# Patient Record
Sex: FEMALE | Race: WHITE | Employment: OTHER | URBAN - METROPOLITAN AREA
[De-identification: names, ages, dates, MRNs, and addresses within clinical notes are randomized per-mention and may not be internally consistent; named-entity substitution may affect disease eponyms.]

---

## 2017-02-07 ENCOUNTER — TELEPHONE (OUTPATIENT)
Dept: INTERNAL MEDICINE CLINIC | Facility: CLINIC | Age: 82
End: 2017-02-07

## 2017-02-10 NOTE — TELEPHONE ENCOUNTER
Future Appointments  Date Time Provider Tera Conrad   6/15/2017 10:30 AM Samir Lopez MD [facility]

## 2017-04-03 PROBLEM — J44.9 ASTHMA WITH COPD (CHRONIC OBSTRUCTIVE PULMONARY DISEASE): Chronic | Status: ACTIVE | Noted: 2017-04-03

## 2017-04-03 PROBLEM — J44.9 ASTHMA WITH COPD (CHRONIC OBSTRUCTIVE PULMONARY DISEASE) (HCC): Chronic | Status: ACTIVE | Noted: 2017-04-03

## 2017-04-03 PROBLEM — J44.89 ASTHMA WITH COPD (CHRONIC OBSTRUCTIVE PULMONARY DISEASE) (HCC): Chronic | Status: ACTIVE | Noted: 2017-04-03

## 2017-04-03 PROBLEM — J44.89 ASTHMA WITH COPD (CHRONIC OBSTRUCTIVE PULMONARY DISEASE): Chronic | Status: ACTIVE | Noted: 2017-04-03

## 2017-04-03 RX ORDER — ATORVASTATIN CALCIUM 10 MG/1
10 TABLET, FILM COATED ORAL DAILY
Qty: 90 TABLET | Refills: 1 | Status: SHIPPED | OUTPATIENT
Start: 2017-04-03 | End: 2017-09-21

## 2017-04-19 RX ORDER — LOSARTAN POTASSIUM AND HYDROCHLOROTHIAZIDE 12.5; 1 MG/1; MG/1
TABLET ORAL
Qty: 90 TABLET | Refills: 0 | Status: SHIPPED | OUTPATIENT
Start: 2017-04-19 | End: 2017-07-14

## 2017-05-23 ENCOUNTER — PATIENT MESSAGE (OUTPATIENT)
Dept: INTERNAL MEDICINE CLINIC | Facility: CLINIC | Age: 82
End: 2017-05-23

## 2017-05-23 DIAGNOSIS — I10 ESSENTIAL HYPERTENSION WITH GOAL BLOOD PRESSURE LESS THAN 140/90: ICD-10-CM

## 2017-05-23 DIAGNOSIS — Z00.00 ROUTINE GENERAL MEDICAL EXAMINATION AT A HEALTH CARE FACILITY: Primary | ICD-10-CM

## 2017-05-23 DIAGNOSIS — E78.00 PURE HYPERCHOLESTEROLEMIA: ICD-10-CM

## 2017-05-24 NOTE — TELEPHONE ENCOUNTER
MA supervisit on 6/15/17- await OV to discuss labs or order CPE labs per protocol? Please advise. Thank you.

## 2017-05-24 NOTE — TELEPHONE ENCOUNTER
FW: Other        From     Cathryn Crowley MD      To     [facility] Clinical Staff      Sent     5/24/2017  9:43 AM            Labs per protocol       CPE labs ordered per protocol.

## 2017-05-24 NOTE — TELEPHONE ENCOUNTER
From: Hortencia Hunter  To: Andres Barrow MD  Sent: 5/23/2017 9:28 AM CDT  Subject: Other    I am coming in for a yearly physical on Sita 15. Do I need to get blood work done before the visit.   If so please send instructions where to go and do I need an ap

## 2017-05-30 ENCOUNTER — MED REC SCAN ONLY (OUTPATIENT)
Dept: INTERNAL MEDICINE CLINIC | Facility: CLINIC | Age: 82
End: 2017-05-30

## 2017-05-31 ENCOUNTER — LAB ENCOUNTER (OUTPATIENT)
Dept: LAB | Age: 82
End: 2017-05-31
Attending: INTERNAL MEDICINE
Payer: MEDICARE

## 2017-05-31 DIAGNOSIS — I10 ESSENTIAL HYPERTENSION WITH GOAL BLOOD PRESSURE LESS THAN 140/90: ICD-10-CM

## 2017-05-31 DIAGNOSIS — E78.00 PURE HYPERCHOLESTEROLEMIA: ICD-10-CM

## 2017-05-31 DIAGNOSIS — Z00.00 ROUTINE GENERAL MEDICAL EXAMINATION AT A HEALTH CARE FACILITY: ICD-10-CM

## 2017-05-31 PROCEDURE — 84443 ASSAY THYROID STIM HORMONE: CPT | Performed by: INTERNAL MEDICINE

## 2017-05-31 PROCEDURE — 80053 COMPREHEN METABOLIC PANEL: CPT | Performed by: INTERNAL MEDICINE

## 2017-05-31 PROCEDURE — 85025 COMPLETE CBC W/AUTO DIFF WBC: CPT | Performed by: INTERNAL MEDICINE

## 2017-05-31 PROCEDURE — 80061 LIPID PANEL: CPT | Performed by: INTERNAL MEDICINE

## 2017-05-31 PROCEDURE — 36415 COLL VENOUS BLD VENIPUNCTURE: CPT | Performed by: INTERNAL MEDICINE

## 2017-06-15 ENCOUNTER — TELEPHONE (OUTPATIENT)
Dept: INTERNAL MEDICINE CLINIC | Facility: CLINIC | Age: 82
End: 2017-06-15

## 2017-06-15 ENCOUNTER — OFFICE VISIT (OUTPATIENT)
Dept: INTERNAL MEDICINE CLINIC | Facility: CLINIC | Age: 82
End: 2017-06-15

## 2017-06-15 ENCOUNTER — MA CHART PREP (OUTPATIENT)
Dept: FAMILY MEDICINE CLINIC | Facility: CLINIC | Age: 82
End: 2017-06-15

## 2017-06-15 VITALS
DIASTOLIC BLOOD PRESSURE: 60 MMHG | RESPIRATION RATE: 16 BRPM | WEIGHT: 173 LBS | BODY MASS INDEX: 29.53 KG/M2 | TEMPERATURE: 98 F | HEART RATE: 68 BPM | SYSTOLIC BLOOD PRESSURE: 118 MMHG | HEIGHT: 64 IN

## 2017-06-15 DIAGNOSIS — M48.061 SPINAL STENOSIS OF LUMBAR REGION: ICD-10-CM

## 2017-06-15 DIAGNOSIS — M43.16 SPONDYLOLISTHESIS OF LUMBAR REGION: ICD-10-CM

## 2017-06-15 DIAGNOSIS — J45.20 MILD INTERMITTENT ASTHMA WITHOUT COMPLICATION: ICD-10-CM

## 2017-06-15 DIAGNOSIS — M17.11 ARTHRITIS OF RIGHT KNEE: ICD-10-CM

## 2017-06-15 DIAGNOSIS — I10 ESSENTIAL HYPERTENSION: ICD-10-CM

## 2017-06-15 DIAGNOSIS — I77.810 AORTIC ROOT DILATION (HCC): ICD-10-CM

## 2017-06-15 DIAGNOSIS — M47.816 LUMBAR SPONDYLOSIS: ICD-10-CM

## 2017-06-15 DIAGNOSIS — Z96.651 S/P TKR (TOTAL KNEE REPLACEMENT) USING CEMENT, RIGHT: ICD-10-CM

## 2017-06-15 DIAGNOSIS — J44.9 ASTHMA WITH COPD (CHRONIC OBSTRUCTIVE PULMONARY DISEASE) (HCC): Chronic | ICD-10-CM

## 2017-06-15 DIAGNOSIS — Z00.00 MEDICARE ANNUAL WELLNESS VISIT, SUBSEQUENT: Primary | ICD-10-CM

## 2017-06-15 DIAGNOSIS — R73.02 GLUCOSE INTOLERANCE (IMPAIRED GLUCOSE TOLERANCE): ICD-10-CM

## 2017-06-15 DIAGNOSIS — R73.03 PRE-DIABETES: ICD-10-CM

## 2017-06-15 DIAGNOSIS — M47.816 LUMBAR ARTHROPATHY: ICD-10-CM

## 2017-06-15 DIAGNOSIS — E78.00 PURE HYPERCHOLESTEROLEMIA: ICD-10-CM

## 2017-06-15 DIAGNOSIS — M54.16 LUMBAR RADICULITIS: ICD-10-CM

## 2017-06-15 DIAGNOSIS — M51.36 DDD (DEGENERATIVE DISC DISEASE), LUMBAR: ICD-10-CM

## 2017-06-15 DIAGNOSIS — Z00.00 ENCOUNTER FOR ANNUAL HEALTH EXAMINATION: ICD-10-CM

## 2017-06-15 PROCEDURE — 96160 PT-FOCUSED HLTH RISK ASSMT: CPT | Performed by: INTERNAL MEDICINE

## 2017-06-15 PROCEDURE — G0439 PPPS, SUBSEQ VISIT: HCPCS | Performed by: INTERNAL MEDICINE

## 2017-06-15 PROCEDURE — 99397 PER PM REEVAL EST PAT 65+ YR: CPT | Performed by: INTERNAL MEDICINE

## 2017-06-15 RX ORDER — ZOLPIDEM TARTRATE 10 MG/1
TABLET ORAL
Qty: 30 TABLET | Refills: 0 | Status: SHIPPED | OUTPATIENT
Start: 2017-06-15 | End: 2017-06-15

## 2017-06-15 NOTE — PROGRESS NOTES
HPI:   Nicolas Goldman is a 80year old female who presents for a MA (Medicare Advantage) [facility] (Once per calendar year). Here for supervisit. Pt notes persistent right knee pain since replacement, would like second opinion.    Her last annual asses route daily. Ipratropium Bromide (ATROVENT) 0.03 % Nasal Solution 2 sprays by Nasal route every 12 (twelve) hours.    fluticasone-salmeterol (ADVAIR DISKUS) 250-50 MCG/DOSE Inhalation Aerosol Powder, Breath Activated Inhale 1 puff into the lungs every 12 illicit drugs.      REVIEW OF SYSTEMS:   GENERAL: feels well otherwise  SKIN: denies any unusual skin lesions  EYES: denies blurred vision or double vision  HEENT: denies nasal congestion, sinus pain or ST  LUNGS: denies shortness of breath with exertion  C Extremities normal, atraumatic, no cyanosis or edema   Pulses: 2+ and symmetric   Skin: Skin color, texture, turgor normal, no rashes or lesions   Lymph nodes: Cervical, supraclavicular, and axillary nodes normal   Neurologic: Normal       SUGGESTED VACCIN 10 MG Oral Tab; TAKE 1 TABLET BY MOUTH AT BEDTIME, AS NEEDED         Ms. Coy already takes aspirin and has it on her medication list.     Diet assessment: good     Advanced Directive:  Living Will on file in [facility]?   Zack Wilson does not have a Living [facility] have 3 or more medical conditions?: 0-No    Have you fallen in the last 12 months?: 1-Yes    Do you accidently lose urine?: 1-Yes    Do you have difficulty seeing?: 0-No    Do you have any difficulty walking or getting up?: 1-Yes    Do you have any trippin medically necessary Electrocardiogram date09/12/2016       Colorectal Cancer Screening      Colonoscopy Screen every 10 years Colonoscopy,10 Years due on 11/26/1987 Update Health Maintenance if applicable    Flex Sigmoidoscopy Screen every 10 years No resu history found This may be covered with your prescription benefits, but Medicare does not cover unless Medically needed    Zoster  Not covered by Medicare Part B No vaccine history found This may be covered with your pharmacy  prescription benefits        S

## 2017-06-15 NOTE — TELEPHONE ENCOUNTER
Paulie Tam Pharmacist at [facility] notified to reduce Zolpidem to 5mg tablet nightly as needed for sleep #30, no refills. New script phoned in.  AS, please approve.

## 2017-06-15 NOTE — TELEPHONE ENCOUNTER
Sheela Argueta from [facility]. called and they are questioning the dosage(high for patient's age) of the Zolpidem.   Please call

## 2017-06-15 NOTE — PATIENT INSTRUCTIONS
Helen Coy's SCREENING SCHEDULE   Tests on this list are recommended by your physician but may not be covered, or covered at this frequency, by your insurer. Please check with your insurance carrier before scheduling to verify coverage.    PREVENTATIVE Limited to patients who meet one of the following criteria:   • Men who are 73-68 years old and have smoked more than 100 cigarettes in their lifetime   • Anyone with a family history    Colorectal Cancer Screening  Covered up to Age 76     Colonoscopy Scr Immunizations      Influenza  Covered Annually   Orders placed or performed in visit on 10/15/15  -FLU VAC NO PRSV 4 ADAM 3 YRS+    Please get every year    Pneumococcal 13 (Prevnar)  Covered Once after 65   Orders placed or performed in visit on 07/06/15 [place])  [email or web address]  This link also has information from the [address] [place] [address] regarding Advance Directives.

## 2017-06-16 RX ORDER — ZOLPIDEM TARTRATE 5 MG/1
5 TABLET ORAL NIGHTLY PRN
Qty: 30 TABLET | Refills: 0 | OUTPATIENT
Start: 2017-06-16 | End: 2018-09-20

## 2017-07-14 RX ORDER — LOSARTAN POTASSIUM AND HYDROCHLOROTHIAZIDE 12.5; 1 MG/1; MG/1
TABLET ORAL
Qty: 90 TABLET | Refills: 1 | Status: SHIPPED | OUTPATIENT
Start: 2017-07-14 | End: 2018-01-10

## 2017-09-21 RX ORDER — ATORVASTATIN CALCIUM 10 MG/1
TABLET, FILM COATED ORAL
Qty: 90 TABLET | Refills: 0 | Status: SHIPPED | OUTPATIENT
Start: 2017-09-21 | End: 2017-12-19

## 2017-12-19 RX ORDER — ATORVASTATIN CALCIUM 10 MG/1
TABLET, FILM COATED ORAL
Qty: 90 TABLET | Refills: 0 | Status: SHIPPED | OUTPATIENT
Start: 2017-12-19 | End: 2018-03-17

## 2017-12-28 ENCOUNTER — OFFICE VISIT (OUTPATIENT)
Dept: INTERNAL MEDICINE CLINIC | Facility: CLINIC | Age: 82
End: 2017-12-28

## 2017-12-28 VITALS
RESPIRATION RATE: 17 BRPM | HEART RATE: 112 BPM | BODY MASS INDEX: 29 KG/M2 | DIASTOLIC BLOOD PRESSURE: 78 MMHG | SYSTOLIC BLOOD PRESSURE: 128 MMHG | WEIGHT: 170 LBS | OXYGEN SATURATION: 98 % | TEMPERATURE: 98 F

## 2017-12-28 DIAGNOSIS — J45.41 MODERATE PERSISTENT ASTHMA WITH ACUTE EXACERBATION: ICD-10-CM

## 2017-12-28 DIAGNOSIS — I10 ESSENTIAL HYPERTENSION: ICD-10-CM

## 2017-12-28 DIAGNOSIS — J06.9 ACUTE URI: Primary | ICD-10-CM

## 2017-12-28 DIAGNOSIS — R05.9 COUGH: ICD-10-CM

## 2017-12-28 DIAGNOSIS — J44.9 ASTHMA WITH COPD (CHRONIC OBSTRUCTIVE PULMONARY DISEASE) (HCC): Chronic | ICD-10-CM

## 2017-12-28 PROCEDURE — 99214 OFFICE O/P EST MOD 30 MIN: CPT | Performed by: NURSE PRACTITIONER

## 2017-12-28 RX ORDER — PREDNISONE 10 MG/1
TABLET ORAL
Qty: 30 TABLET | Refills: 0 | Status: SHIPPED | OUTPATIENT
Start: 2017-12-28 | End: 2018-04-24

## 2017-12-28 RX ORDER — SULFAMETHOXAZOLE AND TRIMETHOPRIM 800; 160 MG/1; MG/1
1 TABLET ORAL 2 TIMES DAILY
Qty: 20 TABLET | Refills: 0 | Status: SHIPPED | OUTPATIENT
Start: 2017-12-28 | End: 2018-05-18

## 2017-12-28 RX ORDER — CODEINE PHOSPHATE AND GUAIFENESIN 10; 100 MG/5ML; MG/5ML
5 SOLUTION ORAL 3 TIMES DAILY PRN
Qty: 120 ML | Refills: 0 | Status: SHIPPED | OUTPATIENT
Start: 2017-12-28 | End: 2018-04-24

## 2017-12-28 NOTE — PROGRESS NOTES
Arsen Bianchi is a [de-identified]year old female. Patient presents with:  Cough: started off as a cold; however now just has the persistant cough x 2-3 days ROOM 10       HPI:   Presents with 5 day hx of cough and cold. Cold symptoms started early in the week.   Carline Mckeon sprays by Nasal route every 12 (twelve) hours. Disp: 1 Bottle Rfl: 1   fluticasone-salmeterol (ADVAIR DISKUS) 250-50 MCG/DOSE Inhalation Aerosol Powder, Breath Activated Inhale 1 puff into the lungs every 12 (twelve) hours.  Disp: 60 each Rfl: 1   aspirin 8 abdominal pain and denies heartburn, no diarrhea or constipation  MUSCULOSKELETAL:  No arthralgias or myalgias  NEURO: denies headaches,     EXAM:   /78 (BP Location: Right arm, Patient Position: Sitting, Cuff Size: adult)   Pulse 112   Temp 98.4 °F

## 2018-01-10 RX ORDER — LOSARTAN POTASSIUM AND HYDROCHLOROTHIAZIDE 12.5; 1 MG/1; MG/1
TABLET ORAL
Qty: 90 TABLET | Refills: 0 | Status: SHIPPED | OUTPATIENT
Start: 2018-01-10 | End: 2018-04-06

## 2018-01-10 RX ORDER — ATORVASTATIN CALCIUM 10 MG/1
TABLET, FILM COATED ORAL
Qty: 90 TABLET | Refills: 0 | Status: SHIPPED | OUTPATIENT
Start: 2018-01-10 | End: 2018-03-19

## 2018-03-07 ENCOUNTER — TELEPHONE (OUTPATIENT)
Dept: INTERNAL MEDICINE CLINIC | Facility: CLINIC | Age: 83
End: 2018-03-07

## 2018-03-07 DIAGNOSIS — E78.00 HYPERCHOLESTEROLEMIA: ICD-10-CM

## 2018-03-07 DIAGNOSIS — I10 HYPERTENSION, UNSPECIFIED TYPE: Primary | ICD-10-CM

## 2018-03-07 NOTE — TELEPHONE ENCOUNTER
Patient coming in for wellness and would like to have labs done through [facility]  Please place orders thank you    Future Appointments  Date Time Provider Tera Conrad   4/24/2018 1:45 PM Clay Steele MD [facility]

## 2018-03-19 RX ORDER — ATORVASTATIN CALCIUM 10 MG/1
TABLET, FILM COATED ORAL
Qty: 90 TABLET | Refills: 0 | Status: SHIPPED | OUTPATIENT
Start: 2018-03-19 | End: 2018-07-14

## 2018-04-06 RX ORDER — LOSARTAN POTASSIUM AND HYDROCHLOROTHIAZIDE 12.5; 1 MG/1; MG/1
TABLET ORAL
Qty: 90 TABLET | Refills: 0 | Status: SHIPPED | OUTPATIENT
Start: 2018-04-06 | End: 2018-07-10

## 2018-04-17 ENCOUNTER — LAB ENCOUNTER (OUTPATIENT)
Dept: LAB | Age: 83
End: 2018-04-17
Attending: INTERNAL MEDICINE
Payer: MEDICARE

## 2018-04-17 DIAGNOSIS — I10 HYPERTENSION, UNSPECIFIED TYPE: ICD-10-CM

## 2018-04-17 DIAGNOSIS — E78.00 HYPERCHOLESTEROLEMIA: ICD-10-CM

## 2018-04-17 PROCEDURE — 85025 COMPLETE CBC W/AUTO DIFF WBC: CPT | Performed by: INTERNAL MEDICINE

## 2018-04-17 PROCEDURE — 80061 LIPID PANEL: CPT | Performed by: INTERNAL MEDICINE

## 2018-04-17 PROCEDURE — 84443 ASSAY THYROID STIM HORMONE: CPT | Performed by: INTERNAL MEDICINE

## 2018-04-17 PROCEDURE — 80053 COMPREHEN METABOLIC PANEL: CPT | Performed by: INTERNAL MEDICINE

## 2018-04-24 ENCOUNTER — OFFICE VISIT (OUTPATIENT)
Dept: INTERNAL MEDICINE CLINIC | Facility: CLINIC | Age: 83
End: 2018-04-24

## 2018-04-24 VITALS
BODY MASS INDEX: 29.02 KG/M2 | HEART RATE: 88 BPM | HEIGHT: 64 IN | WEIGHT: 170 LBS | RESPIRATION RATE: 17 BRPM | DIASTOLIC BLOOD PRESSURE: 62 MMHG | SYSTOLIC BLOOD PRESSURE: 114 MMHG | TEMPERATURE: 98 F

## 2018-04-24 DIAGNOSIS — M54.16 LUMBAR RADICULITIS: ICD-10-CM

## 2018-04-24 DIAGNOSIS — R73.03 PRE-DIABETES: ICD-10-CM

## 2018-04-24 DIAGNOSIS — M51.36 DDD (DEGENERATIVE DISC DISEASE), LUMBAR: ICD-10-CM

## 2018-04-24 DIAGNOSIS — M43.16 SPONDYLOLISTHESIS OF LUMBAR REGION: ICD-10-CM

## 2018-04-24 DIAGNOSIS — R42 DIZZINESS: ICD-10-CM

## 2018-04-24 DIAGNOSIS — J44.9 ASTHMA WITH COPD (CHRONIC OBSTRUCTIVE PULMONARY DISEASE) (HCC): Chronic | ICD-10-CM

## 2018-04-24 DIAGNOSIS — Z00.00 ENCOUNTER FOR ANNUAL HEALTH EXAMINATION: ICD-10-CM

## 2018-04-24 DIAGNOSIS — I77.810 AORTIC ROOT DILATION (HCC): ICD-10-CM

## 2018-04-24 DIAGNOSIS — R73.02 GLUCOSE INTOLERANCE (IMPAIRED GLUCOSE TOLERANCE): ICD-10-CM

## 2018-04-24 DIAGNOSIS — Z96.651 STATUS POST TOTAL RIGHT KNEE REPLACEMENT USING CEMENT: ICD-10-CM

## 2018-04-24 DIAGNOSIS — M47.816 LUMBAR SPONDYLOSIS: ICD-10-CM

## 2018-04-24 DIAGNOSIS — R42 VERTIGO: ICD-10-CM

## 2018-04-24 DIAGNOSIS — M17.11 ARTHRITIS OF RIGHT KNEE: ICD-10-CM

## 2018-04-24 DIAGNOSIS — J34.89 SINUS PRESSURE: ICD-10-CM

## 2018-04-24 DIAGNOSIS — E78.00 PURE HYPERCHOLESTEROLEMIA: ICD-10-CM

## 2018-04-24 DIAGNOSIS — M48.061 SPINAL STENOSIS OF LUMBAR REGION WITHOUT NEUROGENIC CLAUDICATION: ICD-10-CM

## 2018-04-24 DIAGNOSIS — Z00.00 MEDICARE ANNUAL WELLNESS VISIT, SUBSEQUENT: Primary | ICD-10-CM

## 2018-04-24 DIAGNOSIS — M47.816 LUMBAR ARTHROPATHY: ICD-10-CM

## 2018-04-24 DIAGNOSIS — I10 ESSENTIAL HYPERTENSION: ICD-10-CM

## 2018-04-24 DIAGNOSIS — J45.20 MILD INTERMITTENT ASTHMA WITHOUT COMPLICATION: ICD-10-CM

## 2018-04-24 PROCEDURE — 99397 PER PM REEVAL EST PAT 65+ YR: CPT | Performed by: INTERNAL MEDICINE

## 2018-04-24 PROCEDURE — 93000 ELECTROCARDIOGRAM COMPLETE: CPT | Performed by: INTERNAL MEDICINE

## 2018-04-24 PROCEDURE — 96160 PT-FOCUSED HLTH RISK ASSMT: CPT | Performed by: INTERNAL MEDICINE

## 2018-04-24 PROCEDURE — G0439 PPPS, SUBSEQ VISIT: HCPCS | Performed by: INTERNAL MEDICINE

## 2018-04-24 RX ORDER — AZITHROMYCIN 250 MG/1
TABLET, FILM COATED ORAL
Qty: 6 TABLET | Refills: 0 | Status: SHIPPED | OUTPATIENT
Start: 2018-04-24 | End: 2018-05-18

## 2018-04-24 NOTE — PATIENT INSTRUCTIONS
Maryann Coy's SCREENING SCHEDULE   Tests on this list are recommended by your physician but may not be covered, or covered at this frequency, by your insurer. Please check with your insurance carrier before scheduling to verify coverage.    PREVENTATIVE IPPE only No results found for this or any previous visit.  Limited to patients who meet one of the following criteria:   • Men who are 73-68 years old and have smoked more than 100 cigarettes in their lifetime   • Anyone with a family history    Colorectal needed after 75 There are no preventive care reminders to display for this patient.  Please get this Mammogram regularly   Immunizations      Influenza  Covered Annually   Orders placed or performed in visit on 10/15/15  -FLU VAC NO PRSV 4 ADAM 3 YRS+    Ple review and print using their home computer and printer. (the forms are also available in [facility])  [email or web address]  This link also has information from the [facility] regarding Advance Directives.

## 2018-04-24 NOTE — PROGRESS NOTES
HPI:   Laura Cooper is a [de-identified]year old female who presents for a MA (Medicare Advantage) [facility] (Once per calendar year). Here for supervisit. Notes intermittent dizziness for 6 mos. No falls.  Worse in am, room spinning sensation a few times per mo screening   Arsen Bianchi was screened for Alcohol abuse and had a score of 0 so is at low risk.     Patient Care Team: Patient Care Team:  Ti Irving MD as PCP - General    Patient Active Problem List:     Pure hypercholesterolemia     Essential hype Aerosol Powder, Breath Activated Inhale 1 puff into the lungs every 12 (twelve) hours. aspirin 81 MG Oral Tab Take 81 mg by mouth daily. CRANBERRY EXTRACT OR Take 1 tablet by mouth every other day.    Albuterol Sulfate HFA (PROAIR HFA) 108 (90 BASE) MCG (Oral)   Resp 17   Ht 64\"   Wt 170 lb   BMI 29.18 kg/m²  Estimated body mass index is 29.18 kg/m² as calculated from the following:    Height as of this encounter: 64\". Weight as of this encounter: 170 lb.     Medicare Hearing Assessment  (Required for 11/10/2012, 09/21/2014   • Influenza Vaccine, High Dose, Preserv Free 09/25/2017   • Pneumococcal (Prevnar 13) 07/06/2015   • Pneumococcal (Prevnar 7) 10/05/2009   • Pneumovax 23 10/05/2009   • TDAP 06/13/2016   • Zoster Vaccine Live (Zostavax) 04/01/2007 Future  -     CT BRAIN OR HEAD (78623); Future  r/o cardiac cause, get brain imaging, f/u in 2 weeks  Encounter for annual health examination    Other orders  -     azithromycin (ZITHROMAX Z-KATHARINA) 250 MG Oral Tab;  Take two tablets by mouth today, then one t for this or any previous visit. No flowsheet data found. Fecal Occult Blood Annually No results found for: FOB No flowsheet data found.     Glaucoma Screening      Ophthalmology Visit Annually: Diabetics, FHx Glaucoma, AA>50, > 65 No flowsheet d SPECIFIC DISEASE MONITORING Internal Lab or Procedure External Lab or Procedure      Annual Monitoring of Persistent     Medications (ACE/ARB, digoxin diuretics, anticonvulsants.)    Potassium  Annually Potassium (mmol/L)   Date Value   04/17/2018 4.0

## 2018-04-25 ENCOUNTER — HOSPITAL ENCOUNTER (OUTPATIENT)
Dept: ULTRASOUND IMAGING | Facility: HOSPITAL | Age: 83
Discharge: HOME | End: 2018-04-25
Attending: INTERNAL MEDICINE
Payer: MEDICARE

## 2018-04-25 ENCOUNTER — TELEPHONE (OUTPATIENT)
Dept: INTERNAL MEDICINE CLINIC | Facility: CLINIC | Age: 83
End: 2018-04-25

## 2018-04-25 ENCOUNTER — HOSPITAL ENCOUNTER (OUTPATIENT)
Dept: CT IMAGING | Facility: HOSPITAL | Age: 83
Discharge: HOME | End: 2018-04-25
Attending: INTERNAL MEDICINE
Payer: MEDICARE

## 2018-04-25 DIAGNOSIS — R42 DIZZINESS: ICD-10-CM

## 2018-04-25 DIAGNOSIS — I10 ESSENTIAL HYPERTENSION: ICD-10-CM

## 2018-04-25 PROCEDURE — 70450 CT HEAD/BRAIN W/O DYE: CPT | Performed by: INTERNAL MEDICINE

## 2018-04-25 PROCEDURE — 93880 EXTRACRANIAL BILAT STUDY: CPT | Performed by: INTERNAL MEDICINE

## 2018-04-27 ENCOUNTER — OFFICE VISIT (OUTPATIENT)
Dept: INTERNAL MEDICINE CLINIC | Facility: CLINIC | Age: 83
End: 2018-04-27

## 2018-04-27 VITALS
SYSTOLIC BLOOD PRESSURE: 142 MMHG | TEMPERATURE: 98 F | RESPIRATION RATE: 14 BRPM | BODY MASS INDEX: 28.85 KG/M2 | DIASTOLIC BLOOD PRESSURE: 80 MMHG | WEIGHT: 169 LBS | HEIGHT: 64 IN | HEART RATE: 74 BPM

## 2018-04-27 DIAGNOSIS — R30.0 DYSURIA: Primary | ICD-10-CM

## 2018-04-27 DIAGNOSIS — R35.0 FREQUENCY OF URINATION: ICD-10-CM

## 2018-04-27 PROCEDURE — 87086 URINE CULTURE/COLONY COUNT: CPT | Performed by: NURSE PRACTITIONER

## 2018-04-27 PROCEDURE — 99213 OFFICE O/P EST LOW 20 MIN: CPT | Performed by: NURSE PRACTITIONER

## 2018-04-27 PROCEDURE — 87186 SC STD MICRODIL/AGAR DIL: CPT | Performed by: NURSE PRACTITIONER

## 2018-04-27 PROCEDURE — 87077 CULTURE AEROBIC IDENTIFY: CPT | Performed by: NURSE PRACTITIONER

## 2018-04-27 PROCEDURE — 81003 URINALYSIS AUTO W/O SCOPE: CPT | Performed by: NURSE PRACTITIONER

## 2018-04-27 RX ORDER — CIPROFLOXACIN 500 MG/1
500 TABLET, FILM COATED ORAL 2 TIMES DAILY
Qty: 6 TABLET | Refills: 0 | Status: SHIPPED | OUTPATIENT
Start: 2018-04-27 | End: 2018-04-30

## 2018-04-27 NOTE — PROGRESS NOTES
Patient presents with:  Bladder Problem: AB RM 7, pt states having uti X 5 days, pt states having chills and bodyaches       HPI:  Presents with approx 5 day history of dysuria, frequency, chills w/o fever, body aches, fatigue and low back ache.  Denies hem Take 1 tablet (5 mg total) by mouth nightly as needed for Sleep. Disp: 30 tablet Rfl: 0   Ipratropium Bromide (ATROVENT) 0.03 % Nasal Solution 2 sprays by Nasal route every 12 (twelve) hours.  Disp: 1 Bottle Rfl: 1   fluticasone-salmeterol (ADVAIR DISKUS) 2 Tab 6 tablet 0      Sig: Take 1 tablet (500 mg total) by mouth 2 (two) times daily. Imaging & Consults:  None    No Follow-up on file. There are no Patient Instructions on file for this visit.     All questions were answered and the patient under

## 2018-05-03 ENCOUNTER — HOSPITAL ENCOUNTER (OUTPATIENT)
Dept: CV DIAGNOSTICS | Facility: HOSPITAL | Age: 83
Discharge: HOME | End: 2018-05-03
Attending: INTERNAL MEDICINE
Payer: MEDICARE

## 2018-05-03 DIAGNOSIS — R42 DIZZINESS: ICD-10-CM

## 2018-05-03 DIAGNOSIS — I10 ESSENTIAL HYPERTENSION: ICD-10-CM

## 2018-05-03 PROCEDURE — 93225 XTRNL ECG REC<48 HRS REC: CPT | Performed by: INTERNAL MEDICINE

## 2018-05-03 PROCEDURE — 93226 XTRNL ECG REC<48 HR SCAN A/R: CPT | Performed by: INTERNAL MEDICINE

## 2018-05-03 PROCEDURE — 93306 TTE W/DOPPLER COMPLETE: CPT | Performed by: INTERNAL MEDICINE

## 2018-05-03 PROCEDURE — 93227 XTRNL ECG REC<48 HR R&I: CPT | Performed by: INTERNAL MEDICINE

## 2018-05-18 ENCOUNTER — LAB ENCOUNTER (OUTPATIENT)
Dept: LAB | Age: 83
End: 2018-05-18
Attending: INTERNAL MEDICINE
Payer: MEDICARE

## 2018-05-18 ENCOUNTER — OFFICE VISIT (OUTPATIENT)
Dept: INTERNAL MEDICINE CLINIC | Facility: CLINIC | Age: 83
End: 2018-05-18

## 2018-05-18 VITALS
HEART RATE: 80 BPM | BODY MASS INDEX: 29 KG/M2 | TEMPERATURE: 99 F | SYSTOLIC BLOOD PRESSURE: 120 MMHG | WEIGHT: 170 LBS | DIASTOLIC BLOOD PRESSURE: 80 MMHG | RESPIRATION RATE: 16 BRPM

## 2018-05-18 DIAGNOSIS — R35.0 URINARY FREQUENCY: ICD-10-CM

## 2018-05-18 DIAGNOSIS — R42 DIZZINESS: Primary | ICD-10-CM

## 2018-05-18 DIAGNOSIS — R06.83 SNORING: ICD-10-CM

## 2018-05-18 DIAGNOSIS — M54.2 NECK PAIN: ICD-10-CM

## 2018-05-18 PROCEDURE — 81001 URINALYSIS AUTO W/SCOPE: CPT

## 2018-05-18 PROCEDURE — 99214 OFFICE O/P EST MOD 30 MIN: CPT | Performed by: INTERNAL MEDICINE

## 2018-05-18 PROCEDURE — 87086 URINE CULTURE/COLONY COUNT: CPT

## 2018-05-18 NOTE — PROGRESS NOTES
Patient presents with: Follow - Up: Room 8 AH- Per patient following up on dizziness . Test results, still not feeling 100% right       HPI:  Here for f/u dizziness. Pt has improved, neg ct brain, echo, holter, labs.  Pt still with mild dizziiness in am, f MCG/DOSE Inhalation Aerosol Powder, Breath Activated Inhale 1 puff into the lungs every 12 (twelve) hours. Disp: 60 each Rfl: 1   aspirin 81 MG Oral Tab Take 81 mg by mouth daily. Disp:  Rfl:    CRANBERRY EXTRACT OR Take 1 tablet by mouth every other day. STUDY  NEURO - INTERNAL  MRI BRAIN (CPT=70551)  XR CERVICAL SPINE (4VIEWS) (CPT=72050)    Return in about 4 weeks (around 6/15/2018). There are no Patient Instructions on file for this visit.     All questions were answered and the patient understands the

## 2018-05-21 ENCOUNTER — HOSPITAL ENCOUNTER (OUTPATIENT)
Dept: GENERAL RADIOLOGY | Facility: HOSPITAL | Age: 83
Discharge: HOME | End: 2018-05-21
Attending: INTERNAL MEDICINE
Payer: MEDICARE

## 2018-05-21 ENCOUNTER — HOSPITAL ENCOUNTER (OUTPATIENT)
Dept: PHYSICAL THERAPY | Facility: HOSPITAL | Age: 83
Setting detail: THERAPIES SERIES
Discharge: HOME | End: 2018-05-21
Attending: INTERNAL MEDICINE
Payer: MEDICARE

## 2018-05-21 DIAGNOSIS — M54.2 NECK PAIN: ICD-10-CM

## 2018-05-21 DIAGNOSIS — R42 DIZZINESS: ICD-10-CM

## 2018-05-21 PROCEDURE — 97162 PT EVAL MOD COMPLEX 30 MIN: CPT

## 2018-05-21 PROCEDURE — 97110 THERAPEUTIC EXERCISES: CPT

## 2018-05-21 PROCEDURE — 72050 X-RAY EXAM NECK SPINE 4/5VWS: CPT | Performed by: INTERNAL MEDICINE

## 2018-05-21 PROCEDURE — 95992 CANALITH REPOSITIONING PROC: CPT

## 2018-05-21 NOTE — PROGRESS NOTES
SPINE AND VESTIBULAR EVALUATION:   Referring Physician: Dr. Bonnie Beal  Diagnosis: Dizziness, Neck pain   Date of Service: 5/21/2018     PATIENT Nayeli Martinez is a [de-identified]year old female who presents to therapy today with complaints of 6 month epis the biceps. Describes as dull and achy with neck feeling \"gravelly\" when she performs rotation. Takes Advil and uses heat/ice for pain control. Pain ranges from 0-7/10, average is 6-7/10, current is 4-5/10.  Dizziness described as room spinning with rolli notably improved, much less foggy and more clear-headed. R-sided cervical pain recreated with rotation towards R and SB towards R, possible closing dysfunction and or nerve root involvement at C5; diminished pain with distraction.  In agreement with clinica distraction: relief of pain  Spurling's Test: positive R    Occulomotor & Vestibulo-Ocular Exam:  Spontaneous Nystagmus: Negative   Smooth Pursuit: WNL   Saccades: Difficulty performing adequate/quick to R  Gaze Evoked Nystagmus: NT  Head Thrust: Glenis Brown over her shoulders when driving. 3. Patient will score at least 22/24 on the DGI to be considered low risk for falls and improve ability to participate in community ambulation.    4. Patient will improve SLS to at least 15 sec ZURI to dec risk for falls on

## 2018-05-23 ENCOUNTER — HOSPITAL ENCOUNTER (OUTPATIENT)
Dept: PHYSICAL THERAPY | Facility: HOSPITAL | Age: 83
Setting detail: THERAPIES SERIES
Discharge: HOME | End: 2018-05-23
Attending: INTERNAL MEDICINE
Payer: MEDICARE

## 2018-05-23 PROCEDURE — 97110 THERAPEUTIC EXERCISES: CPT

## 2018-05-23 PROCEDURE — 97140 MANUAL THERAPY 1/> REGIONS: CPT

## 2018-05-23 NOTE — PROGRESS NOTES
Dx: Dizziness, Neck pain           Authorized # of Visits:  10 requested (Medicare)         Next MD visit: none scheduled  Fall Risk: Moderate         Precautions: Fall Risk            Subjective: Patient reports that since last session, she has felt great completed in 10 visits)  1. Patient will report improved ability to sleep through the night without waking due to cervical/thoracic pain. - progress  2.  Patient will improve cervical rotation AROM to 70 deg ZURI without pain to improve ability to look over

## 2018-05-24 NOTE — ADDENDUM NOTE
Encounter addended by: Edgardo Chávez PT on: 5/24/2018  7:33 AM<BR>    Actions taken: Sign clinical note

## 2018-05-30 ENCOUNTER — HOSPITAL ENCOUNTER (OUTPATIENT)
Dept: PHYSICAL THERAPY | Facility: HOSPITAL | Age: 83
Setting detail: THERAPIES SERIES
Discharge: HOME | End: 2018-05-30
Attending: INTERNAL MEDICINE
Payer: MEDICARE

## 2018-05-30 PROCEDURE — 97110 THERAPEUTIC EXERCISES: CPT

## 2018-05-30 PROCEDURE — 97140 MANUAL THERAPY 1/> REGIONS: CPT

## 2018-05-30 NOTE — PROGRESS NOTES
Dx: Dizziness, Neck pain           Authorized # of Visits:  10 requested (Medicare)         Next MD visit: none scheduled  Fall Risk: Moderate         Precautions: Fall Risk            Subjective: Patient reports that overall, she has continued to feel imp tolerance. Discussed future POC for addressing balance. Goals: (To be completed in 10 visits)  1. Patient will report improved ability to sleep through the night without waking due to cervical/thoracic pain. - progress  2.  Patient will improve cervic II/III x 3 min        - Seated extension over chair, varying levels throughout session        Skilled Services: Manual therapy to improve mobility and decrease pain. Therex progression within tolerance, revised HEP.    HEP: supine cervical retraction, cervi

## 2018-06-04 ENCOUNTER — HOSPITAL ENCOUNTER (OUTPATIENT)
Dept: PHYSICAL THERAPY | Facility: HOSPITAL | Age: 83
Setting detail: THERAPIES SERIES
Discharge: HOME | End: 2018-06-04
Attending: INTERNAL MEDICINE
Payer: MEDICARE

## 2018-06-04 PROCEDURE — 97112 NEUROMUSCULAR REEDUCATION: CPT

## 2018-06-04 PROCEDURE — 97110 THERAPEUTIC EXERCISES: CPT

## 2018-06-04 PROCEDURE — 97140 MANUAL THERAPY 1/> REGIONS: CPT

## 2018-06-04 NOTE — PROGRESS NOTES
Dx: Dizziness, Neck pain           Authorized # of Visits:  10 requested (Medicare)         Next MD visit: none scheduled  Fall Risk: Moderate         Precautions: Fall Risk            Subjective: Patient reports she has been feeling great since last visit falls and improve ability to participate in community ambulation. - progress  4. Patient will improve SLS to at least 15 sec ZURI to dec risk for falls on uneven surfaces. - progress  5.  Patient will be independent and compliant in HEP to maintain progress - Seated extension over chair, varying levels throughout session Seated extension over chair, 2 levels x 10 ea  - corner pec stretch 3 x 20s       Skilled Services: Manual therapy to improve mobility and decrease pain.  Therex progression within tolerance

## 2018-06-05 ENCOUNTER — MED REC SCAN ONLY (OUTPATIENT)
Dept: INTERNAL MEDICINE CLINIC | Facility: CLINIC | Age: 83
End: 2018-06-05

## 2018-06-06 ENCOUNTER — HOSPITAL ENCOUNTER (OUTPATIENT)
Dept: PHYSICAL THERAPY | Facility: HOSPITAL | Age: 83
Setting detail: THERAPIES SERIES
Discharge: HOME | End: 2018-06-06
Attending: INTERNAL MEDICINE
Payer: MEDICARE

## 2018-06-06 PROCEDURE — 97110 THERAPEUTIC EXERCISES: CPT

## 2018-06-06 PROCEDURE — 97140 MANUAL THERAPY 1/> REGIONS: CPT

## 2018-06-06 PROCEDURE — 97112 NEUROMUSCULAR REEDUCATION: CPT

## 2018-06-06 NOTE — PROGRESS NOTES
Dx: Dizziness, Neck pain           Authorized # of Visits:  10 requested (Medicare)         Next MD visit: none scheduled  Fall Risk: Moderate         Precautions: Fall Risk            Subjective: Patient reports that she has been feeling much better over report improved ability to sleep through the night without waking due to cervical/thoracic pain. - progress  2.  Patient will improve cervical rotation AROM to 70 deg ZURI without pain to improve ability to look over her shoulders when driving. - progress  3 min - cervical distraction 3 x 30s  - SO STM x 3 min  - UT STM x 2 min      - cervical P/AA/A ROM, rotation, SB x 6 min  - cervical side glides gr II/III x 5 min - cervical P/AA/A ROM, rotation, SB x 5 min  - cervical side glides gr II/III x 3 min - cervic

## 2018-06-11 ENCOUNTER — HOSPITAL ENCOUNTER (OUTPATIENT)
Dept: PHYSICAL THERAPY | Facility: HOSPITAL | Age: 83
Setting detail: THERAPIES SERIES
Discharge: HOME | End: 2018-06-11
Attending: INTERNAL MEDICINE
Payer: MEDICARE

## 2018-06-11 PROCEDURE — 97140 MANUAL THERAPY 1/> REGIONS: CPT

## 2018-06-11 PROCEDURE — 97110 THERAPEUTIC EXERCISES: CPT

## 2018-06-11 NOTE — PROGRESS NOTES
Progress/Discharge Summary  Dx: Dizziness, Neck pain           Authorized # of Visits:  10 requested (Medicare)         Next MD visit: none scheduled  Fall Risk: Moderate         Precautions: Fall Risk          Pt has attended 6, cancelled 0, and no shown sec   - Modified Tandem:  >30 sec ZURI with min sway   - Tandem Stance: R back >30 sec; L back > 30 sec  Fall Risk: no    - SLS: R 13 sec, L 15 sec Fall Risk: no  [Full tandem stance <10 sec indicates increased risk of falling]  Age appropriate norms for SL of these findings, precautions, and treatment options and has agreed to actively participate in planning and for this course of care. Thank you for your referral. If you have any questions, please contact me at Dept: [phone].     Sincerely,  Electro gr II/III x 5 min - cervical P/AA/A ROM, rotation, SB x 5 min  - cervical side glides gr II/III x 3 min - cervical P/AA/A ROM, rotation, SB x 5 min  - cervical side glides gr II/III x 3 min - cervical P/AA/A ROM, rotation, SB x 3 min - cervical P/AA/A ROM,

## 2018-06-13 ENCOUNTER — APPOINTMENT (OUTPATIENT)
Dept: PHYSICAL THERAPY | Facility: HOSPITAL | Age: 83
End: 2018-06-13
Attending: INTERNAL MEDICINE
Payer: MEDICARE

## 2018-06-25 ENCOUNTER — APPOINTMENT (OUTPATIENT)
Dept: PHYSICAL THERAPY | Facility: HOSPITAL | Age: 83
End: 2018-06-25
Attending: INTERNAL MEDICINE
Payer: MEDICARE

## 2018-06-27 ENCOUNTER — APPOINTMENT (OUTPATIENT)
Dept: PHYSICAL THERAPY | Facility: HOSPITAL | Age: 83
End: 2018-06-27
Attending: INTERNAL MEDICINE
Payer: MEDICARE

## 2018-07-11 RX ORDER — LOSARTAN POTASSIUM AND HYDROCHLOROTHIAZIDE 12.5; 1 MG/1; MG/1
TABLET ORAL
Qty: 90 TABLET | Refills: 0 | Status: SHIPPED | OUTPATIENT
Start: 2018-07-11 | End: 2019-01-08

## 2018-07-16 RX ORDER — LOSARTAN POTASSIUM AND HYDROCHLOROTHIAZIDE 12.5; 1 MG/1; MG/1
TABLET ORAL
Qty: 90 TABLET | Refills: 0 | Status: SHIPPED | OUTPATIENT
Start: 2018-07-16 | End: 2018-08-28

## 2018-07-16 RX ORDER — ATORVASTATIN CALCIUM 10 MG/1
TABLET, FILM COATED ORAL
Qty: 90 TABLET | Refills: 0 | Status: SHIPPED | OUTPATIENT
Start: 2018-07-16 | End: 2018-10-12

## 2018-08-22 ENCOUNTER — OFFICE VISIT (OUTPATIENT)
Dept: INTERNAL MEDICINE CLINIC | Facility: CLINIC | Age: 83
End: 2018-08-22
Payer: COMMERCIAL

## 2018-08-22 VITALS
DIASTOLIC BLOOD PRESSURE: 62 MMHG | RESPIRATION RATE: 14 BRPM | TEMPERATURE: 99 F | WEIGHT: 169 LBS | OXYGEN SATURATION: 98 % | HEIGHT: 64 IN | SYSTOLIC BLOOD PRESSURE: 130 MMHG | BODY MASS INDEX: 28.85 KG/M2 | HEART RATE: 88 BPM

## 2018-08-22 DIAGNOSIS — G47.00 INSOMNIA, UNSPECIFIED TYPE: ICD-10-CM

## 2018-08-22 DIAGNOSIS — R06.83 SNORING: ICD-10-CM

## 2018-08-22 DIAGNOSIS — J45.21 MILD INTERMITTENT ASTHMA WITH EXACERBATION: Primary | ICD-10-CM

## 2018-08-22 PROBLEM — B35.1 PAIN DUE TO ONYCHOMYCOSIS OF TOENAIL: Status: ACTIVE | Noted: 2017-08-17

## 2018-08-22 PROBLEM — M79.676 PAIN DUE TO ONYCHOMYCOSIS OF TOENAIL: Status: ACTIVE | Noted: 2017-08-17

## 2018-08-22 PROCEDURE — 99213 OFFICE O/P EST LOW 20 MIN: CPT | Performed by: PHYSICIAN ASSISTANT

## 2018-08-22 RX ORDER — FLUTICASONE PROPIONATE AND SALMETEROL 250; 50 UG/1; UG/1
1 POWDER RESPIRATORY (INHALATION) EVERY 12 HOURS SCHEDULED
Qty: 60 EACH | Refills: 1 | Status: SHIPPED | OUTPATIENT
Start: 2018-08-22 | End: 2018-11-05 | Stop reason: ALTCHOICE

## 2018-08-22 RX ORDER — PREDNISONE 10 MG/1
TABLET ORAL
Qty: 30 TABLET | Refills: 0 | Status: SHIPPED | OUTPATIENT
Start: 2018-08-22 | End: 2018-09-05 | Stop reason: ALTCHOICE

## 2018-08-22 RX ORDER — SULFAMETHOXAZOLE AND TRIMETHOPRIM 800; 160 MG/1; MG/1
1 TABLET ORAL 2 TIMES DAILY
Qty: 14 TABLET | Refills: 0 | Status: SHIPPED | OUTPATIENT
Start: 2018-08-22 | End: 2018-08-29

## 2018-08-22 RX ORDER — ALBUTEROL SULFATE 90 UG/1
2 AEROSOL, METERED RESPIRATORY (INHALATION) EVERY 4 HOURS PRN
Qty: 1 INHALER | Refills: 11 | Status: SHIPPED | OUTPATIENT
Start: 2018-08-22 | End: 2021-07-08

## 2018-08-22 NOTE — PROGRESS NOTES
Patient presents with:  Cough: AB RM 2, Patient states having bad cough, chest congestion  X 1-2 days   Medication Request: Patient states cost $50 for Zolpidem Tartrate and wondering if she can try something different       HPI:  Pt presents with c/o wors cause     Edema     Female climacteric state     Musculoskeletal fibromatosis     Encounter for routine gynecological examination     History of asthma     Neoplasm of uncertain behavior of skin     Onychocryptosis     Other seborrheic keratosis     Pain d Physical Exam  /62   Pulse 88   Temp 98.5 °F (36.9 °C) (Oral)   Resp 14   Ht 64\"   Wt 169 lb   SpO2 98%   BMI 29.01 kg/m²   Constitutional:  No distress. HEENT:  Normocephalic and atraumatic.  Tympanic membranes normal.  Nose normal. Serge Kins then 30 mg for 3 days, then 20 mg daily for 3 days, then 10 mg for 3 days then stop. Imaging & Consults:  None    Return if symptoms worsen or fail to improve. There are no Patient Instructions on file for this visit.     All questions were answe

## 2018-08-27 ENCOUNTER — TELEPHONE (OUTPATIENT)
Dept: INTERNAL MEDICINE CLINIC | Facility: CLINIC | Age: 83
End: 2018-08-27

## 2018-08-27 NOTE — TELEPHONE ENCOUNTER
Pt was seen by CB on 8/22-not any better-still has bad cough and SOB-will finish meds tomorrow and is still on steroids-call to advise

## 2018-08-27 NOTE — TELEPHONE ENCOUNTER
Spoke with patient stating continues to have cough, SOB, occasional Headaches, x1 week fevers ranging from 99.8F and 101.0F, pt. Will finish steroid course tomorrow but does not feel relief even after taking antibiotic as well.  No chest pain, No nausea, no

## 2018-08-28 ENCOUNTER — OFFICE VISIT (OUTPATIENT)
Dept: INTERNAL MEDICINE CLINIC | Facility: CLINIC | Age: 83
End: 2018-08-28
Payer: COMMERCIAL

## 2018-08-28 ENCOUNTER — HOSPITAL ENCOUNTER (OUTPATIENT)
Dept: GENERAL RADIOLOGY | Facility: HOSPITAL | Age: 83
Discharge: HOME | End: 2018-08-28
Attending: INTERNAL MEDICINE
Payer: MEDICARE

## 2018-08-28 VITALS
HEART RATE: 82 BPM | BODY MASS INDEX: 28.57 KG/M2 | HEIGHT: 64 IN | OXYGEN SATURATION: 98 % | SYSTOLIC BLOOD PRESSURE: 142 MMHG | DIASTOLIC BLOOD PRESSURE: 82 MMHG | WEIGHT: 167.38 LBS | RESPIRATION RATE: 14 BRPM | TEMPERATURE: 98 F

## 2018-08-28 DIAGNOSIS — J45.21 MILD INTERMITTENT ASTHMA WITH EXACERBATION: Primary | ICD-10-CM

## 2018-08-28 DIAGNOSIS — R06.02 SHORTNESS OF BREATH: ICD-10-CM

## 2018-08-28 DIAGNOSIS — J45.21 MILD INTERMITTENT ASTHMA WITH EXACERBATION: ICD-10-CM

## 2018-08-28 PROCEDURE — 99213 OFFICE O/P EST LOW 20 MIN: CPT | Performed by: INTERNAL MEDICINE

## 2018-08-28 PROCEDURE — 71046 X-RAY EXAM CHEST 2 VIEWS: CPT | Performed by: INTERNAL MEDICINE

## 2018-08-28 PROCEDURE — 94640 AIRWAY INHALATION TREATMENT: CPT | Performed by: INTERNAL MEDICINE

## 2018-08-28 RX ORDER — ALBUTEROL SULFATE 1.5 MG/3ML
1.25 SOLUTION RESPIRATORY (INHALATION) ONCE
Status: DISCONTINUED | OUTPATIENT
Start: 2018-08-28 | End: 2018-08-28

## 2018-08-28 RX ORDER — ALBUTEROL SULFATE 2.5 MG/3ML
2.5 SOLUTION RESPIRATORY (INHALATION) ONCE
Status: COMPLETED | OUTPATIENT
Start: 2018-08-28 | End: 2018-08-28

## 2018-08-28 RX ADMIN — ALBUTEROL SULFATE 2.5 MG: 2.5 SOLUTION RESPIRATORY (INHALATION) at 11:49:00

## 2018-08-28 NOTE — PROGRESS NOTES
Yue Weems Zbigniew  11/26/1935    Patient presents with:  Cough: cn room 14: was seen a week ago by tima OREILLY given pt developed a cough and is having trouble breathing, low grade temp yesterday .        Mercedez Perry is a [de-identified]year old female who Inhaler Rfl: 11   predniSONE 10 MG Oral Tab Take 40 mg for 3 days, then 30 mg for 3 days, then 20 mg daily for 3 days, then 10 mg for 3 days then stop.  Disp: 30 tablet Rfl: 0   ATORVASTATIN 10 MG Oral Tab TAKE 1 TABLET BY MOUTH ONE TIME A DAY  Disp: 90 tab due to unspecified cause     Edema     Female climacteric state     Musculoskeletal fibromatosis     Encounter for routine gynecological examination     History of asthma     Neoplasm of uncertain behavior of skin     Onychocryptosis     Other seborrheic k 97.6 °F (36.4 °C) (Oral)   Resp 14   Ht 64\"   Wt 167 lb 6.4 oz   SpO2 98%   BMI 28.73 kg/m²   Constitutional: Oriented to person, place, and time. No distress. HEENT:  Normocephalic and atraumatic.   Cardiovascular: Normal rate, regular rhythm and intact

## 2018-09-20 ENCOUNTER — OFFICE VISIT (OUTPATIENT)
Dept: INTERNAL MEDICINE CLINIC | Facility: CLINIC | Age: 83
End: 2018-09-20
Payer: COMMERCIAL

## 2018-09-20 VITALS
SYSTOLIC BLOOD PRESSURE: 136 MMHG | HEART RATE: 96 BPM | WEIGHT: 169 LBS | RESPIRATION RATE: 20 BRPM | DIASTOLIC BLOOD PRESSURE: 68 MMHG | TEMPERATURE: 98 F | HEIGHT: 64 IN | OXYGEN SATURATION: 98 % | BODY MASS INDEX: 28.85 KG/M2

## 2018-09-20 DIAGNOSIS — J44.9 ASTHMA WITH COPD (CHRONIC OBSTRUCTIVE PULMONARY DISEASE) (HCC): ICD-10-CM

## 2018-09-20 DIAGNOSIS — J01.00 ACUTE NON-RECURRENT MAXILLARY SINUSITIS: Primary | ICD-10-CM

## 2018-09-20 DIAGNOSIS — Z88.0 PENICILLIN ALLERGY: ICD-10-CM

## 2018-09-20 PROCEDURE — 99213 OFFICE O/P EST LOW 20 MIN: CPT | Performed by: PHYSICIAN ASSISTANT

## 2018-09-20 RX ORDER — CLARITHROMYCIN 500 MG/1
500 TABLET, COATED ORAL 2 TIMES DAILY
Qty: 28 TABLET | Refills: 0 | Status: SHIPPED | OUTPATIENT
Start: 2018-09-20 | End: 2018-10-04

## 2018-09-20 NOTE — PROGRESS NOTES
HPI:   Fletcher To is a [de-identified]year old female who presents for upper respiratory symptoms for  2  weeks. Patient reports congestion, dry cough, sinus pain, denies fever. Pt had recent asthma exac. That is when the cough started.   She feels her asthma is Rfl: 11   Ipratropium Bromide (ATROVENT) 0.03 % Nasal Solution 2 sprays by Nasal route every 12 (twelve) hours.  Disp: 1 Bottle Rfl: 1      Past Medical History:  No date: ASTHMA  No date: Esophageal reflux  No date: Extrinsic asthma, unspecified  No date: MANAGEMENT  2/3/2014: LUMBAR EPIDURAL; N/A      Comment:  Performed by Ajit Sandhu MD at [address] [place] [address],[address]  No date: OTHER SURGICAL HISTORY      Comment:  arthroscopy knee  No date: TONSILLECTOMY   Family History improving. Definitely better than when I saw her 5 weeks ago. Compliant with meds. No orders of the defined types were placed in this encounter.       Meds & Refills for this Visit:  Requested Prescriptions     Signed Prescriptions Disp Refills   • c

## 2018-10-10 ENCOUNTER — OFFICE VISIT (OUTPATIENT)
Dept: INTERNAL MEDICINE CLINIC | Facility: CLINIC | Age: 83
End: 2018-10-10
Payer: COMMERCIAL

## 2018-10-10 ENCOUNTER — TELEPHONE (OUTPATIENT)
Dept: INTERNAL MEDICINE CLINIC | Facility: CLINIC | Age: 83
End: 2018-10-10

## 2018-10-10 VITALS
SYSTOLIC BLOOD PRESSURE: 116 MMHG | WEIGHT: 169 LBS | BODY MASS INDEX: 28.85 KG/M2 | DIASTOLIC BLOOD PRESSURE: 70 MMHG | HEIGHT: 64 IN | HEART RATE: 86 BPM | RESPIRATION RATE: 20 BRPM | OXYGEN SATURATION: 98 % | TEMPERATURE: 98 F

## 2018-10-10 DIAGNOSIS — Z23 NEED FOR INFLUENZA VACCINATION: ICD-10-CM

## 2018-10-10 DIAGNOSIS — L30.9 ECZEMA, UNSPECIFIED TYPE: ICD-10-CM

## 2018-10-10 DIAGNOSIS — J44.9 ASTHMA WITH COPD (CHRONIC OBSTRUCTIVE PULMONARY DISEASE) (HCC): Primary | ICD-10-CM

## 2018-10-10 DIAGNOSIS — J01.00 ACUTE NON-RECURRENT MAXILLARY SINUSITIS: ICD-10-CM

## 2018-10-10 DIAGNOSIS — Z23 NEED FOR SHINGLES VACCINE: ICD-10-CM

## 2018-10-10 DIAGNOSIS — R13.10 DYSPHAGIA, UNSPECIFIED TYPE: ICD-10-CM

## 2018-10-10 PROCEDURE — G0008 ADMIN INFLUENZA VIRUS VAC: HCPCS | Performed by: PHYSICIAN ASSISTANT

## 2018-10-10 PROCEDURE — 90472 IMMUNIZATION ADMIN EACH ADD: CPT | Performed by: PHYSICIAN ASSISTANT

## 2018-10-10 PROCEDURE — 99214 OFFICE O/P EST MOD 30 MIN: CPT | Performed by: PHYSICIAN ASSISTANT

## 2018-10-10 PROCEDURE — 90653 IIV ADJUVANT VACCINE IM: CPT | Performed by: PHYSICIAN ASSISTANT

## 2018-10-10 PROCEDURE — 90750 HZV VACC RECOMBINANT IM: CPT | Performed by: PHYSICIAN ASSISTANT

## 2018-10-10 RX ORDER — MOMETASONE FUROATE 1 MG/G
1 OINTMENT TOPICAL DAILY
Qty: 15 G | Refills: 1 | Status: SHIPPED | OUTPATIENT
Start: 2018-10-10

## 2018-10-10 NOTE — TELEPHONE ENCOUNTER
Zoster Vaccine Recombinant Adjuvanted (Shingrix) 10/10/2018     #2 Shingrix pended for your approval.  Thank you.

## 2018-10-10 NOTE — PROGRESS NOTES
Patient presents with:  Cough: room-6 cf. pt is here to follow up. Sinus Problem      HPI:  Pt presents for follow up of cough and sinusitis. Pt was placed on Biaxin at last OV.   She reports that she is feeling better and her cough is finally almost comp Insomnia     Spondylolisthesis     Spinal stenosis of lumbar region     Lumbar radiculitis     Lumbar spondylosis     DDD (degenerative disc disease), lumbar     Primary localized osteoarthrosis of right lower leg     Asthma     Arthritis of right knee every 6 (six) hours as needed for Wheezing. Disp: 30 ampule Rfl: 2   Albuterol Sulfate HFA (PROAIR HFA) 108 (90 Base) MCG/ACT Inhalation Aero Soln Inhale 2 puffs into the lungs every 4 (four) hours as needed.  Disp: 1 Inhaler Rfl: 11       Physical Exam  BP 1 Application topically daily. Imaging & Consults:  FLU VACCINE ADJUVANT IM  ZOSTER VACC RECOMBINANT IM NJX  XR VIDEO SWALLOW (CPT=74230)    Return in about 2 months (around 12/10/2018) for several issues.   There are no Patient Instructions on file f

## 2018-10-10 NOTE — TELEPHONE ENCOUNTER
Future Appointments   Date Time Provider Tera Conrad   10/19/2018  9:20 [facility]   11/5/2018 10:30 AM Shy Small MD [address]   12/10/2018  9:45 AM [facility] NURSE [facility]     Please

## 2018-10-12 RX ORDER — ATORVASTATIN CALCIUM 10 MG/1
TABLET, FILM COATED ORAL
Qty: 90 TABLET | Refills: 1 | Status: SHIPPED | OUTPATIENT
Start: 2018-10-12 | End: 2019-04-02

## 2018-10-30 ENCOUNTER — TELEPHONE (OUTPATIENT)
Dept: INTERNAL MEDICINE CLINIC | Facility: CLINIC | Age: 83
End: 2018-10-30

## 2018-10-30 NOTE — TELEPHONE ENCOUNTER
Called to cancel shingles vaccine appt Pt would like to know the importance of the second dose since we do not have any available. Pt stated \"Will the first dose go to waste? \". Please advise.

## 2018-10-30 NOTE — TELEPHONE ENCOUNTER
#1 Shingrix on 10/10/18 ordered by CB. Sent Staff Message to [place] for wait list.    Spoke to pt, notified we are trying to obtain more Shingrix vaccine, explained she has to wait a minimum of 2 months (12/10/18)and max of 6 months 4/10/19.  Pt notified if

## 2018-11-19 PROBLEM — M43.16 SPONDYLOLISTHESIS AT L4-L5 LEVEL: Status: ACTIVE | Noted: 2018-11-19

## 2018-12-03 ENCOUNTER — APPOINTMENT (OUTPATIENT)
Dept: PHYSICAL THERAPY | Facility: HOSPITAL | Age: 83
End: 2018-12-03
Attending: INTERNAL MEDICINE
Payer: MEDICARE

## 2018-12-05 ENCOUNTER — APPOINTMENT (OUTPATIENT)
Dept: PHYSICAL THERAPY | Facility: HOSPITAL | Age: 83
End: 2018-12-05
Payer: MEDICARE

## 2018-12-10 ENCOUNTER — APPOINTMENT (OUTPATIENT)
Dept: PHYSICAL THERAPY | Facility: HOSPITAL | Age: 83
End: 2018-12-10
Payer: MEDICARE

## 2018-12-10 ENCOUNTER — NURSE ONLY (OUTPATIENT)
Dept: INTERNAL MEDICINE CLINIC | Facility: CLINIC | Age: 83
End: 2018-12-10
Payer: COMMERCIAL

## 2018-12-10 PROCEDURE — 90471 IMMUNIZATION ADMIN: CPT | Performed by: INTERNAL MEDICINE

## 2018-12-10 PROCEDURE — 90750 HZV VACC RECOMBINANT IM: CPT | Performed by: INTERNAL MEDICINE

## 2018-12-12 ENCOUNTER — APPOINTMENT (OUTPATIENT)
Dept: PHYSICAL THERAPY | Facility: HOSPITAL | Age: 83
End: 2018-12-12
Payer: MEDICARE

## 2018-12-13 ENCOUNTER — TELEPHONE (OUTPATIENT)
Dept: INTERNAL MEDICINE CLINIC | Facility: CLINIC | Age: 83
End: 2018-12-13

## 2018-12-13 NOTE — TELEPHONE ENCOUNTER
Pt got #2 shingles shot here on 12/10-was red the first day but now the redness is much larger-the size of her entire hand-it is warm and she has a burning sensation-does not itch-did not have this reaction to the first shot-call to advise

## 2018-12-13 NOTE — TELEPHONE ENCOUNTER
Called pt stating received #2 shingrix on 12/10/18. Same day noted redness. Red spots appear to be getting bigger. Painful- ice has been helping. Burning sensation. No facial or throat swelling. No difficulty breathing. No chest pain.  Per on call JONNIE ANTONY t

## 2018-12-17 ENCOUNTER — APPOINTMENT (OUTPATIENT)
Dept: PHYSICAL THERAPY | Facility: HOSPITAL | Age: 83
End: 2018-12-17
Payer: MEDICARE

## 2018-12-19 ENCOUNTER — APPOINTMENT (OUTPATIENT)
Dept: PHYSICAL THERAPY | Facility: HOSPITAL | Age: 83
End: 2018-12-19
Payer: MEDICARE

## 2018-12-26 ENCOUNTER — APPOINTMENT (OUTPATIENT)
Dept: PHYSICAL THERAPY | Facility: HOSPITAL | Age: 83
End: 2018-12-26
Attending: INTERNAL MEDICINE
Payer: MEDICARE

## 2018-12-28 ENCOUNTER — APPOINTMENT (OUTPATIENT)
Dept: PHYSICAL THERAPY | Facility: HOSPITAL | Age: 83
End: 2018-12-28
Attending: INTERNAL MEDICINE
Payer: MEDICARE

## 2019-01-08 RX ORDER — LOSARTAN POTASSIUM AND HYDROCHLOROTHIAZIDE 12.5; 1 MG/1; MG/1
TABLET ORAL
Qty: 90 TABLET | Refills: 0 | Status: SHIPPED | OUTPATIENT
Start: 2019-01-08 | End: 2019-04-02

## 2019-01-11 RX ORDER — LOSARTAN POTASSIUM AND HYDROCHLOROTHIAZIDE 12.5; 1 MG/1; MG/1
TABLET ORAL
Qty: 90 TABLET | Refills: 0 | OUTPATIENT
Start: 2019-01-11

## 2019-04-02 RX ORDER — ATORVASTATIN CALCIUM 10 MG/1
TABLET, FILM COATED ORAL
Qty: 90 TABLET | Refills: 0 | Status: SHIPPED | OUTPATIENT
Start: 2019-04-02 | End: 2019-07-31

## 2019-04-02 RX ORDER — LOSARTAN POTASSIUM AND HYDROCHLOROTHIAZIDE 12.5; 1 MG/1; MG/1
TABLET ORAL
Qty: 90 TABLET | Refills: 0 | Status: SHIPPED | OUTPATIENT
Start: 2019-04-02 | End: 2019-07-30

## 2019-04-12 ENCOUNTER — TELEPHONE (OUTPATIENT)
Dept: INTERNAL MEDICINE CLINIC | Facility: CLINIC | Age: 84
End: 2019-04-12

## 2019-04-12 DIAGNOSIS — J45.21 MILD INTERMITTENT ASTHMA WITH ACUTE EXACERBATION: ICD-10-CM

## 2019-04-12 DIAGNOSIS — I10 ESSENTIAL HYPERTENSION: ICD-10-CM

## 2019-04-12 DIAGNOSIS — R73.03 PRE-DIABETES: ICD-10-CM

## 2019-04-12 DIAGNOSIS — F51.01 PRIMARY INSOMNIA: ICD-10-CM

## 2019-04-12 DIAGNOSIS — J44.9 ASTHMA WITH COPD (CHRONIC OBSTRUCTIVE PULMONARY DISEASE) (HCC): Primary | Chronic | ICD-10-CM

## 2019-04-12 DIAGNOSIS — E78.00 PURE HYPERCHOLESTEROLEMIA: ICD-10-CM

## 2019-04-12 NOTE — TELEPHONE ENCOUNTER
Future Appointments   Date Time Provider Tera Conrad   6/21/2019  9:00 AM Sharath Ivan MD [facility]     Orders to edward- Pt aware to fast-no call back required

## 2019-06-11 ENCOUNTER — LAB ENCOUNTER (OUTPATIENT)
Dept: LAB | Age: 84
End: 2019-06-11
Attending: INTERNAL MEDICINE
Payer: MEDICARE

## 2019-06-11 DIAGNOSIS — J44.9 ASTHMA WITH COPD (CHRONIC OBSTRUCTIVE PULMONARY DISEASE) (HCC): ICD-10-CM

## 2019-06-11 DIAGNOSIS — E78.00 PURE HYPERCHOLESTEROLEMIA: ICD-10-CM

## 2019-06-11 DIAGNOSIS — R73.03 PRE-DIABETES: ICD-10-CM

## 2019-06-11 DIAGNOSIS — J45.21 MILD INTERMITTENT ASTHMA WITH ACUTE EXACERBATION: ICD-10-CM

## 2019-06-11 DIAGNOSIS — F51.01 PRIMARY INSOMNIA: ICD-10-CM

## 2019-06-11 DIAGNOSIS — I10 ESSENTIAL HYPERTENSION: ICD-10-CM

## 2019-06-11 PROCEDURE — 85025 COMPLETE CBC W/AUTO DIFF WBC: CPT

## 2019-06-11 PROCEDURE — 84443 ASSAY THYROID STIM HORMONE: CPT

## 2019-06-11 PROCEDURE — 80061 LIPID PANEL: CPT

## 2019-06-11 PROCEDURE — 80053 COMPREHEN METABOLIC PANEL: CPT

## 2019-06-24 ENCOUNTER — MA CHART PREP (OUTPATIENT)
Dept: FAMILY MEDICINE CLINIC | Facility: CLINIC | Age: 84
End: 2019-06-24

## 2019-06-24 PROBLEM — N18.30 CKD (CHRONIC KIDNEY DISEASE) STAGE 3, GFR 30-59 ML/MIN (HCC): Status: ACTIVE | Noted: 2019-06-24

## 2019-06-25 ENCOUNTER — OFFICE VISIT (OUTPATIENT)
Dept: INTERNAL MEDICINE CLINIC | Facility: CLINIC | Age: 84
End: 2019-06-25
Payer: COMMERCIAL

## 2019-06-25 VITALS
HEART RATE: 88 BPM | HEIGHT: 64.17 IN | BODY MASS INDEX: 28.51 KG/M2 | RESPIRATION RATE: 16 BRPM | SYSTOLIC BLOOD PRESSURE: 134 MMHG | TEMPERATURE: 99 F | WEIGHT: 167 LBS | DIASTOLIC BLOOD PRESSURE: 78 MMHG

## 2019-06-25 DIAGNOSIS — Z96.651 HISTORY OF TOTAL RIGHT KNEE REPLACEMENT: ICD-10-CM

## 2019-06-25 DIAGNOSIS — R73.02 GLUCOSE INTOLERANCE (IMPAIRED GLUCOSE TOLERANCE): ICD-10-CM

## 2019-06-25 DIAGNOSIS — L82.1 OTHER SEBORRHEIC KERATOSIS: ICD-10-CM

## 2019-06-25 DIAGNOSIS — G47.00 INSOMNIA, UNSPECIFIED TYPE: ICD-10-CM

## 2019-06-25 DIAGNOSIS — N18.30 CKD (CHRONIC KIDNEY DISEASE) STAGE 3, GFR 30-59 ML/MIN (HCC): ICD-10-CM

## 2019-06-25 DIAGNOSIS — M46.96 INFLAMMATORY SPONDYLOPATHY OF LUMBAR REGION (HCC): ICD-10-CM

## 2019-06-25 DIAGNOSIS — Z00.00 ENCOUNTER FOR ANNUAL HEALTH EXAMINATION: Primary | ICD-10-CM

## 2019-06-25 DIAGNOSIS — M47.816 LUMBAR SPONDYLOSIS: ICD-10-CM

## 2019-06-25 DIAGNOSIS — J45.20 MILD INTERMITTENT ASTHMA WITHOUT COMPLICATION: ICD-10-CM

## 2019-06-25 DIAGNOSIS — M43.16 SPONDYLOLISTHESIS AT L4-L5 LEVEL: ICD-10-CM

## 2019-06-25 DIAGNOSIS — M47.816 LUMBAR ARTHROPATHY: ICD-10-CM

## 2019-06-25 DIAGNOSIS — M51.36 DDD (DEGENERATIVE DISC DISEASE), LUMBAR: ICD-10-CM

## 2019-06-25 DIAGNOSIS — M48.062 SPINAL STENOSIS OF LUMBAR REGION WITH NEUROGENIC CLAUDICATION: ICD-10-CM

## 2019-06-25 DIAGNOSIS — E78.00 PURE HYPERCHOLESTEROLEMIA: ICD-10-CM

## 2019-06-25 DIAGNOSIS — J44.9 ASTHMA WITH COPD (CHRONIC OBSTRUCTIVE PULMONARY DISEASE) (HCC): Chronic | ICD-10-CM

## 2019-06-25 DIAGNOSIS — M17.11 ARTHRITIS OF RIGHT KNEE: ICD-10-CM

## 2019-06-25 DIAGNOSIS — M54.16 LUMBAR RADICULITIS: ICD-10-CM

## 2019-06-25 DIAGNOSIS — M17.11 PRIMARY LOCALIZED OSTEOARTHROSIS OF RIGHT LOWER LEG: ICD-10-CM

## 2019-06-25 DIAGNOSIS — R73.03 PRE-DIABETES: ICD-10-CM

## 2019-06-25 DIAGNOSIS — I10 ESSENTIAL HYPERTENSION: ICD-10-CM

## 2019-06-25 DIAGNOSIS — M43.16 SPONDYLOLISTHESIS OF LUMBAR REGION: ICD-10-CM

## 2019-06-25 DIAGNOSIS — M72.9 MUSCULOSKELETAL FIBROMATOSIS: ICD-10-CM

## 2019-06-25 DIAGNOSIS — I77.810 AORTIC ROOT DILATION (HCC): ICD-10-CM

## 2019-06-25 PROCEDURE — 99397 PER PM REEVAL EST PAT 65+ YR: CPT | Performed by: INTERNAL MEDICINE

## 2019-06-25 PROCEDURE — G0439 PPPS, SUBSEQ VISIT: HCPCS | Performed by: INTERNAL MEDICINE

## 2019-06-25 PROCEDURE — 96160 PT-FOCUSED HLTH RISK ASSMT: CPT | Performed by: INTERNAL MEDICINE

## 2019-06-25 NOTE — PATIENT INSTRUCTIONS
Solo Coy's SCREENING SCHEDULE   Tests on this list are recommended by your physician but may not be covered, or covered at this frequency, by your insurer. Please check with your insurance carrier before scheduling to verify coverage.    PREVENTATIVE Limited to patients who meet one of the following criteria:   • Men who are 73-68 years old and have smoked more than 100 cigarettes in their lifetime   • Anyone with a family history    Colorectal Cancer Screening  Covered up to Age 76     Colonoscopy Scr Mammogram regularly   Immunizations      Influenza  Covered Annually Orders placed or performed in visit on 10/15/15   • FLU VAC NO PRSV 4 ADAM 3 YRS+    Please get every year    Pneumococcal 13 (Prevnar)  Covered Once after 65 Orders placed or performed in [place])  [email or web address]  This link also has information from the [address] [place] [address] regarding Advance Directives.   Early Alonzo Coy's SCREENING SCHEDULE   Tests on this list are recommended by your physician but may not be covered, or cove Visit    Abdominal aortic aneurysm screening (once between ages 73-68) IPPE only No results found for this or any previous visit.  Limited to patients who meet one of the following criteria:   • Men who are 73-68 years old and have smoked more than 100 ciga Mammogram      Mammogram    Recommend Annually to at least age 76, and as needed after 76 Mammogram due on 06/06/2020 Please get this Mammogram regularly   Immunizations      Influenza  Covered Annually Orders placed or performed in visit on 10/15/15   • F available on it's website for anyone to review and print using their home computer and printer. (the forms are also available in [address])  [email or web address]  This link also has information from the [address] regarding Advance Direc

## 2019-06-26 PROBLEM — M46.96 INFLAMMATORY SPONDYLOPATHY OF LUMBAR REGION: Status: ACTIVE | Noted: 2019-06-26

## 2019-06-26 PROBLEM — B35.1 PAIN DUE TO ONYCHOMYCOSIS OF TOENAIL: Status: RESOLVED | Noted: 2017-08-17 | Resolved: 2019-06-26

## 2019-06-26 PROBLEM — M46.96 INFLAMMATORY SPONDYLOPATHY OF LUMBAR REGION (HCC): Status: ACTIVE | Noted: 2019-06-26

## 2019-06-26 PROBLEM — M79.676 PAIN DUE TO ONYCHOMYCOSIS OF TOENAIL: Status: RESOLVED | Noted: 2017-08-17 | Resolved: 2019-06-26

## 2019-06-26 NOTE — PROGRESS NOTES
HPI:   Omega Dumont is a 82year old female who presents for a MA (Medicare Advantage) [facility] [place] (Once per calendar year). Here for supervisit. Pt has stable chronic issues. No complaints today, labs erviewed.    Her last annual assessment has been knee     History of total knee replacement     Pre-diabetes     Asthma with COPD (chronic obstructive pulmonary disease) (HCC)     Aortic root dilation (HCC)     Lumbar arthropathy     Musculoskeletal fibromatosis     Other seborrheic keratosis     Spondyl 81 mg by mouth daily. CRANBERRY EXTRACT OR Take 1 tablet by mouth every other day. Vitamin D3 (VITAMIN D3) 2000 UNITS Oral Cap Take 2,000 Units by mouth daily.    PRILOSEC 20 MG OR CPDR 1 CAPSULE DAILY   VITAMIN E qd   CLOBETASOL PROPIONATE E EX prn double vision  HEENT: denies nasal congestion, sinus pain or ST  LUNGS: denies shortness of breath with exertion  CARDIOVASCULAR: denies chest pain on exertion  GI: denies abdominal pain, denies heartburn  : denies dysuria, vaginal discharge or itching, no cyanosis or edema   Pulses: 2+ and symmetric   Skin: Skin color, texture, turgor normal, no rashes or lesions   Lymph nodes: Cervical, supraclavicular, and axillary nodes normal   Neurologic: Normal       Vaccination History     Immunization History   A iproved  Lumbar spondylosis  See above  DDD (degenerative disc disease), lumbar  See above  Primary localized osteoarthrosis of right lower leg  resolved  Arthritis of right knee  resolved  Lumbar arthropathy  See above  Musculoskeletal fibromatosis  Stabl Preventative Physical Exam only, or if medically necessary Electrocardiogram date04/24/2018       Colorectal Cancer Screening      Colonoscopy Screen every 10 years There are no preventive care reminders to display for this patient.  Update [employer] TDaP Not covered by Medicare Part B No vaccine history found This may be covered with your prescription benefits, but Medicare does not cover unless Medically needed    Zoster  Not covered by Medicare Part B No vaccine history found This may be covered wit

## 2019-07-30 RX ORDER — LOSARTAN POTASSIUM AND HYDROCHLOROTHIAZIDE 12.5; 1 MG/1; MG/1
TABLET ORAL
Qty: 90 TABLET | Refills: 1 | Status: SHIPPED | OUTPATIENT
Start: 2019-07-30 | End: 2020-01-30

## 2019-07-31 RX ORDER — ATORVASTATIN CALCIUM 10 MG/1
TABLET, FILM COATED ORAL
Qty: 90 TABLET | Refills: 1 | Status: SHIPPED | OUTPATIENT
Start: 2019-07-31 | End: 2020-02-05

## 2019-09-16 ENCOUNTER — IMMUNIZATION (OUTPATIENT)
Dept: INTERNAL MEDICINE CLINIC | Facility: CLINIC | Age: 84
End: 2019-09-16
Payer: COMMERCIAL

## 2019-09-16 ENCOUNTER — MED REC SCAN ONLY (OUTPATIENT)
Dept: INTERNAL MEDICINE CLINIC | Facility: CLINIC | Age: 84
End: 2019-09-16

## 2019-09-16 DIAGNOSIS — Z23 NEED FOR VACCINATION: ICD-10-CM

## 2019-09-16 PROCEDURE — 90662 IIV NO PRSV INCREASED AG IM: CPT | Performed by: INTERNAL MEDICINE

## 2019-09-16 PROCEDURE — G0008 ADMIN INFLUENZA VIRUS VAC: HCPCS | Performed by: INTERNAL MEDICINE

## 2019-10-15 ENCOUNTER — OFFICE VISIT (OUTPATIENT)
Dept: INTERNAL MEDICINE CLINIC | Facility: CLINIC | Age: 84
End: 2019-10-15
Payer: COMMERCIAL

## 2019-10-15 VITALS
TEMPERATURE: 98 F | HEART RATE: 84 BPM | BODY MASS INDEX: 28.34 KG/M2 | WEIGHT: 166 LBS | SYSTOLIC BLOOD PRESSURE: 114 MMHG | DIASTOLIC BLOOD PRESSURE: 72 MMHG | RESPIRATION RATE: 16 BRPM | OXYGEN SATURATION: 98 % | HEIGHT: 64 IN

## 2019-10-15 DIAGNOSIS — R30.0 DYSURIA: Primary | ICD-10-CM

## 2019-10-15 DIAGNOSIS — N30.01 ACUTE CYSTITIS WITH HEMATURIA: ICD-10-CM

## 2019-10-15 PROCEDURE — 81003 URINALYSIS AUTO W/O SCOPE: CPT | Performed by: INTERNAL MEDICINE

## 2019-10-15 PROCEDURE — 99213 OFFICE O/P EST LOW 20 MIN: CPT | Performed by: INTERNAL MEDICINE

## 2019-10-15 RX ORDER — CIPROFLOXACIN 500 MG/1
500 TABLET, FILM COATED ORAL 2 TIMES DAILY
Qty: 14 TABLET | Refills: 0 | Status: SHIPPED | OUTPATIENT
Start: 2019-10-15 | End: 2020-04-16

## 2019-10-15 NOTE — PROGRESS NOTES
Arsen Coy  11/26/1935    Patient presents with:  UTI: RG rm 7 bad UTI, blood in urine      [address] is a 82year old female who presents with     The patient was in her usual state of health until yesterday when she began experiencing due to unspecified cause 12/2/2005   • Edema 12/2/2005   • Esophageal reflux    • Extrinsic asthma, unspecified    • Female climacteric state 6/15/2000   • GERD    • HYPERTENSION    • Onychocryptosis 8/25/2016   • Other and unspecified hyperlipidemia    • MANAGEMENT   • LUMBAR EPIDURAL N/A 7/6/2016    Performed by Taryn Olson MD at [address] [facility] 2/3/2014    Performed by David Aguilar MD at [address]   • OTHER SURGICAL HISTORY      arthros file for this visit. All questions were answered and the patient agrees with the plan.      Thank you,  Maru Moran MD

## 2019-10-29 ENCOUNTER — PATIENT OUTREACH (OUTPATIENT)
Dept: CASE MANAGEMENT | Age: 84
End: 2019-10-29

## 2019-10-29 NOTE — PROGRESS NOTES
Spoke w/ patient to introduce CCM program. Pt states she will contact insurance regarding cost. I will mail letter to pt and will call back to follow up.

## 2019-11-12 ENCOUNTER — PATIENT OUTREACH (OUTPATIENT)
Dept: CASE MANAGEMENT | Age: 84
End: 2019-11-12

## 2019-12-16 ENCOUNTER — TELEPHONE (OUTPATIENT)
Dept: INTERNAL MEDICINE CLINIC | Facility: CLINIC | Age: 84
End: 2019-12-16

## 2019-12-16 DIAGNOSIS — N30.01 ACUTE CYSTITIS WITH HEMATURIA: Primary | ICD-10-CM

## 2019-12-27 ENCOUNTER — APPOINTMENT (OUTPATIENT)
Dept: LAB | Age: 84
End: 2019-12-27
Attending: INTERNAL MEDICINE
Payer: MEDICARE

## 2020-01-30 ENCOUNTER — TELEPHONE (OUTPATIENT)
Dept: INTERNAL MEDICINE CLINIC | Facility: CLINIC | Age: 85
End: 2020-01-30

## 2020-01-30 RX ORDER — LOSARTAN POTASSIUM AND HYDROCHLOROTHIAZIDE 12.5; 1 MG/1; MG/1
TABLET ORAL
Qty: 90 TABLET | Refills: 0 | Status: SHIPPED | OUTPATIENT
Start: 2020-01-30 | End: 2020-02-03 | Stop reason: RX

## 2020-02-03 RX ORDER — HYDROCHLOROTHIAZIDE 12.5 MG/1
12.5 TABLET ORAL DAILY
Qty: 90 TABLET | Refills: 0 | Status: SHIPPED | OUTPATIENT
Start: 2020-02-03 | End: 2020-04-29

## 2020-02-03 RX ORDER — LOSARTAN POTASSIUM 100 MG/1
100 TABLET ORAL DAILY
Qty: 90 TABLET | Refills: 0 | Status: SHIPPED | OUTPATIENT
Start: 2020-02-03 | End: 2020-04-29

## 2020-02-03 NOTE — TELEPHONE ENCOUNTER
Yeimy Atkins [phone]  Stated this medication is on back order will need 2 separate scripts.  Please advise   LOSARTAN POTASSIUM-HCTZ 100-12.5 MG Oral Tab 90 tablet 0 1/30/2020    Sig:   TAKE 1 TABLET BY MOUTH ONE TIME A DAY

## 2020-02-05 RX ORDER — ATORVASTATIN CALCIUM 10 MG/1
TABLET, FILM COATED ORAL
Qty: 90 TABLET | Refills: 0 | Status: SHIPPED | OUTPATIENT
Start: 2020-02-05 | End: 2020-05-15

## 2020-03-24 ENCOUNTER — TELEPHONE (OUTPATIENT)
Dept: INTERNAL MEDICINE CLINIC | Facility: CLINIC | Age: 85
End: 2020-03-24

## 2020-03-24 DIAGNOSIS — N39.0 URINARY TRACT INFECTION WITHOUT HEMATURIA, SITE UNSPECIFIED: Primary | ICD-10-CM

## 2020-03-24 PROCEDURE — G2012 BRIEF CHECK IN BY MD/QHP: HCPCS | Performed by: INTERNAL MEDICINE

## 2020-03-24 RX ORDER — SULFAMETHOXAZOLE AND TRIMETHOPRIM 800; 160 MG/1; MG/1
1 TABLET ORAL 2 TIMES DAILY
Qty: 14 TABLET | Refills: 0 | Status: SHIPPED | OUTPATIENT
Start: 2020-03-24 | End: 2021-06-25

## 2020-03-24 NOTE — TELEPHONE ENCOUNTER
Possible UTI, burning, when she does go has some pian. Pt just returned from [facility], 3-, she did fly home       Travel screening complete, No International travel.   But just came through the airports from [facility]     Pt aware may be billed as telephone vi

## 2020-03-24 NOTE — TELEPHONE ENCOUNTER
Virtual/Telephone Check-In    Bessy Fernandes verbally consents to a Virtual/Telephone Check-In service on 03/24/20. Patient understands and accepts financial responsibility for any deductible, co-insurance and/or co-pays associated with this service.     Du

## 2020-04-16 ENCOUNTER — VIRTUAL PHONE E/M (OUTPATIENT)
Dept: INTERNAL MEDICINE CLINIC | Facility: CLINIC | Age: 85
End: 2020-04-16
Payer: COMMERCIAL

## 2020-04-16 DIAGNOSIS — N39.0 RECURRENT UTI: Primary | ICD-10-CM

## 2020-04-16 PROCEDURE — 99213 OFFICE O/P EST LOW 20 MIN: CPT | Performed by: INTERNAL MEDICINE

## 2020-04-16 RX ORDER — CIPROFLOXACIN 500 MG/1
500 TABLET, FILM COATED ORAL 2 TIMES DAILY
Qty: 14 TABLET | Refills: 0 | Status: SHIPPED | OUTPATIENT
Start: 2020-04-16 | End: 2020-04-23

## 2020-04-16 NOTE — PROGRESS NOTES
Lucero Coy  11/26/1935    No chief complaint on file. Sepideh Easley is a 82year old female who presents with dysuria and right flank pain.     The patient was recently (3/24) diagnosed clinically with having an acute urinary tract infe lungs every 4 (four) hours as needed. 1 Inhaler 11   • aspirin 81 MG Oral Tab Take 81 mg by mouth daily. • CRANBERRY EXTRACT OR Take 1 tablet by mouth every other day. • Vitamin D3 (VITAMIN D3) 2000 UNITS Oral Cap Take 2,000 Units by mouth daily. pre cancerous cells were seen   • KNEE SURGERY  09/08/2015    right leg   • LUMBAR EPIDURAL N/A 12/30/2019    Performed by Jacquelin Sanchez MD at [address] [place] [address] N/A 11/18/2019    Performed by Jacquelin Sanchez MD at [facility] types were placed in this encounter. Meds & Refills:  Requested Prescriptions      No prescriptions requested or ordered in this encounter       Imaging & Consults:  None    No follow-ups on file.   There are no Patient Instructions on file for this vi

## 2020-04-29 RX ORDER — LOSARTAN POTASSIUM 100 MG/1
TABLET ORAL
Qty: 90 TABLET | Refills: 0 | Status: SHIPPED | OUTPATIENT
Start: 2020-04-29 | End: 2020-07-24

## 2020-04-29 RX ORDER — HYDROCHLOROTHIAZIDE 12.5 MG/1
TABLET ORAL
Qty: 90 TABLET | Refills: 0 | Status: SHIPPED | OUTPATIENT
Start: 2020-04-29 | End: 2020-08-05

## 2020-04-29 NOTE — TELEPHONE ENCOUNTER
Future Appointments   Date Time Provider Tera Anamaria   6/16/2020  2:15 PM Tom Rodriguez MD [facility]

## 2020-05-15 RX ORDER — ATORVASTATIN CALCIUM 10 MG/1
TABLET, FILM COATED ORAL
Qty: 90 TABLET | Refills: 0 | Status: SHIPPED | OUTPATIENT
Start: 2020-05-15 | End: 2020-08-13

## 2020-05-15 NOTE — TELEPHONE ENCOUNTER
Last OV: 10/15/19 UTI f/u w/AD    Future Appointments: Next PE due 6/2020   Date Time Provider Tera Anamaria   6/2/2020  9:50 AM Syde Mahan MD [facility] URO [facility] R[facility]   6/16/2020  2:15 PM Samir Lopez MD [facility]   7/13/2020 11:20 AM

## 2020-06-02 PROBLEM — N39.0 FREQUENT UTI: Status: ACTIVE | Noted: 2020-06-02

## 2020-06-09 ENCOUNTER — MA CHART PREP (OUTPATIENT)
Dept: FAMILY MEDICINE CLINIC | Facility: CLINIC | Age: 85
End: 2020-06-09

## 2020-06-12 ENCOUNTER — TELEPHONE (OUTPATIENT)
Dept: INTERNAL MEDICINE CLINIC | Facility: CLINIC | Age: 85
End: 2020-06-12

## 2020-06-12 NOTE — TELEPHONE ENCOUNTER
Received records from [facility] in regards to pt's mammogram.  Abstracted, placed in MD bin for review.

## 2020-06-16 ENCOUNTER — TELEPHONE (OUTPATIENT)
Dept: INTERNAL MEDICINE CLINIC | Facility: CLINIC | Age: 85
End: 2020-06-16

## 2020-06-16 ENCOUNTER — OFFICE VISIT (OUTPATIENT)
Dept: INTERNAL MEDICINE CLINIC | Facility: CLINIC | Age: 85
End: 2020-06-16
Payer: COMMERCIAL

## 2020-06-16 VITALS
WEIGHT: 160.63 LBS | HEIGHT: 63.98 IN | DIASTOLIC BLOOD PRESSURE: 58 MMHG | TEMPERATURE: 98 F | HEART RATE: 96 BPM | SYSTOLIC BLOOD PRESSURE: 132 MMHG | BODY MASS INDEX: 27.42 KG/M2

## 2020-06-16 DIAGNOSIS — R73.03 PRE-DIABETES: ICD-10-CM

## 2020-06-16 DIAGNOSIS — Z78.0 POST-MENOPAUSAL: ICD-10-CM

## 2020-06-16 DIAGNOSIS — R73.02 GLUCOSE INTOLERANCE (IMPAIRED GLUCOSE TOLERANCE): ICD-10-CM

## 2020-06-16 DIAGNOSIS — M54.16 LUMBAR RADICULITIS: ICD-10-CM

## 2020-06-16 DIAGNOSIS — N18.30 CKD (CHRONIC KIDNEY DISEASE) STAGE 3, GFR 30-59 ML/MIN (HCC): ICD-10-CM

## 2020-06-16 DIAGNOSIS — M17.11 ARTHRITIS OF RIGHT KNEE: ICD-10-CM

## 2020-06-16 DIAGNOSIS — G47.00 INSOMNIA, UNSPECIFIED TYPE: ICD-10-CM

## 2020-06-16 DIAGNOSIS — N39.0 FREQUENT UTI: ICD-10-CM

## 2020-06-16 DIAGNOSIS — M43.16 SPONDYLOLISTHESIS AT L4-L5 LEVEL: ICD-10-CM

## 2020-06-16 DIAGNOSIS — M48.062 SPINAL STENOSIS OF LUMBAR REGION WITH NEUROGENIC CLAUDICATION: ICD-10-CM

## 2020-06-16 DIAGNOSIS — Z00.00 ENCOUNTER FOR ANNUAL HEALTH EXAMINATION: Primary | ICD-10-CM

## 2020-06-16 DIAGNOSIS — Z96.651 HISTORY OF TOTAL RIGHT KNEE REPLACEMENT: ICD-10-CM

## 2020-06-16 DIAGNOSIS — Z12.31 VISIT FOR SCREENING MAMMOGRAM: ICD-10-CM

## 2020-06-16 DIAGNOSIS — E78.00 PURE HYPERCHOLESTEROLEMIA: ICD-10-CM

## 2020-06-16 DIAGNOSIS — I77.810 AORTIC ROOT DILATION (HCC): ICD-10-CM

## 2020-06-16 DIAGNOSIS — M46.96 INFLAMMATORY SPONDYLOPATHY OF LUMBAR REGION (HCC): ICD-10-CM

## 2020-06-16 DIAGNOSIS — J45.20 MILD INTERMITTENT ASTHMA WITHOUT COMPLICATION: ICD-10-CM

## 2020-06-16 DIAGNOSIS — J44.9 ASTHMA WITH COPD (CHRONIC OBSTRUCTIVE PULMONARY DISEASE) (HCC): Chronic | ICD-10-CM

## 2020-06-16 DIAGNOSIS — I10 ESSENTIAL HYPERTENSION: ICD-10-CM

## 2020-06-16 DIAGNOSIS — M43.16 SPONDYLOLISTHESIS OF LUMBAR REGION: ICD-10-CM

## 2020-06-16 PROCEDURE — 96160 PT-FOCUSED HLTH RISK ASSMT: CPT | Performed by: INTERNAL MEDICINE

## 2020-06-16 PROCEDURE — G0439 PPPS, SUBSEQ VISIT: HCPCS | Performed by: INTERNAL MEDICINE

## 2020-06-16 PROCEDURE — 99397 PER PM REEVAL EST PAT 65+ YR: CPT | Performed by: INTERNAL MEDICINE

## 2020-06-16 NOTE — PATIENT INSTRUCTIONS
Early Alonzo Coy's SCREENING SCHEDULE   Tests on this list are recommended by your physician but may not be covered, or covered at this frequency, by your insurer. Please check with your insurance carrier before scheduling to verify coverage.    PREVENTATIVE Limited to patients who meet one of the following criteria:   • Men who are 73-68 years old and have smoked more than 100 cigarettes in their lifetime   • Anyone with a family history    Colorectal Cancer Screening  Covered up to Age 76     Colonoscopy Scr Mammogram regularly   Immunizations      Influenza  Covered Annually Orders placed or performed in visit on 09/16/19   • FLU VACC HIGH DOSE PRSV FREE   Orders placed or performed in visit on 10/15/15   • FLU VAC NO PRSV 4 ADAM 3 YRS+    Please get every yea using their home computer and printer. (the forms are also available in [facility])  [email or web address]  This link also has information from the [facility] regarding Advance Directives.

## 2020-06-16 NOTE — PROGRESS NOTES
HPI:   Kadeem Robles is a 82year old female who presents for a MA (Medicare Advantage) Supervisit (Once per calendar year). Here for supervisit.    Annual Physical due on 06/25/2020        Fall/Risk Assessment   She has been screened for Falls and is with COPD (chronic obstructive pulmonary disease) (HCC)     Aortic root dilation (HCC)     Spondylolisthesis at L4-L5 level     CKD (chronic kidney disease) stage 3, GFR 30-59 ml/min ([facility])     Inflammatory spondylopathy of lumbar region ([facility] [address]     Frequent (12/2/2005), Edema (12/2/2005), Esophageal reflux, Extrinsic asthma, unspecified, Female climacteric state (6/15/2000), GERD, HYPERTENSION, Onychocryptosis (8/25/2016), Other and unspecified hyperlipidemia, Pain due to onychomycosis of toenail (8/17/2017), incontinence   MUSCULOSKELETAL: denies back pain  NEURO: denies headaches  PSYCHE: denies depression or anxiety  HEMATOLOGIC: denies hx of anemia  ENDOCRINE: denies thyroid history  ALL/ASTHMA: denies hx of allergy or asthma    EXAM:   /58 (BP Locati • >=3 YRS FLUZONE OR FLUARIX QUAD PRESERVE FREE SINGLE DOSE (28100) FLU CLINIC 10/15/2015   • FLU VACC High Dose 65 YRS & Older PRSV Free (47613) 09/25/2017, 09/16/2019   • FLUAD High Dose 72 yr and older (31299) 10/10/2018   • FLUZONE 3 Yrs+ Quad Prsv F stage 3, GFR 30-59 ml/min ([facility])  Stable continue observtion  Frequent UTI  Doing well, no recent uti, continue observation  History of total right knee replacement  resolved  Pre-diabetes  Stable continue diet and exercise  Visit for screening mammogram Update Health Maintenance if applicable    Flex Sigmoidoscopy Screen every 10 years No results found for this or any previous visit. No flowsheet data found. Fecal Occult Blood Annually No results found for: FOB No flowsheet data found.     Glaucoma Scr may be covered with your pharmacy  prescription benefits      SPECIFIC DISEASE MONITORING Internal Lab or Procedure External Lab or Procedure      Annual Monitoring of Persistent     Medications (ACE/ARB, digoxin diuretics, anticonvulsants.)    Potassium

## 2020-06-16 NOTE — TELEPHONE ENCOUNTER
Manda Read [facility] Clinical Staff          Previous Messages      ----- Message -----   From: Frandy Chan MD   Sent: 6/16/2020   2:49 PM CDT   To: [facility] Front Office     Mammogram was done Thursday.  Please get result

## 2020-06-19 NOTE — TELEPHONE ENCOUNTER
Mammogram results from 6/10/20 received from [facility] for [facility]. Results indicates no evidence of malignancy and repeat mammogram in one year. Pt verbalizes understanding and mammogram results sent to scan.

## 2020-07-21 ENCOUNTER — APPOINTMENT (OUTPATIENT)
Dept: LAB | Age: 85
End: 2020-07-21
Attending: INTERNAL MEDICINE
Payer: MEDICARE

## 2020-07-21 LAB
ALBUMIN SERPL-MCNC: 4 G/DL (ref 3.4–5)
ALBUMIN/GLOB SERPL: 1.3 {RATIO} (ref 1–2)
ALP LIVER SERPL-CCNC: 72 U/L (ref 55–142)
ALT SERPL-CCNC: 21 U/L (ref 13–56)
ANION GAP SERPL CALC-SCNC: 3 MMOL/L (ref 0–18)
AST SERPL-CCNC: 13 U/L (ref 15–37)
BASOPHILS # BLD AUTO: 0.07 X10(3) UL (ref 0–0.2)
BASOPHILS NFR BLD AUTO: 1.3 %
BILIRUB SERPL-MCNC: 1 MG/DL (ref 0.1–2)
BUN BLD-MCNC: 25 MG/DL (ref 7–18)
BUN/CREAT SERPL: 27.8 (ref 10–20)
CALCIUM BLD-MCNC: 9.5 MG/DL (ref 8.5–10.1)
CHLORIDE SERPL-SCNC: 106 MMOL/L (ref 98–112)
CHOLEST SMN-MCNC: 186 MG/DL (ref ?–200)
CO2 SERPL-SCNC: 29 MMOL/L (ref 21–32)
CREAT BLD-MCNC: 0.9 MG/DL (ref 0.55–1.02)
DEPRECATED RDW RBC AUTO: 43.9 FL (ref 35.1–46.3)
EOSINOPHIL # BLD AUTO: 0.33 X10(3) UL (ref 0–0.7)
EOSINOPHIL NFR BLD AUTO: 6.2 %
ERYTHROCYTE [DISTWIDTH] IN BLOOD BY AUTOMATED COUNT: 13.3 % (ref 11–15)
GLOBULIN PLAS-MCNC: 3.1 G/DL (ref 2.8–4.4)
GLUCOSE BLD-MCNC: 94 MG/DL (ref 70–99)
HCT VFR BLD AUTO: 42.5 % (ref 35–48)
HDLC SERPL-MCNC: 63 MG/DL (ref 40–59)
HGB BLD-MCNC: 13.5 G/DL (ref 12–16)
IMM GRANULOCYTES # BLD AUTO: 0.02 X10(3) UL (ref 0–1)
IMM GRANULOCYTES NFR BLD: 0.4 %
LDLC SERPL CALC-MCNC: 103 MG/DL (ref ?–100)
LYMPHOCYTES # BLD AUTO: 1.61 X10(3) UL (ref 1–4)
LYMPHOCYTES NFR BLD AUTO: 30.2 %
M PROTEIN MFR SERPL ELPH: 7.1 G/DL (ref 6.4–8.2)
MCH RBC QN AUTO: 29 PG (ref 26–34)
MCHC RBC AUTO-ENTMCNC: 31.8 G/DL (ref 31–37)
MCV RBC AUTO: 91.2 FL (ref 80–100)
MONOCYTES # BLD AUTO: 0.59 X10(3) UL (ref 0.1–1)
MONOCYTES NFR BLD AUTO: 11.1 %
NEUTROPHILS # BLD AUTO: 2.71 X10 (3) UL (ref 1.5–7.7)
NEUTROPHILS # BLD AUTO: 2.71 X10(3) UL (ref 1.5–7.7)
NEUTROPHILS NFR BLD AUTO: 50.8 %
NONHDLC SERPL-MCNC: 123 MG/DL (ref ?–130)
OSMOLALITY SERPL CALC.SUM OF ELEC: 290 MOSM/KG (ref 275–295)
PATIENT FASTING Y/N/NP: YES
PATIENT FASTING Y/N/NP: YES
PLATELET # BLD AUTO: 308 10(3)UL (ref 150–450)
POTASSIUM SERPL-SCNC: 4.1 MMOL/L (ref 3.5–5.1)
RBC # BLD AUTO: 4.66 X10(6)UL (ref 3.8–5.3)
SODIUM SERPL-SCNC: 138 MMOL/L (ref 136–145)
TRIGL SERPL-MCNC: 101 MG/DL (ref 30–149)
TSI SER-ACNC: 1.68 MIU/ML (ref 0.36–3.74)
VLDLC SERPL CALC-MCNC: 20 MG/DL (ref 0–30)
WBC # BLD AUTO: 5.3 X10(3) UL (ref 4–11)

## 2020-07-24 RX ORDER — LOSARTAN POTASSIUM 100 MG/1
TABLET ORAL
Qty: 90 TABLET | Refills: 1 | Status: SHIPPED | OUTPATIENT
Start: 2020-07-24 | End: 2021-01-25 | Stop reason: RX

## 2020-07-24 NOTE — TELEPHONE ENCOUNTER
LOV:6/16/20 AS  FOV:none on file   LAST RX:4/29/20 100 mg take 1 tab daily 90 tabs 0 refills   LAST LABS:7/21/20 cbc,cmp,lipids,tsh  PER PROTOCOL: to provider

## 2020-08-05 ENCOUNTER — OFFICE VISIT (OUTPATIENT)
Dept: INTERNAL MEDICINE CLINIC | Facility: CLINIC | Age: 85
End: 2020-08-05
Payer: COMMERCIAL

## 2020-08-05 VITALS
DIASTOLIC BLOOD PRESSURE: 60 MMHG | BODY MASS INDEX: 28 KG/M2 | WEIGHT: 164.38 LBS | SYSTOLIC BLOOD PRESSURE: 122 MMHG | OXYGEN SATURATION: 99 % | TEMPERATURE: 98 F | HEART RATE: 90 BPM

## 2020-08-05 DIAGNOSIS — I10 ESSENTIAL HYPERTENSION: ICD-10-CM

## 2020-08-05 DIAGNOSIS — R30.0 DYSURIA: Primary | ICD-10-CM

## 2020-08-05 LAB
APPEARANCE: CLEAR
BILIRUBIN: NEGATIVE
GLUCOSE (URINE DIPSTICK): NEGATIVE MG/DL
MULTISTIX LOT#: ABNORMAL NUMERIC
NITRITE, URINE: NEGATIVE
PH, URINE: 5.5 (ref 4.5–8)
PROTEIN (URINE DIPSTICK): NEGATIVE MG/DL
SPECIFIC GRAVITY: 1.02 (ref 1–1.03)
URINE-COLOR: YELLOW
UROBILINOGEN,SEMI-QN: 0.2 MG/DL (ref 0–1.9)

## 2020-08-05 PROCEDURE — 3078F DIAST BP <80 MM HG: CPT | Performed by: PHYSICIAN ASSISTANT

## 2020-08-05 PROCEDURE — 81003 URINALYSIS AUTO W/O SCOPE: CPT | Performed by: PHYSICIAN ASSISTANT

## 2020-08-05 PROCEDURE — 99213 OFFICE O/P EST LOW 20 MIN: CPT | Performed by: PHYSICIAN ASSISTANT

## 2020-08-05 PROCEDURE — 3074F SYST BP LT 130 MM HG: CPT | Performed by: PHYSICIAN ASSISTANT

## 2020-08-05 RX ORDER — HYDROCHLOROTHIAZIDE 12.5 MG/1
TABLET ORAL
Qty: 90 TABLET | Refills: 0 | Status: SHIPPED | OUTPATIENT
Start: 2020-08-05 | End: 2020-10-20

## 2020-08-05 RX ORDER — VALSARTAN AND HYDROCHLOROTHIAZIDE 320; 12.5 MG/1; MG/1
1 TABLET, FILM COATED ORAL DAILY
Qty: 90 TABLET | Refills: 0 | Status: SHIPPED | OUTPATIENT
Start: 2020-08-05 | End: 2021-06-18

## 2020-08-05 RX ORDER — CEFUROXIME AXETIL 250 MG/1
250 TABLET ORAL 2 TIMES DAILY
Qty: 10 TABLET | Refills: 0 | Status: SHIPPED | OUTPATIENT
Start: 2020-08-05 | End: 2020-08-10

## 2020-08-05 NOTE — TELEPHONE ENCOUNTER
PASSED per protocol, refill sent.   Last PE 6.16.20   Future Appointments   Date Time Provider [facility] Anamaria   8/5/2020  2:15 PM Juan Antonio Monson PA-C [facility]   7/13/2021  9:20 AM Griselda Panning, MD SPPULM  SPAL

## 2020-08-05 NOTE — PROGRESS NOTES
Patient presents with:  Painful Urination: x2days LM rm 2      HPI:  Pt presents with c/o dysuria X 2 days. No fever but chills with urination. No visible blood in her urine. + urgency but no frequency. More nocturia, X3 the last couple of nights.   Pt ([facility] [address]     Spondylolisthesis at L4-L5 level     CKD (chronic kidney disease) stage 3, GFR 30-59 ml/min (HCC)     Inflammatory spondylopathy of lumbar region [facility])     Frequent UTI      Current Outpatient Medications   Medication Sig Dispense Refill   • HYDR Location: Right arm, Patient Position: Sitting, Cuff Size: adult)   Pulse 90   Temp 98.2 °F (36.8 °C) (Temporal)   Wt 164 lb 6.4 oz (74.6 kg)   LMP  (LMP Unknown)   SpO2 99%   Breastfeeding No   BMI 28.22 kg/m²   Constitutional:  No distress.    HEENT:  Nor

## 2020-08-13 RX ORDER — ATORVASTATIN CALCIUM 10 MG/1
TABLET, FILM COATED ORAL
Qty: 90 TABLET | Refills: 0 | Status: SHIPPED | OUTPATIENT
Start: 2020-08-13 | End: 2020-10-20

## 2020-08-13 NOTE — TELEPHONE ENCOUNTER
PASSED per protocol, refill sent.   Last PE 6.16.20   Future Appointments   Date Time Provider Tera Conrad   7/13/2021  9:20 AM Christian Martinez MD [facility]  SPAL

## 2020-09-28 ENCOUNTER — IMMUNIZATION (OUTPATIENT)
Dept: FAMILY MEDICINE CLINIC | Facility: CLINIC | Age: 85
End: 2020-09-28
Payer: COMMERCIAL

## 2020-09-28 PROCEDURE — G0008 ADMIN INFLUENZA VIRUS VAC: HCPCS | Performed by: FAMILY MEDICINE

## 2020-09-28 PROCEDURE — 90662 IIV NO PRSV INCREASED AG IM: CPT | Performed by: FAMILY MEDICINE

## 2020-10-20 RX ORDER — HYDROCHLOROTHIAZIDE 12.5 MG/1
TABLET ORAL
Qty: 90 TABLET | Refills: 0 | Status: SHIPPED | OUTPATIENT
Start: 2020-10-20 | End: 2021-01-25 | Stop reason: RX

## 2020-10-20 RX ORDER — ATORVASTATIN CALCIUM 10 MG/1
TABLET, FILM COATED ORAL
Qty: 90 TABLET | Refills: 0 | Status: SHIPPED | OUTPATIENT
Start: 2020-10-20 | End: 2021-01-25

## 2020-10-20 NOTE — TELEPHONE ENCOUNTER
Last VISIT 06/16/20    Last REFILL 08/05/20 HCL qty 90 w/0 refills, 08/13/20 Atorv qty 90 w/0 refills    Last LABS 09/10/20 Covid testing done    Future Appointments   Date Time Provider Tera Conrad   6/18/2021  9:45 AM Tom Rodriguez MD [facility] 75T

## 2020-10-28 RX ORDER — LOSARTAN POTASSIUM AND HYDROCHLOROTHIAZIDE 12.5; 1 MG/1; MG/1
1 TABLET ORAL DAILY
Qty: 90 TABLET | Refills: 0 | Status: SHIPPED | OUTPATIENT
Start: 2020-10-28 | End: 2021-01-25

## 2020-10-28 NOTE — TELEPHONE ENCOUNTER
Need a new script for   LOSARTAN POTASSIUM-HCTZ 100-12.5 MG Oral Tab (Discontinued) 90 tablet     This medication is now available and the patient would like to go back on it  Please advise

## 2020-10-28 NOTE — TELEPHONE ENCOUNTER
PASSED per protocol, refill sent.   Last PE 6.16.20  Future Appointments   Date Time Provider [facility] Anamaria   6/18/2021  9:45 AM Ti Irving MD [facility]   7/13/2021  9:20 AM Yuliya Townsend MD SPPULM  SPAL

## 2020-11-09 ENCOUNTER — TELEMEDICINE (OUTPATIENT)
Dept: INTERNAL MEDICINE CLINIC | Facility: CLINIC | Age: 85
End: 2020-11-09
Payer: COMMERCIAL

## 2020-11-09 DIAGNOSIS — Z20.822 CLOSE EXPOSURE TO COVID-19 VIRUS: Primary | ICD-10-CM

## 2020-11-09 PROCEDURE — 99441 PHONE E/M BY PHYS 5-10 MIN: CPT | Performed by: INTERNAL MEDICINE

## 2020-11-09 NOTE — PROGRESS NOTES
Pedro Coy  11/26/1935    No chief complaint on file. SUBJECTIVE   Mary Lou Huynh is a 82year old female who presents following close exposure to [employer].     The patient was exposed to her son on 11/5, both unmasked and in her home, who was later c Tab Take 81 mg by mouth daily. • CRANBERRY EXTRACT OR Take 1 tablet by mouth every other day. • Vitamin D3 (VITAMIN D3) 2000 UNITS Oral Cap Take 2,000 Units by mouth daily.      • PRILOSEC 20 MG OR CPDR 1 CAPSULE DAILY     • VITAMIN E qd     • CLOBE 12/30/2019    Performed by Patti Rogers MD at [address] [place] [address] N/A 11/18/2019    Performed by Patti Rogers MD at [address] [place] [address] N/A 4/29/2019    Performed by Patti Rogers, Imaging & Consults:  None    No follow-ups on file. There are no Patient Instructions on file for this visit. All questions were answered and the patient agrees with the plan.      Thank you,  Lorena Tran MD

## 2020-11-10 ENCOUNTER — HOSPITAL ENCOUNTER (OUTPATIENT)
Age: 85
Discharge: HOME | End: 2020-11-10
Payer: MEDICARE

## 2020-11-10 VITALS
SYSTOLIC BLOOD PRESSURE: 158 MMHG | HEIGHT: 64 IN | RESPIRATION RATE: 20 BRPM | TEMPERATURE: 98 F | BODY MASS INDEX: 27.31 KG/M2 | DIASTOLIC BLOOD PRESSURE: 95 MMHG | WEIGHT: 160 LBS | HEART RATE: 90 BPM | OXYGEN SATURATION: 98 %

## 2020-11-10 DIAGNOSIS — N39.0 FREQUENT UTI: Primary | ICD-10-CM

## 2020-11-10 DIAGNOSIS — R35.0 URINARY FREQUENCY: ICD-10-CM

## 2020-11-10 PROCEDURE — 81002 URINALYSIS NONAUTO W/O SCOPE: CPT | Performed by: NURSE PRACTITIONER

## 2020-11-10 PROCEDURE — 99214 OFFICE O/P EST MOD 30 MIN: CPT | Performed by: NURSE PRACTITIONER

## 2020-11-10 RX ORDER — CEPHALEXIN 500 MG/1
500 CAPSULE ORAL 2 TIMES DAILY
Qty: 10 CAPSULE | Refills: 0 | Status: SHIPPED | OUTPATIENT
Start: 2020-11-10 | End: 2020-11-15

## 2020-11-10 NOTE — ED PROVIDER NOTES
Patient Seen in: Immediate [address] [place] [address]      History   Patient presents with:  Urinary Symptoms  Testing    Stated Complaint: UTI    44-year-old female presents the immediate care for urinary frequency and discomfort.   History of UTIs, last UTI [facility]   • LUMBAR EPIDURAL N/A 2/11/2019    Performed by Kenny Miller MD at [address] [facility] N/A 11/22/2016    Performed by Kenny Miller MD at [address] POTTER Appearance: Normal appearance. HENT:      Head: Normocephalic. Nose: Nose normal.      Mouth/Throat:      Mouth: Mucous membranes are moist.   Eyes:      Extraocular Movements: Extraocular movements intact.    Cardiovascular:      Rate and Rhyt (two) times daily for 5 days. , Normal, Disp-10 capsule, R-0

## 2020-11-10 NOTE — ED INITIAL ASSESSMENT (HPI)
Pt c/o blood in urine yesterday. Pt also c/o urinary frequency. Pt states a family member who visited her is covid positive.

## 2020-11-11 ENCOUNTER — APPOINTMENT (OUTPATIENT)
Dept: LAB | Facility: HOSPITAL | Age: 85
End: 2020-11-11
Attending: INTERNAL MEDICINE
Payer: MEDICARE

## 2020-11-11 DIAGNOSIS — Z20.822 CLOSE EXPOSURE TO COVID-19 VIRUS: ICD-10-CM

## 2021-01-25 RX ORDER — ATORVASTATIN CALCIUM 10 MG/1
TABLET, FILM COATED ORAL
Qty: 90 TABLET | Refills: 0 | Status: SHIPPED | OUTPATIENT
Start: 2021-01-25 | End: 2021-06-18

## 2021-01-25 RX ORDER — LOSARTAN POTASSIUM AND HYDROCHLOROTHIAZIDE 12.5; 1 MG/1; MG/1
TABLET ORAL
Qty: 90 TABLET | Refills: 0 | Status: SHIPPED | OUTPATIENT
Start: 2021-01-25 | End: 2021-04-21

## 2021-01-26 RX ORDER — LOSARTAN POTASSIUM AND HYDROCHLOROTHIAZIDE 12.5; 1 MG/1; MG/1
TABLET ORAL
Qty: 90 TABLET | Refills: 0 | OUTPATIENT
Start: 2021-01-26

## 2021-02-01 ENCOUNTER — TELEPHONE (OUTPATIENT)
Dept: INTERNAL MEDICINE CLINIC | Facility: CLINIC | Age: 86
End: 2021-02-01

## 2021-02-01 DIAGNOSIS — R31.9 HEMATURIA, UNSPECIFIED TYPE: Primary | ICD-10-CM

## 2021-02-01 NOTE — TELEPHONE ENCOUNTER
Recent UTI treated at [facility]  Note of blood in urine. Recommended f/u with UA. Will order. Spoke to her when I was on a call for her .

## 2021-02-02 DIAGNOSIS — Z23 NEED FOR VACCINATION: ICD-10-CM

## 2021-02-04 ENCOUNTER — IMMUNIZATION (OUTPATIENT)
Dept: LAB | Age: 86
End: 2021-02-04
Attending: HOSPITALIST
Payer: MEDICARE

## 2021-02-04 DIAGNOSIS — Z23 NEED FOR VACCINATION: Primary | ICD-10-CM

## 2021-02-04 PROCEDURE — 0001A SARSCOV2 VAC 30MCG/0.3ML IM: CPT

## 2021-02-25 ENCOUNTER — IMMUNIZATION (OUTPATIENT)
Dept: LAB | Age: 86
End: 2021-02-25
Attending: HOSPITALIST
Payer: MEDICARE

## 2021-02-25 DIAGNOSIS — Z23 NEED FOR VACCINATION: Primary | ICD-10-CM

## 2021-02-25 PROCEDURE — 0002A SARSCOV2 VAC 30MCG/0.3ML IM: CPT

## 2021-04-21 RX ORDER — LOSARTAN POTASSIUM AND HYDROCHLOROTHIAZIDE 12.5; 1 MG/1; MG/1
TABLET ORAL
Qty: 90 TABLET | Refills: 0 | Status: SHIPPED | OUTPATIENT
Start: 2021-04-21 | End: 2021-07-12

## 2021-04-21 NOTE — TELEPHONE ENCOUNTER
PASSED per protocol, refill sent.   Last PE 6.16.20   Future Appointments   Date Time Provider Tera Conrad   6/18/2021  9:45 AM Melanie Newman MD [facility]   7/13/2021  9:20 AM Oval Gilford, MD SP PULM DMG [address] [place] [address]

## 2021-06-03 ENCOUNTER — TELEPHONE (OUTPATIENT)
Dept: INTERNAL MEDICINE CLINIC | Facility: CLINIC | Age: 86
End: 2021-06-03

## 2021-06-03 DIAGNOSIS — R73.03 PRE-DIABETES: ICD-10-CM

## 2021-06-03 DIAGNOSIS — E78.00 PURE HYPERCHOLESTEROLEMIA: ICD-10-CM

## 2021-06-03 DIAGNOSIS — N18.31 STAGE 3A CHRONIC KIDNEY DISEASE (HCC): ICD-10-CM

## 2021-06-03 DIAGNOSIS — Z00.00 ROUTINE GENERAL MEDICAL EXAMINATION AT A HEALTH CARE FACILITY: Primary | ICD-10-CM

## 2021-06-03 DIAGNOSIS — I10 ESSENTIAL HYPERTENSION: ICD-10-CM

## 2021-06-03 DIAGNOSIS — R73.02 GLUCOSE INTOLERANCE (IMPAIRED GLUCOSE TOLERANCE): ICD-10-CM

## 2021-06-03 NOTE — TELEPHONE ENCOUNTER
Future Appointments   Date Time Provider Tera Anamaria   6/11/2021  9:00 AM REFERENCE [id number]   6/18/2021  3:00 PM Pool Orozco MD [facility]     Orders to edwad- Pt informed that labs need to be completed no sooner th

## 2021-06-11 ENCOUNTER — LAB ENCOUNTER (OUTPATIENT)
Dept: LAB | Age: 86
End: 2021-06-11
Attending: INTERNAL MEDICINE
Payer: MEDICARE

## 2021-06-11 PROCEDURE — 80061 LIPID PANEL: CPT | Performed by: INTERNAL MEDICINE

## 2021-06-11 PROCEDURE — 84443 ASSAY THYROID STIM HORMONE: CPT | Performed by: INTERNAL MEDICINE

## 2021-06-11 PROCEDURE — 36415 COLL VENOUS BLD VENIPUNCTURE: CPT | Performed by: INTERNAL MEDICINE

## 2021-06-11 PROCEDURE — 85025 COMPLETE CBC W/AUTO DIFF WBC: CPT | Performed by: INTERNAL MEDICINE

## 2021-06-11 PROCEDURE — 80053 COMPREHEN METABOLIC PANEL: CPT | Performed by: INTERNAL MEDICINE

## 2021-06-11 PROCEDURE — 81001 URINALYSIS AUTO W/SCOPE: CPT | Performed by: NURSE PRACTITIONER

## 2021-06-16 ENCOUNTER — TELEPHONE (OUTPATIENT)
Dept: INTERNAL MEDICINE CLINIC | Facility: CLINIC | Age: 86
End: 2021-06-16

## 2021-06-16 NOTE — TELEPHONE ENCOUNTER
Mammogram results placed in CB bin for review    Recommended 1 year screening , no mammographic evidence of malignancy; no sig interval change.      Overdue for annual

## 2021-06-18 ENCOUNTER — OFFICE VISIT (OUTPATIENT)
Dept: INTERNAL MEDICINE CLINIC | Facility: CLINIC | Age: 86
End: 2021-06-18
Payer: COMMERCIAL

## 2021-06-18 VITALS
TEMPERATURE: 98 F | RESPIRATION RATE: 16 BRPM | WEIGHT: 159 LBS | SYSTOLIC BLOOD PRESSURE: 136 MMHG | HEART RATE: 68 BPM | BODY MASS INDEX: 27.48 KG/M2 | OXYGEN SATURATION: 97 % | HEIGHT: 63.78 IN | DIASTOLIC BLOOD PRESSURE: 72 MMHG

## 2021-06-18 DIAGNOSIS — M54.16 LUMBAR RADICULITIS: ICD-10-CM

## 2021-06-18 DIAGNOSIS — M48.062 SPINAL STENOSIS OF LUMBAR REGION WITH NEUROGENIC CLAUDICATION: ICD-10-CM

## 2021-06-18 DIAGNOSIS — I77.810 AORTIC ROOT DILATION (HCC): ICD-10-CM

## 2021-06-18 DIAGNOSIS — M79.641 BILATERAL HAND PAIN: ICD-10-CM

## 2021-06-18 DIAGNOSIS — I10 ESSENTIAL HYPERTENSION: ICD-10-CM

## 2021-06-18 DIAGNOSIS — Z96.651 HISTORY OF TOTAL RIGHT KNEE REPLACEMENT: ICD-10-CM

## 2021-06-18 DIAGNOSIS — R73.02 GLUCOSE INTOLERANCE (IMPAIRED GLUCOSE TOLERANCE): ICD-10-CM

## 2021-06-18 DIAGNOSIS — M79.642 BILATERAL HAND PAIN: ICD-10-CM

## 2021-06-18 DIAGNOSIS — G47.00 INSOMNIA, UNSPECIFIED TYPE: ICD-10-CM

## 2021-06-18 DIAGNOSIS — N39.0 FREQUENT UTI: ICD-10-CM

## 2021-06-18 DIAGNOSIS — N18.31 STAGE 3A CHRONIC KIDNEY DISEASE (HCC): ICD-10-CM

## 2021-06-18 DIAGNOSIS — Z00.00 ENCOUNTER FOR ANNUAL HEALTH EXAMINATION: ICD-10-CM

## 2021-06-18 DIAGNOSIS — M43.16 SPONDYLOLISTHESIS OF LUMBAR REGION: ICD-10-CM

## 2021-06-18 DIAGNOSIS — E78.00 PURE HYPERCHOLESTEROLEMIA: Primary | ICD-10-CM

## 2021-06-18 DIAGNOSIS — M46.96 INFLAMMATORY SPONDYLOPATHY OF LUMBAR REGION (HCC): ICD-10-CM

## 2021-06-18 DIAGNOSIS — J44.9 ASTHMA WITH COPD (CHRONIC OBSTRUCTIVE PULMONARY DISEASE) (HCC): ICD-10-CM

## 2021-06-18 DIAGNOSIS — M43.16 SPONDYLOLISTHESIS AT L4-L5 LEVEL: ICD-10-CM

## 2021-06-18 DIAGNOSIS — Z78.0 POST-MENOPAUSAL: ICD-10-CM

## 2021-06-18 DIAGNOSIS — J45.20 MILD INTERMITTENT ASTHMA WITHOUT COMPLICATION: ICD-10-CM

## 2021-06-18 DIAGNOSIS — M17.11 ARTHRITIS OF RIGHT KNEE: ICD-10-CM

## 2021-06-18 DIAGNOSIS — R73.03 PRE-DIABETES: ICD-10-CM

## 2021-06-18 PROCEDURE — 96160 PT-FOCUSED HLTH RISK ASSMT: CPT | Performed by: INTERNAL MEDICINE

## 2021-06-18 PROCEDURE — G0439 PPPS, SUBSEQ VISIT: HCPCS | Performed by: INTERNAL MEDICINE

## 2021-06-18 PROCEDURE — 3008F BODY MASS INDEX DOCD: CPT | Performed by: INTERNAL MEDICINE

## 2021-06-18 PROCEDURE — 99397 PER PM REEVAL EST PAT 65+ YR: CPT | Performed by: INTERNAL MEDICINE

## 2021-06-18 PROCEDURE — 3075F SYST BP GE 130 - 139MM HG: CPT | Performed by: INTERNAL MEDICINE

## 2021-06-18 PROCEDURE — 3078F DIAST BP <80 MM HG: CPT | Performed by: INTERNAL MEDICINE

## 2021-06-18 RX ORDER — ATORVASTATIN CALCIUM 20 MG/1
20 TABLET, FILM COATED ORAL DAILY
Qty: 90 TABLET | Refills: 1 | Status: SHIPPED | OUTPATIENT
Start: 2021-06-18 | End: 2022-02-03

## 2021-06-18 NOTE — PATIENT INSTRUCTIONS
Early Alonzo Coy's SCREENING SCHEDULE   Tests on this list are recommended by your physician but may not be covered, or covered at this frequency, by your insurer. Please check with your insurance carrier before scheduling to verify coverage.    PREVENTATI BONE DENSITOMETRY (CPT=77080) 11/15/2016      No recommendations at this time   Pap and Pelvic    Pap   Covered every 2 years for women at normal risk;  Annually if at high risk -  No recommendations at this time    Chlamydia Annually if high risk -  No rec including necessary form from the [employer]. http://www. idph.state. [place]/public/books/advin.htm  A link to the CompareAway.  This site has a lot of good information including definitions of the different t

## 2021-06-18 NOTE — PROGRESS NOTES
HPI:   Sarahi Mcmullen is a 82year old female who presents for a MA (Medicare Advantage) [facility] (Once per calendar year). Here for supervisit. She has stable chronic issues. Htn, high hol and asthma/copd are all stable.    No topic due editable te radiculitis     Asthma     Arthritis of right knee     History of total knee replacement     Pre-diabetes     Asthma with COPD (chronic obstructive pulmonary disease) (HCC)     Aortic root dilation (HCC)     Spondylolisthesis at L4-L5 level     CKD (chroni and other eczema, due to unspecified cause (12/2/2005), Edema (12/2/2005), Esophageal reflux, Extrinsic asthma, unspecified, Female climacteric state (6/15/2000), GERD, HYPERTENSION, Onychocryptosis (8/25/2016), Other and unspecified hyperlipidemia, Pain d complaint of urinary incontinence   MUSCULOSKELETAL: denies back pain  NEURO: denies headaches  PSYCHE: denies depression or anxiety  HEMATOLOGIC: denies hx of anemia  ENDOCRINE: denies thyroid history  ALL/ASTHMA: denies hx of allergy or asthma    EXAM: rashes or lesions   Lymph nodes: Cervical, supraclavicular, and axillary nodes normal   Neurologic: Normal       Vaccination History     Immunization History   Administered Date(s) Administered   • >=3 YRS FLUZONE OR FLUARIX QUAD PRESERVE FREE SINGLE DOSE need ortho hand  Encounter for annual health examination   Essential hypertension-stable continue meds    Glucose intolerance (impaired glucose tolerance)-stable continue observation    Insomnia-stable contien observation    Spondylolisthesis-stable contie Supplementary Documentation:   Mark Coy's SCREENING SCHEDULE   Tests on this list are recommended by your physician but may not be covered, or covered at this frequency, by your insurer.    Please check with your insurance carrier before scheduling (Steroids) Last Dexa Scan:    XR DEXA BONE DENSITOMETRY (CPT=77080) 11/15/2016      No recommendations at this time   Pap and Pelvic    Pap   Covered every 2 years for women at normal risk;  Annually if at high risk -  No recommendations at this time    Chl

## 2021-06-21 ENCOUNTER — PATIENT MESSAGE (OUTPATIENT)
Dept: INTERNAL MEDICINE CLINIC | Facility: CLINIC | Age: 86
End: 2021-06-21

## 2021-06-21 NOTE — TELEPHONE ENCOUNTER
From: Lainey Tuttle  To: Viji Irving MD  Sent: 6/21/2021 1:31 PM CDT  Subject: Other    There was a question which drug I was taking when I was in for my yearly physical.    I am taking   Losartan/Hct 100-12 Tab  My record can be up dated if need be.

## 2021-06-24 ENCOUNTER — TELEPHONE (OUTPATIENT)
Dept: INTERNAL MEDICINE CLINIC | Facility: CLINIC | Age: 86
End: 2021-06-24

## 2021-06-24 NOTE — TELEPHONE ENCOUNTER
Pt made the below appt through my chart for a cough. I reached out to pt and she's worried that it will trigger her asthma. Pt stated she's a little short of breath. Pt stated she's been vaccinated and would rather come in person for the appt. Pt also added she wants to get in today vs tomorrow. High TE please advise.     Future Appointments   Date Time Provider Tera Anamaria   6/25/2021  1:40 PM Brenton Adams MD [facility]

## 2021-06-24 NOTE — TELEPHONE ENCOUNTER
FYI, appt cancelled. Pt reports cough and mild SOB. Has asthma. AD reports we are not seeing sick patients in office at this time. Pt advised to go to [facility]. Donald souza. Gave address to [facility]. Pt states that one is not very good, she will find another, then hung up.

## 2021-07-07 ENCOUNTER — HOSPITAL ENCOUNTER (OUTPATIENT)
Dept: GENERAL RADIOLOGY | Age: 86
Discharge: HOME | End: 2021-07-07
Attending: INTERNAL MEDICINE
Payer: MEDICARE

## 2021-07-07 DIAGNOSIS — M79.642 BILATERAL HAND PAIN: ICD-10-CM

## 2021-07-07 DIAGNOSIS — M79.641 BILATERAL HAND PAIN: ICD-10-CM

## 2021-07-07 PROCEDURE — 73130 X-RAY EXAM OF HAND: CPT | Performed by: INTERNAL MEDICINE

## 2021-07-12 ENCOUNTER — HOSPITAL ENCOUNTER (OUTPATIENT)
Dept: BONE DENSITY | Age: 86
Discharge: HOME | End: 2021-07-12
Attending: INTERNAL MEDICINE
Payer: MEDICARE

## 2021-07-12 DIAGNOSIS — Z78.0 POST-MENOPAUSAL: ICD-10-CM

## 2021-07-12 PROCEDURE — 77080 DXA BONE DENSITY AXIAL: CPT | Performed by: INTERNAL MEDICINE

## 2021-07-12 RX ORDER — LOSARTAN POTASSIUM AND HYDROCHLOROTHIAZIDE 12.5; 1 MG/1; MG/1
TABLET ORAL
Qty: 90 TABLET | Refills: 0 | Status: SHIPPED | OUTPATIENT
Start: 2021-07-12 | End: 2021-10-29

## 2021-07-19 ENCOUNTER — TELEPHONE (OUTPATIENT)
Dept: INTERNAL MEDICINE CLINIC | Facility: CLINIC | Age: 86
End: 2021-07-19

## 2021-07-19 NOTE — TELEPHONE ENCOUNTER
Pt stated she received a Scoopinion message to get Bone density scans and xrays done for both hands but stated she already did them. Results in system.  FYI

## 2021-08-04 ENCOUNTER — OFFICE VISIT (OUTPATIENT)
Dept: INTERNAL MEDICINE CLINIC | Facility: CLINIC | Age: 86
End: 2021-08-04
Payer: COMMERCIAL

## 2021-08-04 ENCOUNTER — TELEPHONE (OUTPATIENT)
Dept: PHYSICAL THERAPY | Facility: HOSPITAL | Age: 86
End: 2021-08-04

## 2021-08-04 ENCOUNTER — TELEPHONE (OUTPATIENT)
Dept: INTERNAL MEDICINE CLINIC | Facility: CLINIC | Age: 86
End: 2021-08-04

## 2021-08-04 VITALS
WEIGHT: 162 LBS | TEMPERATURE: 98 F | DIASTOLIC BLOOD PRESSURE: 64 MMHG | RESPIRATION RATE: 16 BRPM | BODY MASS INDEX: 28.7 KG/M2 | HEART RATE: 88 BPM | OXYGEN SATURATION: 95 % | SYSTOLIC BLOOD PRESSURE: 122 MMHG | HEIGHT: 63 IN

## 2021-08-04 DIAGNOSIS — M62.838 CERVICAL PARASPINAL MUSCLE SPASM: ICD-10-CM

## 2021-08-04 DIAGNOSIS — G89.29 CHRONIC NECK PAIN: ICD-10-CM

## 2021-08-04 DIAGNOSIS — K06.8 GUM LESION: Primary | ICD-10-CM

## 2021-08-04 DIAGNOSIS — M54.2 CHRONIC NECK PAIN: ICD-10-CM

## 2021-08-04 PROCEDURE — 3008F BODY MASS INDEX DOCD: CPT | Performed by: INTERNAL MEDICINE

## 2021-08-04 PROCEDURE — 3074F SYST BP LT 130 MM HG: CPT | Performed by: INTERNAL MEDICINE

## 2021-08-04 PROCEDURE — 99214 OFFICE O/P EST MOD 30 MIN: CPT | Performed by: INTERNAL MEDICINE

## 2021-08-04 PROCEDURE — 3078F DIAST BP <80 MM HG: CPT | Performed by: INTERNAL MEDICINE

## 2021-08-04 RX ORDER — DEXAMETHASONE 0.5 MG/5ML
ELIXIR ORAL
COMMUNITY
Start: 2021-06-17

## 2021-08-04 NOTE — PROGRESS NOTES
Jonas Coy  11/26/1935    Patient presents with:  Lesion: RG rm 7  Sore in mouth noticed since end of May.  Was treated effectively with steroid but once done, it comes back  Referral: PT      Jadon Wharton is a 82year old female who presen Inhale 2 puffs into the lungs every 4 (four) hours as needed. 1 each 5   • albuterol sulfate (2.5 MG/3ML) 0.083% Inhalation Nebu Soln Take 3 mL (2.5 mg total) by nebulization every 6 (six) hours as needed for Wheezing.  1 each 0   • atorvastatin 20 MG Oral [facility])     Frequent UTI     Past Surgical History:   Procedure Laterality Date   • APPENDECTOMY     • CATARACT     • COLONOSCOPY  2009   • FLUOR GID & Linn Otoole NDL/CATH SPI DX/THER NJX  2/3/2014    Procedure: CERVICAL EPIDURAL;  Surgeon: Kalpana Lozada MD; Normal carotid pulses. No masses. Cardiovascular: Normal rate, regular rhythm and intact distal pulses. No murmur, rubs or gallops. Pulmonary/Chest: Effort normal and breath sounds normal. No respiratory distress. Abdominal: Soft.  Bowel sounds are nor

## 2021-08-04 NOTE — TELEPHONE ENCOUNTER
Patient indicates sores along gum line since June. Patient was told by dentis in June to use peroxide and follow up with PCP. Patient passed travel screen. Patient is fully vaccinated. Patient will take pictures of the sores for reference for exam due to limited oral/sinus exams in office r/t COVID. No sore throat, no sinus symptoms. No new; onset 2 months ago. fyi sent to AD.

## 2021-08-04 NOTE — PROGRESS NOTES
SPINE EVALUATION:   Referring Physician: Dr. Mira Soler  Diagnosis: Chronic neck pain (M54.2,G89.29)  Cervical paraspinal muscle spasm (D27.642)  Dizziness        Date of Service: 8/4/2021     PATIENT SUMMARY   Bessy Fernandes is a 82year old female who prese Onychocryptosis (8/25/2016), Other and unspecified hyperlipidemia, Pain due to onychomycosis of toenail (8/17/2017), Pain due to onychomycosis of toenail of left foot (8/25/2016), Primary localized osteoarthrosis of right lower leg (6/10/2015), Pruritic di limited ability to correct  Neuro Screen:   Biceps and triceps symmetrical and WNL     Cervical ROM:(*denotes pain)   Flexion(0-45): 40  Extension(0-45/60): 42 (dizziness with return)  Side flexion (0-45): NT this date   Rotation(0-60/80): R 55*; L 70 x weekly - 2 sets - 10 reps      Charges: PT Eval Moderate Complexity, Manual x 1 ; Canalith repositioning maneuver     Total Timed Treatment: 15 min     Total Treatment Time: 45 min     Based on the clinical presentation, examination and history, this mary jo while under my care.     X___________________________________________________ Date____________________    Certification From: 0/1/3282  To:11/2/2021

## 2021-08-05 ENCOUNTER — OFFICE VISIT (OUTPATIENT)
Dept: PHYSICAL THERAPY | Facility: HOSPITAL | Age: 86
End: 2021-08-05
Attending: INTERNAL MEDICINE
Payer: MEDICARE

## 2021-08-05 DIAGNOSIS — M62.838 CERVICAL PARASPINAL MUSCLE SPASM: ICD-10-CM

## 2021-08-05 DIAGNOSIS — M54.2 CHRONIC NECK PAIN: ICD-10-CM

## 2021-08-05 DIAGNOSIS — G89.29 CHRONIC NECK PAIN: ICD-10-CM

## 2021-08-05 PROCEDURE — 95992 CANALITH REPOSITIONING PROC: CPT

## 2021-08-05 PROCEDURE — 97140 MANUAL THERAPY 1/> REGIONS: CPT

## 2021-08-05 PROCEDURE — 97162 PT EVAL MOD COMPLEX 30 MIN: CPT

## 2021-08-09 ENCOUNTER — OFFICE VISIT (OUTPATIENT)
Dept: PHYSICAL THERAPY | Facility: HOSPITAL | Age: 86
End: 2021-08-09
Attending: INTERNAL MEDICINE
Payer: MEDICARE

## 2021-08-09 PROCEDURE — 97140 MANUAL THERAPY 1/> REGIONS: CPT

## 2021-08-09 PROCEDURE — 97110 THERAPEUTIC EXERCISES: CPT

## 2021-08-09 PROCEDURE — 95992 CANALITH REPOSITIONING PROC: CPT

## 2021-08-09 NOTE — PROGRESS NOTES
Dx: Chronic neck pain (M54.2,G89.29)  Cervical paraspinal muscle spasm (G53.430)           Insurance (Authorized # of Visits):  [id number] [facility] Physician: Dr. Itzel Glasgow  Next MD visit: none scheduled  Fall Risk: standard         Precaution intermittent shutting in response to symptoms, so no noted nystagmus. Due to still symptomatic, treated with Epley maneuver. Re-assessed after 5' and no symptoms and no nystagmus noted with R [place].    Assessment of accessory motion thoracic and cerv Treatment: 45 min  Total Treatment Time: 45 min

## 2021-08-11 ENCOUNTER — OFFICE VISIT (OUTPATIENT)
Dept: PHYSICAL THERAPY | Facility: HOSPITAL | Age: 86
End: 2021-08-11
Attending: INTERNAL MEDICINE
Payer: MEDICARE

## 2021-08-11 PROCEDURE — 97110 THERAPEUTIC EXERCISES: CPT

## 2021-08-11 PROCEDURE — 97140 MANUAL THERAPY 1/> REGIONS: CPT

## 2021-08-11 PROCEDURE — 95992 CANALITH REPOSITIONING PROC: CPT

## 2021-08-11 NOTE — PROGRESS NOTES
Dx: Chronic neck pain (M54.2,G89.29)  Cervical paraspinal muscle spasm (G67.681)           Insurance (Authorized # of Visits):  23[address] [facility] Physician: Dr. Rylee Nick MD visit: none scheduled  Fall Risk: standard         Precaution leaving for vacation all next week. Instructed to contact therapist tomorrow night if still having a lot of symptoms and will get in Friday morning if needed.  Work on thoracic mobility and postural re-training and upgraded HEP for further ex for week off f stretching  *therapist stretching B UT x 4' total      TE:   pec stretch 3 x 35s doorway  T, Y, W with 1/2 FR on wall, towel added behind head to reach FR with neutral cervical  *UT stretch 2 x 25s  *levator trap stretch 2 x25s ea   TE:  Rows RTB 2 x 12  S

## 2021-08-12 ENCOUNTER — OFFICE VISIT (OUTPATIENT)
Dept: ORTHOPEDICS CLINIC | Facility: CLINIC | Age: 86
End: 2021-08-12
Payer: COMMERCIAL

## 2021-08-12 ENCOUNTER — MED REC SCAN ONLY (OUTPATIENT)
Dept: INTERNAL MEDICINE CLINIC | Facility: CLINIC | Age: 86
End: 2021-08-12

## 2021-08-12 VITALS — OXYGEN SATURATION: 99 % | HEART RATE: 97 BPM

## 2021-08-12 DIAGNOSIS — M19.049 HAND ARTHRITIS: Primary | ICD-10-CM

## 2021-08-12 PROCEDURE — 99203 OFFICE O/P NEW LOW 30 MIN: CPT | Performed by: ORTHOPAEDIC SURGERY

## 2021-08-12 RX ORDER — VALACYCLOVIR HYDROCHLORIDE 1 G/1
TABLET, FILM COATED ORAL 2 TIMES DAILY
COMMUNITY
Start: 2021-08-05 | End: 2021-12-22 | Stop reason: ALTCHOICE

## 2021-08-12 NOTE — H&P
Clinic Note [facility]     Assessment/Plan:  82year old female    1. MCP arthritis R- Turmeric, Glucosamine, or Voltaren gel are options to consider. Rec continue to move and use fingers to prevent stiffness.  May consider OT for hot paraffin wax tr 8/17/2017   • Pain due to onychomycosis of toenail of left foot 8/25/2016   • Primary localized osteoarthrosis of right lower leg 6/10/2015   • Pruritic disorder 1/13/2006   • Shortness of breath 2/24/2011   • Unspecified essential hypertension      Past S hours as needed. 1 each 5   • albuterol sulfate (2.5 MG/3ML) 0.083% Inhalation Nebu Soln Take 3 mL (2.5 mg total) by nebulization every 6 (six) hours as needed for Wheezing.  1 each 0   • atorvastatin 20 MG Oral Tab Take 1 tablet (20 mg total) by mouth [place]   [email or web address]  [email or web address]  t: [id number]  f: [phone]

## 2021-08-24 NOTE — PROGRESS NOTES
Dx: Chronic neck pain (M54.2,G89.29)  Cervical paraspinal muscle spasm (G91.379)           Insurance (Authorized # of Visits):  [phone] [facility] Physician: Dr. Maureen Ferrera  Next MD visit: none scheduled  Fall Risk: standard         Precaution likely over recruiting elevators in other aspects of day contributing to the increased tension. Will continue to work on re-education of this.      Patient was instructed in and issued a HEP for:   Access Code: [email or web address]  Exercises  Doorway Pec Stretch at 61 directions  *suboccipital stretching  PIVVM R and L rotation C4-C6     TE:   pec stretch 3 x 30s doorway  T, Y, W with 1/2 FR on wall, towel added behind head to reach FR with neutral cervical  *UT stretch 2 x 25s  *levator trap stretch 2 x25s ea   TE:  Ro

## 2021-08-25 ENCOUNTER — OFFICE VISIT (OUTPATIENT)
Dept: PHYSICAL THERAPY | Facility: HOSPITAL | Age: 86
End: 2021-08-25
Attending: INTERNAL MEDICINE
Payer: MEDICARE

## 2021-08-25 PROCEDURE — 97110 THERAPEUTIC EXERCISES: CPT

## 2021-08-25 PROCEDURE — 97140 MANUAL THERAPY 1/> REGIONS: CPT

## 2021-08-27 ENCOUNTER — APPOINTMENT (OUTPATIENT)
Dept: PHYSICAL THERAPY | Facility: HOSPITAL | Age: 86
End: 2021-08-27
Attending: INTERNAL MEDICINE
Payer: MEDICARE

## 2021-08-29 NOTE — PROGRESS NOTES
Dx: Chronic neck pain (M54.2,G89.29)  Cervical paraspinal muscle spasm (J19.712)           Insurance (Authorized # of Visits):  [phone] Physician: Dr. Kymberly Moscoso  Next MD visit: none scheduled  Fall Risk: standard         Precaution reps  Seated Trunk Rotation - Arms Crossed - 2 x daily - 7 x weekly - 1 sets - 5 reps - 5-10 hold  Standing Row with Anchored Resistance - 1 x daily - 4 x weekly - 2 sets - 12 reps         Goals:  (to be met in 10 visits)   · Pt will have no dizziness when neutral cervical  *UT stretch 2 x 25s  *levator trap stretch 2 x25s ea   TE:  Rows RTB 2 x 12  Seated thoracic rotation with cervical rotation 5 sets ea 5-10s hold   Review of pec stretch  HEP upgrade with handout   TE:  BPPV assessments  Seated thoracic c

## 2021-08-30 ENCOUNTER — OFFICE VISIT (OUTPATIENT)
Dept: PHYSICAL THERAPY | Facility: HOSPITAL | Age: 86
End: 2021-08-30
Attending: INTERNAL MEDICINE
Payer: MEDICARE

## 2021-08-30 PROCEDURE — 97110 THERAPEUTIC EXERCISES: CPT

## 2021-08-30 PROCEDURE — 97140 MANUAL THERAPY 1/> REGIONS: CPT

## 2021-09-01 ENCOUNTER — MED REC SCAN ONLY (OUTPATIENT)
Dept: INTERNAL MEDICINE CLINIC | Facility: CLINIC | Age: 86
End: 2021-09-01

## 2021-09-01 ENCOUNTER — OFFICE VISIT (OUTPATIENT)
Dept: PHYSICAL THERAPY | Facility: HOSPITAL | Age: 86
End: 2021-09-01
Attending: INTERNAL MEDICINE
Payer: MEDICARE

## 2021-09-01 PROCEDURE — 97140 MANUAL THERAPY 1/> REGIONS: CPT

## 2021-09-01 PROCEDURE — 97110 THERAPEUTIC EXERCISES: CPT

## 2021-09-01 NOTE — PROGRESS NOTES
Dx: Chronic neck pain (M54.2,G89.29)  Cervical paraspinal muscle spasm (U08.344)           Insurance (Authorized # of Visits):  [facility] Physician: Dr. Itzel Glasgow  Next MD visit: none scheduled  Fall Risk: standard         Precaution hold  Standing Backward Shoulder Rolls - 2 x daily - 7 x weekly - 2 sets - 10 reps  Seated Trunk Rotation - Arms Crossed - 2 x daily - 7 x weekly - 1 sets - 5 reps - 5-10 hold  Standing Row with Anchored Resistance - 1 x daily - 4 x weekly - 2 sets - 12 re time R rotation   TE:  Rows RTB 2 x 12  Seated thoracic rotation with cervical rotation 5 sets ea 5-10s hold   Review of pec stretch  HEP upgrade with handout   TE:  BPPV assessments  Seated thoracic chair extension 2 x 15  Rows RTB 2 x 12  Seated thoracic

## 2021-09-13 ENCOUNTER — LAB ENCOUNTER (OUTPATIENT)
Dept: LAB | Age: 86
End: 2021-09-13
Attending: INTERNAL MEDICINE
Payer: MEDICARE

## 2021-09-13 LAB
ALBUMIN SERPL-MCNC: 3.8 G/DL (ref 3.4–5)
ALBUMIN/GLOB SERPL: 1.5 {RATIO} (ref 1–2)
ALP LIVER SERPL-CCNC: 63 U/L
ALT SERPL-CCNC: 18 U/L
ANION GAP SERPL CALC-SCNC: 6 MMOL/L (ref 0–18)
AST SERPL-CCNC: 13 U/L (ref 15–37)
BILIRUB SERPL-MCNC: 1 MG/DL (ref 0.1–2)
BUN BLD-MCNC: 19 MG/DL (ref 7–18)
CALCIUM BLD-MCNC: 9.2 MG/DL (ref 8.5–10.1)
CHLORIDE SERPL-SCNC: 106 MMOL/L (ref 98–112)
CHOLEST SMN-MCNC: 193 MG/DL (ref ?–200)
CO2 SERPL-SCNC: 26 MMOL/L (ref 21–32)
CREAT BLD-MCNC: 0.74 MG/DL
EST. AVERAGE GLUCOSE BLD GHB EST-MCNC: 134 MG/DL (ref 68–126)
GLOBULIN PLAS-MCNC: 2.5 G/DL (ref 2.8–4.4)
GLUCOSE BLD-MCNC: 100 MG/DL (ref 70–99)
HBA1C MFR BLD HPLC: 6.3 % (ref ?–5.7)
HDLC SERPL-MCNC: 67 MG/DL (ref 40–59)
LDLC SERPL CALC-MCNC: 101 MG/DL (ref ?–100)
M PROTEIN MFR SERPL ELPH: 6.3 G/DL (ref 6.4–8.2)
NONHDLC SERPL-MCNC: 126 MG/DL (ref ?–130)
OSMOLALITY SERPL CALC.SUM OF ELEC: 288 MOSM/KG (ref 275–295)
PATIENT FASTING Y/N/NP: YES
PATIENT FASTING Y/N/NP: YES
POTASSIUM SERPL-SCNC: 4.6 MMOL/L (ref 3.5–5.1)
SODIUM SERPL-SCNC: 138 MMOL/L (ref 136–145)
TRIGL SERPL-MCNC: 144 MG/DL (ref 30–149)
VLDLC SERPL CALC-MCNC: 24 MG/DL (ref 0–30)

## 2021-09-13 PROCEDURE — 87086 URINE CULTURE/COLONY COUNT: CPT | Performed by: INTERNAL MEDICINE

## 2021-09-13 PROCEDURE — 83036 HEMOGLOBIN GLYCOSYLATED A1C: CPT | Performed by: INTERNAL MEDICINE

## 2021-09-13 PROCEDURE — 36415 COLL VENOUS BLD VENIPUNCTURE: CPT | Performed by: INTERNAL MEDICINE

## 2021-09-13 PROCEDURE — 80053 COMPREHEN METABOLIC PANEL: CPT | Performed by: INTERNAL MEDICINE

## 2021-09-13 PROCEDURE — 81001 URINALYSIS AUTO W/SCOPE: CPT | Performed by: INTERNAL MEDICINE

## 2021-09-13 PROCEDURE — 80061 LIPID PANEL: CPT | Performed by: INTERNAL MEDICINE

## 2021-09-14 ENCOUNTER — PATIENT MESSAGE (OUTPATIENT)
Dept: INTERNAL MEDICINE CLINIC | Facility: CLINIC | Age: 86
End: 2021-09-14

## 2021-09-14 ENCOUNTER — APPOINTMENT (OUTPATIENT)
Dept: PHYSICAL THERAPY | Facility: HOSPITAL | Age: 86
End: 2021-09-14
Attending: INTERNAL MEDICINE
Payer: MEDICARE

## 2021-09-14 ENCOUNTER — TELEPHONE (OUTPATIENT)
Dept: INTERNAL MEDICINE CLINIC | Facility: CLINIC | Age: 86
End: 2021-09-14

## 2021-09-14 DIAGNOSIS — R35.0 FREQUENCY OF URINATION: ICD-10-CM

## 2021-09-14 DIAGNOSIS — R30.0 BURNING WITH URINATION: ICD-10-CM

## 2021-09-14 DIAGNOSIS — R39.15 URGENCY OF URINATION: Primary | ICD-10-CM

## 2021-09-14 NOTE — TELEPHONE ENCOUNTER
Pt calling inquiring about urinalysis results, she is experiencing an \"urgency\" to go to the restroom.

## 2021-09-14 NOTE — TELEPHONE ENCOUNTER
Called patient to get more info:    Burning when urination - got up in 3 times in the night last night, no the norm  Feeling have to go \"all the time\"   Drinking cranberry juice and a cranberry pill in the AM   By noontime it starts feeling better but th

## 2021-09-14 NOTE — TELEPHONE ENCOUNTER
From: Meghan Zhou  To: Rizwana Castorena MD  Sent: 9/14/2021 3:47 PM CDT  Subject: Question regarding URINE CULTURE, ROUTINE    I am experiencing symptoms of a UTI. Does this indicate that I have one if not what do I have.  I had a UTI about 2 weeks ago a

## 2021-09-14 NOTE — TELEPHONE ENCOUNTER
From: Lainey Tuttle  To: Viji Irving MD  Sent: 9/14/2021 2:07 PM CDT  Subject: Question regarding URINE CULTURE, ROUTINE    I am experiencing the symptoms that I have when I have a UTI. Do I have a UTI.

## 2021-09-15 NOTE — TELEPHONE ENCOUNTER
Patient notified U/A C/S ordered, needs to repeat and see Dr Kolby Velazquez Urologist that she has seen before. Pt verbalizes understanding.

## 2021-09-16 ENCOUNTER — LAB ENCOUNTER (OUTPATIENT)
Dept: LAB | Age: 86
End: 2021-09-16
Attending: INTERNAL MEDICINE
Payer: MEDICARE

## 2021-09-16 LAB
BILIRUB UR QL STRIP.AUTO: NEGATIVE
CLARITY UR REFRACT.AUTO: CLEAR
COLOR UR AUTO: YELLOW
GLUCOSE UR STRIP.AUTO-MCNC: NEGATIVE MG/DL
KETONES UR STRIP.AUTO-MCNC: NEGATIVE MG/DL
NITRITE UR QL STRIP.AUTO: NEGATIVE
PH UR STRIP.AUTO: 6 [PH] (ref 5–8)
PROT UR STRIP.AUTO-MCNC: NEGATIVE MG/DL
RBC UR QL AUTO: NEGATIVE
SP GR UR STRIP.AUTO: 1 (ref 1–1.03)
UROBILINOGEN UR STRIP.AUTO-MCNC: <2 MG/DL

## 2021-09-16 PROCEDURE — 87086 URINE CULTURE/COLONY COUNT: CPT | Performed by: INTERNAL MEDICINE

## 2021-09-16 PROCEDURE — 81001 URINALYSIS AUTO W/SCOPE: CPT | Performed by: INTERNAL MEDICINE

## 2021-09-17 ENCOUNTER — OFFICE VISIT (OUTPATIENT)
Dept: PHYSICAL THERAPY | Facility: HOSPITAL | Age: 86
End: 2021-09-17
Attending: INTERNAL MEDICINE
Payer: MEDICARE

## 2021-09-17 PROCEDURE — 97140 MANUAL THERAPY 1/> REGIONS: CPT

## 2021-09-17 PROCEDURE — 97110 THERAPEUTIC EXERCISES: CPT

## 2021-09-17 RX ORDER — CEPHALEXIN 500 MG/1
500 CAPSULE ORAL 2 TIMES DAILY
Qty: 14 CAPSULE | Refills: 0 | Status: SHIPPED | OUTPATIENT
Start: 2021-09-17

## 2021-09-17 NOTE — PROGRESS NOTES
Dx: Chronic neck pain (M54.2,G89.29)  Cervical paraspinal muscle spasm (I57.931)           Insurance (Authorized # of Visits):  67[address] [facility] Physician: Dr. Michelle Conklin  Next MD visit: none scheduled  Fall Risk: standard         Precaution wall push ups, requires tactile cues for prevention cervical extension and hiking. Upgraded HEP for further strengthening with goal of performance 3x per week. Mild residual tightness on R remains compared to L.  Continued work on cervical PIVVMs and upslop thoracic upper and mid G3 B directions  *suboccipital stretching  *therapist stretching B UT x 4' total Manual therapy: (15')  *thoracic P-A G3 mid thoracic throughout region of increased kyphosis  *transverse thoracic upper and mid G3 B directions  *suboc Open book stretch 6 x 10s holds   Quadruped thoracic rotation 3s holds x 5 ea   Shoulder W RTB 2 x 12  Wall push ups 2 x 10  UT stretch seated 3 x 20s ea      CRM:   Epley x 2 L                Charges: Manual x1; TE x 2  Total Timed Treatment: 40 min  To

## 2021-09-20 ENCOUNTER — OFFICE VISIT (OUTPATIENT)
Dept: PHYSICAL THERAPY | Facility: HOSPITAL | Age: 86
End: 2021-09-20
Attending: INTERNAL MEDICINE
Payer: MEDICARE

## 2021-09-20 PROCEDURE — 97110 THERAPEUTIC EXERCISES: CPT

## 2021-09-20 PROCEDURE — 97140 MANUAL THERAPY 1/> REGIONS: CPT

## 2021-09-20 NOTE — PROGRESS NOTES
Dx: Chronic neck pain (M54.2,G89.29)  Cervical paraspinal muscle spasm (Y28.465)           Insurance (Authorized # of Visits):  7699 [facility] Physician: Dr. Clari Mercado  Next MD visit: none scheduled  Fall Risk: standard         Precaution able to taper contact. Work on plus for serratus on wall. Initially with great difficulty, had patient palpate therapist scapulae while performing and then max tactile cues. When attempted in second set, getting a lot of hiking on R. Held from Hep.  Plan to kyphosis  *transverse thoracic upper and mid G3 B directions  *suboccipital stretching  PIVVM R and L rotation C4-C6 Manual therapy: (15')  *thoracic P-A G3 mid thoracic throughout region of increased kyphosis  *transverse thoracic upper and mid G3 B direc stretch 6 x 10s holds   Prone W 3s holds 2 x 10  Prone scap retractions, max tactile cues  unilateral then B, 2s x 8, x 2   Prone scap squeeze with horizontal abd 1# 2 x 8  Prone scap squeeze with B extension x 10, 2s hold, tactile cues   Doorway jacobo mays

## 2021-09-27 ENCOUNTER — OFFICE VISIT (OUTPATIENT)
Dept: PHYSICAL THERAPY | Facility: HOSPITAL | Age: 86
End: 2021-09-27
Attending: INTERNAL MEDICINE
Payer: MEDICARE

## 2021-09-27 PROCEDURE — 97110 THERAPEUTIC EXERCISES: CPT

## 2021-09-27 PROCEDURE — 97140 MANUAL THERAPY 1/> REGIONS: CPT

## 2021-09-27 NOTE — PROGRESS NOTES
Discharge Summary  Dx: Chronic neck pain (M54.2,G89.29)  Cervical paraspinal muscle spasm (D18.367)           Insurance (Authorized # of Visits):  2343 [facility] Physician: Dr. Madeline Mo  Next MD visit: none scheduled  Fall Risk: standar reps  Standing Row with Anchored Resistance - 1 x daily - 3 x weekly - 2 sets - 12 reps  Shoulder W - External Rotation with Resistance - 1 x daily - 3 x weekly - 2 sets - 15 reps  Single Arm Shoulder Extension with Anchored Resistance - 1 x daily - 3 x we throughout region of increased kyphosis  *transverse thoracic upper and mid G3 B directions  *suboccipital stretching  PIVVM R and L rotation C3-C6, greater time R rotation Manual therapy:   *transverse thoracic upper and mid G3 B directions, R > L   *subo B extension x 10, 2s hold, tactile cues   Doorway pec stretch 3 x 30s  Plus portion push up, max tactile cues taper to min 2 sets 8 reps      TE:   Open book stretch 6 x 10s holds   Prone B extension 3s x 10  Prone shoulder W 3s x 10 x 2

## 2021-10-06 ENCOUNTER — APPOINTMENT (OUTPATIENT)
Dept: PHYSICAL THERAPY | Facility: HOSPITAL | Age: 86
End: 2021-10-06
Attending: INTERNAL MEDICINE
Payer: MEDICARE

## 2021-10-15 ENCOUNTER — TELEPHONE (OUTPATIENT)
Dept: INTERNAL MEDICINE CLINIC | Facility: CLINIC | Age: 86
End: 2021-10-15

## 2021-10-15 NOTE — TELEPHONE ENCOUNTER
Reached patient for medication adherence consult. Patient is coming due for refills on atorvastatin and losartan-hydrochlorothiazide. Patient reports she is still taking both of these medications and she has about 3 weeks left of medication.  She plans

## 2021-10-29 RX ORDER — LOSARTAN POTASSIUM AND HYDROCHLOROTHIAZIDE 12.5; 1 MG/1; MG/1
TABLET ORAL
Qty: 90 TABLET | Refills: 0 | Status: SHIPPED | OUTPATIENT
Start: 2021-10-29

## 2021-11-21 ENCOUNTER — APPOINTMENT (OUTPATIENT)
Dept: GENERAL RADIOLOGY | Facility: HOSPITAL | Age: 86
End: 2021-11-21
Attending: EMERGENCY MEDICINE
Payer: MEDICARE

## 2021-11-21 ENCOUNTER — HOSPITAL ENCOUNTER (EMERGENCY)
Facility: HOSPITAL | Age: 86
Discharge: HOME | End: 2021-11-21
Attending: EMERGENCY MEDICINE
Payer: MEDICARE

## 2021-11-21 VITALS
HEIGHT: 64 IN | HEART RATE: 78 BPM | OXYGEN SATURATION: 100 % | WEIGHT: 156 LBS | BODY MASS INDEX: 26.63 KG/M2 | TEMPERATURE: 98 F | RESPIRATION RATE: 16 BRPM | SYSTOLIC BLOOD PRESSURE: 131 MMHG | DIASTOLIC BLOOD PRESSURE: 76 MMHG

## 2021-11-21 DIAGNOSIS — H81.10 BENIGN PAROXYSMAL POSITIONAL VERTIGO, UNSPECIFIED LATERALITY: Primary | ICD-10-CM

## 2021-11-21 PROCEDURE — 96360 HYDRATION IV INFUSION INIT: CPT

## 2021-11-21 PROCEDURE — 80053 COMPREHEN METABOLIC PANEL: CPT | Performed by: EMERGENCY MEDICINE

## 2021-11-21 PROCEDURE — 93005 ELECTROCARDIOGRAM TRACING: CPT

## 2021-11-21 PROCEDURE — 85025 COMPLETE CBC W/AUTO DIFF WBC: CPT | Performed by: EMERGENCY MEDICINE

## 2021-11-21 PROCEDURE — 80053 COMPREHEN METABOLIC PANEL: CPT

## 2021-11-21 PROCEDURE — 96361 HYDRATE IV INFUSION ADD-ON: CPT

## 2021-11-21 PROCEDURE — 93010 ELECTROCARDIOGRAM REPORT: CPT

## 2021-11-21 PROCEDURE — 85025 COMPLETE CBC W/AUTO DIFF WBC: CPT

## 2021-11-21 PROCEDURE — 71045 X-RAY EXAM CHEST 1 VIEW: CPT | Performed by: EMERGENCY MEDICINE

## 2021-11-21 PROCEDURE — 99284 EMERGENCY DEPT VISIT MOD MDM: CPT

## 2021-11-21 PROCEDURE — 82962 GLUCOSE BLOOD TEST: CPT

## 2021-11-21 PROCEDURE — 84484 ASSAY OF TROPONIN QUANT: CPT | Performed by: EMERGENCY MEDICINE

## 2021-11-21 RX ORDER — MECLIZINE HYDROCHLORIDE 25 MG/1
25 TABLET ORAL 3 TIMES DAILY PRN
Qty: 20 TABLET | Refills: 0 | Status: SHIPPED | OUTPATIENT
Start: 2021-11-21 | End: 2021-12-22 | Stop reason: ALTCHOICE

## 2021-11-21 RX ORDER — MECLIZINE HYDROCHLORIDE 25 MG/1
25 TABLET ORAL ONCE
Status: COMPLETED | OUTPATIENT
Start: 2021-11-21 | End: 2021-11-21

## 2021-11-21 RX ORDER — SODIUM CHLORIDE 9 MG/ML
INJECTION, SOLUTION INTRAVENOUS ONCE
Status: COMPLETED | OUTPATIENT
Start: 2021-11-21 | End: 2021-11-21

## 2021-11-21 RX ORDER — MECLIZINE HYDROCHLORIDE 25 MG/1
25 TABLET ORAL 3 TIMES DAILY PRN
Qty: 20 TABLET | Refills: 0 | Status: SHIPPED | OUTPATIENT
Start: 2021-11-21 | End: 2021-11-21

## 2021-11-21 NOTE — ED INITIAL ASSESSMENT (HPI)
Pt here with headache, earache and dizziness since waking up today. Hx vertigo. Pt also c/o intermittent nausea.

## 2021-11-21 NOTE — ED PROVIDER NOTES
Patient Seen in: [facility] Emergency Department      History   Patient presents with:  Dizziness  Difficulty Breathing    Stated Complaint: dizzy and sob from urgent care    Subjective:   HPI    82-year-old female complaint of dizziness this patien [facility]   • INJECTION, W/WO CONTRAST, DX/THERAPEUTIC SUBSTANCE, EPIDURAL/SUBARACHNOID; LUMBAR/SACRAL N/A 7/6/2016    Procedure: LUMBAR EPIDURAL;  Surgeon: Alexander Trent MD;  Location: [facility]   • INJECTION, is soft and nontender. Skin there is no rash.   Mental status alert and oriented ×3  Cranial nerves II through XII within normal limits  Motor function is intact in all 4 extremities  Sensation is intact in all 4 extremities  Cerebellar finger-nose and boyd respiratory difficulty. Her pulse oximetry is been good lungs are clear chest x-ray is unremarkable. Advised her to take meclizine rest follow-up doctor in few days return if worse.                              Disposition and Plan     Clinical Impression

## 2021-11-22 ENCOUNTER — TELEPHONE (OUTPATIENT)
Dept: INTERNAL MEDICINE CLINIC | Facility: CLINIC | Age: 86
End: 2021-11-22

## 2021-11-22 DIAGNOSIS — M54.2 CHRONIC NECK PAIN: Primary | ICD-10-CM

## 2021-11-22 DIAGNOSIS — G89.29 CHRONIC NECK PAIN: Primary | ICD-10-CM

## 2021-11-22 DIAGNOSIS — M62.838 CERVICAL PARASPINAL MUSCLE SPASM: ICD-10-CM

## 2021-11-22 NOTE — TELEPHONE ENCOUNTER
Pt called and needs an order for PT for vertigo placed so she can keep her appt with PT that is scheduled for tomorrow,     Pt is asking for a few visits approved on the order     Please inform pt when order is placed

## 2021-11-23 ENCOUNTER — OFFICE VISIT (OUTPATIENT)
Dept: PHYSICAL THERAPY | Facility: HOSPITAL | Age: 86
End: 2021-11-23
Attending: EMERGENCY MEDICINE
Payer: MEDICARE

## 2021-11-23 PROCEDURE — 97161 PT EVAL LOW COMPLEX 20 MIN: CPT

## 2021-11-23 NOTE — PROGRESS NOTES
VESTIBULAR EVALUATION:   Referring Physician: Dr. Izabella Jovel (order received from ER doc, forwarding to PCP with permission of patient)   Diagnosis: Benign paroxysmal positional vertigo, unspecified laterality (H81.10)    Date of Service: 11/22/2021 sinuses ; denies currently, on and off for a few days since this started (started Saturday before onset) thinks could've been stress as well    Cervical pain: none at rest, end range rotation feels some discomfort    Current functional limitations include: goals.    Precautions:  None  OBJECTIVE:   Physical Exam:  Posture/Observation: upper crossed with neck in increased extension with limited ability to correct  Neuro Screen: Sensation: light touch reduced outside lower leg R (reports since knee replacement in gait speed: 3  Grading: Ne Han the lowest category that applies. (3)        Normal: Able to smoothly change walking speed without loss of balance or gait deviation. Shows a significant difference in walking speed between normal, fast and slow speeds.    ( gait, i.e., staggers outside 15” path, loses balance, stops, reaches for wall.         Today's Treatment and Response:   Pt education was provided on exam findings, treatment diagnosis, treatment plan, expectations, and prognosis.  Pt was also provided vero plan of treatment and while under my care.     X___________________________________________________ Date____________________    Certification From: 86/96/1931  To:2/20/2022

## 2021-11-29 ENCOUNTER — TELEPHONE (OUTPATIENT)
Dept: PHYSICAL THERAPY | Facility: HOSPITAL | Age: 86
End: 2021-11-29

## 2021-11-29 ENCOUNTER — APPOINTMENT (OUTPATIENT)
Dept: PHYSICAL THERAPY | Facility: HOSPITAL | Age: 86
End: 2021-11-29
Attending: EMERGENCY MEDICINE
Payer: MEDICARE

## 2021-11-29 NOTE — TELEPHONE ENCOUNTER
Patient called to update provider. Was doing well from dizziness standpoint this weekend. Just felt \"a little off\" this AM. Thinks could've been due to change in pillow and even neck related.  Cancelled today's appointment as discussed, but would like to

## 2021-11-30 NOTE — PROGRESS NOTES
Progress Summary  Dx: Benign paroxysmal positional vertigo, unspecified laterality (H81.10)            Insurance (Authorized # of Visits):  Medicare 8 per POC        Authorizing Physician: Dr. Garner ref.  provider found  Next MD visit: none scheduled  Fall Ri will be able to get up from bed without dizziness x 5 days. Patient will improve tandem stance to >20s B to demonstrate reduced fall risk. (MET)  Patient will be independent and compliant with HEP.        Plan: f/u as needed if symptoms change; f/u with p

## 2021-12-01 ENCOUNTER — OFFICE VISIT (OUTPATIENT)
Dept: PHYSICAL THERAPY | Facility: HOSPITAL | Age: 86
End: 2021-12-01
Attending: INTERNAL MEDICINE
Payer: MEDICARE

## 2021-12-01 ENCOUNTER — OFFICE VISIT (OUTPATIENT)
Dept: INTERNAL MEDICINE CLINIC | Facility: CLINIC | Age: 86
End: 2021-12-01
Payer: COMMERCIAL

## 2021-12-01 VITALS
TEMPERATURE: 98 F | OXYGEN SATURATION: 99 % | HEIGHT: 64 IN | SYSTOLIC BLOOD PRESSURE: 108 MMHG | HEART RATE: 82 BPM | DIASTOLIC BLOOD PRESSURE: 62 MMHG | WEIGHT: 161 LBS | BODY MASS INDEX: 27.49 KG/M2

## 2021-12-01 DIAGNOSIS — J01.10 ACUTE NON-RECURRENT FRONTAL SINUSITIS: ICD-10-CM

## 2021-12-01 DIAGNOSIS — R42 DIZZINESS: ICD-10-CM

## 2021-12-01 DIAGNOSIS — R06.02 SOB (SHORTNESS OF BREATH): Primary | ICD-10-CM

## 2021-12-01 PROCEDURE — 3008F BODY MASS INDEX DOCD: CPT | Performed by: PHYSICIAN ASSISTANT

## 2021-12-01 PROCEDURE — 3078F DIAST BP <80 MM HG: CPT | Performed by: PHYSICIAN ASSISTANT

## 2021-12-01 PROCEDURE — 99214 OFFICE O/P EST MOD 30 MIN: CPT | Performed by: PHYSICIAN ASSISTANT

## 2021-12-01 PROCEDURE — 97110 THERAPEUTIC EXERCISES: CPT

## 2021-12-01 PROCEDURE — 3074F SYST BP LT 130 MM HG: CPT | Performed by: PHYSICIAN ASSISTANT

## 2021-12-01 RX ORDER — AMOXICILLIN AND CLAVULANATE POTASSIUM 875; 125 MG/1; MG/1
1 TABLET, FILM COATED ORAL 2 TIMES DAILY
Qty: 20 TABLET | Refills: 0 | Status: SHIPPED | OUTPATIENT
Start: 2021-12-01 | End: 2021-12-11

## 2021-12-01 NOTE — PROGRESS NOTES
Patient presents with:  ER F/U: SAMINA rm 6 ER f/u vertigo. has seen PT post ER      HPI:  Pt presents for ER f/u. Had vertigo and SOB, the SOB was a new sxs so pt proceeded to [facility] where she was redirected to ER on 11/21/21.   Pt had labs (including normal tro Spondylolisthesis     Spinal stenosis of lumbar region     Lumbar radiculitis     Asthma     Arthritis of right knee     History of total knee replacement     Pre-diabetes     Asthma with COPD (chronic obstructive pulmonary disease) ([address]     Aortic root d • VITAMIN E qd     • CLOBETASOL PROPIONATE E EX prn         Physical Exam  /62 (BP Location: Right arm, Patient Position: Sitting, Cuff Size: adult)   Pulse 82   Temp 97.8 °F (36.6 °C) (Temporal)   Ht 5' 4\" (1.626 m)   Wt 161 lb (73 kg)   SpO2 99 this visit. All questions were answered and the patient understands the plan.

## 2021-12-07 ENCOUNTER — HOSPITAL ENCOUNTER (OUTPATIENT)
Dept: CV DIAGNOSTICS | Facility: HOSPITAL | Age: 86
Discharge: HOME | End: 2021-12-07
Attending: PHYSICIAN ASSISTANT
Payer: MEDICARE

## 2021-12-07 DIAGNOSIS — R06.02 SOB (SHORTNESS OF BREATH): ICD-10-CM

## 2021-12-07 PROCEDURE — 93018 CV STRESS TEST I&R ONLY: CPT | Performed by: PHYSICIAN ASSISTANT

## 2021-12-07 PROCEDURE — 78452 HT MUSCLE IMAGE SPECT MULT: CPT | Performed by: PHYSICIAN ASSISTANT

## 2021-12-07 PROCEDURE — 93017 CV STRESS TEST TRACING ONLY: CPT | Performed by: PHYSICIAN ASSISTANT

## 2021-12-22 ENCOUNTER — OFFICE VISIT (OUTPATIENT)
Dept: INTERNAL MEDICINE CLINIC | Facility: CLINIC | Age: 86
End: 2021-12-22
Payer: COMMERCIAL

## 2021-12-22 VITALS
TEMPERATURE: 97 F | BODY MASS INDEX: 27.38 KG/M2 | WEIGHT: 160.38 LBS | OXYGEN SATURATION: 97 % | SYSTOLIC BLOOD PRESSURE: 122 MMHG | RESPIRATION RATE: 16 BRPM | HEIGHT: 64 IN | HEART RATE: 102 BPM | DIASTOLIC BLOOD PRESSURE: 66 MMHG

## 2021-12-22 DIAGNOSIS — R42 DIZZINESS: ICD-10-CM

## 2021-12-22 DIAGNOSIS — J44.9 ASTHMA WITH COPD (CHRONIC OBSTRUCTIVE PULMONARY DISEASE) (HCC): ICD-10-CM

## 2021-12-22 DIAGNOSIS — J01.10 ACUTE NON-RECURRENT FRONTAL SINUSITIS: ICD-10-CM

## 2021-12-22 DIAGNOSIS — R06.00 DOE (DYSPNEA ON EXERTION): ICD-10-CM

## 2021-12-22 DIAGNOSIS — G89.29 CHRONIC NECK PAIN: Primary | ICD-10-CM

## 2021-12-22 DIAGNOSIS — M54.2 CHRONIC NECK PAIN: Primary | ICD-10-CM

## 2021-12-22 PROCEDURE — 3078F DIAST BP <80 MM HG: CPT | Performed by: PHYSICIAN ASSISTANT

## 2021-12-22 PROCEDURE — 99214 OFFICE O/P EST MOD 30 MIN: CPT | Performed by: PHYSICIAN ASSISTANT

## 2021-12-22 PROCEDURE — 3008F BODY MASS INDEX DOCD: CPT | Performed by: PHYSICIAN ASSISTANT

## 2021-12-22 PROCEDURE — 3074F SYST BP LT 130 MM HG: CPT | Performed by: PHYSICIAN ASSISTANT

## 2021-12-22 RX ORDER — CEFUROXIME AXETIL 500 MG/1
500 TABLET ORAL 2 TIMES DAILY
Qty: 20 TABLET | Refills: 0 | Status: SHIPPED | OUTPATIENT
Start: 2021-12-22 | End: 2022-01-01

## 2021-12-22 NOTE — PROGRESS NOTES
Patient presents with:  Lab Results: rm # 2--kb-lab results f/u  Other: Pt has mammogram schedule 6-  Sinus Problem      HPI:  Patient presents for follow-up of shortness of breath/LEIGH, sinus issues and dizziness.   Patient underwent a [place] Shortness of breath 2/24/2011   • Unspecified essential hypertension        Patient Active Problem List:     Pure hypercholesterolemia     Essential hypertension     Glucose intolerance (impaired glucose tolerance)     Insomnia     Spondylolisthesis     Sp daily.     • PRILOSEC 20 MG OR CPDR 1 CAPSULE DAILY     • VITAMIN E qd     • CLOBETASOL PROPIONATE E EX prn         Physical Exam  /66   Pulse 102   Temp 97.3 °F (36.3 °C) (Oral)   Resp 16   Ht 5' 4\" (1.626 m)   Wt 160 lb 6.4 oz (72.8 kg)   SpO2 97% Sig: Take 1 tablet (500 mg total) by mouth 2 (two) times daily for 10 days. Imaging & Consults:  OP REFERRAL TO [facility]  ENT - INTERNAL    Return if symptoms worsen or fail to improve.   There are no Patient Instructions on

## 2021-12-28 ENCOUNTER — LAB ENCOUNTER (OUTPATIENT)
Dept: LAB | Facility: HOSPITAL | Age: 86
End: 2021-12-28
Attending: PHYSICIAN ASSISTANT
Payer: MEDICARE

## 2021-12-28 DIAGNOSIS — R06.00 DOE (DYSPNEA ON EXERTION): ICD-10-CM

## 2021-12-28 DIAGNOSIS — J44.9 ASTHMA WITH COPD (CHRONIC OBSTRUCTIVE PULMONARY DISEASE) (HCC): ICD-10-CM

## 2021-12-28 LAB — SARS-COV-2 RNA RESP QL NAA+PROBE: NOT DETECTED

## 2022-02-03 RX ORDER — ATORVASTATIN CALCIUM 20 MG/1
TABLET, FILM COATED ORAL
Qty: 90 TABLET | Refills: 0 | Status: SHIPPED | OUTPATIENT
Start: 2022-02-03

## 2022-02-15 RX ORDER — LOSARTAN POTASSIUM AND HYDROCHLOROTHIAZIDE 12.5; 1 MG/1; MG/1
TABLET ORAL
Qty: 90 TABLET | Refills: 0 | Status: SHIPPED | OUTPATIENT
Start: 2022-02-15

## 2022-02-17 ENCOUNTER — TELEPHONE (OUTPATIENT)
Dept: INTERNAL MEDICINE CLINIC | Facility: CLINIC | Age: 87
End: 2022-02-17

## 2022-02-17 NOTE — TELEPHONE ENCOUNTER
Future Appointments   Date Time Provider Tera Conrad   6/29/2022  9:30 AM Silvio Ring MD [facility] [facility]     Orders to quest- Pt informed that labs need to be completed no sooner than 2 weeks prior to the appt.  Pt aware to fast-no call back required

## 2022-03-17 ENCOUNTER — LABORATORY ENCOUNTER (OUTPATIENT)
Dept: LAB | Age: 87
End: 2022-03-17
Attending: INTERNAL MEDICINE
Payer: MEDICARE

## 2022-03-17 ENCOUNTER — TELEPHONE (OUTPATIENT)
Dept: INTERNAL MEDICINE CLINIC | Facility: CLINIC | Age: 87
End: 2022-03-17

## 2022-03-17 DIAGNOSIS — I10 ESSENTIAL HYPERTENSION, MALIGNANT: Primary | ICD-10-CM

## 2022-03-17 LAB
ALBUMIN SERPL-MCNC: 4 G/DL (ref 3.4–5)
ALBUMIN/GLOB SERPL: 1.5 {RATIO} (ref 1–2)
ALP LIVER SERPL-CCNC: 72 U/L
ALT SERPL-CCNC: 17 U/L
ANION GAP SERPL CALC-SCNC: 4 MMOL/L (ref 0–18)
AST SERPL-CCNC: 22 U/L (ref 15–37)
BASOPHILS # BLD AUTO: 0.07 X10(3) UL (ref 0–0.2)
BASOPHILS NFR BLD AUTO: 1.4 %
BILIRUB SERPL-MCNC: 1 MG/DL (ref 0.1–2)
BUN BLD-MCNC: 25 MG/DL (ref 7–18)
CALCIUM BLD-MCNC: 9.5 MG/DL (ref 8.5–10.1)
CHLORIDE SERPL-SCNC: 109 MMOL/L (ref 98–112)
CHOLEST SERPL-MCNC: 167 MG/DL (ref ?–200)
CO2 SERPL-SCNC: 28 MMOL/L (ref 21–32)
CREAT BLD-MCNC: 0.82 MG/DL
EOSINOPHIL # BLD AUTO: 0.35 X10(3) UL (ref 0–0.7)
EOSINOPHIL NFR BLD AUTO: 7 %
ERYTHROCYTE [DISTWIDTH] IN BLOOD BY AUTOMATED COUNT: 13.2 %
FASTING PATIENT LIPID ANSWER: YES
FASTING STATUS PATIENT QL REPORTED: YES
GLOBULIN PLAS-MCNC: 2.7 G/DL (ref 2.8–4.4)
GLUCOSE BLD-MCNC: 104 MG/DL (ref 70–99)
HCT VFR BLD AUTO: 42.4 %
HDLC SERPL-MCNC: 61 MG/DL (ref 40–59)
HGB BLD-MCNC: 13.9 G/DL
IMM GRANULOCYTES # BLD AUTO: 0.03 X10(3) UL (ref 0–1)
IMM GRANULOCYTES NFR BLD: 0.6 %
LDLC SERPL CALC-MCNC: 84 MG/DL (ref ?–100)
LYMPHOCYTES # BLD AUTO: 1.42 X10(3) UL (ref 1–4)
LYMPHOCYTES NFR BLD AUTO: 28.6 %
MCH RBC QN AUTO: 29.3 PG (ref 26–34)
MCHC RBC AUTO-ENTMCNC: 32.8 G/DL (ref 31–37)
MCV RBC AUTO: 89.5 FL
MONOCYTES # BLD AUTO: 0.59 X10(3) UL (ref 0.1–1)
MONOCYTES NFR BLD AUTO: 11.9 %
NEUTROPHILS # BLD AUTO: 2.51 X10 (3) UL (ref 1.5–7.7)
NEUTROPHILS # BLD AUTO: 2.51 X10(3) UL (ref 1.5–7.7)
NEUTROPHILS NFR BLD AUTO: 50.5 %
NONHDLC SERPL-MCNC: 106 MG/DL (ref ?–130)
OSMOLALITY SERPL CALC.SUM OF ELEC: 297 MOSM/KG (ref 275–295)
PLATELET # BLD AUTO: 298 10(3)UL (ref 150–450)
POTASSIUM SERPL-SCNC: 4.4 MMOL/L (ref 3.5–5.1)
PROT SERPL-MCNC: 6.7 G/DL (ref 6.4–8.2)
RBC # BLD AUTO: 4.74 X10(6)UL
SODIUM SERPL-SCNC: 141 MMOL/L (ref 136–145)
TRIGL SERPL-MCNC: 124 MG/DL (ref 30–149)
TSI SER-ACNC: 1.42 MIU/ML (ref 0.36–3.74)
VLDLC SERPL CALC-MCNC: 20 MG/DL (ref 0–30)
WBC # BLD AUTO: 5 X10(3) UL (ref 4–11)

## 2022-03-17 PROCEDURE — 85025 COMPLETE CBC W/AUTO DIFF WBC: CPT | Performed by: INTERNAL MEDICINE

## 2022-03-17 PROCEDURE — 84443 ASSAY THYROID STIM HORMONE: CPT | Performed by: INTERNAL MEDICINE

## 2022-03-17 PROCEDURE — 80061 LIPID PANEL: CPT

## 2022-03-17 PROCEDURE — 36415 COLL VENOUS BLD VENIPUNCTURE: CPT | Performed by: INTERNAL MEDICINE

## 2022-03-17 PROCEDURE — 80053 COMPREHEN METABOLIC PANEL: CPT | Performed by: INTERNAL MEDICINE

## 2022-03-17 NOTE — TELEPHONE ENCOUNTER
Pt was told to repeat labs in 6 months nothing was placed. See AS message I copied and pasted below. ...   Deneen Rock MD   9/14/2021 10:00 AM CDT         Stable labs, rpt in 6 mos     Future Appointments   Date Time Provider Tera Conrad   3/17/2022  9:00 AM Sergoi Ref [address]

## 2022-03-17 NOTE — TELEPHONE ENCOUNTER
Pt is going to have labs today pt wants a call back to inform her whether or not she needs to do blood work again.

## 2022-05-05 ENCOUNTER — PATIENT MESSAGE (OUTPATIENT)
Dept: INTERNAL MEDICINE CLINIC | Facility: CLINIC | Age: 87
End: 2022-05-05

## 2022-05-06 RX ORDER — ATORVASTATIN CALCIUM 20 MG/1
TABLET, FILM COATED ORAL
Qty: 90 TABLET | Refills: 0 | Status: SHIPPED | OUTPATIENT
Start: 2022-05-06

## 2022-05-06 RX ORDER — LOSARTAN POTASSIUM AND HYDROCHLOROTHIAZIDE 12.5; 1 MG/1; MG/1
TABLET ORAL
Qty: 90 TABLET | Refills: 0 | Status: SHIPPED | OUTPATIENT
Start: 2022-05-06

## 2022-05-06 NOTE — TELEPHONE ENCOUNTER
Last fill 2/2022 for 90 day with no fills. Refill request 5/4/2022- patient notified of 48-72 refill turn around time. Notified patient that patients concerns would be forwarded for review as well.

## 2022-06-14 ENCOUNTER — PATIENT MESSAGE (OUTPATIENT)
Dept: INTERNAL MEDICINE CLINIC | Facility: CLINIC | Age: 87
End: 2022-06-14

## 2022-06-14 DIAGNOSIS — R73.02 GLUCOSE INTOLERANCE (IMPAIRED GLUCOSE TOLERANCE): Primary | ICD-10-CM

## 2022-06-22 ENCOUNTER — MED REC SCAN ONLY (OUTPATIENT)
Dept: INTERNAL MEDICINE CLINIC | Facility: CLINIC | Age: 87
End: 2022-06-22

## 2022-06-22 ENCOUNTER — LAB ENCOUNTER (OUTPATIENT)
Dept: LAB | Age: 87
End: 2022-06-22
Attending: INTERNAL MEDICINE
Payer: MEDICARE

## 2022-06-22 LAB
EST. AVERAGE GLUCOSE BLD GHB EST-MCNC: 128 MG/DL (ref 68–126)
HBA1C MFR BLD: 6.1 % (ref ?–5.7)

## 2022-06-22 PROCEDURE — 83036 HEMOGLOBIN GLYCOSYLATED A1C: CPT | Performed by: INTERNAL MEDICINE

## 2022-06-22 PROCEDURE — 36415 COLL VENOUS BLD VENIPUNCTURE: CPT | Performed by: INTERNAL MEDICINE

## 2022-06-22 NOTE — PROGRESS NOTES
Mammogram result received from [facility].  updated and sent to scanning.

## 2022-06-29 ENCOUNTER — LAB ENCOUNTER (OUTPATIENT)
Dept: LAB | Age: 87
End: 2022-06-29
Attending: INTERNAL MEDICINE
Payer: MEDICARE

## 2022-06-29 ENCOUNTER — OFFICE VISIT (OUTPATIENT)
Dept: INTERNAL MEDICINE CLINIC | Facility: CLINIC | Age: 87
End: 2022-06-29
Payer: COMMERCIAL

## 2022-06-29 VITALS
TEMPERATURE: 98 F | SYSTOLIC BLOOD PRESSURE: 128 MMHG | BODY MASS INDEX: 27.14 KG/M2 | OXYGEN SATURATION: 99 % | HEART RATE: 90 BPM | DIASTOLIC BLOOD PRESSURE: 68 MMHG | HEIGHT: 64 IN | WEIGHT: 159 LBS

## 2022-06-29 DIAGNOSIS — M54.16 LUMBAR RADICULITIS: ICD-10-CM

## 2022-06-29 DIAGNOSIS — R79.89 LOW SERUM VITAMIN D: ICD-10-CM

## 2022-06-29 DIAGNOSIS — I10 ESSENTIAL HYPERTENSION: ICD-10-CM

## 2022-06-29 DIAGNOSIS — E78.00 PURE HYPERCHOLESTEROLEMIA: ICD-10-CM

## 2022-06-29 DIAGNOSIS — Z00.00 ENCOUNTER FOR ANNUAL HEALTH EXAMINATION: ICD-10-CM

## 2022-06-29 DIAGNOSIS — R79.89 LOW SERUM VITAMIN D: Primary | ICD-10-CM

## 2022-06-29 DIAGNOSIS — Z96.651 HISTORY OF TOTAL RIGHT KNEE REPLACEMENT: ICD-10-CM

## 2022-06-29 DIAGNOSIS — J44.9 ASTHMA WITH COPD (CHRONIC OBSTRUCTIVE PULMONARY DISEASE) (HCC): ICD-10-CM

## 2022-06-29 DIAGNOSIS — M46.96 INFLAMMATORY SPONDYLOPATHY OF LUMBAR REGION (HCC): ICD-10-CM

## 2022-06-29 DIAGNOSIS — R73.03 PRE-DIABETES: ICD-10-CM

## 2022-06-29 DIAGNOSIS — N18.31 STAGE 3A CHRONIC KIDNEY DISEASE (HCC): ICD-10-CM

## 2022-06-29 DIAGNOSIS — G47.00 INSOMNIA, UNSPECIFIED TYPE: ICD-10-CM

## 2022-06-29 DIAGNOSIS — R73.02 GLUCOSE INTOLERANCE (IMPAIRED GLUCOSE TOLERANCE): ICD-10-CM

## 2022-06-29 DIAGNOSIS — M43.16 SPONDYLOLISTHESIS OF LUMBAR REGION: ICD-10-CM

## 2022-06-29 DIAGNOSIS — M17.11 ARTHRITIS OF RIGHT KNEE: ICD-10-CM

## 2022-06-29 DIAGNOSIS — M48.062 SPINAL STENOSIS OF LUMBAR REGION WITH NEUROGENIC CLAUDICATION: ICD-10-CM

## 2022-06-29 DIAGNOSIS — M43.16 SPONDYLOLISTHESIS AT L4-L5 LEVEL: ICD-10-CM

## 2022-06-29 DIAGNOSIS — J45.20 MILD INTERMITTENT ASTHMA WITHOUT COMPLICATION: ICD-10-CM

## 2022-06-29 DIAGNOSIS — N39.0 FREQUENT UTI: ICD-10-CM

## 2022-06-29 DIAGNOSIS — I77.810 AORTIC ROOT DILATION (HCC): ICD-10-CM

## 2022-06-29 LAB — VIT D+METAB SERPL-MCNC: 36.6 NG/ML (ref 30–100)

## 2022-06-29 PROCEDURE — 1126F AMNT PAIN NOTED NONE PRSNT: CPT | Performed by: INTERNAL MEDICINE

## 2022-06-29 PROCEDURE — 3078F DIAST BP <80 MM HG: CPT | Performed by: INTERNAL MEDICINE

## 2022-06-29 PROCEDURE — G0439 PPPS, SUBSEQ VISIT: HCPCS | Performed by: INTERNAL MEDICINE

## 2022-06-29 PROCEDURE — 82306 VITAMIN D 25 HYDROXY: CPT

## 2022-06-29 PROCEDURE — 36415 COLL VENOUS BLD VENIPUNCTURE: CPT

## 2022-06-29 PROCEDURE — 3008F BODY MASS INDEX DOCD: CPT | Performed by: INTERNAL MEDICINE

## 2022-06-29 PROCEDURE — 3074F SYST BP LT 130 MM HG: CPT | Performed by: INTERNAL MEDICINE

## 2022-06-29 PROCEDURE — 96160 PT-FOCUSED HLTH RISK ASSMT: CPT | Performed by: INTERNAL MEDICINE

## 2022-06-29 PROCEDURE — 99397 PER PM REEVAL EST PAT 65+ YR: CPT | Performed by: INTERNAL MEDICINE

## 2022-06-29 RX ORDER — TRAZODONE HYDROCHLORIDE 50 MG/1
50 TABLET ORAL NIGHTLY PRN
Qty: 30 TABLET | Refills: 0 | Status: SHIPPED | OUTPATIENT
Start: 2022-06-29

## 2022-06-29 RX ORDER — ATORVASTATIN CALCIUM 20 MG/1
20 TABLET, FILM COATED ORAL DAILY
Qty: 90 TABLET | Refills: 3 | Status: SHIPPED | OUTPATIENT
Start: 2022-06-29

## 2022-06-29 RX ORDER — LOSARTAN POTASSIUM AND HYDROCHLOROTHIAZIDE 12.5; 1 MG/1; MG/1
1 TABLET ORAL DAILY
Qty: 90 TABLET | Refills: 3 | Status: SHIPPED | OUTPATIENT
Start: 2022-06-29

## 2022-06-29 RX ORDER — FLUTICASONE PROPIONATE 50 MCG
2 SPRAY, SUSPENSION (ML) NASAL DAILY
Qty: 3 EACH | Refills: 3 | Status: SHIPPED | OUTPATIENT
Start: 2022-06-29

## 2022-06-29 RX ORDER — CLINDAMYCIN HYDROCHLORIDE 300 MG/1
2 CAPSULE ORAL
COMMUNITY
Start: 2022-04-14

## 2022-06-29 NOTE — PROGRESS NOTES
ASTHMA CONTROL TEST (ACT):    1. In the past 4 weeks, how much of the time did your asthma keep you from getting as much done at work, school or at home? All the time (1)  Most of the time (2)  Some of the time (3)  A little of the time (4)  None of the time (5)       4    2. During the past 4 weeks, how often have you had shortness of breath? More than once a day (1)  Once a day (2)  3-6 times a week (3)  Once or twice a week (4)  Not at all (5)        5    3. During the past 4 weeks, how often did your asthma symptoms (wheezing, coughing, SOB, chest tightness or pain) wake you up at night or earlier than usual in the morning?    4 or more night per week (1)  2-3 nights per week (2)  Once a week (3)  Once or twice (4)  Not at all (5)        5    4. During the past 4 weeks, how often have you used your rescue inhaler or nebulizer medication? 3 or more times per day (1)  1-2 times per day (2)  2 or 3 times per week (3)  Once a week or less (4)  Not at all (5)        4    5. How would you rate your asthma control in the last 4 weeks?     Not controlled (1)  Poorly controlled (2)  Somewhat controlled (3)  Well controlled (4)  Completely controlled (5)      5      TOTAL ACT SCORE:      23

## 2022-12-07 ENCOUNTER — TELEPHONE (OUTPATIENT)
Dept: PHYSICAL THERAPY | Facility: HOSPITAL | Age: 87
End: 2022-12-07

## 2022-12-12 ENCOUNTER — ORDER TRANSCRIPTION (OUTPATIENT)
Dept: PHYSICAL THERAPY | Facility: HOSPITAL | Age: 87
End: 2022-12-12

## 2022-12-12 DIAGNOSIS — M54.16 LUMBAR RADICULOPATHY: ICD-10-CM

## 2022-12-12 DIAGNOSIS — M43.16 SPONDYLOLISTHESIS OF LUMBAR REGION: Primary | ICD-10-CM

## 2023-01-09 ENCOUNTER — TELEPHONE (OUTPATIENT)
Dept: PHYSICAL THERAPY | Facility: HOSPITAL | Age: 88
End: 2023-01-09

## 2023-01-09 ENCOUNTER — OFFICE VISIT (OUTPATIENT)
Dept: PHYSICAL THERAPY | Facility: HOSPITAL | Age: 88
End: 2023-01-09
Attending: PHYSICIAN ASSISTANT
Payer: MEDICARE

## 2023-01-09 DIAGNOSIS — M43.16 SPONDYLOLISTHESIS OF LUMBAR REGION: ICD-10-CM

## 2023-01-09 DIAGNOSIS — M54.16 LUMBAR RADICULOPATHY: ICD-10-CM

## 2023-01-09 PROCEDURE — 97161 PT EVAL LOW COMPLEX 20 MIN: CPT | Performed by: PHYSICAL THERAPIST

## 2023-01-09 PROCEDURE — 97110 THERAPEUTIC EXERCISES: CPT | Performed by: PHYSICAL THERAPIST

## 2023-01-09 NOTE — PATIENT INSTRUCTIONS
HOME EXERCISE PROGRAM [[id number]]    SIDE LYING CLAMSHELL - CLAM SHELL -  Repeat 10 Times, Hold 1 Second(s), Complete 2 Sets, Perform 1 Times a Day    BRIDGING -  Repeat 10 Times, Hold 1 Second(s), Complete 1 Set, Perform 1 Times a Day    SIDE LYING TRUNK ROTATION - MODIFIED -  Repeat 5 Times, Hold 1 Second(s), Complete 1 Set, Perform 1 Times a Day

## 2023-01-12 ENCOUNTER — OFFICE VISIT (OUTPATIENT)
Dept: PHYSICAL THERAPY | Facility: HOSPITAL | Age: 88
End: 2023-01-12
Attending: PHYSICIAN ASSISTANT
Payer: MEDICARE

## 2023-01-12 PROCEDURE — 97110 THERAPEUTIC EXERCISES: CPT | Performed by: PHYSICAL THERAPIST

## 2023-01-12 PROCEDURE — 97140 MANUAL THERAPY 1/> REGIONS: CPT | Performed by: PHYSICAL THERAPIST

## 2023-01-12 NOTE — PROGRESS NOTES
Diagnosis:   Spondylolisthesis of lumbar region (M43.16)  Lumbar radiculopathy (M54.16)     Referring Provider: Seda Toth  Date of Evaluation:   1/5/23    Precautions:  None Next MD visit:   none scheduled  Date of Surgery: n/a   Insurance Primary/Secondary: [employer] / N/A     # Auth Visits: 10            Subjective: overall good. Some of the exercise can increase the burning int the back of the knee. Pull on the left side when I am doing the ball exercise . Pain: 2/10  burning in her knee    Objective: seen for first treatment. Trigger point along right Q.L. Assessment: focus on strength, endurance and centralization of pain and discomfort in her right LE       Goals:   1  Patient will demonstrate lumbar flexion within available range with no pain within 6 weeks in order to pick objects from the floor. 2. Patient will tolerate standing  For>1hour with no increase in pain within 6 weeks to improve community involvement. 3. Patient will demonstrate proper squat form with no increase in pain within 6 weeks in order to lift objects from low surfaces, with modifications to vacuuming   4. Patient will demonstrate improvement in FOTO score equal or greater than THOMAS within 6 weeks in order to improve functional mobility. 5. Patient will have subjective pain 2/10     Plan: Progress with core stab, control, low back mobility  Date: 1/12/2023  TX#: 2/8 Date:                 TX#: 3/ Date:                 TX#: 4/ Date:                 TX#: 5/ Date: Tx#: 6/   NuStep x5'       TE:    SB x20 DKTC  LBR   Bridge   Isometrics with sb   Supine:   Bent knee fallouts x10 x2 grn tb   Hooklying RS multiple bouts        Sidelying   LBR mobs gr 2 L3-L5   STM to Q. L/piriformis with green knobby roller               HEP: see patient instructions     Charges: 2 TE     1 MT    Total Timed Treatment: 40 min  Total Treatment Time: 45 min

## 2023-01-17 ENCOUNTER — OFFICE VISIT (OUTPATIENT)
Dept: PHYSICAL THERAPY | Facility: HOSPITAL | Age: 88
End: 2023-01-17
Attending: PHYSICIAN ASSISTANT
Payer: MEDICARE

## 2023-01-17 PROCEDURE — 97140 MANUAL THERAPY 1/> REGIONS: CPT | Performed by: PHYSICAL THERAPIST

## 2023-01-17 PROCEDURE — 97110 THERAPEUTIC EXERCISES: CPT | Performed by: PHYSICAL THERAPIST

## 2023-01-17 NOTE — PROGRESS NOTES
Diagnosis:   Spondylolisthesis of lumbar region (M43.16)  Lumbar radiculopathy (M54.16)     Referring Provider: Abelino Echevarria  Date of Evaluation:   1/5/23    Precautions:  None Next MD visit:   none scheduled  Date of Surgery: n/a   Insurance Primary/Secondary: Maryam Gramajo / N/A     # Auth Visits: 10            Subjective:   states that her pain woke her up at note after the last visit. Had to move around and then when fell back to sleep it was good. More of a mm spasm in front of thigh left and then down into ankle  Better today, has not happened since. Pain: 8/10  After last treatment . Today pain is 4/10. Objective: seen for treatment. TTP along right ITB mariela along mid distance of band. Assessment: increased pain and discomfort noted in her right le after last session; Avoided ITB STM today to determine if that was the cause. Goals:   1  Patient will demonstrate lumbar flexion within available range with no pain within 6 weeks in order to pick objects from the floor. 2. Patient will tolerate standing  For>1hour with no increase in pain within 6 weeks to improve community involvement. 3. Patient will demonstrate proper squat form with no increase in pain within 6 weeks in order to lift objects from low surfaces, with modifications to vacuuming   4. Patient will demonstrate improvement in FOTO score equal or greater than THOMAS within 6 weeks in order to improve functional mobility. 5. Patient will have subjective pain 2/10     Plan: Progress with core stab, control, low back mobility 2x/week x4 week s  Date: 1/12/2023  TX#: 2/8 Date:      1/17/23           TX#: 3/ Date:                 TX#: 4/ Date:                 TX#: 5/ Date:    Tx#: 6/   NuStep x5' NuStep x5'      TE:    SB x20 DKTC  LBR   Bridge   Isometrics with sb   Supine:   Bent knee fallouts x10 x2 grn tb   Hooklying RS multiple bouts  TE:    SB x20 DKTC  LBR   Bridge   Isometrics with sb   Supine:   Bent knee fallouts x10 x2 grn tb   Hooklying RS multiple bouts       Sidelying   LBR mobs gr 2 L3-L5   STM to Q. L/piriformis with green knobby roller  MT:   Sidelying   LBR mobs gr 2 L3-L5   STM to Q.L  Supine:   Manual Leg traction x3 pulls at 20 sec each  Hip distraction right LE   Seated LBR with neutral pelvis x10 eac direction   Seated with feet flat RS with neutral pelvis              HEP: see patient instructions     Charges: 2 TE     1 MT    Total  Timed Treatment: 40 min  Total Treatment Time: 45 min

## 2023-01-19 ENCOUNTER — OFFICE VISIT (OUTPATIENT)
Dept: PHYSICAL THERAPY | Facility: HOSPITAL | Age: 88
End: 2023-01-19
Attending: PHYSICIAN ASSISTANT
Payer: MEDICARE

## 2023-01-19 PROCEDURE — 97140 MANUAL THERAPY 1/> REGIONS: CPT | Performed by: PHYSICAL THERAPIST

## 2023-01-19 PROCEDURE — 97110 THERAPEUTIC EXERCISES: CPT | Performed by: PHYSICAL THERAPIST

## 2023-01-19 NOTE — PROGRESS NOTES
Diagnosis:   Spondylolisthesis of lumbar region (M43.16)  Lumbar radiculopathy (M54.16)     Referring Provider: Shanice Turner  Date of Evaluation:   1/5/23    Precautions:  None Next MD visit:   none scheduled  Date of Surgery: n/a   Insurance Primary/Secondary: [employer] INS / N/A     # Auth Visits: 10            Subjective:   Pain is better, didn't have any after the last session. burning in knee and along outside of ankle . Pain:     Today pain is 5/10. Objective: seen for treatment. Dural stretching. STM to lateral knee, lateral ankle. Flossing with some relief . Assessment: possible dural component to her knee, ankle pain and discomfort, as evidenced by the fact that she had some irration and reproduction of symptoms with flossing and STM to the le lateral ankle nerves. Goals:   1  Patient will demonstrate lumbar flexion within available range with no pain within 6 weeks in order to pick objects from the floor. 2. Patient will tolerate standing  For>1hour with no increase in pain within 6 weeks to improve community involvement. 3. Patient will demonstrate proper squat form with no increase in pain within 6 weeks in order to lift objects from low surfaces, with modifications to vacuuming   4. Patient will demonstrate improvement in FOTO score equal or greater than THOMAS within 6 weeks in order to improve functional mobility. 5. Patient will have subjective pain 2/10     Plan: Progress with core stab, control, low back mobility 2x/week x4 week s  Date: 1/12/2023  TX#: 2/8 Date:      1/17/23           TX#: 3/ Date:               1/19/23  TX#: 4/ Date:                 TX#: 5/ Date:    Tx#: 6/   NuStep x5' NuStep x5' NuStep x5'     TE:    SB x20 DKTC  LBR   Bridge   Isometrics with sb   Supine:   Bent knee fallouts x10 x2 grn tb   Hooklying RS multiple bouts  TE:    SB x20 DKTC  LBR   Bridge   Isometrics with sb   Supine:   Bent knee fallouts x10 x2 grn tb   Hooklying RS multiple bouts  TE:    SB x20 DKTC  LBR   Bridge   Isometrics with sb   Supine:   Bent knee fallouts x10 x2 grn tb   Hooklying RS multiple bouts      Sidelying   LBR mobs gr 2 L3-L5   STM to Q. L/piriformis with green knobby roller  MT:   Sidelying   LBR mobs gr 2 L3-L5   STM to Q.L  Supine:   Manual Leg traction x3 pulls at 20 sec each  Hip distraction right LE   Seated LBR with neutral pelvis x10 eac direction   Seated with feet flat RS with neutral pelvis  MT:   Sidelying   LBR mobs gr 2 L3-L5   STM to Q.L  Supine:   Manual Leg traction x3 pulls at 20 sec each  STM gentle to ITB proximal portion Sidelying left               HEP: see patient instructions     Charges: 2 TE     1 MT    Total  Timed Treatment: 40 min  Total Treatment Time: 45 min

## 2023-01-23 ENCOUNTER — OFFICE VISIT (OUTPATIENT)
Dept: PHYSICAL THERAPY | Facility: HOSPITAL | Age: 88
End: 2023-01-23
Attending: PHYSICIAN ASSISTANT
Payer: MEDICARE

## 2023-01-23 PROCEDURE — 97140 MANUAL THERAPY 1/> REGIONS: CPT | Performed by: PHYSICAL THERAPIST

## 2023-01-23 PROCEDURE — 97110 THERAPEUTIC EXERCISES: CPT | Performed by: PHYSICAL THERAPIST

## 2023-01-23 NOTE — PROGRESS NOTES
Diagnosis:   Spondylolisthesis of lumbar region (M43.16)  Lumbar radiculopathy (M54.16)     Referring Provider: Hudson Cabrera  Date of Evaluation:   1/5/23    Precautions:  None Next MD visit:   none scheduled  Date of Surgery: n/a   Insurance Primary/Secondary: [employer] INS / N/A     # Auth Visits: 10            Subjective: ankle is so much better. No burning noted. Pain has not woken me up recently. side eo of knee is stiff. Did some walking since I last saw you , and my back is hurting just a little bit . Pain:     Today pain is 5/10. Objective: seen for treatment. ITB remains TTP along right knee insertion point. Assessment: Progressing well with current treatment . Ankle burning has decreased since last visit, and is subjectively improving . strength and endurance is improving  steadily     Goals:   1  Patient will demonstrate lumbar flexion within available range with no pain within 6 weeks in order to pick objects from the floor. 2. Patient will tolerate standing  For>1hour with no increase in pain within 6 weeks to improve community involvement. 3. Patient will demonstrate proper squat form with no increase in pain within 6 weeks in order to lift objects from low surfaces, with modifications to vacuuming   4. Patient will demonstrate improvement in FOTO score equal or greater than THOMAS within 6 weeks in order to improve functional mobility. 5. Patient will have subjective pain 2/10     Plan: Progress with core stab, control, low back mobility 2x/week x4 week s  Date: 1/12/2023  TX#: 2/8 Date:      1/17/23           TX#: 3/ Date:               1/19/23  TX#: 4/ Date:             1/23    TX#: 5/ Date:    Tx#: 6/   NuStep x5' NuStep x5' NuStep x5' NuStep x5'    TE:    SB x20 DKTC  LBR   Bridge   Isometrics with sb   Supine:   Bent knee fallouts x10 x2 grn tb   Hooklying RS multiple bouts  TE:    SB x20 DKTC  LBR   Bridge   Isometrics with sb   Supine:   Bent knee fallouts x10 x2 grn tb Hooklying RS multiple bouts  TE:    SB x20 DKTC  LBR   Bridge   Isometrics with sb   Supine:   Bent knee fallouts x10 x2 grn tb   Hooklying RS multiple bouts  TE:  Squat>toe raise x10   SLS airex x5 ea 10 sec each   Hooklying RS multiple bouts   SB: DKTC   LBR   x20   Brdige     Sidelying   LBR mobs gr 2 L3-L5   STM to Q. L/piriformis with green knobby roller  MT:   Sidelying   LBR mobs gr 2 L3-L5   STM to Q.L  Supine:   Manual Leg traction x3 pulls at 20 sec each  Hip distraction right LE   Seated LBR with neutral pelvis x10 eac direction   Seated with feet flat RS with neutral pelvis  MT:   Sidelying   LBR mobs gr 2 L3-L5   STM to Q.L  Supine:   Manual Leg traction x3 pulls at 20 sec each  STM gentle to ITB proximal portion Sidelying left    STM gentle to ITB proximal portion Sidelying left   Sidelying hip extension stretch   STM to lateral insertion of ITB            HEP: see patient instructions     Charges: 2 TE     1 MT    Total  Timed Treatment: 40 min  Total Treatment Time: 45 min

## 2023-01-23 NOTE — PATIENT INSTRUCTIONS
HOME EXERCISE PROGRAM [[id number]]    Supine sciatic floss -  Repeat 10 Times, Hold 5 Seconds, Complete 1 Set, Perform 1 Times a Day    SINGLE KNEE TO CHEST STRETCH - SKTC -  Repeat 10 Times, Hold 5 Seconds, Complete 1 Set, Perform 1 Times a Day

## 2023-01-30 ENCOUNTER — APPOINTMENT (OUTPATIENT)
Dept: PHYSICAL THERAPY | Facility: HOSPITAL | Age: 88
End: 2023-01-30
Attending: PHYSICIAN ASSISTANT
Payer: MEDICARE

## 2023-01-30 ENCOUNTER — TELEPHONE (OUTPATIENT)
Dept: PHYSICAL THERAPY | Facility: HOSPITAL | Age: 88
End: 2023-01-30

## 2023-02-02 ENCOUNTER — OFFICE VISIT (OUTPATIENT)
Dept: INTERNAL MEDICINE CLINIC | Facility: CLINIC | Age: 88
End: 2023-02-02
Payer: MEDICARE

## 2023-02-02 VITALS
OXYGEN SATURATION: 98 % | HEIGHT: 64 IN | BODY MASS INDEX: 28.06 KG/M2 | WEIGHT: 164.38 LBS | RESPIRATION RATE: 18 BRPM | SYSTOLIC BLOOD PRESSURE: 149 MMHG | DIASTOLIC BLOOD PRESSURE: 83 MMHG | HEART RATE: 92 BPM

## 2023-02-02 DIAGNOSIS — I10 ESSENTIAL HYPERTENSION: Primary | ICD-10-CM

## 2023-02-02 PROCEDURE — 99213 OFFICE O/P EST LOW 20 MIN: CPT | Performed by: PHYSICIAN ASSISTANT

## 2023-02-02 PROCEDURE — 3077F SYST BP >= 140 MM HG: CPT | Performed by: PHYSICIAN ASSISTANT

## 2023-02-02 PROCEDURE — 3008F BODY MASS INDEX DOCD: CPT | Performed by: PHYSICIAN ASSISTANT

## 2023-02-02 PROCEDURE — 3079F DIAST BP 80-89 MM HG: CPT | Performed by: PHYSICIAN ASSISTANT

## 2023-02-14 ENCOUNTER — TELEPHONE (OUTPATIENT)
Dept: INTERNAL MEDICINE CLINIC | Facility: CLINIC | Age: 88
End: 2023-02-14

## 2023-02-14 DIAGNOSIS — E78.00 PURE HYPERCHOLESTEROLEMIA: ICD-10-CM

## 2023-02-14 DIAGNOSIS — Z00.00 ROUTINE GENERAL MEDICAL EXAMINATION AT A HEALTH CARE FACILITY: Primary | ICD-10-CM

## 2023-02-14 DIAGNOSIS — R73.03 PRE-DIABETES: ICD-10-CM

## 2023-02-14 DIAGNOSIS — I10 ESSENTIAL HYPERTENSION: ICD-10-CM

## 2023-02-14 NOTE — TELEPHONE ENCOUNTER
Supervisit   Future Appointments   Date Time Provider T[facility] Conrad   3/23/2023 10:00 AM Gume Vogel PA-C [facility]      Informed must fast no call back required.  Orders to    [facility]

## 2023-02-20 ENCOUNTER — OFFICE VISIT (OUTPATIENT)
Dept: PHYSICAL THERAPY | Facility: HOSPITAL | Age: 88
End: 2023-02-20
Attending: PHYSICIAN ASSISTANT
Payer: MEDICARE

## 2023-02-20 PROCEDURE — 97140 MANUAL THERAPY 1/> REGIONS: CPT | Performed by: PHYSICAL THERAPIST

## 2023-02-20 PROCEDURE — 97110 THERAPEUTIC EXERCISES: CPT | Performed by: PHYSICAL THERAPIST

## 2023-02-20 NOTE — PROGRESS NOTES
Diagnosis:   Spondylolisthesis of lumbar region (M43.16)  Lumbar radiculopathy (M54.16)     Referring Provider: Balbir Celestin  Date of Evaluation:   1/5/23    Precautions:  None Next MD visit:   none scheduled  Date of Surgery: n/a   Insurance Primary/Secondary: [employer] INS / N/A     # Auth Visits: 10            Subjective: Burning in my ankle is a little bit better. Saw the doctor the other day and he said my knees look good. Back pain is so much better. My pain isn't too bad in my knees, just hurts when I am resting. Have been keeping up with my exercise for both my back and my knee,       Pain:     Today pain is 0/10. Back   Knee 4/10. Right knee   Left knee 2/10. Pain described as burning , walking 75% of time helps o loosen. Doesn't make it worse. Not waking up at night . DKTC aggravated knee pain . Objective: seen for treatment. TTP along left ITB and along medial tib plateau . Hip strength 4+/5, bilaterally. Knee strength 4+/5 bilaterally. SLS on right 11 Left 14 without sig. Loss of balance. FT hold with both . Gait pattern steady without loss of balance, good WERO. Assessment: subjectively she is noting increased function with decreased pain overall with bilateral knees. Continues to have burning in right ankle, but responds to STM to both ITB , PF bilaterally and along peroneal nerve of the right LE. She is continuing to demonstrate improvement and is progressing with minimal back pain , an decreased radicular symptoms. Primary focus of treatment today is on core strength, knee ROM and strengthening . Goals:   1  Patient will demonstrate lumbar flexion within available range with no pain within 6 weeks in order to pick objects from the floor. 2. Patient will tolerate standing  For>1 hour with no increase in pain within 6 weeks to improve community involvement.   3. Patient will demonstrate proper squat form with no increase in pain within 6 weeks in order to lift objects from low surfaces, with modifications to vacuuming   4. Patient will demonstrate improvement in FOTO score equal or greater than THOMAS within 6 weeks in order to improve functional mobility. 5. Patient will have subjective pain 2/10     Plan: Progress with core stab, control, low back mobility 2x/week x4 week s  Date: 1/12/2023  TX#: 2/8 Date:      1/17/23           TX#: 3/ Date:               1/19/23  TX#: 4/ Date:             1/23    TX#: 5/ Date: 2/20/23  Tx#: 6/   NuStep x5' NuStep x5' NuStep x5' NuStep x5' NuStep x5'  Seat 7 res 3    TE:    SB x20 DKTC  LBR   Bridge   Isometrics with sb   Supine:   Bent knee fallouts x10 x2 grn tb   Hooklying RS multiple bouts  TE:    SB x20 DKTC  LBR   Bridge   Isometrics with sb   Supine:   Bent knee fallouts x10 x2 grn tb   Hooklying RS multiple bouts  TE:    SB x20 DKTC  LBR   Bridge   Isometrics with sb   Supine:   Bent knee fallouts x10 x2 grn tb   Hooklying RS multiple bouts  TE:  Squat>toe raise x10   SLS airex x5 ea 10 sec each   Hooklying RS multiple bouts   SB: DKTC   LBR   x20   Bridge  TE:   SB:   DKTC x20   LBR x20   Bridge x20   QS in supine x15 each LE   Standing TE:   SLB with ft hold x10 @ 5 sec each   Side steps x10 red theracord bilaterally   BW with red [id number]      Sidelying   LBR mobs gr 2 L3-L5   STM to Q. L/piriformis with green knobby roller  MT:   Sidelying   LBR mobs gr 2 L3-L5   STM to Q.L  Supine:   Manual Leg traction x3 pulls at 20 sec each  Hip distraction right LE   Seated LBR with neutral pelvis x10 eac direction   Seated with feet flat RS with neutral pelvis  MT:   Sidelying   LBR mobs gr 2 L3-L5   STM to Q.L  Supine:   Manual Leg traction x3 pulls at 20 sec each  STM gentle to ITB proximal portion Sidelying left    STM gentle to ITB proximal portion Sidelying left   Sidelying hip extension stretch   STM to lateral insertion of ITB  STM gentle to ITB proximal portion  PF mobs bilaterally inferior./superior glide             HEP: see patient instructions     Charges: 2 TE     1 MT    Total  Timed Treatment: 40 min  Total Treatment Time: 45 min

## 2023-02-23 ENCOUNTER — OFFICE VISIT (OUTPATIENT)
Dept: PHYSICAL THERAPY | Facility: HOSPITAL | Age: 88
End: 2023-02-23
Attending: INTERNAL MEDICINE
Payer: MEDICARE

## 2023-02-23 PROCEDURE — 97110 THERAPEUTIC EXERCISES: CPT | Performed by: PHYSICAL THERAPIST

## 2023-02-23 NOTE — PROGRESS NOTES
Diagnosis:   Spondylolisthesis of lumbar region (M43.16)  Lumbar radiculopathy (M54.16)     Referring Provider: No ref. provider found  Date of Evaluation:   1/5/23    Precautions:  None Next MD visit:   none scheduled  Date of Surgery: n/a   Insurance Primary/Secondary: [employer] INS / N/A     # Auth Visits: 10            Subjective: states that her back and knees have been increasing pain   Back pain :  noted first thing in the morning, bending forward and moving around helps. Has not been walking due to knee pain . Pain on right low back. Left knee walking increased. Right knee loosens up with walking . Doing the exercise, but the open book one seems like it hurts my neck, so I have not been doing that one. I am going to call my doctor and see if I can get a shot in my back, as that seems like it has helped the most in the past for my back and knee pain . My pain has gotten a little bit better but the ankle burning is still there. Pain:     Today pain is 4/10. Back Yesterday it was 7/10. Knee 4/10. Right knee   Left knee 2/10. Pain described as burning , walking , stretching 75% of time helps to loosen. Doesn't make it worse. Objective: seen for treatment. Reviewed HEP , dc'd  Open book due to increased subjective pain in neck. HS length 55 bilaterally, - SLR   FOTO score IE 54/100    2/23/23 : 63/100  RIM lumbar spine WFL , fingers to ankles without pain or discomfort. Assessment:Has progressed with her full HEP,and primary treatment today was to review, modify and progress with her HEP. She has progressed nicely with her HEP, and is confident that she will continue with her HEP . Subjectively her pain has continued to decrease overall, but the burning in her right ankle has not improved.  She notes pain in her back and knees is slightly better overall, but expresses that she wants to follow up with her PCP/pain doctor to evaluate possibly having a shot which she reports subjectively has helped her in the past. Pt is able to demonstrate good recall of her HEP and anticipate that she will continue to do so . Goals:   1  Patient will demonstrate lumbar flexion within available range with no pain within 6 weeks in order to pick objects from the floor. 2. Patient will tolerate standing  For>1 hour with no increase in pain within 6 weeks to improve community involvement. 3. Patient will demonstrate proper squat form with no increase in pain within 6 weeks in order to lift objects from low surfaces, with modifications to vacuuming   4. Patient will demonstrate improvement in FOTO score equal or greater than THOMAS within 6 weeks in order to improve functional mobility. 5. Patient will have subjective pain 2/10     Plan: Will hold PT for now; Will resume after her upcoming vacation and MD follow up. Recommend continued PT 1-2x/week, x3-4 weeks. Date: 1/12/2023  TX#: 2/8 Date:      1/17/23           TX#: 3/ Date:               1/19/23  TX#: 4/ Date:             1/23    TX#: 5/ Date: 2/20/23  Tx#: 6/ Date: 2/23/23  Tx#: 7/   NuStep x5' NuStep x5' NuStep x5' NuStep x5' NuStep x5'  Seat 7 res 3  NuStep x5'  Seat 7 res 3    TE:    SB x20 DKTC  LBR   Bridge   Isometrics with sb   Supine:   Bent knee fallouts x10 x2 grn tb   Hooklying RS multiple bouts  TE:    SB x20 DKTC  LBR   Bridge   Isometrics with sb   Supine:   Bent knee fallouts x10 x2 grn tb   Hooklying RS multiple bouts  TE:    SB x20 DKTC  LBR   Bridge   Isometrics with sb   Supine:   Bent knee fallouts x10 x2 grn tb   Hooklying RS multiple bouts  TE:  Squat>toe raise x10   SLS airex x5 ea 10 sec each   Hooklying RS multiple bouts   SB: DKTC   LBR   x20   Bridge  TE:   SB:   DKTC x20   LBR x20   Bridge x20   QS in supine x15 each LE   Standing TE:   SLB with ft hold x10 @ 5 sec each   Side steps x10 red theracord bilaterally   BW with red [id number]    Review HEP and upgrade/modify .    Discussed next plan of care , progression. Sidelying   LBR mobs gr 2 L3-L5   STM to Q. L/piriformis with green knobby roller  MT:   Sidelying   LBR mobs gr 2 L3-L5   STM to Q.L  Supine:   Manual Leg traction x3 pulls at 20 sec each  Hip distraction right LE   Seated LBR with neutral pelvis x10 eac direction   Seated with feet flat RS with neutral pelvis  MT:   Sidelying   LBR mobs gr 2 L3-L5   STM to Q.L  Supine:   Manual Leg traction x3 pulls at 20 sec each  STM gentle to ITB proximal portion Sidelying left    STM gentle to ITB proximal portion Sidelying left   Sidelying hip extension stretch   STM to lateral insertion of ITB  STM gentle to ITB proximal portion  PF mobs bilaterally inferior./superior glide               HEP: see patient instructions     Charges: 3 TE        Total  Timed Treatment: 38 min  Total Treatment Time: 42 min

## 2023-02-23 NOTE — PATIENT INSTRUCTIONS
HOME EXERCISE PROGRAM [VLLTFEP]    BRIDGING -  Repeat 10 Times, Hold 1 Second(s), Complete 2 Sets, Perform 1 Times a Day    HAMSTRING STRETCH - SUPINE -  Repeat 5 Times, Hold 5 Seconds, Complete 1 Set, Perform 1 Times a Day    LOWER TRUNK ROTATIONS - LTR - WIG WAGS - KNEE ROCKS -  Repeat 1 Time, Hold 1 Second(s), Complete 1 Set, Perform 1 Times a Day    Crossover Stretch -  Repeat 5 Times,

## 2023-02-26 ENCOUNTER — APPOINTMENT (OUTPATIENT)
Dept: GENERAL RADIOLOGY | Facility: HOSPITAL | Age: 88
End: 2023-02-26
Attending: EMERGENCY MEDICINE
Payer: MEDICARE

## 2023-02-26 ENCOUNTER — HOSPITAL ENCOUNTER (EMERGENCY)
Facility: HOSPITAL | Age: 88
Discharge: HOME | End: 2023-02-26
Attending: EMERGENCY MEDICINE
Payer: MEDICARE

## 2023-02-26 VITALS
SYSTOLIC BLOOD PRESSURE: 145 MMHG | RESPIRATION RATE: 19 BRPM | OXYGEN SATURATION: 95 % | DIASTOLIC BLOOD PRESSURE: 82 MMHG | TEMPERATURE: 98 F | HEART RATE: 80 BPM

## 2023-02-26 DIAGNOSIS — M25.469 SUPRAPATELLAR EFFUSION OF KNEE: ICD-10-CM

## 2023-02-26 DIAGNOSIS — M25.562 ACUTE PAIN OF LEFT KNEE: Primary | ICD-10-CM

## 2023-02-26 PROCEDURE — 73562 X-RAY EXAM OF KNEE 3: CPT | Performed by: EMERGENCY MEDICINE

## 2023-02-26 PROCEDURE — 99283 EMERGENCY DEPT VISIT LOW MDM: CPT

## 2023-02-26 RX ORDER — TRAMADOL HYDROCHLORIDE 50 MG/1
TABLET ORAL EVERY 6 HOURS PRN
Qty: 10 TABLET | Refills: 0 | Status: SHIPPED | OUTPATIENT
Start: 2023-02-26 | End: 2023-03-03

## 2023-02-26 NOTE — ED INITIAL ASSESSMENT (HPI)
Patient in c/o left knee pain x1 week no injury or fall. Anterior with some posterior knee pain.  No hx blood clots not on thinners

## 2023-02-26 NOTE — DISCHARGE INSTRUCTIONS
Please follow-up with your primary care physician 1-2 days return to the ER if your symptoms worsen progress or if you have any further concerns. Please call orthopedic surgery in the morning schedule an appointment to be seen for your knee pain. Use the Ace wrap for support. Alternate acetaminophen 650 mg with Motrin 600 mg every 6 hours as needed for pain control. Please take tramadol as prescribed as needed for severe pain do not take tramadol and drive or operate heavy machinery as it may make you drowsy.

## 2023-03-14 ENCOUNTER — LAB ENCOUNTER (OUTPATIENT)
Dept: LAB | Age: 88
End: 2023-03-14
Attending: PHYSICIAN ASSISTANT
Payer: MEDICARE

## 2023-03-14 DIAGNOSIS — I10 ESSENTIAL HYPERTENSION: ICD-10-CM

## 2023-03-14 DIAGNOSIS — E78.00 PURE HYPERCHOLESTEROLEMIA: ICD-10-CM

## 2023-03-14 DIAGNOSIS — R73.03 PRE-DIABETES: ICD-10-CM

## 2023-03-14 DIAGNOSIS — Z00.00 ROUTINE GENERAL MEDICAL EXAMINATION AT A HEALTH CARE FACILITY: ICD-10-CM

## 2023-03-14 LAB
ALBUMIN SERPL-MCNC: 4.1 G/DL (ref 3.4–5)
ALBUMIN/GLOB SERPL: 1.4 {RATIO} (ref 1–2)
ALP LIVER SERPL-CCNC: 79 U/L
ALT SERPL-CCNC: 21 U/L
ANION GAP SERPL CALC-SCNC: 6 MMOL/L (ref 0–18)
AST SERPL-CCNC: 20 U/L (ref 15–37)
BASOPHILS # BLD AUTO: 0.07 X10(3) UL (ref 0–0.2)
BASOPHILS NFR BLD AUTO: 1 %
BILIRUB SERPL-MCNC: 1 MG/DL (ref 0.1–2)
BUN BLD-MCNC: 24 MG/DL (ref 7–18)
CALCIUM BLD-MCNC: 9.7 MG/DL (ref 8.5–10.1)
CHLORIDE SERPL-SCNC: 105 MMOL/L (ref 98–112)
CHOLEST SERPL-MCNC: 175 MG/DL (ref ?–200)
CO2 SERPL-SCNC: 26 MMOL/L (ref 21–32)
CREAT BLD-MCNC: 0.93 MG/DL
EOSINOPHIL # BLD AUTO: 0.14 X10(3) UL (ref 0–0.7)
EOSINOPHIL NFR BLD AUTO: 2 %
ERYTHROCYTE [DISTWIDTH] IN BLOOD BY AUTOMATED COUNT: 13.2 %
EST. AVERAGE GLUCOSE BLD GHB EST-MCNC: 134 MG/DL (ref 68–126)
FASTING PATIENT LIPID ANSWER: YES
FASTING STATUS PATIENT QL REPORTED: YES
GFR SERPLBLD BASED ON 1.73 SQ M-ARVRAT: 60 ML/MIN/1.73M2 (ref 60–?)
GLOBULIN PLAS-MCNC: 3 G/DL (ref 2.8–4.4)
GLUCOSE BLD-MCNC: 111 MG/DL (ref 70–99)
HBA1C MFR BLD: 6.3 % (ref ?–5.7)
HCT VFR BLD AUTO: 46.5 %
HDLC SERPL-MCNC: 70 MG/DL (ref 40–59)
HGB BLD-MCNC: 14.8 G/DL
IMM GRANULOCYTES # BLD AUTO: 0.03 X10(3) UL (ref 0–1)
IMM GRANULOCYTES NFR BLD: 0.4 %
LDLC SERPL CALC-MCNC: 83 MG/DL (ref ?–100)
LYMPHOCYTES # BLD AUTO: 1.04 X10(3) UL (ref 1–4)
LYMPHOCYTES NFR BLD AUTO: 15 %
MCH RBC QN AUTO: 28.5 PG (ref 26–34)
MCHC RBC AUTO-ENTMCNC: 31.8 G/DL (ref 31–37)
MCV RBC AUTO: 89.6 FL
MONOCYTES # BLD AUTO: 0.91 X10(3) UL (ref 0.1–1)
MONOCYTES NFR BLD AUTO: 13.1 %
NEUTROPHILS # BLD AUTO: 4.75 X10 (3) UL (ref 1.5–7.7)
NEUTROPHILS # BLD AUTO: 4.75 X10(3) UL (ref 1.5–7.7)
NEUTROPHILS NFR BLD AUTO: 68.5 %
NONHDLC SERPL-MCNC: 105 MG/DL (ref ?–130)
OSMOLALITY SERPL CALC.SUM OF ELEC: 289 MOSM/KG (ref 275–295)
PLATELET # BLD AUTO: 322 10(3)UL (ref 150–450)
POTASSIUM SERPL-SCNC: 4.1 MMOL/L (ref 3.5–5.1)
PROT SERPL-MCNC: 7.1 G/DL (ref 6.4–8.2)
RBC # BLD AUTO: 5.19 X10(6)UL
SODIUM SERPL-SCNC: 137 MMOL/L (ref 136–145)
TRIGL SERPL-MCNC: 129 MG/DL (ref 30–149)
TSI SER-ACNC: 1.54 MIU/ML (ref 0.36–3.74)
VLDLC SERPL CALC-MCNC: 20 MG/DL (ref 0–30)
WBC # BLD AUTO: 6.9 X10(3) UL (ref 4–11)

## 2023-03-14 PROCEDURE — 84443 ASSAY THYROID STIM HORMONE: CPT

## 2023-03-14 PROCEDURE — 80061 LIPID PANEL: CPT

## 2023-03-14 PROCEDURE — 36415 COLL VENOUS BLD VENIPUNCTURE: CPT

## 2023-03-14 PROCEDURE — 83036 HEMOGLOBIN GLYCOSYLATED A1C: CPT

## 2023-03-14 PROCEDURE — 85025 COMPLETE CBC W/AUTO DIFF WBC: CPT

## 2023-03-14 PROCEDURE — 80053 COMPREHEN METABOLIC PANEL: CPT

## 2023-03-18 ENCOUNTER — APPOINTMENT (OUTPATIENT)
Dept: GENERAL RADIOLOGY | Facility: HOSPITAL | Age: 88
End: 2023-03-18
Payer: MEDICARE

## 2023-03-18 ENCOUNTER — HOSPITAL ENCOUNTER (EMERGENCY)
Facility: HOSPITAL | Age: 88
Discharge: HOME | End: 2023-03-18
Attending: EMERGENCY MEDICINE
Payer: MEDICARE

## 2023-03-18 VITALS
SYSTOLIC BLOOD PRESSURE: 152 MMHG | BODY MASS INDEX: 26.63 KG/M2 | HEART RATE: 74 BPM | TEMPERATURE: 98 F | DIASTOLIC BLOOD PRESSURE: 84 MMHG | RESPIRATION RATE: 15 BRPM | OXYGEN SATURATION: 96 % | WEIGHT: 156 LBS | HEIGHT: 64 IN

## 2023-03-18 DIAGNOSIS — J01.90 ACUTE NON-RECURRENT SINUSITIS, UNSPECIFIED LOCATION: ICD-10-CM

## 2023-03-18 DIAGNOSIS — J40 BRONCHITIS: Primary | ICD-10-CM

## 2023-03-18 PROBLEM — R73.9 HYPERGLYCEMIA: Status: ACTIVE | Noted: 2023-03-18

## 2023-03-18 PROBLEM — R79.89 AZOTEMIA: Status: ACTIVE | Noted: 2023-03-18

## 2023-03-18 LAB
ALBUMIN SERPL-MCNC: 3.5 G/DL (ref 3.4–5)
ALBUMIN/GLOB SERPL: 1.2 {RATIO} (ref 1–2)
ALP LIVER SERPL-CCNC: 70 U/L
ALT SERPL-CCNC: 22 U/L
ANION GAP SERPL CALC-SCNC: 2 MMOL/L (ref 0–18)
AST SERPL-CCNC: 17 U/L (ref 15–37)
BASOPHILS # BLD AUTO: 0.01 X10(3) UL (ref 0–0.2)
BASOPHILS NFR BLD AUTO: 0.2 %
BILIRUB SERPL-MCNC: 0.4 MG/DL (ref 0.1–2)
BUN BLD-MCNC: 31 MG/DL (ref 7–18)
CALCIUM BLD-MCNC: 9.5 MG/DL (ref 8.5–10.1)
CHLORIDE SERPL-SCNC: 107 MMOL/L (ref 98–112)
CO2 SERPL-SCNC: 27 MMOL/L (ref 21–32)
CREAT BLD-MCNC: 1.05 MG/DL
EOSINOPHIL # BLD AUTO: 0 X10(3) UL (ref 0–0.7)
EOSINOPHIL NFR BLD AUTO: 0 %
ERYTHROCYTE [DISTWIDTH] IN BLOOD BY AUTOMATED COUNT: 12.9 %
FLUAV + FLUBV RNA SPEC NAA+PROBE: NEGATIVE
FLUAV + FLUBV RNA SPEC NAA+PROBE: NEGATIVE
GFR SERPLBLD BASED ON 1.73 SQ M-ARVRAT: 52 ML/MIN/1.73M2 (ref 60–?)
GLOBULIN PLAS-MCNC: 3 G/DL (ref 2.8–4.4)
GLUCOSE BLD-MCNC: 133 MG/DL (ref 70–99)
HCT VFR BLD AUTO: 40.4 %
HGB BLD-MCNC: 13.7 G/DL
IMM GRANULOCYTES # BLD AUTO: 0.02 X10(3) UL (ref 0–1)
IMM GRANULOCYTES NFR BLD: 0.3 %
LYMPHOCYTES # BLD AUTO: 0.68 X10(3) UL (ref 1–4)
LYMPHOCYTES NFR BLD AUTO: 11.6 %
MCH RBC QN AUTO: 29.3 PG (ref 26–34)
MCHC RBC AUTO-ENTMCNC: 33.9 G/DL (ref 31–37)
MCV RBC AUTO: 86.3 FL
MONOCYTES # BLD AUTO: 0.28 X10(3) UL (ref 0.1–1)
MONOCYTES NFR BLD AUTO: 4.8 %
NEUTROPHILS # BLD AUTO: 4.88 X10 (3) UL (ref 1.5–7.7)
NEUTROPHILS # BLD AUTO: 4.88 X10(3) UL (ref 1.5–7.7)
NEUTROPHILS NFR BLD AUTO: 83.1 %
NT-PROBNP SERPL-MCNC: 54 PG/ML (ref ?–450)
OSMOLALITY SERPL CALC.SUM OF ELEC: 290 MOSM/KG (ref 275–295)
PLATELET # BLD AUTO: 262 10(3)UL (ref 150–450)
POTASSIUM SERPL-SCNC: 4.8 MMOL/L (ref 3.5–5.1)
PROT SERPL-MCNC: 6.5 G/DL (ref 6.4–8.2)
RBC # BLD AUTO: 4.68 X10(6)UL
RSV RNA SPEC NAA+PROBE: NEGATIVE
SARS-COV-2 RNA RESP QL NAA+PROBE: NOT DETECTED
SODIUM SERPL-SCNC: 136 MMOL/L (ref 136–145)
TROPONIN I HIGH SENSITIVITY: 6 NG/L
WBC # BLD AUTO: 5.9 X10(3) UL (ref 4–11)

## 2023-03-18 PROCEDURE — 93005 ELECTROCARDIOGRAM TRACING: CPT

## 2023-03-18 PROCEDURE — 99285 EMERGENCY DEPT VISIT HI MDM: CPT

## 2023-03-18 PROCEDURE — 0241U SARS-COV-2/FLU A AND B/RSV BY PCR (GENEXPERT): CPT | Performed by: EMERGENCY MEDICINE

## 2023-03-18 PROCEDURE — 83880 ASSAY OF NATRIURETIC PEPTIDE: CPT | Performed by: EMERGENCY MEDICINE

## 2023-03-18 PROCEDURE — 0241U SARS-COV-2/FLU A AND B/RSV BY PCR (GENEXPERT): CPT

## 2023-03-18 PROCEDURE — 71046 X-RAY EXAM CHEST 2 VIEWS: CPT

## 2023-03-18 PROCEDURE — 93010 ELECTROCARDIOGRAM REPORT: CPT

## 2023-03-18 PROCEDURE — 80053 COMPREHEN METABOLIC PANEL: CPT | Performed by: EMERGENCY MEDICINE

## 2023-03-18 PROCEDURE — C9113 INJ PANTOPRAZOLE SODIUM, VIA: HCPCS | Performed by: EMERGENCY MEDICINE

## 2023-03-18 PROCEDURE — 96365 THER/PROPH/DIAG IV INF INIT: CPT

## 2023-03-18 PROCEDURE — 96375 TX/PRO/DX INJ NEW DRUG ADDON: CPT

## 2023-03-18 PROCEDURE — 85025 COMPLETE CBC W/AUTO DIFF WBC: CPT | Performed by: EMERGENCY MEDICINE

## 2023-03-18 PROCEDURE — 84484 ASSAY OF TROPONIN QUANT: CPT | Performed by: EMERGENCY MEDICINE

## 2023-03-18 RX ORDER — ACETAMINOPHEN AND CODEINE PHOSPHATE 120; 12 MG/5ML; MG/5ML
5 SOLUTION ORAL ONCE
Status: COMPLETED | OUTPATIENT
Start: 2023-03-18 | End: 2023-03-18

## 2023-03-18 RX ORDER — ONDANSETRON 2 MG/ML
INJECTION INTRAMUSCULAR; INTRAVENOUS
Status: DISCONTINUED
Start: 2023-03-18 | End: 2023-03-18

## 2023-03-18 RX ORDER — CODEINE PHOSPHATE AND GUAIFENESIN 10; 100 MG/5ML; MG/5ML
5 SOLUTION ORAL EVERY 6 HOURS PRN
Qty: 180 ML | Refills: 0 | Status: SHIPPED | OUTPATIENT
Start: 2023-03-18 | End: 2023-03-28

## 2023-03-18 RX ORDER — HYDROCODONE BITARTRATE AND ACETAMINOPHEN 5; 325 MG/1; MG/1
1 TABLET ORAL ONCE
Status: DISCONTINUED | OUTPATIENT
Start: 2023-03-18 | End: 2023-03-18

## 2023-03-18 RX ORDER — METHYLPREDNISOLONE SODIUM SUCCINATE 125 MG/2ML
125 INJECTION, POWDER, LYOPHILIZED, FOR SOLUTION INTRAMUSCULAR; INTRAVENOUS ONCE
Status: COMPLETED | OUTPATIENT
Start: 2023-03-18 | End: 2023-03-18

## 2023-03-18 NOTE — ED INITIAL ASSESSMENT (HPI)
Pt c/o productive cough x 2 weeks, saw her pulmonologist and started on a z-jeaneth and prednisone, states not improving. Today c/o MAYRA and feeling \"shaky\".

## 2023-03-19 LAB
ATRIAL RATE: 80 BPM
P AXIS: 44 DEGREES
P-R INTERVAL: 152 MS
Q-T INTERVAL: 350 MS
QRS DURATION: 72 MS
QTC CALCULATION (BEZET): 403 MS
R AXIS: 7 DEGREES
T AXIS: 51 DEGREES
VENTRICULAR RATE: 80 BPM

## 2023-03-23 ENCOUNTER — OFFICE VISIT (OUTPATIENT)
Dept: INTERNAL MEDICINE CLINIC | Facility: CLINIC | Age: 88
End: 2023-03-23
Payer: MEDICARE

## 2023-03-23 VITALS
HEART RATE: 98 BPM | RESPIRATION RATE: 18 BRPM | WEIGHT: 161.81 LBS | HEIGHT: 64 IN | SYSTOLIC BLOOD PRESSURE: 118 MMHG | BODY MASS INDEX: 27.63 KG/M2 | DIASTOLIC BLOOD PRESSURE: 70 MMHG | OXYGEN SATURATION: 99 %

## 2023-03-23 DIAGNOSIS — J45.41 MODERATE PERSISTENT ASTHMA WITH EXACERBATION: Primary | ICD-10-CM

## 2023-03-23 DIAGNOSIS — J01.00 ACUTE NON-RECURRENT MAXILLARY SINUSITIS: ICD-10-CM

## 2023-03-23 PROBLEM — M17.12 PRIMARY OSTEOARTHRITIS OF LEFT KNEE: Status: ACTIVE | Noted: 2023-03-02

## 2023-03-23 PROCEDURE — 3074F SYST BP LT 130 MM HG: CPT | Performed by: PHYSICIAN ASSISTANT

## 2023-03-23 PROCEDURE — 99214 OFFICE O/P EST MOD 30 MIN: CPT | Performed by: PHYSICIAN ASSISTANT

## 2023-03-23 PROCEDURE — 3008F BODY MASS INDEX DOCD: CPT | Performed by: PHYSICIAN ASSISTANT

## 2023-03-23 PROCEDURE — 3078F DIAST BP <80 MM HG: CPT | Performed by: PHYSICIAN ASSISTANT

## 2023-03-23 RX ORDER — AZELASTINE 1 MG/ML
SPRAY, METERED NASAL
COMMUNITY
Start: 2022-12-03

## 2023-03-23 RX ORDER — IPRATROPIUM BROMIDE 42 UG/1
2 SPRAY, METERED NASAL 3 TIMES DAILY PRN
COMMUNITY
Start: 2023-01-04

## 2023-03-23 RX ORDER — AMOXICILLIN AND CLAVULANATE POTASSIUM 875; 125 MG/1; MG/1
1 TABLET, FILM COATED ORAL 2 TIMES DAILY
COMMUNITY
Start: 2023-03-17

## 2023-03-23 RX ORDER — PREDNISONE 10 MG/1
TABLET ORAL
COMMUNITY
Start: 2023-03-13

## 2023-03-23 NOTE — PATIENT INSTRUCTIONS
Clean sinuses twice a day with sterile nasal saline. Mucinex (plain) 1200 mg every morning with a full glass of water, as needed for congestion    Take cough medicine with codeine as prescribed at ER at night.     Use Albuterol nebulizer every 4-6 hours as needed for wheeze, cough, shortness of breath

## 2023-04-13 ENCOUNTER — OFFICE VISIT (OUTPATIENT)
Dept: INTERNAL MEDICINE CLINIC | Facility: CLINIC | Age: 88
End: 2023-04-13
Payer: MEDICARE

## 2023-04-13 VITALS
TEMPERATURE: 98 F | BODY MASS INDEX: 27.83 KG/M2 | OXYGEN SATURATION: 97 % | WEIGHT: 163 LBS | RESPIRATION RATE: 18 BRPM | HEART RATE: 94 BPM | SYSTOLIC BLOOD PRESSURE: 130 MMHG | DIASTOLIC BLOOD PRESSURE: 78 MMHG | HEIGHT: 64.17 IN

## 2023-04-13 DIAGNOSIS — N39.0 FREQUENT UTI: ICD-10-CM

## 2023-04-13 DIAGNOSIS — R73.03 PRE-DIABETES: ICD-10-CM

## 2023-04-13 DIAGNOSIS — I10 ESSENTIAL HYPERTENSION: ICD-10-CM

## 2023-04-13 DIAGNOSIS — Z96.651 HISTORY OF TOTAL RIGHT KNEE REPLACEMENT: ICD-10-CM

## 2023-04-13 DIAGNOSIS — E78.00 PURE HYPERCHOLESTEROLEMIA: ICD-10-CM

## 2023-04-13 DIAGNOSIS — I77.810 AORTIC ROOT DILATION (HCC): ICD-10-CM

## 2023-04-13 DIAGNOSIS — M54.16 LUMBAR RADICULITIS: ICD-10-CM

## 2023-04-13 DIAGNOSIS — G47.00 INSOMNIA, UNSPECIFIED TYPE: ICD-10-CM

## 2023-04-13 DIAGNOSIS — M48.061 SPINAL STENOSIS OF LUMBAR REGION WITHOUT NEUROGENIC CLAUDICATION: ICD-10-CM

## 2023-04-13 DIAGNOSIS — M46.96 INFLAMMATORY SPONDYLOPATHY OF LUMBAR REGION (HCC): ICD-10-CM

## 2023-04-13 DIAGNOSIS — J44.9 ASTHMA WITH COPD (CHRONIC OBSTRUCTIVE PULMONARY DISEASE) (HCC): ICD-10-CM

## 2023-04-13 DIAGNOSIS — M17.12 PRIMARY OSTEOARTHRITIS OF LEFT KNEE: ICD-10-CM

## 2023-04-13 DIAGNOSIS — M43.16 SPONDYLOLISTHESIS AT L4-L5 LEVEL: ICD-10-CM

## 2023-04-13 DIAGNOSIS — Z00.00 ENCOUNTER FOR ANNUAL HEALTH EXAMINATION: Primary | ICD-10-CM

## 2023-04-13 DIAGNOSIS — N18.31 STAGE 3A CHRONIC KIDNEY DISEASE (HCC): ICD-10-CM

## 2023-04-13 RX ORDER — DOXYCYCLINE HYCLATE 100 MG/1
100 CAPSULE ORAL 2 TIMES DAILY
COMMUNITY
Start: 2023-04-10

## 2023-04-14 NOTE — TELEPHONE ENCOUNTER
From: Jaya Rosado  To: Geovany Valdes MD  Sent: 6/14/2022 3:25 PM CDT  Subject: Blood Work    I have a yearly physical June 29. There is always a question should I have blood work done before the appt. ? So often I go when I am told to get blood work and the orders are not there. I also would like to know when the last time an A1C has been done. My insurance sends a home nurse once a year to give a brief check up and go over My Chart. She did not see that an A1C had been done. She stated my count indicated that I am a borderline diabetic. Not sure what that means. I would appreciate an answer to the above questions.   Thank you  Isadora Pacheco Intermittent CP and SOB x2 days. Now resolved after nitroglycerin. 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 Likely trop is elevated in setting of ESRD and missed HD as well. Not likely NSTEMI. Possible unstable angina however  -echo  -started on heparin gtt  -s/p ASA 162mg, resume ASA 81mg qd for now given new EKG changes  -monitor for bleeding while on hep gtt and ASA. May require GI input given hx of GIB if angiogram to be performed  -rest of ischemic eval per primary cardiology team Dr. Epperson (sent patient in) in AM Intermittent CP and SOB x2 days. Now resolved after nitroglycerin. EKG with new TWI in V2, V3  [facility] 6/2021 with nonobstructive CAD  -tele  -trop 63, CKMB normal. Likely trop is elevated in setting of ESRD and missed HD as well. Not likely NSTEMI. Possible unstable angina however. Repeat troponin, CKMB  -echo  -started on heparin gtt  -s/p ASA 162mg, resume ASA 81mg qd for now given new EKG changes  -monitor for bleeding while on hep gtt and ASA. May require GI input given hx of GIB if angiogram to be performed  -rest of ischemic eval per primary cardiology team Dr. Epperson (sent patient in) in AM Intermittent CP and SOB x2 days. Now resolved after nitroglycerin. EKG with new TWI in V2, V3  [facility] 6/2021 with nonobstructive CAD  -tele  -trop 63, CKMB normal. Likely trop is elevated in setting of ESRD and missed HD as well. Not likely NSTEMI. Possible unstable angina however. Repeat troponin, CKMB  -echo  -started on heparin gtt  -s/p ASA 162mg, resume ASA 81mg qd for now given new EKG changes  -monitor for bleeding while on hep gtt and ASA. May require GI input given hx of GIB if angiogram to be performed  -rest of ischemic eval per primary cardiology team Dr. Epperson (sent patient in) in AM  -wells score 0, not suspicious for PE Intermittent CP and SOB x2 days. Now resolved after nitroglycerin. EKG with new TWI in V2, V3  [facility] 6/2021 with nonobstructive CAD  -tele  -trop 63, CKMB normal. Likely trop is elevated in setting of ESRD and missed HD as well. Not likely NSTEMI. Possible unstable angina however. Repeat troponin 61, CKMB normal  -echo  -started on heparin gtt  -s/p ASA 162mg, resume ASA 81mg qd for now given new EKG changes  -monitor for bleeding while on hep gtt and ASA. May require GI input given hx of GIB if angiogram to be performed  -rest of ischemic eval per primary cardiology team Dr. Epperson (sent patient in) in AM  -wells score 0, not suspicious for PE

## 2023-05-05 ENCOUNTER — OFFICE VISIT (OUTPATIENT)
Dept: INTERNAL MEDICINE CLINIC | Facility: CLINIC | Age: 88
End: 2023-05-05
Payer: MEDICARE

## 2023-05-05 VITALS
BODY MASS INDEX: 28 KG/M2 | TEMPERATURE: 99 F | SYSTOLIC BLOOD PRESSURE: 156 MMHG | WEIGHT: 163 LBS | DIASTOLIC BLOOD PRESSURE: 80 MMHG | OXYGEN SATURATION: 99 % | HEART RATE: 86 BPM

## 2023-05-05 DIAGNOSIS — I77.810 AORTIC ROOT DILATION (HCC): ICD-10-CM

## 2023-05-05 DIAGNOSIS — I10 PRIMARY HYPERTENSION: Primary | ICD-10-CM

## 2023-05-05 PROCEDURE — 1160F RVW MEDS BY RX/DR IN RCRD: CPT | Performed by: INTERNAL MEDICINE

## 2023-05-05 PROCEDURE — 3077F SYST BP >= 140 MM HG: CPT | Performed by: INTERNAL MEDICINE

## 2023-05-05 PROCEDURE — 1159F MED LIST DOCD IN RCRD: CPT | Performed by: INTERNAL MEDICINE

## 2023-05-05 PROCEDURE — 99213 OFFICE O/P EST LOW 20 MIN: CPT | Performed by: INTERNAL MEDICINE

## 2023-05-05 PROCEDURE — 3079F DIAST BP 80-89 MM HG: CPT | Performed by: INTERNAL MEDICINE

## 2023-05-05 RX ORDER — FLUTICASONE FUROATE, UMECLIDINIUM BROMIDE AND VILANTEROL TRIFENATATE 200; 62.5; 25 UG/1; UG/1; UG/1
POWDER RESPIRATORY (INHALATION)
COMMUNITY
Start: 2023-04-26

## 2023-05-11 ENCOUNTER — HOSPITAL ENCOUNTER (OUTPATIENT)
Dept: CV DIAGNOSTICS | Facility: HOSPITAL | Age: 88
Discharge: HOME | End: 2023-05-11
Attending: PHYSICIAN ASSISTANT
Payer: MEDICARE

## 2023-05-11 DIAGNOSIS — I77.810 AORTIC ROOT DILATION (HCC): ICD-10-CM

## 2023-05-11 DIAGNOSIS — I77.810 MILD DILATION OF ASCENDING AORTA (HCC): ICD-10-CM

## 2023-05-11 DIAGNOSIS — I35.1 AORTIC VALVE INSUFFICIENCY, ETIOLOGY OF CARDIAC VALVE DISEASE UNSPECIFIED: Primary | ICD-10-CM

## 2023-05-11 PROCEDURE — 93306 TTE W/DOPPLER COMPLETE: CPT | Performed by: PHYSICIAN ASSISTANT

## 2023-05-18 ENCOUNTER — OFFICE VISIT (OUTPATIENT)
Dept: INTERNAL MEDICINE CLINIC | Facility: CLINIC | Age: 88
End: 2023-05-18
Payer: MEDICARE

## 2023-05-18 VITALS
DIASTOLIC BLOOD PRESSURE: 85 MMHG | HEART RATE: 92 BPM | BODY MASS INDEX: 26.63 KG/M2 | HEIGHT: 64 IN | WEIGHT: 156 LBS | SYSTOLIC BLOOD PRESSURE: 145 MMHG | RESPIRATION RATE: 18 BRPM | OXYGEN SATURATION: 96 %

## 2023-05-18 DIAGNOSIS — Z01.30 ENCOUNTER FOR EXAMINATION OF BLOOD PRESSURE WITHOUT ABNORMAL FINDINGS: Primary | ICD-10-CM

## 2023-05-18 DIAGNOSIS — I35.1 AORTIC VALVE INSUFFICIENCY, ETIOLOGY OF CARDIAC VALVE DISEASE UNSPECIFIED: ICD-10-CM

## 2023-05-18 DIAGNOSIS — I77.810 AORTIC ROOT DILATION (HCC): ICD-10-CM

## 2023-05-18 DIAGNOSIS — I10 ESSENTIAL HYPERTENSION: ICD-10-CM

## 2023-05-18 PROCEDURE — 1159F MED LIST DOCD IN RCRD: CPT | Performed by: PHYSICIAN ASSISTANT

## 2023-05-18 PROCEDURE — 3079F DIAST BP 80-89 MM HG: CPT | Performed by: PHYSICIAN ASSISTANT

## 2023-05-18 PROCEDURE — 3077F SYST BP >= 140 MM HG: CPT | Performed by: PHYSICIAN ASSISTANT

## 2023-05-18 PROCEDURE — 99215 OFFICE O/P EST HI 40 MIN: CPT | Performed by: PHYSICIAN ASSISTANT

## 2023-05-18 PROCEDURE — 3008F BODY MASS INDEX DOCD: CPT | Performed by: PHYSICIAN ASSISTANT

## 2023-05-18 PROCEDURE — 1170F FXNL STATUS ASSESSED: CPT | Performed by: PHYSICIAN ASSISTANT

## 2023-05-18 RX ORDER — ADHESIVE BANDAGE 3/4"
BANDAGE TOPICAL
Qty: 1 EACH | Refills: 0 | Status: SHIPPED | OUTPATIENT
Start: 2023-05-18

## 2023-05-19 ENCOUNTER — HOSPITAL ENCOUNTER (EMERGENCY)
Facility: HOSPITAL | Age: 88
Discharge: HOME | End: 2023-05-19
Attending: EMERGENCY MEDICINE
Payer: MEDICARE

## 2023-05-19 ENCOUNTER — APPOINTMENT (OUTPATIENT)
Dept: GENERAL RADIOLOGY | Facility: HOSPITAL | Age: 88
End: 2023-05-19
Payer: MEDICARE

## 2023-05-19 ENCOUNTER — APPOINTMENT (OUTPATIENT)
Dept: ULTRASOUND IMAGING | Facility: HOSPITAL | Age: 88
End: 2023-05-19
Payer: MEDICARE

## 2023-05-19 VITALS
TEMPERATURE: 97 F | HEIGHT: 64 IN | HEART RATE: 81 BPM | DIASTOLIC BLOOD PRESSURE: 82 MMHG | BODY MASS INDEX: 27.31 KG/M2 | OXYGEN SATURATION: 97 % | WEIGHT: 160 LBS | RESPIRATION RATE: 14 BRPM | SYSTOLIC BLOOD PRESSURE: 164 MMHG

## 2023-05-19 DIAGNOSIS — M79.89 LEG SWELLING: ICD-10-CM

## 2023-05-19 DIAGNOSIS — R06.00 DYSPNEA, UNSPECIFIED TYPE: Primary | ICD-10-CM

## 2023-05-19 LAB
ALBUMIN SERPL-MCNC: 4.5 G/DL (ref 3.4–5)
ALBUMIN/GLOB SERPL: 1.5 {RATIO} (ref 1–2)
ALP LIVER SERPL-CCNC: 75 U/L
ALT SERPL-CCNC: 20 U/L
ANION GAP SERPL CALC-SCNC: 9 MMOL/L (ref 0–18)
AST SERPL-CCNC: 16 U/L (ref 15–37)
BASOPHILS # BLD AUTO: 0.09 X10(3) UL (ref 0–0.2)
BASOPHILS NFR BLD AUTO: 1.2 %
BILIRUB SERPL-MCNC: 0.9 MG/DL (ref 0.1–2)
BILIRUB UR QL STRIP.AUTO: NEGATIVE
BUN BLD-MCNC: 23 MG/DL (ref 7–18)
CALCIUM BLD-MCNC: 10.2 MG/DL (ref 8.5–10.1)
CHLORIDE SERPL-SCNC: 102 MMOL/L (ref 98–112)
CLARITY UR REFRACT.AUTO: CLEAR
CO2 SERPL-SCNC: 26 MMOL/L (ref 21–32)
CREAT BLD-MCNC: 0.88 MG/DL
D DIMER PPP FEU-MCNC: 0.5 UG/ML FEU (ref ?–0.85)
EOSINOPHIL # BLD AUTO: 0.18 X10(3) UL (ref 0–0.7)
EOSINOPHIL NFR BLD AUTO: 2.5 %
ERYTHROCYTE [DISTWIDTH] IN BLOOD BY AUTOMATED COUNT: 13 %
GFR SERPLBLD BASED ON 1.73 SQ M-ARVRAT: 64 ML/MIN/1.73M2 (ref 60–?)
GLOBULIN PLAS-MCNC: 3.1 G/DL (ref 2.8–4.4)
GLUCOSE BLD-MCNC: 110 MG/DL (ref 70–99)
GLUCOSE UR STRIP.AUTO-MCNC: NEGATIVE MG/DL
HCT VFR BLD AUTO: 44.3 %
HGB BLD-MCNC: 14.7 G/DL
IMM GRANULOCYTES # BLD AUTO: 0.03 X10(3) UL (ref 0–1)
IMM GRANULOCYTES NFR BLD: 0.4 %
KETONES UR STRIP.AUTO-MCNC: NEGATIVE MG/DL
LYMPHOCYTES # BLD AUTO: 1.6 X10(3) UL (ref 1–4)
LYMPHOCYTES NFR BLD AUTO: 22 %
MCH RBC QN AUTO: 29.4 PG (ref 26–34)
MCHC RBC AUTO-ENTMCNC: 33.2 G/DL (ref 31–37)
MCV RBC AUTO: 88.6 FL
MONOCYTES # BLD AUTO: 0.8 X10(3) UL (ref 0.1–1)
MONOCYTES NFR BLD AUTO: 11 %
NEUTROPHILS # BLD AUTO: 4.58 X10 (3) UL (ref 1.5–7.7)
NEUTROPHILS # BLD AUTO: 4.58 X10(3) UL (ref 1.5–7.7)
NEUTROPHILS NFR BLD AUTO: 62.9 %
NITRITE UR QL STRIP.AUTO: NEGATIVE
NT-PROBNP SERPL-MCNC: 57 PG/ML (ref ?–450)
OSMOLALITY SERPL CALC.SUM OF ELEC: 288 MOSM/KG (ref 275–295)
PH UR STRIP.AUTO: 6 [PH] (ref 5–8)
PLATELET # BLD AUTO: 344 10(3)UL (ref 150–450)
POTASSIUM SERPL-SCNC: 3.8 MMOL/L (ref 3.5–5.1)
PROT SERPL-MCNC: 7.6 G/DL (ref 6.4–8.2)
PROT UR STRIP.AUTO-MCNC: NEGATIVE MG/DL
RBC # BLD AUTO: 5 X10(6)UL
RBC UR QL AUTO: NEGATIVE
SODIUM SERPL-SCNC: 137 MMOL/L (ref 136–145)
SP GR UR STRIP.AUTO: 1.01 (ref 1–1.03)
TROPONIN I HIGH SENSITIVITY: 7 NG/L
UROBILINOGEN UR STRIP.AUTO-MCNC: <2 MG/DL
WBC # BLD AUTO: 7.3 X10(3) UL (ref 4–11)

## 2023-05-19 PROCEDURE — 93971 EXTREMITY STUDY: CPT

## 2023-05-19 PROCEDURE — 80053 COMPREHEN METABOLIC PANEL: CPT | Performed by: EMERGENCY MEDICINE

## 2023-05-19 PROCEDURE — 81001 URINALYSIS AUTO W/SCOPE: CPT

## 2023-05-19 PROCEDURE — 99285 EMERGENCY DEPT VISIT HI MDM: CPT

## 2023-05-19 PROCEDURE — 87086 URINE CULTURE/COLONY COUNT: CPT | Performed by: EMERGENCY MEDICINE

## 2023-05-19 PROCEDURE — 84484 ASSAY OF TROPONIN QUANT: CPT | Performed by: EMERGENCY MEDICINE

## 2023-05-19 PROCEDURE — 85025 COMPLETE CBC W/AUTO DIFF WBC: CPT

## 2023-05-19 PROCEDURE — 93010 ELECTROCARDIOGRAM REPORT: CPT

## 2023-05-19 PROCEDURE — 83880 ASSAY OF NATRIURETIC PEPTIDE: CPT | Performed by: EMERGENCY MEDICINE

## 2023-05-19 PROCEDURE — 85025 COMPLETE CBC W/AUTO DIFF WBC: CPT | Performed by: EMERGENCY MEDICINE

## 2023-05-19 PROCEDURE — 99284 EMERGENCY DEPT VISIT MOD MDM: CPT

## 2023-05-19 PROCEDURE — 93005 ELECTROCARDIOGRAM TRACING: CPT

## 2023-05-19 PROCEDURE — 71045 X-RAY EXAM CHEST 1 VIEW: CPT

## 2023-05-19 PROCEDURE — 85379 FIBRIN DEGRADATION QUANT: CPT | Performed by: EMERGENCY MEDICINE

## 2023-05-19 PROCEDURE — 36415 COLL VENOUS BLD VENIPUNCTURE: CPT

## 2023-05-19 PROCEDURE — 80053 COMPREHEN METABOLIC PANEL: CPT

## 2023-05-19 PROCEDURE — 81001 URINALYSIS AUTO W/SCOPE: CPT | Performed by: EMERGENCY MEDICINE

## 2023-05-19 NOTE — ED INITIAL ASSESSMENT (HPI)
Patient to ER from MD [facility] office for complaints of SOB, elevated HR, and to rule out a DVT to left leg. EKG at office showed sinus tachycardia, .

## 2023-05-20 LAB
ATRIAL RATE: 86 BPM
P AXIS: 42 DEGREES
P-R INTERVAL: 172 MS
Q-T INTERVAL: 350 MS
QRS DURATION: 76 MS
QTC CALCULATION (BEZET): 418 MS
R AXIS: -3 DEGREES
T AXIS: 37 DEGREES
VENTRICULAR RATE: 86 BPM

## 2023-05-20 NOTE — ED QUICK NOTES
Patient was sent from Cardiologist office to r/o PE. Patient states she's had shortness of breath with exertion and LLE swelling X 3 weeks. States she was told her HR was elevated at Cardiologist office.  Patient states she's never had a stress test.

## 2023-05-30 RX ORDER — AMLODIPINE BESYLATE 5 MG/1
TABLET ORAL
Qty: 90 TABLET | Refills: 0 | Status: SHIPPED | OUTPATIENT
Start: 2023-05-30

## 2023-05-30 NOTE — TELEPHONE ENCOUNTER
PASSED per protocol, refill sent.     Last PE-- 5--cb      Future Appointments   Date Time Provider [facility]   10/19/2023 10:00 AM Katherine Alvarado PA-C [facility]

## 2023-07-12 ENCOUNTER — TELEPHONE (OUTPATIENT)
Dept: INTERNAL MEDICINE CLINIC | Facility: CLINIC | Age: 88
End: 2023-07-12

## 2023-07-12 DIAGNOSIS — R92.8 ABNORMAL MAMMOGRAM OF LEFT BREAST: Primary | ICD-10-CM

## 2023-07-12 NOTE — TELEPHONE ENCOUNTER
Received mammo results from [facility] per fax. Additional views needed with possible US. I entered orders. Is pt aware? Do we need to fax orders to [facility]? I will hold the results in the box on my desk until further imaging results received (and if needed).

## 2023-07-13 NOTE — TELEPHONE ENCOUNTER
ANTONIA Carlton has an appt for additional views on Tues, 7/18 at [facility]. Does not need orders. Was thankful for f/u call.

## 2023-07-20 ENCOUNTER — PATIENT MESSAGE (OUTPATIENT)
Dept: INTERNAL MEDICINE CLINIC | Facility: CLINIC | Age: 88
End: 2023-07-20

## 2023-07-20 DIAGNOSIS — N63.20 MASS OF LEFT BREAST, UNSPECIFIED QUADRANT: Primary | ICD-10-CM

## 2023-07-20 NOTE — TELEPHONE ENCOUNTER
Per [facility] note 7/18/23    Imaging Results - (ABNORMAL) BREAST US LEFT LIMITED (07/18/2023 1:24 PM CDT)  Impressions   07/18/2023 1:29 PM CDT   Suspicious mass in the left breast    RECOMMENDATION: An ultrasound guided biopsy is recommended x1 for the left  breast.    The results and recommendations were discussed with the patient in verbal  and written format immediately following this examination.     MAMMOGRAM BI-RADS: 4 - Suspicious    Electronically Signed By: Carlos Boudreaux DO on 7/18/2023 1:29 PM CDT           CB - Please advise

## 2023-07-20 NOTE — TELEPHONE ENCOUNTER
Patient would really like to get this test done ASAP because she has to schedule some other testing.

## 2023-07-20 NOTE — TELEPHONE ENCOUNTER
From: Marita Coy  To: Ben Sibley PA-C  Sent: 7/20/2023 9:14 AM CDT  Subject: Mammogram     I had a mammogram that needs to have follow up biopsy done. For some reason they need your ok. They sent paperwork to your office but was not received. I hope you have it by now. I am anxious to have it done. The paper work is from [facility] for [address] [place] [address].    Thank you  Alena Glover

## 2023-07-20 NOTE — TELEPHONE ENCOUNTER
I have entered an order in our system as Bonnie Cushing was not able to get in touch with [facility] for them to send over order and we have not received the paperwork that pt is referring to. Please the order I place to [facility].

## 2023-07-21 NOTE — TELEPHONE ENCOUNTER
Attempted to fax to [phone]. Fax failed. Called [facility] and confirmed fax is [phone]. Attempted to refax and confirmation was received. Per pt, she spoke with Samara Araujo and was informed fax was received. Do not see documentation on this and [facility] does not have. Samara Araujo, do you recall seeing this and where it was placed?

## 2023-08-15 ENCOUNTER — OFFICE VISIT (OUTPATIENT)
Dept: SURGERY | Facility: CLINIC | Age: 88
End: 2023-08-15
Payer: MEDICARE

## 2023-08-15 VITALS
OXYGEN SATURATION: 99 % | HEIGHT: 64 IN | HEART RATE: 76 BPM | RESPIRATION RATE: 17 BRPM | BODY MASS INDEX: 27.31 KG/M2 | DIASTOLIC BLOOD PRESSURE: 81 MMHG | SYSTOLIC BLOOD PRESSURE: 135 MMHG | TEMPERATURE: 97 F | WEIGHT: 160 LBS

## 2023-08-15 DIAGNOSIS — C50.912 INVASIVE LOBULAR CARCINOMA OF LEFT BREAST IN FEMALE (HCC): Primary | ICD-10-CM

## 2023-08-15 PROCEDURE — 1160F RVW MEDS BY RX/DR IN RCRD: CPT | Performed by: SURGERY

## 2023-08-15 PROCEDURE — 3075F SYST BP GE 130 - 139MM HG: CPT | Performed by: SURGERY

## 2023-08-15 PROCEDURE — 1159F MED LIST DOCD IN RCRD: CPT | Performed by: SURGERY

## 2023-08-15 PROCEDURE — 1126F AMNT PAIN NOTED NONE PRSNT: CPT | Performed by: SURGERY

## 2023-08-15 PROCEDURE — 3079F DIAST BP 80-89 MM HG: CPT | Performed by: SURGERY

## 2023-08-15 PROCEDURE — 3008F BODY MASS INDEX DOCD: CPT | Performed by: SURGERY

## 2023-08-15 PROCEDURE — 99205 OFFICE O/P NEW HI 60 MIN: CPT | Performed by: SURGERY

## 2023-08-15 RX ORDER — METOPROLOL SUCCINATE 25 MG/1
25 TABLET, EXTENDED RELEASE ORAL DAILY
Qty: 30 TABLET | Refills: 11 | COMMUNITY
Start: 2023-07-20 | End: 2024-07-19

## 2023-08-15 NOTE — PATIENT INSTRUCTIONS
Dr. Gaurav Cummins  Tel: [phone]  Fax: [facility] [address] [facility], [address]  [phone]     Surgery/Procedure: Left breast wire localized lumpectomy     Anesthesia:   MAC  Surgery Length:   1 hour CPT:  92485   Wire LOC:   Yes Nuc Med:   No   Christine Seed:  No       Dx & ICD-10: Invasive lobular carcinoma of left breast in female [facility]) (E50.913)   Radiology Instructions: Left breast, 12:30 position, 5 cm from the nipple, butterfly shaped clip, biopsy demonstrates invasive lobular carcinoma.    _______________________________________________________________________________    Someone must accompany you the day of the procedure to drive you home safely, because of anesthesia. You will need an adult  to stay with you the first night following your surgery. You must remove any kind of makeup, acrylic nails, lotions, powders, creams or deodorant. [place] ONLY: Pre-admission will give instruct you on when to take Gatorade and Tylenol/acetaminophen prior to your surgery, purchase 2 - 12oz bottles of regular Gatorade (NOT RED/SUGAR FREE). Otherwise, you may not eat or drink anything else after 11PM the night before surgery. [place] ONLY: You may not eat or drink anything after midnight the day of your surgery. Wear comfortable clothing that can be easily removed. If you wear dentures, contacts lenses, or any prosthesis, you will be asked to remove them. Do not drink alcohol or smoke 24 hours prior to your procedure. Bring a picture ID and your insurance card. Covid-19 testing is no longer required before surgery unless you are experiencing symptoms such as fever, cough, congestion, etc.   The Pre-Admission Testing Department will call the day before to confirm your procedure, give you the time you need to arrive by and directions on where to go.  They begin making calls after 2pm, if you are not contacted by 4pm, please call the surgeon's office listed above. Do not take any blood thinners at least one week prior to the procedure/surgery. This includes aspirin, baby aspirin, Ibuprofen products, herbal supplements, diet medications, vitamin E, fish oil and green tea supplements. Please check other supplements for these ingredients. *TYLENOL or acetaminophen is acceptable*  If you take Coumadin, Plavix, Xarelto, or Eliquis, please contact your prescribing physician for special instructions on how long to hold. If you take insulin contact your primary care physician for special instructions. Our surgery scheduler, Juancho Gomez, will be contacting you to discuss surgery dates. If you have any questions related to scheduling your surgery, please reach out to her at [phone].  _____________________________________________________________________  PRE-OPERATIVE TESTING IF INDICATED BELOW  PLEASE COMPLETE ASAP (AT LEAST 14-21 DAYS PRIOR TO SURGERY)  [] CBC [x] BMP [] CMP [x] EKG    [] PT, PTT, INR [] Cardiac Clearance  [x] H&P Medical Clearance [] Chest X-ray     Please call Central Scheduling to schedule an appointment for pre-operative labs/tests @ [phone]  Does the patient have a pacemaker or ICD? Does the patient have sleep apnea?   [] Yes   [x] No                               [] Yes   [x] No

## 2023-08-16 PROBLEM — C50.912 INVASIVE LOBULAR CARCINOMA OF LEFT BREAST IN FEMALE (HCC): Status: ACTIVE | Noted: 2023-08-16

## 2023-08-21 ENCOUNTER — TELEPHONE (OUTPATIENT)
Dept: MAMMOGRAPHY | Facility: HOSPITAL | Age: 88
End: 2023-08-21

## 2023-08-21 ENCOUNTER — TELEPHONE (OUTPATIENT)
Dept: INTERNAL MEDICINE CLINIC | Facility: CLINIC | Age: 88
End: 2023-08-21

## 2023-08-21 ENCOUNTER — TELEPHONE (OUTPATIENT)
Dept: SURGERY | Facility: CLINIC | Age: 88
End: 2023-08-21

## 2023-08-21 DIAGNOSIS — C50.912 INVASIVE LOBULAR CARCINOMA OF LEFT BREAST IN FEMALE (HCC): Primary | ICD-10-CM

## 2023-08-21 RX ORDER — LOSARTAN POTASSIUM AND HYDROCHLOROTHIAZIDE 12.5; 1 MG/1; MG/1
1 TABLET ORAL DAILY
Qty: 90 TABLET | Refills: 0 | Status: SHIPPED | OUTPATIENT
Start: 2023-08-21

## 2023-08-21 NOTE — TELEPHONE ENCOUNTER
Calling pt in regards to scheduling surgery. Informed pt that I have 09/15/2023 available at [facility] with Dr. Mikala Bardales. Pt verbalized understanding and in agreement with date and location. All questions answered. Encouraged pt to call or [employer] message office with any other questions or concerns.

## 2023-08-21 NOTE — TELEPHONE ENCOUNTER
Phoned Gerhardt Christine regarding needle localization process of breast for lumpectomy scheduled for 9-15-23 with Dr. Smiley Jauregui. Procedure explained and all questions answered. Pt to be transported via W/C through [facility] to [facility] in [place]. Pt verbalized understanding and had no further questions at this time.

## 2023-08-21 NOTE — TELEPHONE ENCOUNTER
Last VISIT Blood pressure CB 05/15/23     Last CPE Wellness CB 04/13/23    Last REFILL  Medication Quantity Refills Start End   Losartan Potassium-HCTZ 100-12.5 MG Oral Tab 90 tablet 3 6/29/2022    Sig:   Take 1 tablet by mouth daily. Last LABS  Lipid 07/27/23  Future Appointments   Date Time Provider Tera Conrad   8/30/2023  1:30 PM Jaye Hanson PA-C [facility]   10/19/2023 10:00 AM Jaye Hanson PA-C [facility]         Per PROTOCOL? Hypertension Medications Protocol Bhptsa3308/19/2023 10:02 AM   Protocol Details CMP or BMP in past 12 months    Last serum creatinine< 2.0    Appointment in past 6 or next 3 months        Please Approve or Deny.

## 2023-08-25 RX ORDER — FLUTICASONE FUROATE, UMECLIDINIUM BROMIDE AND VILANTEROL TRIFENATATE 200; 62.5; 25 UG/1; UG/1; UG/1
1 POWDER RESPIRATORY (INHALATION) DAILY
COMMUNITY

## 2023-08-30 ENCOUNTER — OFFICE VISIT (OUTPATIENT)
Dept: INTERNAL MEDICINE CLINIC | Facility: CLINIC | Age: 88
End: 2023-08-30
Payer: MEDICARE

## 2023-08-30 VITALS
BODY MASS INDEX: 27.31 KG/M2 | DIASTOLIC BLOOD PRESSURE: 60 MMHG | HEIGHT: 64.17 IN | SYSTOLIC BLOOD PRESSURE: 116 MMHG | HEART RATE: 89 BPM | WEIGHT: 160 LBS | OXYGEN SATURATION: 97 %

## 2023-08-30 DIAGNOSIS — I10 ESSENTIAL HYPERTENSION: ICD-10-CM

## 2023-08-30 DIAGNOSIS — J44.9 ASTHMA WITH COPD (CHRONIC OBSTRUCTIVE PULMONARY DISEASE) (HCC): Chronic | ICD-10-CM

## 2023-08-30 DIAGNOSIS — C50.912 INVASIVE LOBULAR CARCINOMA OF LEFT BREAST IN FEMALE (HCC): ICD-10-CM

## 2023-08-30 DIAGNOSIS — R73.03 PRE-DIABETES: ICD-10-CM

## 2023-08-30 DIAGNOSIS — Z01.818 PRE-OP EVALUATION: Primary | ICD-10-CM

## 2023-08-30 LAB
ATRIAL RATE: 80 BPM
P AXIS: 51 DEGREES
P-R INTERVAL: 184 MS
Q-T INTERVAL: 346 MS
QRS DURATION: 80 MS
QTC CALCULATION (BEZET): 399 MS
R AXIS: -2 DEGREES
T AXIS: 47 DEGREES
VENTRICULAR RATE: 80 BPM

## 2023-08-30 PROCEDURE — 99214 OFFICE O/P EST MOD 30 MIN: CPT | Performed by: PHYSICIAN ASSISTANT

## 2023-08-30 PROCEDURE — 1170F FXNL STATUS ASSESSED: CPT | Performed by: PHYSICIAN ASSISTANT

## 2023-08-30 PROCEDURE — 3074F SYST BP LT 130 MM HG: CPT | Performed by: PHYSICIAN ASSISTANT

## 2023-08-30 PROCEDURE — 3008F BODY MASS INDEX DOCD: CPT | Performed by: PHYSICIAN ASSISTANT

## 2023-08-30 PROCEDURE — 93000 ELECTROCARDIOGRAM COMPLETE: CPT | Performed by: PHYSICIAN ASSISTANT

## 2023-08-30 PROCEDURE — 3078F DIAST BP <80 MM HG: CPT | Performed by: PHYSICIAN ASSISTANT

## 2023-08-31 ENCOUNTER — TELEPHONE (OUTPATIENT)
Dept: ORTHOPEDICS CLINIC | Facility: CLINIC | Age: 88
End: 2023-08-31

## 2023-08-31 ENCOUNTER — LAB ENCOUNTER (OUTPATIENT)
Dept: LAB | Age: 88
End: 2023-08-31
Attending: PHYSICIAN ASSISTANT
Payer: MEDICARE

## 2023-08-31 DIAGNOSIS — M25.562 CHRONIC PAIN OF LEFT KNEE: Primary | ICD-10-CM

## 2023-08-31 DIAGNOSIS — G89.29 CHRONIC PAIN OF LEFT KNEE: Primary | ICD-10-CM

## 2023-08-31 DIAGNOSIS — Z01.818 PRE-OP EVALUATION: ICD-10-CM

## 2023-08-31 DIAGNOSIS — I10 ESSENTIAL HYPERTENSION: ICD-10-CM

## 2023-08-31 LAB
ALBUMIN SERPL-MCNC: 3.7 G/DL (ref 3.4–5)
ALBUMIN/GLOB SERPL: 1.2 {RATIO} (ref 1–2)
ALP LIVER SERPL-CCNC: 61 U/L
ALT SERPL-CCNC: 21 U/L
ANION GAP SERPL CALC-SCNC: 6 MMOL/L (ref 0–18)
AST SERPL-CCNC: 15 U/L (ref 15–37)
BILIRUB SERPL-MCNC: 1 MG/DL (ref 0.1–2)
BUN BLD-MCNC: 26 MG/DL (ref 7–18)
CALCIUM BLD-MCNC: 9.6 MG/DL (ref 8.5–10.1)
CHLORIDE SERPL-SCNC: 105 MMOL/L (ref 98–112)
CO2 SERPL-SCNC: 26 MMOL/L (ref 21–32)
CREAT BLD-MCNC: 0.94 MG/DL
EGFRCR SERPLBLD CKD-EPI 2021: 59 ML/MIN/1.73M2 (ref 60–?)
FASTING STATUS PATIENT QL REPORTED: YES
GLOBULIN PLAS-MCNC: 3.1 G/DL (ref 2.8–4.4)
GLUCOSE BLD-MCNC: 110 MG/DL (ref 70–99)
OSMOLALITY SERPL CALC.SUM OF ELEC: 289 MOSM/KG (ref 275–295)
POTASSIUM SERPL-SCNC: 4 MMOL/L (ref 3.5–5.1)
PROT SERPL-MCNC: 6.8 G/DL (ref 6.4–8.2)
SODIUM SERPL-SCNC: 137 MMOL/L (ref 136–145)

## 2023-08-31 PROCEDURE — 80053 COMPREHEN METABOLIC PANEL: CPT

## 2023-08-31 PROCEDURE — 36415 COLL VENOUS BLD VENIPUNCTURE: CPT

## 2023-08-31 NOTE — TELEPHONE ENCOUNTER
Patient scheduled on mychart for left knee pain.  No xrays in epic, please advise for imaging  Future Appointments   Date Time Provider Tera Conrad   9/15/2023 10:20 AM [facility] [address] [facility]   9/21/2023  9:15 AM SOFIA Maynard [address] [facility] Surg/Onc   9/26/2023 12:00 PM Kyrie Bonilla MD [address] [facility] Surg/Onc   10/16/2023  9:20 AM Rene Cuenca MD [facility] ORTHO 75 [facility]   10/19/2023 10:00 AM Grisel Jama PA-C [facility]

## 2023-08-31 NOTE — TELEPHONE ENCOUNTER
X-Ray ordered. Please schedule with the patient.  Thank you    - no hx replacement   - last images greater than 3 months (another provider)  - left knee

## 2023-09-06 ENCOUNTER — TELEPHONE (OUTPATIENT)
Dept: INTERNAL MEDICINE CLINIC | Facility: CLINIC | Age: 88
End: 2023-09-06

## 2023-09-06 NOTE — TELEPHONE ENCOUNTER
Pt filled out MELI - to have EKG sent to Dr. Arlet Bowling. MA faxed EKG.   MELI sent to scanning Rifampin Pregnancy And Lactation Text: This medication is Pregnancy Category C and it isn't know if it is safe during pregnancy. It is also excreted in breast milk and should not be used if you are breast feeding.

## 2023-09-08 ENCOUNTER — TELEPHONE (OUTPATIENT)
Dept: SURGERY | Facility: CLINIC | Age: 88
End: 2023-09-08

## 2023-09-14 ENCOUNTER — ANESTHESIA EVENT (OUTPATIENT)
Dept: SURGERY | Facility: HOSPITAL | Age: 88
End: 2023-09-14
Payer: MEDICARE

## 2023-09-15 ENCOUNTER — HOSPITAL ENCOUNTER (OUTPATIENT)
Dept: MAMMOGRAPHY | Facility: HOSPITAL | Age: 88
Discharge: HOME | End: 2023-09-15
Attending: SURGERY | Admitting: SURGERY
Payer: MEDICARE

## 2023-09-15 ENCOUNTER — ANESTHESIA (OUTPATIENT)
Dept: SURGERY | Facility: HOSPITAL | Age: 88
End: 2023-09-15
Payer: MEDICARE

## 2023-09-15 ENCOUNTER — HOSPITAL ENCOUNTER (OUTPATIENT)
Facility: HOSPITAL | Age: 88
Setting detail: HOSPITAL OUTPATIENT SURGERY
Discharge: HOME | End: 2023-09-15
Attending: SURGERY | Admitting: SURGERY
Payer: MEDICARE

## 2023-09-15 VITALS
TEMPERATURE: 98 F | OXYGEN SATURATION: 91 % | BODY MASS INDEX: 27.01 KG/M2 | HEART RATE: 89 BPM | DIASTOLIC BLOOD PRESSURE: 97 MMHG | RESPIRATION RATE: 18 BRPM | SYSTOLIC BLOOD PRESSURE: 123 MMHG | HEIGHT: 64 IN | WEIGHT: 158.19 LBS

## 2023-09-15 DIAGNOSIS — C50.912 INVASIVE LOBULAR CARCINOMA OF LEFT BREAST IN FEMALE (HCC): ICD-10-CM

## 2023-09-15 PROCEDURE — 0HBU0ZZ EXCISION OF LEFT BREAST, OPEN APPROACH: ICD-10-PCS | Performed by: SURGERY

## 2023-09-15 PROCEDURE — 19281 PERQ DEVICE BREAST 1ST IMAG: CPT | Performed by: SURGERY

## 2023-09-15 PROCEDURE — 88307 TISSUE EXAM BY PATHOLOGIST: CPT | Performed by: SURGERY

## 2023-09-15 PROCEDURE — 76098 X-RAY EXAM SURGICAL SPECIMEN: CPT | Performed by: SURGERY

## 2023-09-15 PROCEDURE — 88305 TISSUE EXAM BY PATHOLOGIST: CPT | Performed by: SURGERY

## 2023-09-15 RX ORDER — SODIUM CHLORIDE, SODIUM LACTATE, POTASSIUM CHLORIDE, CALCIUM CHLORIDE 600; 310; 30; 20 MG/100ML; MG/100ML; MG/100ML; MG/100ML
INJECTION, SOLUTION INTRAVENOUS CONTINUOUS
Status: DISCONTINUED | OUTPATIENT
Start: 2023-09-15 | End: 2023-09-15

## 2023-09-15 RX ORDER — HYDROCODONE BITARTRATE AND ACETAMINOPHEN 5; 325 MG/1; MG/1
2 TABLET ORAL ONCE AS NEEDED
Status: DISCONTINUED | OUTPATIENT
Start: 2023-09-15 | End: 2023-09-15

## 2023-09-15 RX ORDER — CEFAZOLIN SODIUM/WATER 2 G/20 ML
2 SYRINGE (ML) INTRAVENOUS ONCE
Status: COMPLETED | OUTPATIENT
Start: 2023-09-15 | End: 2023-09-15

## 2023-09-15 RX ORDER — ACETAMINOPHEN 500 MG
1000 TABLET ORAL ONCE AS NEEDED
Status: DISCONTINUED | OUTPATIENT
Start: 2023-09-15 | End: 2023-09-15

## 2023-09-15 RX ORDER — ATORVASTATIN CALCIUM 40 MG/1
40 TABLET, FILM COATED ORAL DAILY
COMMUNITY
Start: 2023-08-14

## 2023-09-15 RX ORDER — LIDOCAINE HYDROCHLORIDE 10 MG/ML
INJECTION, SOLUTION EPIDURAL; INFILTRATION; INTRACAUDAL; PERINEURAL AS NEEDED
Status: DISCONTINUED | OUTPATIENT
Start: 2023-09-15 | End: 2023-09-15 | Stop reason: SURG

## 2023-09-15 RX ORDER — ONDANSETRON 2 MG/ML
INJECTION INTRAMUSCULAR; INTRAVENOUS AS NEEDED
Status: DISCONTINUED | OUTPATIENT
Start: 2023-09-15 | End: 2023-09-15 | Stop reason: SURG

## 2023-09-15 RX ORDER — ACETAMINOPHEN 500 MG
1000 TABLET ORAL ONCE
Status: DISCONTINUED | OUTPATIENT
Start: 2023-09-15 | End: 2023-09-15 | Stop reason: HOSPADM

## 2023-09-15 RX ORDER — ONDANSETRON 2 MG/ML
4 INJECTION INTRAMUSCULAR; INTRAVENOUS EVERY 6 HOURS PRN
Status: DISCONTINUED | OUTPATIENT
Start: 2023-09-15 | End: 2023-09-15

## 2023-09-15 RX ORDER — METOCLOPRAMIDE HYDROCHLORIDE 5 MG/ML
10 INJECTION INTRAMUSCULAR; INTRAVENOUS EVERY 8 HOURS PRN
Status: DISCONTINUED | OUTPATIENT
Start: 2023-09-15 | End: 2023-09-15

## 2023-09-15 RX ORDER — DEXAMETHASONE SODIUM PHOSPHATE 4 MG/ML
VIAL (ML) INJECTION AS NEEDED
Status: DISCONTINUED | OUTPATIENT
Start: 2023-09-15 | End: 2023-09-15 | Stop reason: SURG

## 2023-09-15 RX ORDER — HYDROCODONE BITARTRATE AND ACETAMINOPHEN 5; 325 MG/1; MG/1
1 TABLET ORAL ONCE AS NEEDED
Status: DISCONTINUED | OUTPATIENT
Start: 2023-09-15 | End: 2023-09-15

## 2023-09-15 RX ORDER — CEFAZOLIN SODIUM/WATER 2 G/20 ML
SYRINGE (ML) INTRAVENOUS
Status: DISCONTINUED
Start: 2023-09-15 | End: 2023-09-15

## 2023-09-15 RX ORDER — HYDROMORPHONE HYDROCHLORIDE 1 MG/ML
0.2 INJECTION, SOLUTION INTRAMUSCULAR; INTRAVENOUS; SUBCUTANEOUS EVERY 5 MIN PRN
Status: DISCONTINUED | OUTPATIENT
Start: 2023-09-15 | End: 2023-09-15

## 2023-09-15 RX ORDER — LIDOCAINE HYDROCHLORIDE AND EPINEPHRINE 10; 10 MG/ML; UG/ML
INJECTION, SOLUTION INFILTRATION; PERINEURAL AS NEEDED
Status: DISCONTINUED | OUTPATIENT
Start: 2023-09-15 | End: 2023-09-15

## 2023-09-15 RX ORDER — EPHEDRINE SULFATE 50 MG/ML
INJECTION INTRAVENOUS AS NEEDED
Status: DISCONTINUED | OUTPATIENT
Start: 2023-09-15 | End: 2023-09-15 | Stop reason: SURG

## 2023-09-15 RX ORDER — HYDROMORPHONE HYDROCHLORIDE 1 MG/ML
0.6 INJECTION, SOLUTION INTRAMUSCULAR; INTRAVENOUS; SUBCUTANEOUS EVERY 5 MIN PRN
Status: DISCONTINUED | OUTPATIENT
Start: 2023-09-15 | End: 2023-09-15

## 2023-09-15 RX ORDER — BUPIVACAINE HYDROCHLORIDE 5 MG/ML
INJECTION, SOLUTION EPIDURAL; INTRACAUDAL AS NEEDED
Status: DISCONTINUED | OUTPATIENT
Start: 2023-09-15 | End: 2023-09-15

## 2023-09-15 RX ORDER — HYDROMORPHONE HYDROCHLORIDE 1 MG/ML
0.4 INJECTION, SOLUTION INTRAMUSCULAR; INTRAVENOUS; SUBCUTANEOUS EVERY 5 MIN PRN
Status: DISCONTINUED | OUTPATIENT
Start: 2023-09-15 | End: 2023-09-15

## 2023-09-15 RX ORDER — DIAZEPAM 5 MG/1
5 TABLET ORAL AS NEEDED
Status: DISCONTINUED | OUTPATIENT
Start: 2023-09-15 | End: 2023-09-15 | Stop reason: HOSPADM

## 2023-09-15 RX ORDER — NALOXONE HYDROCHLORIDE 0.4 MG/ML
0.08 INJECTION, SOLUTION INTRAMUSCULAR; INTRAVENOUS; SUBCUTANEOUS AS NEEDED
Status: DISCONTINUED | OUTPATIENT
Start: 2023-09-15 | End: 2023-09-15

## 2023-09-15 RX ADMIN — CEFAZOLIN SODIUM/WATER 2 G: 2 G/20 ML SYRINGE (ML) INTRAVENOUS at 11:36:00

## 2023-09-15 RX ADMIN — SODIUM CHLORIDE, SODIUM LACTATE, POTASSIUM CHLORIDE, CALCIUM CHLORIDE: 600; 310; 30; 20 INJECTION, SOLUTION INTRAVENOUS at 12:14:00

## 2023-09-15 RX ADMIN — LIDOCAINE HYDROCHLORIDE 50 MG: 10 INJECTION, SOLUTION EPIDURAL; INFILTRATION; INTRACAUDAL; PERINEURAL at 11:29:00

## 2023-09-15 RX ADMIN — SODIUM CHLORIDE, SODIUM LACTATE, POTASSIUM CHLORIDE, CALCIUM CHLORIDE: 600; 310; 30; 20 INJECTION, SOLUTION INTRAVENOUS at 11:25:00

## 2023-09-15 RX ADMIN — EPHEDRINE SULFATE 10 MG: 50 INJECTION INTRAVENOUS at 11:49:00

## 2023-09-15 RX ADMIN — DEXAMETHASONE SODIUM PHOSPHATE 8 MG: 4 MG/ML VIAL (ML) INJECTION at 11:37:00

## 2023-09-15 RX ADMIN — ONDANSETRON 4 MG: 2 INJECTION INTRAMUSCULAR; INTRAVENOUS at 12:11:00

## 2023-09-15 NOTE — IMAGING NOTE
Assisted  with mammography guided needle localization of the left breast.   Catalina Mercado identified with spelling of name and date of birth. Medications and allergies reviewed. NKDA     History: Invasive lobular carcinoma of left breast   Surgery: Left breast wire localized lumpectomy     Order verified. Procedure explained and questions answered. Catalina Mercado verbalized understanding and agreement. 0845: Written consent obtained. 0930: Scans taken by Brandi Nash- mammography technologist    6696: Skip Shah present    1070: Time out complete. Site prepped in a sterile manner by the imaging staff.   Donny Foil: Lidocaine administered for anesthetic affect. [id number] Spearing 20G x 7.5cm needle placed-left breast 12:30 position, 5 cm from the nipple, butterfly shaped clip, biopsy demonstrates invasive lobular carcinoma. Emotional support provided. Catalina Mercado tolerated procedure well. Site cleaned. Wire secured with blue clip, steri strips, sterile 4x4 gauze dressing, and Tegaderm. Catalina Mercado transported via wheelchair to pre-op/surgery holding in stable condition. Ms. Emanuel Fuss  without complaints or concerns at this time.

## 2023-09-15 NOTE — BRIEF OP NOTE
Pre-Operative Diagnosis: Invasive lobular carcinoma of left breast in female [facility]) [C50.912]     Post-Operative Diagnosis: * No post-op diagnosis entered *      Procedure Performed:   Left breast wire localized lumpectomy    Surgeon(s) and Role:     Brett Higgins MD - Primary    Assistant(s):  Surgical Assistant.: Bob Cruz CSA     Surgical Findings: Clip visible on specimen radiogram     Specimen: Left lumpectomy, left margins x6     Estimated Blood Loss: Warren San MD  9/15/2023  12:10 PM

## 2023-09-15 NOTE — OPERATIVE REPORT
[address]    PATIENT'S NAME: Imelda DIAZ [address]: Maryjo Bernal. Ke Spivey M.D. OPERATING PHYSICIAN: Maryjo Bernal. Ke Spivey M.D. PATIENT ACCOUNT#:   [de-identified]    LOCATION:  [address]  MEDICAL RECORD #:   [id number]       YOB: 1935  ADMISSION DATE:       09/15/2023      OPERATION DATE:  09/15/2023    OPERATIVE REPORT      PREOPERATIVE DIAGNOSIS:  Left breast cancer. POSTOPERATIVE DIAGNOSIS:  Left breast cancer. PROCEDURE:  Left breast wire localized lumpectomy with left breast specimen radiography and left breast advancement flap mastopexy for a defect measuring 12 sq cm. ASSISTANT:  Donita Anne CSA. ANESTHESIA:  General anesthesia and local.    ESTIMATED BLOOD LOSS:  5 mL. DRAINS:  None. COMPLICATIONS:  None. DISPOSITION:  Stable on transfer to the recovery room. INDICATIONS:  The patient is an 30-year-old female with an imaging-detected mass. She had a biopsy-confirmed diagnosis of invasive lobular carcinoma. We discussed local treatment options. She was motivated for a breast-conserving approach. We discussed the need to achieve free margins, the possibility of reexcision to achieve free margins, as well as possible need for postoperative systemic and/or radiation therapy. We discussed that given the normal clinical and radiological axillary exam in the context of her advanced age, comorbidities, and favorable tumor that change in systemic management would not be altered in any rehana interrogation and, therefore, we were to defer any lymph node surgery at this time. Risks and possible complications were discussed with the patient including, but not limited to, infection, bleeding, injury to surrounding structures, possible need for reoperation. She agreed to the proposed surgery. OPERATIVE TECHNIQUE:  Patient was brought to the imaging suite.   She underwent a wire localization in the area of concern in the left breast. She was brought to the OR, placed in a supine position, properly padded and secured, given a dose of IV antibiotics, and sequential compression devices were applied to her legs for DVT prophylaxis. General anesthesia was induced. Her left breast was then prepped and draped in the usual sterile fashion. Lidocaine 1% with epinephrine was used to infiltrate the skin and subcutaneous tissues at the targeted incision site. A curvilinear incision was made along the upper outer quadrant with a 15 blade knife in the skin. Her wire was identified and brought into the field. Using sharp dissection and electrocautery, a segment of breast tissue surrounding the tip of the wire was carefully excised and oriented with a short stitch single clip superiorly and long stitch double clip laterally. It was placed in the imaging device where specimen x-ray confirmed the presence of the targeted clip residual lesion with adequate margins as deemed by myself. Using sharp dissection and electrocautery, 6 margins were then taken from the interior of the lumpectomy cavity. Each were marked with a clip at true margin, individually labeled, and sent for permanent pathologic evaluation. The wound was then irrigated and hemostasis assured with electrocautery. Hemoclips were placed around the periphery of the cavity to assist with subsequent surveillance. She was found to have a large defect measuring at least 12 sq cm in the upper outer quadrant thought to impair both optimal wound healing and cosmesis for which we proceeded with an advancement flap mastopexy. This required that I place a counterincision through the immediate adjacent superolateral breast parenchyma down to the level of the pectoralis fascia via a superior vascular pedicle. Tissue was then mobilized into the aforementioned defect and secured to the level of the pectoralis fascia with a running 3-0 PDS suture.   Her wound was then closed with interrupted 3-0 Vicryl for deep layer, running 4-0 subcuticular Monocryl for skin. Mastisol and Steri-Strips were applied. Marcaine 0.5% was instilled in the cavity to assist with postoperative analgesia. A sterile dressing and compression bra were placed. Her blood loss was minimal.  All counts were correct at the conclusion of the procedure. She tolerated the procedure well. She was transferred to the recovery area in stable condition. Dictated By Mu Andres. Cal Coronado M.D.  d: 09/15/2023 12:16:29  t: 09/15/2023 15:49:48  [facility] [id number]  CMG/    cc: MD Mady River Cera, PA-C

## 2023-09-18 ENCOUNTER — NURSE NAVIGATOR ENCOUNTER (OUTPATIENT)
Dept: HEMATOLOGY/ONCOLOGY | Facility: HOSPITAL | Age: 88
End: 2023-09-18

## 2023-09-21 ENCOUNTER — OFFICE VISIT (OUTPATIENT)
Dept: SURGERY | Facility: CLINIC | Age: 88
End: 2023-09-21
Payer: MEDICARE

## 2023-09-21 VITALS
HEART RATE: 85 BPM | RESPIRATION RATE: 14 BRPM | SYSTOLIC BLOOD PRESSURE: 116 MMHG | TEMPERATURE: 98 F | DIASTOLIC BLOOD PRESSURE: 72 MMHG | OXYGEN SATURATION: 99 %

## 2023-09-21 DIAGNOSIS — C50.912 INVASIVE LOBULAR CARCINOMA OF LEFT BREAST IN FEMALE (HCC): Primary | ICD-10-CM

## 2023-09-21 PROCEDURE — 3074F SYST BP LT 130 MM HG: CPT

## 2023-09-21 PROCEDURE — 1126F AMNT PAIN NOTED NONE PRSNT: CPT

## 2023-09-21 PROCEDURE — 3078F DIAST BP <80 MM HG: CPT

## 2023-09-21 PROCEDURE — 99024 POSTOP FOLLOW-UP VISIT: CPT

## 2023-09-21 PROCEDURE — 1160F RVW MEDS BY RX/DR IN RCRD: CPT

## 2023-09-21 PROCEDURE — 1159F MED LIST DOCD IN RCRD: CPT

## 2023-09-26 ENCOUNTER — OFFICE VISIT (OUTPATIENT)
Dept: SURGERY | Facility: CLINIC | Age: 88
End: 2023-09-26
Payer: MEDICARE

## 2023-09-26 ENCOUNTER — OFFICE VISIT (OUTPATIENT)
Dept: HEMATOLOGY/ONCOLOGY | Facility: HOSPITAL | Age: 88
End: 2023-09-26
Attending: INTERNAL MEDICINE
Payer: MEDICARE

## 2023-09-26 VITALS
TEMPERATURE: 97 F | SYSTOLIC BLOOD PRESSURE: 124 MMHG | OXYGEN SATURATION: 98 % | RESPIRATION RATE: 16 BRPM | WEIGHT: 158 LBS | DIASTOLIC BLOOD PRESSURE: 76 MMHG | BODY MASS INDEX: 26.98 KG/M2 | HEIGHT: 64 IN | HEART RATE: 88 BPM

## 2023-09-26 VITALS
SYSTOLIC BLOOD PRESSURE: 151 MMHG | TEMPERATURE: 97 F | WEIGHT: 151.19 LBS | OXYGEN SATURATION: 98 % | BODY MASS INDEX: 26.79 KG/M2 | DIASTOLIC BLOOD PRESSURE: 84 MMHG | RESPIRATION RATE: 16 BRPM | HEART RATE: 74 BPM | HEIGHT: 62.91 IN

## 2023-09-26 DIAGNOSIS — Z78.0 ASYMPTOMATIC MENOPAUSE: ICD-10-CM

## 2023-09-26 DIAGNOSIS — C50.912 INVASIVE LOBULAR CARCINOMA OF LEFT BREAST IN FEMALE (HCC): Primary | ICD-10-CM

## 2023-09-26 DIAGNOSIS — C50.912: Primary | ICD-10-CM

## 2023-09-26 PROCEDURE — 1126F AMNT PAIN NOTED NONE PRSNT: CPT | Performed by: INTERNAL MEDICINE

## 2023-09-26 PROCEDURE — 1160F RVW MEDS BY RX/DR IN RCRD: CPT | Performed by: SURGERY

## 2023-09-26 PROCEDURE — 1126F AMNT PAIN NOTED NONE PRSNT: CPT | Performed by: SURGERY

## 2023-09-26 PROCEDURE — 1159F MED LIST DOCD IN RCRD: CPT | Performed by: SURGERY

## 2023-09-26 PROCEDURE — 3079F DIAST BP 80-89 MM HG: CPT | Performed by: INTERNAL MEDICINE

## 2023-09-26 PROCEDURE — 3077F SYST BP >= 140 MM HG: CPT | Performed by: INTERNAL MEDICINE

## 2023-09-26 PROCEDURE — 3074F SYST BP LT 130 MM HG: CPT | Performed by: SURGERY

## 2023-09-26 PROCEDURE — 3078F DIAST BP <80 MM HG: CPT | Performed by: SURGERY

## 2023-09-26 PROCEDURE — 3008F BODY MASS INDEX DOCD: CPT | Performed by: INTERNAL MEDICINE

## 2023-09-26 PROCEDURE — 99024 POSTOP FOLLOW-UP VISIT: CPT | Performed by: SURGERY

## 2023-09-26 PROCEDURE — 3008F BODY MASS INDEX DOCD: CPT | Performed by: SURGERY

## 2023-09-26 PROCEDURE — 99205 OFFICE O/P NEW HI 60 MIN: CPT | Performed by: INTERNAL MEDICINE

## 2023-09-26 RX ORDER — LETROZOLE 2.5 MG/1
2.5 TABLET, FILM COATED ORAL DAILY
Qty: 90 TABLET | Refills: 3 | Status: SHIPPED | OUTPATIENT
Start: 2023-09-26

## 2023-09-26 NOTE — PROGRESS NOTES
Patient is here for consultation for breast cancer. She had lumpectomy last week and is feeling well. She still has some twinges of pain that last for a short time. She is in good general health. She is active. She has a problem with her left knee. She is scheduled to see orthopedics. She denies any fever, cough or shortness of breath. Her appetite is pretty good. She has some issues with constipation.      Education Record    Learner:  Patient    Disease / Diagnosis: Breast cancer    Barriers / Limitations:  None   Comments:    Method:  Discussion   Comments:    General Topics:  Side effects and symptom management   Comments:    Outcome:  Shows understanding   Comments:

## 2023-10-09 ENCOUNTER — HOSPITAL ENCOUNTER (OUTPATIENT)
Dept: RADIATION ONCOLOGY | Facility: HOSPITAL | Age: 88
End: 2023-10-09
Attending: RADIOLOGY
Payer: MEDICARE

## 2023-10-09 VITALS
HEIGHT: 64 IN | TEMPERATURE: 97 F | DIASTOLIC BLOOD PRESSURE: 74 MMHG | SYSTOLIC BLOOD PRESSURE: 163 MMHG | BODY MASS INDEX: 27.18 KG/M2 | HEART RATE: 72 BPM | WEIGHT: 159.19 LBS | OXYGEN SATURATION: 99 % | RESPIRATION RATE: 20 BRPM

## 2023-10-09 DIAGNOSIS — C50.912 INVASIVE LOBULAR CARCINOMA OF LEFT BREAST IN FEMALE (HCC): Primary | ICD-10-CM

## 2023-10-09 PROCEDURE — 99214 OFFICE O/P EST MOD 30 MIN: CPT

## 2023-10-09 NOTE — CONSULTS
[address]    PATIENT'S NAME: [address] [place] [address], [address]   RADIATION ONCOLOGIST: Jabier Eduardo. Tom Sharma M.D. PATIENT ACCOUNT #: [de-identified] Giancarlo Vo   [facility]   MEDICAL RECORD #: [id number] YOB: 1935   CONSULTATION DATE: 10/09/2023       RADIATION ONCOLOGY CONSULTATION    REFERRING PHYSICIAN:  Adair Goss M.D. DIAGNOSIS:  Invasive lobular carcinoma of the left breast, T1cNX, ER positive. HISTORY OF PRESENT ILLNESS:  The patient is an 82-year-old female who had an image-detected malignancy in the left breast.  Routine mammography on 07/27/2023 showed a new focal asymmetry in the left breast for which additional imaging was recommended. A left diagnostic exam confirmed a 1 cm mass in the 12:30 position correlating to the mammographic finding. Left axillary lymph nodes were sonographically normal.  An ultrasound-guided biopsy on 07/27/2023 showed invasive lobular carcinoma. The tumor was noted to be ER 90% positive, MD negative, and HER2/cecil negative with a Ki-67 of 10% to 20%. She saw Dr. Antony Campbell who took her to the operating room for lumpectomy. A sentinel lymph node dissection was not performed based upon the patient's advanced age. The surgery was done on 09/15/2023 and revealed an invasive lobular carcinoma of 1.4 cm. The margins are widely negative. The tumor was noted to be grade 2. The patient saw Dr. Hans Lerma to discuss adjuvant therapy, and he started her on letrozole which she has tolerated well thus far. She was then referred to Radiation Oncology to discuss the possibility of adjuvant radiation. She otherwise has recovered from her surgery well and denies any particular issues or complaints at this time. She is in considerably better shape than most 80year-olds and denies other problems at this time. She denies any pain in the breast, swelling, or tenderness and has full range of motion in the upper extremity.   She started the letrozole about 2 weeks ago and has had no difficulties with this medication. She denies other issues and specifically denies any headaches, fevers or chills, nausea or vomiting, chest pain, shortness of breath, weight loss, or changes in appetite. PAST MEDICAL HISTORY:  The patient has a past history of GERD, hyperlipidemia, high blood pressure, high cholesterol, asthma, and aortic valve insufficiency. PAST SURGICAL HISTORY:  Hysterectomy, tonsillectomy, appendectomy, cataract surgery, and procedures on the knee in addition to the aforementioned left-sided lumpectomy. MEDICATIONS:  Albuterol, amlodipine, aspirin, atorvastatin, ipratropium, letrozole, losartan, metoprolol, Prilosec, Trelegy Ellipta. ALLERGIES:  No known drug allergies. FAMILY HISTORY:  Father  with leukemia. Mother with breast cancer. Son had prostate cancer. Another brother had prostate cancer. SOCIAL HISTORY:  The patient is a never smoker who reports no alcohol use. She is seen in consultation with her son and denies any transportation-related difficulties. REVIEW OF SYSTEMS:  A 14-point review of systems is performed. Pertinent positives and negatives are as per HPI. PHYSICAL EXAMINATION:    GENERAL:  An 79-year-old female who is pleasant, cooperative, and appears better than her stated age. She denies any particular issues or complaints. She has an ECOG performance score of 1 and a current pain score of 5 due to some lingering knee discomfort which is longstanding. VITAL SIGNS:  Blood pressure 163/74, pulse of 72, respiratory rate of 20, and a temperature of 96.9. Her weight is 159 pounds. HEENT:  Pupils are equal, round, and reactive to light with accommodation, and the extraocular movements are intact. The oral cavity is without ulceration or lesions. NECK:  Supple with no lymphadenopathy. LUNGS:  Clear to auscultation bilaterally. HEART:  Regular rate and rhythm, normal S1 and S2, with no audible murmurs.     LYMPHATICS:  There is no supraclavicular, axillary, inguinal.   BREASTS:  On examination of the left breast, the patient has recovered from her surgery extremely well. There are no palpable masses, skin thickening, or nipple discharge. The contralateral breast is also clinically negative. ABDOMEN:  Soft, nontender, and nondistended with normoactive bowel sounds and no hepatosplenomegaly. EXTREMITIES:  Without clubbing, cyanosis, edema. NEUROLOGIC:  Cranial nerves II-XII are grossly intact, and there are no focal deficits. IMPRESSION:  This is an 82-year-old female with a T1cNX invasive lobular carcinoma of the left breast.  Her tumor is noted to be ER positive. Based on her age, sentinel lymph nodes were not sampled at the time of surgery. She has recovered from surgery well and [facility]. RECOMMENDATIONS:  The patient is a candidate for adjuvant radiation in an attempt to decrease her likelihood for a local failure. However, this treatment would not have any impact upon overall survival and would only impact the likelihood of a local recurrence. If she were to have a recurrence, this likely could be salvaged with mastectomy. 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 I told them that the radiation may decrease her likelihood for additional procedure such as a mastectomy but this benefit would likely be in the single digits and would have no impact upon her overall survival.    Next, we talked about the potential side effects of radiation. I would treat her to 4256 cGy in 266 cGy daily fractions. I would treat her in the prone position to avoid cardiac or pulmonary dosing. I would not utilize a boost given the widely negative margins. I do believe the patient would tolerate these treatments well, but there are a number of side effects that can occur.   Specifically, she can have some fatigue as well as skin changes akin to a sunburn. This includes redness, itching, peeling, dryness, and blistering. These side effects will worsen during the course of therapy and should resolve fairly quickly after radiation is complete. I would not expect any long-term or permanent side effects as a result of the radiation, but there is a possibility of chest wall discomfort, rib pain, or breast tenderness. Following our long and thorough discussion, the patient indicated that she understood all these issues and was appreciative of our discussion. At the completion of our time together, the patient indicated that she was uncertain as to whether or not she wished to proceed with radiation. I told her that it would be perfectly reasonable to skip therapy and that this would likely have no impact upon her overall survival.  She, therefore, would like to take some time to make up her mind and will contact us with her decision. If she does opt to proceed with radiation, we would proceed with simulation as I believe she would be ready to go at any time. If she does not proceed with radiation, she then will continue to follow up with Dr. Tiana Soares and Dr. Smiley Jauregui and would need no future visits in this department. Thank you very much for allowing me the opportunity to participate in the care of this patient. If there should be any questions regarding the radiotherapy, please feel free to contact me at any time. Dictated By Jennifer Flores M.D.  d: 10/09/2023 10:26:34  t: 10/09/2023 10:41:37  [facility] [id number]  NAD/    cc: JORDY Bassett M.D. Amish K. Dorette Carwin, MD

## 2023-10-09 NOTE — PATIENT INSTRUCTIONS
- PLEASE CALL [phone] WITH YOUR TREATMENT DECISION, OR IF YOU HAVE QUESTIONS REGARDING RADIATION THERAPY. - IF YOU DECIDE TO HAVE RADIATION TREATMENTS, WE WILL CALL TO SCHEDULE YOU FOR YOUR CT SIMULATION FOR RADIATION PLANNING.

## 2023-10-16 ENCOUNTER — HOSPITAL ENCOUNTER (OUTPATIENT)
Dept: RADIATION ONCOLOGY | Facility: HOSPITAL | Age: 88
Discharge: HOME | End: 2023-10-16
Attending: RADIOLOGY
Payer: MEDICARE

## 2023-10-16 ENCOUNTER — HOSPITAL ENCOUNTER (OUTPATIENT)
Dept: GENERAL RADIOLOGY | Age: 88
Discharge: HOME | End: 2023-10-16
Attending: ORTHOPAEDIC SURGERY
Payer: MEDICARE

## 2023-10-16 ENCOUNTER — OFFICE VISIT (OUTPATIENT)
Dept: ORTHOPEDICS CLINIC | Facility: CLINIC | Age: 88
End: 2023-10-16
Payer: MEDICARE

## 2023-10-16 VITALS — BODY MASS INDEX: 27.11 KG/M2 | HEIGHT: 64 IN | WEIGHT: 158.81 LBS

## 2023-10-16 DIAGNOSIS — M25.562 CHRONIC PAIN OF LEFT KNEE: ICD-10-CM

## 2023-10-16 DIAGNOSIS — G89.29 CHRONIC PAIN OF LEFT KNEE: ICD-10-CM

## 2023-10-16 DIAGNOSIS — M17.12 PRIMARY OSTEOARTHRITIS OF LEFT KNEE: Primary | ICD-10-CM

## 2023-10-16 PROCEDURE — 73564 X-RAY EXAM KNEE 4 OR MORE: CPT | Performed by: ORTHOPAEDIC SURGERY

## 2023-10-16 RX ORDER — TRIAMCINOLONE ACETONIDE 40 MG/ML
40 INJECTION, SUSPENSION INTRA-ARTICULAR; INTRAMUSCULAR ONCE
Status: COMPLETED | OUTPATIENT
Start: 2023-10-16 | End: 2023-10-16

## 2023-10-16 RX ADMIN — TRIAMCINOLONE ACETONIDE 40 MG: 40 INJECTION, SUSPENSION INTRA-ARTICULAR; INTRAMUSCULAR at 10:11:00

## 2023-10-16 NOTE — PROCEDURES
Risks and benefits of knee injection discussed with the patient, with risks including but not limited to pain and swelling at the injection site and/or within the knee joint, infection, elevation in blood pressure and/or glucose levels, facial flushing. After informed consent, the patient's left knee was marked, locally anesthetized with skin refrigerant, prepped with topical antiseptic, and injected with a mixture of 1mL 40mg/mL Kenalog, 2mL 1% lidocaine and 2mL 0.5% marcaine through the inferolateral portal.  A band-aid was applied. The patient tolerated the procedure well.     Marlon Cabrera MD, [address] [place]  Phone [phone]  Fax [phone]

## 2023-10-27 ENCOUNTER — PATIENT MESSAGE (OUTPATIENT)
Dept: ORTHOPEDICS CLINIC | Facility: CLINIC | Age: 88
End: 2023-10-27

## 2023-10-27 DIAGNOSIS — M17.12 PRIMARY OSTEOARTHRITIS OF LEFT KNEE: Primary | ICD-10-CM

## 2023-10-27 NOTE — TELEPHONE ENCOUNTER
From: Meseret Coy  To: Camilafranny Kiser  Sent: 10/27/2023 1:38 PM CDT  Subject: Knee Injection    I received an injection in my knee on 10/16/23. I didn't get any relief from the shot. You suggested if I didn't get relief to try a Zilretta injection. I would like to do that. I am starting radiation treatments for breast cancer Oct. 30 and will finish 11/23/23. I would like to schedule the injection after that. Can I go ahead and make an appt. I know you have to get permission from the insurance  company which I would like to have you go ahead and   do that. I would like to have things in place for the injection as soon as possible after 11/23. Hope this all makes sense.   Thank you   Isadora Pacheco  [phone]

## 2023-10-27 NOTE — TELEPHONE ENCOUNTER
Please see patient message ,    I called patient to inform her of the approval process patient understood    Last visit was 10/16/23 for left knee. Steriod injection was administered , zilretta was discussed . Please place order to get insurance approval . Patient isnt looking to have it done until the end of Nov. But with the time line of last injection looks as if she's ,would not be due yet.  Referral can be postponed in referral pool until time to administer if order is signed

## 2023-10-30 ENCOUNTER — HOSPITAL ENCOUNTER (OUTPATIENT)
Dept: RADIATION ONCOLOGY | Facility: HOSPITAL | Age: 88
Discharge: HOME | End: 2023-10-30
Attending: RADIOLOGY
Payer: MEDICARE

## 2023-10-30 VITALS
OXYGEN SATURATION: 99 % | DIASTOLIC BLOOD PRESSURE: 74 MMHG | TEMPERATURE: 98 F | HEART RATE: 82 BPM | SYSTOLIC BLOOD PRESSURE: 119 MMHG | RESPIRATION RATE: 18 BRPM

## 2023-10-30 DIAGNOSIS — C50.912 INVASIVE LOBULAR CARCINOMA OF LEFT BREAST IN FEMALE (HCC): Primary | ICD-10-CM

## 2023-11-01 ENCOUNTER — HOSPITAL ENCOUNTER (OUTPATIENT)
Dept: RADIATION ONCOLOGY | Facility: HOSPITAL | Age: 88
Discharge: HOME | End: 2023-11-01
Attending: RADIOLOGY
Payer: MEDICARE

## 2023-11-01 PROCEDURE — 77412 RADIATION TX DELIVERY LVL 3: CPT | Performed by: RADIOLOGY

## 2023-11-01 PROCEDURE — 77387 GUIDANCE FOR RADJ TX DLVR: CPT | Performed by: RADIOLOGY

## 2023-11-02 PROCEDURE — 77387 GUIDANCE FOR RADJ TX DLVR: CPT | Performed by: RADIOLOGY

## 2023-11-02 PROCEDURE — 77412 RADIATION TX DELIVERY LVL 3: CPT | Performed by: RADIOLOGY

## 2023-11-03 PROCEDURE — 77387 GUIDANCE FOR RADJ TX DLVR: CPT | Performed by: RADIOLOGY

## 2023-11-03 PROCEDURE — 77336 RADIATION PHYSICS CONSULT: CPT | Performed by: RADIOLOGY

## 2023-11-03 PROCEDURE — 77412 RADIATION TX DELIVERY LVL 3: CPT | Performed by: RADIOLOGY

## 2023-11-06 ENCOUNTER — HOSPITAL ENCOUNTER (OUTPATIENT)
Dept: RADIATION ONCOLOGY | Facility: HOSPITAL | Age: 88
End: 2023-11-06
Attending: RADIOLOGY
Payer: MEDICARE

## 2023-11-06 VITALS
DIASTOLIC BLOOD PRESSURE: 78 MMHG | HEART RATE: 73 BPM | RESPIRATION RATE: 18 BRPM | OXYGEN SATURATION: 98 % | SYSTOLIC BLOOD PRESSURE: 139 MMHG | TEMPERATURE: 98 F

## 2023-11-06 DIAGNOSIS — C50.912 INVASIVE LOBULAR CARCINOMA OF LEFT BREAST IN FEMALE (HCC): Primary | ICD-10-CM

## 2023-11-06 PROCEDURE — 77387 GUIDANCE FOR RADJ TX DLVR: CPT | Performed by: RADIOLOGY

## 2023-11-06 PROCEDURE — 77412 RADIATION TX DELIVERY LVL 3: CPT | Performed by: RADIOLOGY

## 2023-11-07 PROCEDURE — 77387 GUIDANCE FOR RADJ TX DLVR: CPT | Performed by: RADIOLOGY

## 2023-11-07 PROCEDURE — 77412 RADIATION TX DELIVERY LVL 3: CPT | Performed by: RADIOLOGY

## 2023-11-08 PROCEDURE — 77412 RADIATION TX DELIVERY LVL 3: CPT | Performed by: RADIOLOGY

## 2023-11-08 PROCEDURE — 77387 GUIDANCE FOR RADJ TX DLVR: CPT | Performed by: RADIOLOGY

## 2023-11-09 PROCEDURE — 77387 GUIDANCE FOR RADJ TX DLVR: CPT | Performed by: RADIOLOGY

## 2023-11-09 PROCEDURE — 77412 RADIATION TX DELIVERY LVL 3: CPT | Performed by: RADIOLOGY

## 2023-11-10 PROCEDURE — 77387 GUIDANCE FOR RADJ TX DLVR: CPT | Performed by: RADIOLOGY

## 2023-11-10 PROCEDURE — 77412 RADIATION TX DELIVERY LVL 3: CPT | Performed by: RADIOLOGY

## 2023-11-10 PROCEDURE — 77336 RADIATION PHYSICS CONSULT: CPT | Performed by: RADIOLOGY

## 2023-11-13 ENCOUNTER — HOSPITAL ENCOUNTER (OUTPATIENT)
Dept: RADIATION ONCOLOGY | Facility: HOSPITAL | Age: 88
End: 2023-11-13
Attending: RADIOLOGY
Payer: MEDICARE

## 2023-11-13 VITALS
RESPIRATION RATE: 18 BRPM | TEMPERATURE: 98 F | HEART RATE: 78 BPM | OXYGEN SATURATION: 98 % | DIASTOLIC BLOOD PRESSURE: 85 MMHG | SYSTOLIC BLOOD PRESSURE: 142 MMHG

## 2023-11-13 DIAGNOSIS — C50.912 INVASIVE LOBULAR CARCINOMA OF LEFT BREAST IN FEMALE (HCC): Primary | ICD-10-CM

## 2023-11-13 PROCEDURE — 77387 GUIDANCE FOR RADJ TX DLVR: CPT | Performed by: RADIOLOGY

## 2023-11-13 PROCEDURE — 77412 RADIATION TX DELIVERY LVL 3: CPT | Performed by: RADIOLOGY

## 2023-11-14 ENCOUNTER — PATIENT MESSAGE (OUTPATIENT)
Dept: ORTHOPEDICS CLINIC | Facility: CLINIC | Age: 88
End: 2023-11-14

## 2023-11-14 PROCEDURE — 77387 GUIDANCE FOR RADJ TX DLVR: CPT | Performed by: RADIOLOGY

## 2023-11-14 PROCEDURE — 77412 RADIATION TX DELIVERY LVL 3: CPT | Performed by: RADIOLOGY

## 2023-11-14 NOTE — TELEPHONE ENCOUNTER
From: Mariam Coy  To: Francisco Sanford  Sent: 11/14/2023 2:51 PM CST  Subject: Suzanne Blade    I got a letter yesterday from the [facility]. They had approved the Zilretta injection in my left knee. I called today to try and set up an appt. The person I spoke with seemed to be confused who I was or what I wanted. I am not sure the information was relayed to anyone. I would liked to have the injection as my knee is rather painful. I finish with radiation treatments the 11/20.  I would like to have the injection the last week in Nov. or   first week in Dec. if it works for the Dr. Myriam holm,  Una Gomez

## 2023-11-14 NOTE — TELEPHONE ENCOUNTER
Spoke to patient and informed her are waiting for the medication to come in to schedule patient understood

## 2023-11-15 ENCOUNTER — TELEPHONE (OUTPATIENT)
Dept: HEMATOLOGY/ONCOLOGY | Facility: HOSPITAL | Age: 88
End: 2023-11-15

## 2023-11-15 ENCOUNTER — NURSE NAVIGATOR ENCOUNTER (OUTPATIENT)
Dept: HEMATOLOGY/ONCOLOGY | Facility: HOSPITAL | Age: 88
End: 2023-11-15

## 2023-11-15 PROCEDURE — 77412 RADIATION TX DELIVERY LVL 3: CPT | Performed by: RADIOLOGY

## 2023-11-15 PROCEDURE — 77387 GUIDANCE FOR RADJ TX DLVR: CPT | Performed by: RADIOLOGY

## 2023-11-15 NOTE — PROGRESS NOTES
Patient called back in regards to my VM message about scheduling her for a survivorship appointment after her radiation treatment. She stated she is experiencing fatigue with her treatments and no skin irritations. Discussed survivorship appointment and what it entails. Scheduled patient with Dick Valdemar on January 4, 2024 at 0900 at the [facility]. She thanked me for the assistance and was provided with the breast nurse navigators contact information and was encouraged to phone with any other questions or concerns.

## 2023-11-16 PROCEDURE — 77412 RADIATION TX DELIVERY LVL 3: CPT | Performed by: RADIOLOGY

## 2023-11-16 PROCEDURE — 77387 GUIDANCE FOR RADJ TX DLVR: CPT | Performed by: RADIOLOGY

## 2023-11-17 PROCEDURE — 77412 RADIATION TX DELIVERY LVL 3: CPT | Performed by: RADIOLOGY

## 2023-11-17 PROCEDURE — 77336 RADIATION PHYSICS CONSULT: CPT | Performed by: RADIOLOGY

## 2023-11-17 PROCEDURE — 77387 GUIDANCE FOR RADJ TX DLVR: CPT | Performed by: RADIOLOGY

## 2023-11-20 ENCOUNTER — HOSPITAL ENCOUNTER (OUTPATIENT)
Dept: RADIATION ONCOLOGY | Facility: HOSPITAL | Age: 88
End: 2023-11-20
Attending: RADIOLOGY
Payer: MEDICARE

## 2023-11-20 VITALS
DIASTOLIC BLOOD PRESSURE: 78 MMHG | SYSTOLIC BLOOD PRESSURE: 127 MMHG | OXYGEN SATURATION: 98 % | TEMPERATURE: 98 F | HEART RATE: 68 BPM | RESPIRATION RATE: 18 BRPM

## 2023-11-20 DIAGNOSIS — C50.912 INVASIVE LOBULAR CARCINOMA OF LEFT BREAST IN FEMALE (HCC): Primary | ICD-10-CM

## 2023-11-20 PROCEDURE — 77336 RADIATION PHYSICS CONSULT: CPT | Performed by: RADIOLOGY

## 2023-11-20 PROCEDURE — 77387 GUIDANCE FOR RADJ TX DLVR: CPT | Performed by: RADIOLOGY

## 2023-11-20 PROCEDURE — 77412 RADIATION TX DELIVERY LVL 3: CPT | Performed by: RADIOLOGY

## 2023-11-20 NOTE — PATIENT INSTRUCTIONS
- WE WILL CALL TO SCHEDULE YOU FOR A FOLLOW-UP WITH DR. CELINE POPE IN 3 MONTHS  - FOLLOW-UP WITH DR. Joce Phillips  -CALL CENTRAL SCHEDULING TO SCHEDULE DEXA AND MAMMOGRAM (MARCH 2024)- [phone]  - SIDE EFFECTS OF RADIATION WILL GRADUALLY SUBSIDE. IT MAY TAKE 1- 2 WEEKS POST-RADIATION FOR YOU TO NOTICE CHANGES SUCH AS A DECREASE IN YOUR FATIGUE LEVEL.   - CONTINUE WITH SKIN CARE: APPLY MILD CREAM TO TREATED AREA 2-3X/DAY, DO NOT USE HOT/COLD PACKS DIRECTLY ON SITE, USE MILD SOAP/DEODORANT TO AFFECTED AREA, KEEP OPEN TO AIR WHEN AT HOME. - CALL THE NURSE LINE AT [phone] IF YOU HAVE ANY QUESTIONS/CONCERNS REGARDING RADIATION THERAPY.

## 2023-12-04 ENCOUNTER — HOSPITAL ENCOUNTER (OUTPATIENT)
Dept: RADIATION ONCOLOGY | Facility: HOSPITAL | Age: 88
End: 2023-12-04
Attending: RADIOLOGY
Payer: MEDICARE

## 2023-12-04 NOTE — PROGRESS NOTES
Pt here for skin check  Pt called to report rash on CW   Pt has been using hydrocortisone x 2 weeks   Mild rash noted on CW  Pt reports rash got worse after she completed RT  Is now improving, but not gone  Pt states she has ezema and frequently has skin issues  Skin is not warm to touch   Does not appear infected, not red and inflamed  Advised to continue hydrocortisone for one more week   Advised to moisturize after hydrocortisone  Pt to stop hydrocortisone after one week and continue moisturizing  Advised to call with worsening symptoms  Pt agreeable to plan

## 2023-12-07 RX ORDER — FLUTICASONE FUROATE, UMECLIDINIUM BROMIDE AND VILANTEROL TRIFENATATE 200; 62.5; 25 UG/1; UG/1; UG/1
1 POWDER RESPIRATORY (INHALATION) DAILY
Refills: 0 | OUTPATIENT
Start: 2023-12-07

## 2023-12-08 ENCOUNTER — HOSPITAL ENCOUNTER (OUTPATIENT)
Dept: RADIATION ONCOLOGY | Facility: HOSPITAL | Age: 88
Discharge: HOME | End: 2023-12-08
Attending: RADIOLOGY
Payer: MEDICARE

## 2023-12-08 VITALS
SYSTOLIC BLOOD PRESSURE: 127 MMHG | OXYGEN SATURATION: 100 % | HEART RATE: 75 BPM | RESPIRATION RATE: 18 BRPM | DIASTOLIC BLOOD PRESSURE: 75 MMHG | TEMPERATURE: 97 F

## 2023-12-11 ENCOUNTER — ORDER TRANSCRIPTION (OUTPATIENT)
Dept: PHYSICAL THERAPY | Facility: HOSPITAL | Age: 88
End: 2023-12-11

## 2023-12-11 ENCOUNTER — OFFICE VISIT (OUTPATIENT)
Dept: ORTHOPEDICS CLINIC | Facility: CLINIC | Age: 88
End: 2023-12-11
Payer: MEDICARE

## 2023-12-11 DIAGNOSIS — M17.12 PRIMARY OSTEOARTHRITIS OF LEFT KNEE: Primary | ICD-10-CM

## 2023-12-11 DIAGNOSIS — M17.12 OSTEOARTHRITIS OF LEFT KNEE: Primary | ICD-10-CM

## 2023-12-11 RX ORDER — TRIAMCINOLONE ACETONIDE 40 MG/ML
40 INJECTION, SUSPENSION INTRA-ARTICULAR; INTRAMUSCULAR ONCE
Status: DISCONTINUED | OUTPATIENT
Start: 2023-12-11 | End: 2023-12-11

## 2023-12-11 NOTE — PROCEDURES
She reports the last injection did not help at all. As much as she had a difficult recovery with her right knee and wants to hold off on left knee replacement, she does feel that the pain is enough that she may need to consider it as she cannot continue going on with the knee pain the way it is. We will set up for appointment 1 month from now to review her results from this injection, consider whether to schedule surgery. Risks and benefits of knee injection discussed with the patient, with risks including but not limited to pain and swelling at the injection site and/or within the knee joint, infection, elevation in blood pressure and/or glucose levels, facial flushing. After informed consent, the patient's left knee was marked, locally anesthetized with skin refrigerant, prepped with topical antiseptic, and injected with 5mL of 32mg/5mL Zilretta through the inferolateral portal.  A band-aid was applied. The patient tolerated the procedure well.     Nubia Barrera MD, [address] [facility]  Phone [phone]  Fax [phone]

## 2023-12-22 ENCOUNTER — TELEPHONE (OUTPATIENT)
Dept: PHYSICAL THERAPY | Facility: HOSPITAL | Age: 88
End: 2023-12-22

## 2024-01-04 ENCOUNTER — OFFICE VISIT (OUTPATIENT)
Dept: HEMATOLOGY/ONCOLOGY | Facility: HOSPITAL | Age: 89
End: 2024-01-04
Attending: INTERNAL MEDICINE
Payer: MEDICARE

## 2024-01-04 DIAGNOSIS — Z71.9 COUNSELING, UNSPECIFIED: ICD-10-CM

## 2024-01-04 DIAGNOSIS — Z08 ENCOUNTER FOR FOLLOW-UP EXAMINATION AFTER COMPLETED TREATMENT FOR MALIGNANT NEOPLASM: Primary | ICD-10-CM

## 2024-01-04 DIAGNOSIS — Z85.3 PERSONAL HISTORY OF BREAST CANCER: ICD-10-CM

## 2024-01-04 PROCEDURE — G2212 PROLONG OUTPT/OFFICE VIS: HCPCS | Performed by: NURSE PRACTITIONER

## 2024-01-04 PROCEDURE — 99215 OFFICE O/P EST HI 40 MIN: CPT | Performed by: NURSE PRACTITIONER

## 2024-01-04 NOTE — PROGRESS NOTES
I met with Shanti for a Survivorship Clinic visit to provide a survivorship care plan (SCP) and information related to post-treatment care.  She is an 88 year old woman who presented with an imaging detected concern of her left breast.   She does have a family history of breast cancer and has been consistent with annual mammograms.  Her screening mammogram on 7/7/2023 detected a new focal asymmetry with distortion in the left breast for which additional imaging was recommended.  A left diagnostic evaluation confirmed an associated 1 cm mass at 1230, 5 cm from the nipple correlating with the mammographic finding.  Her left axillary lymph nodes were noted to be sonographically normal.  She had ultrasound-guided biopsy on 7/27/2023 and was confirmed to have invasive lobular carcinoma, ER 90%, SC less than 1%, HER2/cecil negative and Ki-67 10 to 20%. Dr. Ninoska Galeano performed a left lumpectomy. Pathology showed a pT1, cN0, cM0, Gr 2, 1.4 cm tumor, Stage I ILC. She was seen by Dr. Rusty Ma with no systemic chemotherapy recommended. She did receive radiation therapy with Dr. Moisés Euceda which was given from 10/30/2023-11/20/2023. Dr. Ma recommended adjuvant endocrine therapy with Letrozole, which she started in late 9/2023   (See Oncology Treatment Summary SCP for details).      Current condition/issues: Shanti is doing well post-treatment. She has been taking Letrozole since late 9/2023 and is tolerating well with no reported side effects.  Prior bone density done in 2021 was normal. She takes 2000IU of Vitamin D3 each day. She has no post-surgical issues.  She previously was quite active and exercised at a local fitness center. She has worsening left knee arthritis that is limiting her activity, despite steroid and gel injections. She is anticipating future knee surgery. She is currently doing no physical activity but will be starting PT for her left knee next week.  She eats fairly healthy and her BMI is 27.   She denies anxiety or depression related to her breast cancer and feels fortunate with the early stage of her disease.  Her current concerns relate to the potential of having knee surgery.  She has good support from her three sons.     Survivorship Care Plan and letter: She was provided a hard copy of the SCP and a letter that outlined the purpose of the visit and plan. We reviewed all elements of both documents. She is aware that a letter and SCP will be sent to her PCP,  Dr. Brenton Hinojosa.     Reviewed Cancer Surveillance (office visits, imaging, bone density) and that Dr. Ma will oversee this care.  She will see him next on 3/27/24 and prior to that visit will get bone density on 3/18 and left mammogram on 3/19. She will see Dr. Euceda on 2/21 and Dr. Galeano on 4/2.     Reviewed Schedule of other clinic visits with Primary Care and specialists: Dr. Hinojosa will continue to manage all general health care recommended for age and gender including cancer screening tests.  She is up-to-date with screening including annual skin screens .She was encouraged to continue to see specialists at usual intervals (including pulmonary, ortho, cardiology, PT).     Reviewed concerning symptoms that she should report to any Provider.      Reviewed possible late and long-term effects related to the treatment that was received.  We reviewed management of hormone related side effects.     Reviewed common cancer survivor issues and resources available.  We reviewed exercise options to consider given her left knee issues. She may be interested in Med Fitness Program and will let me know if would like an order. Various survivorship resources were provided to use going forward as needed (see below).     Reviewed Lifestyle/behaviors that can affect ongoing health, including the risk for the cancer coming back or developing another cancer.  We reviewed importance of physical activity including aerobic, strength training and weight bearing  exercise.  We reviewed a plant based diet.  We reviewed skin care and sun safety and she sees Dermatologist annually.     The patient received a take-home folder that included the following survivorship resources:   -SCP and patient letter  -Breast Survivorship Guide   -[facility] - nutrition and exercise classes, mind/body programs, education, support groups  -[facility]-education, support groups, special programs, nutrition and exercise classes, movement classes, mind/body programs, survivorship programs, individual counseling  -Medical Fitness Program  -[employer] handout-nutrition, physical activity  -ACS Cancer Screening Guidelines  -Websites: American Cancer Society, Living Beyond Breast Cancer, [employer] for your Life, ACS Survivorship Network, National Coalition for Cancer Survivorship     Shanti was given the opportunity to ask questions.  She had a few questions and verbalized understanding of the information we discussed today.  She appears to be doing very well. My total time spent caring for the patient on the day of the encounter: 90 minutes (10 minutes reviewing chart, 60 minutes of face to face counseling regarding survivorship education, review of care plan and additional resources and 20 minutes of documentation).  She was encouraged to call with any further questions.   SOFIA Harper

## 2024-01-08 ENCOUNTER — TELEPHONE (OUTPATIENT)
Dept: PHYSICAL THERAPY | Facility: HOSPITAL | Age: 89
End: 2024-01-08

## 2024-01-09 ENCOUNTER — APPOINTMENT (OUTPATIENT)
Dept: PHYSICAL THERAPY | Facility: HOSPITAL | Age: 89
End: 2024-01-09
Attending: ORTHOPAEDIC SURGERY
Payer: MEDICARE

## 2024-01-12 ENCOUNTER — APPOINTMENT (OUTPATIENT)
Dept: PHYSICAL THERAPY | Facility: HOSPITAL | Age: 89
End: 2024-01-12
Attending: ORTHOPAEDIC SURGERY
Payer: MEDICARE

## 2024-01-15 ENCOUNTER — OFFICE VISIT (OUTPATIENT)
Dept: ORTHOPEDICS CLINIC | Facility: CLINIC | Age: 89
End: 2024-01-15
Payer: MEDICARE

## 2024-01-15 ENCOUNTER — TELEPHONE (OUTPATIENT)
Dept: ORTHOPEDICS CLINIC | Facility: CLINIC | Age: 89
End: 2024-01-15

## 2024-01-15 ENCOUNTER — HOSPITAL ENCOUNTER (OUTPATIENT)
Dept: GENERAL RADIOLOGY | Age: 89
Discharge: HOME | End: 2024-01-15
Attending: ORTHOPAEDIC SURGERY
Payer: MEDICARE

## 2024-01-15 VITALS — HEIGHT: 64 IN | WEIGHT: 160.63 LBS | BODY MASS INDEX: 27.42 KG/M2

## 2024-01-15 DIAGNOSIS — M17.12 PRIMARY OSTEOARTHRITIS OF LEFT KNEE: ICD-10-CM

## 2024-01-15 DIAGNOSIS — M17.12 PRIMARY OSTEOARTHRITIS OF LEFT KNEE: Primary | ICD-10-CM

## 2024-01-15 PROCEDURE — 1160F RVW MEDS BY RX/DR IN RCRD: CPT | Performed by: ORTHOPAEDIC SURGERY

## 2024-01-15 PROCEDURE — 99214 OFFICE O/P EST MOD 30 MIN: CPT | Performed by: ORTHOPAEDIC SURGERY

## 2024-01-15 PROCEDURE — 3008F BODY MASS INDEX DOCD: CPT | Performed by: ORTHOPAEDIC SURGERY

## 2024-01-15 PROCEDURE — 77073 BONE LENGTH STUDIES: CPT | Performed by: ORTHOPAEDIC SURGERY

## 2024-01-15 PROCEDURE — 1159F MED LIST DOCD IN RCRD: CPT | Performed by: ORTHOPAEDIC SURGERY

## 2024-01-15 NOTE — TELEPHONE ENCOUNTER
Date of Surgery: 4/2/24         Post Op Appt:  4/17/24 @ 940AM     Case ID: [id number]     Notes:           SURGERY SCHEDULING SHEET     Shanti Coy  11/26/1935  [id number]     Procedure: Left total knee arthroplasty     CPT: 78929     Diagnosis: Left knee osteoarthritis     Anesthesia: Choice     Length of Surgery: 2 hours     Disposition: Inpatient     Instruments: Medacta TKA     Assist: If case scheduled on Thurs/Fri, then Rick Gentile first assist. If case scheduled on Tues, then Porfirio Dubose ([phone]) first assist. If Porfirio unavailable, then please communicate to Rick Gentile/Cherise/Rock to cancel Rick's clinic for that day and have Rick first assist. If Rick unavailable, contact David Bell for first assist. If Porfirio and David unavailable, then contact [facility] for first assist.     Pre-op Testing: CBC, CMP, PT/INR, PTT, TYPE AND SCREEN, and MRSA/MSSA THROUGH EDW PAT     Clearance: MEDICAL/PCP and CARDIAC     Post op: 2 weeks postop appointment with Rick Gentile     Surgery date specifics: Around April 2024     Sanjeev Cage MD, FAAOS  [facility]  Phone: [phone]  Fax: [phone]

## 2024-01-15 NOTE — PROGRESS NOTES
[facility] Progress Note    Subjective: Patient returns to clinic for her left knee.  She is present with her son today.  She states that the last injection, Zilretta, did not help.  Pain has gotten significantly worse, she feels more limited due to the pain secondary to her knee arthritis.  Pain is activity and lifestyle limiting.  She also notes swelling, points inferolateral.    Objective: Patient is very pleasant, appears comfortable.  She demonstrates full extension of the knee, flexion to 120.  Knee has slight laxity to varus valgus stress.      Assessment/Plan: 88-year-old female with symptomatic radiographically moderate severe left knee osteoarthritis.  The patient has reasonably attempted nonsurgical treatments as mentioned above and remains limited in activities of daily living secondary to pain from knee arthritis.  I discussed risks and benefits of surgery, with risks including but not limited to infection, blood clots, fracture, bleeding/need for blood transfusion, injury to blood vessels and nerves, loosening or failure of components, stiffness, continued pain, need for further intervention or revision surgery.  Additionally I discussed expectations with regards to the postoperative recovery timeframe, the possibility of mechanical clicking with the knee, numbness on the lateral side of the knee/leg from sacrifice of the infrapatellar branch of the saphenous nerve, and potential for difficulty/pain with kneeling and performing deep flexion activities.  The patient understands this discussion and elects to proceed with knee replacement surgery.  She would like to proceed in April.  We did order full-length lower extremity standing films for her today.  She will need primary care as well as cardiology evaluation and clearance, as she reports she has a \"leaky valve\" and follows with a cardiologist regularly.    Sanjeev Cage MD, [facility]  [facility]  Phone  [phone]  Fax [phone]

## 2024-01-15 NOTE — PROGRESS NOTES
SURGERY SCHEDULING SHEET    Shanti Coy  11/26/1935  [id number]    Procedure: Left total knee arthroplasty    CPT: 54796    Diagnosis: Left knee osteoarthritis    Anesthesia: Choice    Length of Surgery: 2 hours    Disposition: Inpatient    Instruments: Medacta TKA    Assist: If case scheduled on Thurs/Fri, then Rick Gentile first assist. If case scheduled on Tues, then Porfirio Keithjordan ([phone]) first assist. If Porfirio unavailable, then please communicate to Rick Gentile/Cherise/Rock to cancel Rick's clinic for that day and have Rick first assist. If Rick unavailable, contact David Bell for first assist. If Porfirio and David unavailable, then contact [facility] for first assist.    Pre-op Testing: CBC, CMP, PT/INR, PTT, TYPE AND SCREEN, and MRSA/MSSA THROUGH EDW PAT    Clearance: MEDICAL/PCP and CARDIAC    Post op: 2 weeks postop appointment with Rick Gentile    Surgery date specifics: Around April 2024    Sanjeev Cage MD, [facility]  [facility]  Phone: [phone]  Fax: [phone]

## 2024-01-16 ENCOUNTER — APPOINTMENT (OUTPATIENT)
Dept: PHYSICAL THERAPY | Facility: HOSPITAL | Age: 89
End: 2024-01-16
Attending: ORTHOPAEDIC SURGERY
Payer: MEDICARE

## 2024-01-19 ENCOUNTER — APPOINTMENT (OUTPATIENT)
Dept: PHYSICAL THERAPY | Facility: HOSPITAL | Age: 89
End: 2024-01-19
Attending: ORTHOPAEDIC SURGERY
Payer: MEDICARE

## 2024-01-23 ENCOUNTER — APPOINTMENT (OUTPATIENT)
Dept: PHYSICAL THERAPY | Facility: HOSPITAL | Age: 89
End: 2024-01-23
Attending: ORTHOPAEDIC SURGERY
Payer: MEDICARE

## 2024-01-25 ENCOUNTER — PATIENT MESSAGE (OUTPATIENT)
Dept: ORTHOPEDICS CLINIC | Facility: CLINIC | Age: 89
End: 2024-01-25

## 2024-01-25 NOTE — TELEPHONE ENCOUNTER
From: Shanti Coy  To: Sanjeev Cage  Sent: 1/25/2024 10:00 AM CST  Subject: Rehab     You are doing a knee replacement April 2. My family has discussed my dismissal from the hospital after the surgery. They do not feel it is a good idea for me to come home alone. I also agree as I have stairs and live alone. I called my insurance and they would cover 20 days in a rehab center. I probably don't need that long a stay.  The DrDayo has to recommend a stay in a rehab facility in order for insurance to pay for it. I would like to have plans made before the surgery.   Thank you,  Shanti Coy  [phone]

## 2024-01-26 ENCOUNTER — APPOINTMENT (OUTPATIENT)
Dept: PHYSICAL THERAPY | Facility: HOSPITAL | Age: 89
End: 2024-01-26
Attending: ORTHOPAEDIC SURGERY
Payer: MEDICARE

## 2024-02-01 ENCOUNTER — APPOINTMENT (OUTPATIENT)
Dept: PHYSICAL THERAPY | Facility: HOSPITAL | Age: 89
End: 2024-02-01
Attending: ORTHOPAEDIC SURGERY
Payer: MEDICARE

## 2024-02-08 ENCOUNTER — OFFICE VISIT (OUTPATIENT)
Dept: INTERNAL MEDICINE CLINIC | Facility: CLINIC | Age: 89
End: 2024-02-08
Payer: MEDICARE

## 2024-02-08 ENCOUNTER — HOSPITAL ENCOUNTER (OUTPATIENT)
Dept: GENERAL RADIOLOGY | Age: 89
Discharge: HOME | End: 2024-02-08
Attending: PHYSICIAN ASSISTANT
Payer: MEDICARE

## 2024-02-08 VITALS
OXYGEN SATURATION: 97 % | BODY MASS INDEX: 27.04 KG/M2 | RESPIRATION RATE: 14 BRPM | DIASTOLIC BLOOD PRESSURE: 74 MMHG | HEART RATE: 70 BPM | SYSTOLIC BLOOD PRESSURE: 122 MMHG | WEIGHT: 158.38 LBS | HEIGHT: 64 IN

## 2024-02-08 DIAGNOSIS — M25.511 ACUTE PAIN OF RIGHT SHOULDER: ICD-10-CM

## 2024-02-08 DIAGNOSIS — M25.511 ACUTE PAIN OF RIGHT SHOULDER: Primary | ICD-10-CM

## 2024-02-08 PROCEDURE — 99213 OFFICE O/P EST LOW 20 MIN: CPT | Performed by: PHYSICIAN ASSISTANT

## 2024-02-08 PROCEDURE — 73030 X-RAY EXAM OF SHOULDER: CPT | Performed by: PHYSICIAN ASSISTANT

## 2024-02-08 NOTE — PROGRESS NOTES
Chief Complaint   Patient presents with    Shoulder Pain     Room # 6 KB    Care Gap Management     NONE         HPI:  Pt presents with c/o 6 weeks of R shoulder pain, certain activities seem to make the pain worse and it bothers her at night.  No specific injury of inciting event.  Pt to have L TKA in April and is wanting to get her pain and sxs under control before her TKA.      Review of Systems   See HPI.  No f/c/chest pain or sob. No cough. No n/v/d. No ha or dizziness. No other complaints today.    Past Medical History:   Diagnosis Date    Aortic valve insufficiency     last echo 7/26/23    Asthma     Eczema     Female climacteric state 06/15/2000    GERD     High blood pressure     High cholesterol     Left Breast cancer (HCC) 07/2023    Onychocryptosis 08/25/2016    Osteoarthritis     Other and unspecified hyperlipidemia     Pain due to onychomycosis of toenail 08/17/2017    Prediabetes     Primary localized osteoarthrosis of right lower leg 06/10/2015    Visual impairment     glasses       Patient Active Problem List   Diagnosis    Pure hypercholesterolemia    Essential hypertension    Insomnia    Spinal stenosis of lumbar region    Lumbar radiculitis    History of total knee replacement    Pre-diabetes    Asthma with COPD (chronic obstructive pulmonary disease)    Aortic root dilation (HCC)    Spondylolisthesis at L4-L5 level    CKD (chronic kidney disease) stage 3, GFR 30-59 ml/min (HCC)    Inflammatory spondylopathy of lumbar region (HCC)    Frequent UTI    Primary osteoarthritis of left knee    Invasive lobular carcinoma of left breast in female (HCC)    Infiltrating lobular carcinoma of breast, stage 1, left (HCC)       Current Outpatient Medications   Medication Sig Dispense Refill    letrozole 2.5 MG Oral Tab Take 1 tablet (2.5 mg total) by mouth daily. 90 tablet 3    atorvastatin 40 MG Oral Tab Take 1 tablet (40 mg total) by mouth daily.      TRELEGY ELLIPTA 200-62.5-25 MCG/ACT Inhalation Aerosol  Powder, Breath Activated Inhale 1 puff into the lungs daily.      Losartan Potassium-HCTZ 100-12.5 MG Oral Tab Take 1 tablet by mouth daily. 90 tablet 0    metoprolol succinate ER 25 MG Oral Tablet 24 Hr Take 1 tablet (25 mg total) by mouth daily. 30 tablet 11    AMLODIPINE 5 MG Oral Tab TAKE 1 TABLET BY MOUTH EVERY DAY 90 tablet 0    Blood Pressure Monitoring (BLOOD PRESSURE CUFF) Does not apply Misc For use to measure BP daily 1 each 0    azelastine 0.1 % Nasal Solution as needed.      ipratropium 0.06 % Nasal Solution 2 sprays by Nasal route 3 (three) times daily as needed.      Albuterol Sulfate HFA (PROAIR HFA) 108 (90 Base) MCG/ACT Inhalation Aero Soln Inhale 2 puffs into the lungs every 4 (four) hours as needed. 1 each 5    mometasone 0.1 % External Ointment Apply 1 Application topically daily. 15 g 1    aspirin 81 MG Oral Tab Take 1 tablet (81 mg total) by mouth daily.      CRANBERRY EXTRACT OR Take 1 tablet by mouth daily.        Vitamin D3 (VITAMIN D3) 2000 UNITS Oral Cap Take 1 capsule (2,000 Units total) by mouth daily.      PRILOSEC 20 MG OR CPDR Take 1 capsule (20 mg total) by mouth every morning.      VITAMIN E qd      CLOBETASOL PROPIONATE E EX prn         Physical Exam  /74   Pulse 70   Resp 14   Ht 5' 4\" (1.626 m)   Wt 158 lb 6.4 oz (71.8 kg)   LMP  (LMP Unknown)   SpO2 97%   BMI 27.19 kg/m²   Constitutional:  No distress.   HEENT:  Normocephalic and atraumatic.  MS:  R shoulder:  Full AROM except mild decrease in IR to inferior scapular border.  + pain with active abduction approx 40 deg to 120 deg.  No gross deformity.  + empty can test on R.    Skin: Skin is warm and dry. No rash noted. No erythema. No pallor.       A/P:    Encounter Diagnosis   Name Primary?    Acute pain of right shoulder - Suspect RTC pathology and possible impingement.  Check X-rays and refer for PT.  F/U prn.   Yes       No orders of the defined types were placed in this encounter.      Meds & Refills for this  Visit:  Requested Prescriptions      No prescriptions requested or ordered in this encounter       Imaging & Consults:  OP REFERRAL TO [facility]    Return if symptoms worsen or fail to improve.  There are no Patient Instructions on file for this visit.    All questions were answered and the patient understands the plan.

## 2024-02-12 RX ORDER — LOSARTAN POTASSIUM AND HYDROCHLOROTHIAZIDE 12.5; 1 MG/1; MG/1
1 TABLET ORAL DAILY
Qty: 90 TABLET | Refills: 0 | Status: SHIPPED | OUTPATIENT
Start: 2024-02-12

## 2024-02-13 ENCOUNTER — TELEPHONE (OUTPATIENT)
Dept: PHYSICAL THERAPY | Facility: HOSPITAL | Age: 89
End: 2024-02-13

## 2024-02-14 ENCOUNTER — OFFICE VISIT (OUTPATIENT)
Dept: PHYSICAL THERAPY | Facility: HOSPITAL | Age: 89
End: 2024-02-14
Attending: PHYSICIAN ASSISTANT
Payer: MEDICARE

## 2024-02-14 DIAGNOSIS — M25.511 ACUTE PAIN OF RIGHT SHOULDER: Primary | ICD-10-CM

## 2024-02-14 PROCEDURE — 97161 PT EVAL LOW COMPLEX 20 MIN: CPT

## 2024-02-14 PROCEDURE — 97110 THERAPEUTIC EXERCISES: CPT

## 2024-02-14 NOTE — PROGRESS NOTES
SHOULDER EVALUATION:     Diagnosis:   Acute pain of right shoulder (M25.511)       Referring Provider: Opal  Date of Evaluation:    2/14/2024    Precautions:      L knee impending surgery  R knee 8 years ago Next MD visit:   none scheduled  Date of Surgery: n/a     PATIENT SUMMARY   Shanti Coy is a 88 year old female who presents to therapy today with complaints of R shoulder pain that has been bothering her for 6 weeks. NKI. Most pain in R medial deltoid. Last night pain went down to wrist. Denies N/T. Pt report that her R bra strap has fallen of her shoulder for years. Pt also reports that she has had mild lateral R neck tenderness since having to get radiation for breast cancer and had B arms overhead with neck turned for extended period of time.  Pt describes pain level current dull/10, at best 0/10, at worst 7/10.   Current functional limitations include sleep disturbance, reaching, vacuuming, putting on coat.   Open a tight or new jar Moderate difficulty   Do heavy household chores (eg, wash walls, floors) Moderate difficulty   Carry a shopping bag or briefcase Moderate difficulty   Wash your back Moderate difficulty   Use a knife to cut food Mild difficulty   Recreational activities in which you take some force or impact through your arm, shoulder or hand (eg, golf, hammering, tennis, etc) Moderate difficulty   During the past week, to what extent has your arm, shoulder or hand problem interfered with your normal social activities with family, friends, neighbors or groups? Moderately   During the past week, were you limited in your work or other regular daily activities as a result of your arm, shoulder or hand problem? Slightly limited   Arm, shoulder or hand pain Moderate   During the past week, how much difficulty have you had sleeping because of the pain in your arm, shoulder or hand? Moderate difficulty     Lives alone in [address] [place] house.Takes care of home Planning on having L TKA on April 2nd.  Pt reports that she has to pull herself up with right arm when negotiation 9 steps due to L knee pain .  Shanti describes prior level of function no R shoulder pain. Pt goals include no R shoulder pain.  Past medical history was reviewed with Shanti. Significant findings include  has a past medical history of Aortic valve insufficiency, Asthma, Eczema, Female climacteric state (06/15/2000), GERD, High blood pressure, High cholesterol, Left Breast cancer (HCC) (07/2023), Onychocryptosis (08/25/2016), Osteoarthritis, Other and unspecified hyperlipidemia, Pain due to onychomycosis of toenail (08/17/2017), Prediabetes, Primary localized osteoarthrosis of right lower leg (06/10/2015), and Visual impairment.     ASSESSMENT  Shanti presents to physical therapy evaluation with primary c/o R shoulder pain. The results of the objective tests and measures show decreased range of motion and strength, fair posture, high irritability of soft tissue as exhibited with increased pain with minimal movement mariela eccentric movements.  Functional deficits include but are not limited to sleep disturbance, reaching, vacuuming, putting on coat.  Signs and symptoms are consistent with diagnosis of Acute pain of right shoulder (M25.511). Pt  Pt and PT discussed evaluation findings, pathology, POC and HEP.  Pt voiced understanding and performs HEP correctly without reported pain. Skilled Physical Therapy is medically necessary to address the above impairments and reach functional goals.     OBJECTIVE:   Observation/Posture: R shoulder lower, forward head posture, rounded shoulders  Palpation: tenderness/tightness R deltoid mm, upper trap, R biceps tendon, R scalenes  Sensation: intact light touch, symmetrically, no neuro complaints   Cervical Screen: L SB 75% L rotation 75% -tightness R upper trap and R scalenes, all other motions wfl    AROM: (* denotes performed with pain)  Shoulder  Elbow-wnl all motions B   Flexion: R 120*; L  wnl  Abduction: R 65*; L wnl  ER: R T2/60*; L T2  IR: R T7/60*; L T5    Pain with eccentric movements R      Accessory motion: decreased R GH joint all directions    Flexibility:   R upper trap mod    Strength/MMT: (* denotes performed with pain)  Shoulder Elbow Scapular   Flexion: R 3-/5*; L 5/5  Abduction: R 3-/5*; L 5/5  ER: R 3+/5*; L 5/5  IR: R 4/5; L 5/5 Flexion: R 5/5; L 5/5  Extension: R 5/5; L 5/5  Supination: R 5/5; L 5/5  Pronation: R 5/5; L 5/5  Mid trap     R 3+/5* L 4+/5  Lower trap R 3+/5* L 4+/5     Special tests:     Subacromial Pain Syndrome:  Empty Can test: R +pain  Painful Arc Sign: R +  External Rotation Resistance: R + pain  Guerra-Abdiaziz Impingement Sign: R neg  Neer Impingement Test: R neg  Palpable Tenderness Infraspinatus and Supraspinatus: R no tenderness    Long Head Biceps Tendinopathy:     Yergason's Test: R +    Rotator Cuff Tears:   ER Lag Sign: R -  Dropping Sign at 90 Deg ABD and 45 deg ER: R +pain, difficulty    AC Compression: R neg, L neg      Today’s Treatment and Response:   Pt education was provided on exam findings, treatment diagnosis, treatment plan, expectations, and prognosis. Pt was also provided recommendations for activity modifications, possible soreness after evaluation, modalities as needed [ice/heat], postural corrections, and importance of remaining active.  Patient was instructed in and issued a HEP for: see below  PROM R shoulder x15  STM R shoulder-gentle    Charges: PT Eval Low Complexity, Augustin      Total Timed Treatment: 15 min     Total Treatment Time: 45 min     Access Code: [email or web address]  URL: [email or web address]  Date: 02/14/2024  Prepared by: Alessandra Cavazos    Exercises  - Seated Scapular Retraction  - 1 x daily - 7 x weekly - 1 sets - 10 reps  - Sidelying Shoulder External Rotation  - 1 x daily - 7 x weekly - 1 sets - 10 reps  - Sidelying Shoulder Abduction Palm Forward  - 1 x daily - 7 x weekly - 1 sets - 10 reps  - Shoulder Flexion Wall Slide  with Towel  - 1 x daily - 7 x weekly - 1 sets - 10 reps-AA PRN  - Circular Shoulder Pendulum with Table Support  - 1 x daily - 7 x weekly - 1 sets - 10 reps  - Standing Isometric Shoulder External Rotation with Doorway  - 1 x daily - 7 x weekly - 1 sets - 10 reps  PLAN OF CARE:    Goals: (to be met in 10 visits)   Pt will report improved ability to sleep without waking due to shoulder pain   Pt will improve shoulder flexion AROM to >140 degrees to be able to reach into overhead cabinets without pain or restriction   Pt will improve shoulder abduction AROM to >140 degrees to improve ability to don deodorant, don/doff shirts, and wash hair   Pt will increase shoulder AROM ER to 80 to be able to reach and fasten seatbelt   Pt will increase shoulder AROM IR to 70 to be able to reach in back pocket, tuck in shirt, and turn steering wheel without pain  Pt will improve shoulder strength throughout to 4+/5 to improve function with raising arm up   Pt will demonstrate increased mid/low trap strength to 4+/5 to promote improved shoulder mechanics and stabilization with lifting and reaching   Pt will be independent and compliant with comprehensive HEP to maintain progress achieved in PT       Frequency / Duration: Patient will be seen for 2 x/week or a total of 10 visits over a 90 day period. Treatment will include: Manual Therapy, Neuromuscular Re-education, Therapeutic Activities, Therapeutic Exercise, Home Exercise Program instruction, and Modalities to include: Electrical stimulation (unattended)    Education or treatment limitation: None  Rehab Potential:good    QuickDASH Outcome Score  Score: 40.91 % (2/8/2024  4:38 PM)      Patient/Family/Caregiver was advised of these findings, precautions, and treatment options and has agreed to actively participate in planning and for this course of care.    Thank you for your referral. Please co-sign or sign and return this letter via fax as soon as possible to [phone]. If you  have any questions, please contact me at Dept: [phone]    Sincerely,  Electronically signed by therapist: Alessandra Cavazos PT  Physician's certification required: Yes  I certify the need for these services furnished under this plan of treatment and while under my care.    X___________________________________________________ Date____________________    Certification From: 2/14/2024  To:5/14/2024

## 2024-02-20 ENCOUNTER — OFFICE VISIT (OUTPATIENT)
Dept: PHYSICAL THERAPY | Facility: HOSPITAL | Age: 89
End: 2024-02-20
Attending: PHYSICIAN ASSISTANT
Payer: MEDICARE

## 2024-02-20 PROCEDURE — 97110 THERAPEUTIC EXERCISES: CPT

## 2024-02-20 PROCEDURE — 97140 MANUAL THERAPY 1/> REGIONS: CPT

## 2024-02-21 ENCOUNTER — HOSPITAL ENCOUNTER (OUTPATIENT)
Dept: RADIATION ONCOLOGY | Facility: HOSPITAL | Age: 89
Discharge: HOME | End: 2024-02-21
Attending: RADIOLOGY
Payer: MEDICARE

## 2024-02-21 VITALS
HEART RATE: 79 BPM | WEIGHT: 158.19 LBS | TEMPERATURE: 97 F | RESPIRATION RATE: 16 BRPM | BODY MASS INDEX: 27 KG/M2 | DIASTOLIC BLOOD PRESSURE: 84 MMHG | SYSTOLIC BLOOD PRESSURE: 133 MMHG | OXYGEN SATURATION: 99 %

## 2024-02-21 DIAGNOSIS — C50.912 INVASIVE LOBULAR CARCINOMA OF LEFT BREAST IN FEMALE (HCC): Primary | ICD-10-CM

## 2024-02-21 PROCEDURE — 99213 OFFICE O/P EST LOW 20 MIN: CPT

## 2024-02-21 NOTE — PROGRESS NOTES
Nursing Follow-Up Note    Patient: Shanti Coy  YOB: 1935  Age: 88 year old  Radiation Oncologist: Dr. Moisés Guy  Referring Physician: Rusty Ma  Chief Complaint:   Chief Complaint   Patient presents with    Follow - Up     LEFT BREAST CA     Date: 2/21/2024    Toxicities: N/A    Vital Signs: /84 (BP Location: Right arm, Patient Position: Sitting, Cuff Size: adult)   Pulse 79   Temp 97.3 °F (36.3 °C) (Tympanic)   Resp 16   Wt 71.8 kg (158 lb 3.2 oz)   LMP  (LMP Unknown)   SpO2 99%   BMI 27.15 kg/m² ,   Wt Readings from Last 6 Encounters:   02/21/24 71.8 kg (158 lb 3.2 oz)   02/08/24 71.8 kg (158 lb 6.4 oz)   01/15/24 72.8 kg (160 lb 9.6 oz)   10/16/23 72 kg (158 lb 12.8 oz)   10/09/23 72.2 kg (159 lb 3.2 oz)   09/26/23 68.6 kg (151 lb 3.2 oz)       Allergies:    Allergies   Allergen Reactions    Mold        Nursing Note: Pt completed L breast RT on 11/20/23. To have L breast mammo on 3/11/24, to see Dr. Ma in March 2024. On Letrozole daily, c/o joint aching. Denies hyperpigmentation or desquamation to L breast area. States used Triamcinolone cream to breast in Dec for pruritus, now resolved. Has full ROM to LUE.

## 2024-02-21 NOTE — PATIENT INSTRUCTIONS
- FOLLOW-UP WITH DR. CELINE POPE AS NEEDED        - CALL [phone] IF YOU HAVE ANY QUESTIONS/CONCERNS REGARDING RADIATION THERAPY

## 2024-02-21 NOTE — PROGRESS NOTES
Diagnosis:   Acute pain of right shoulder (M25.511)          Referring Provider: Opal  Date of Evaluation:    2/14/2024    Precautions:      L knee impending surgery  R knee 8 years ago    QuickDASH Outcome Score  Score: 40.91 % (2/8/2024  4:38 PM)   Next MD visit:   none scheduled  Date of Surgery: n/a   Insurance Primary/Secondary: AETDANII MCR / N/A     # Auth Visits: no limit            Subjective:   R shoulder feeling better but has pain with rasing arm forward or to the side.  Pt reports that she has to pull herself up with right arm when negotiating 9 steps due to L knee pain .      Initial evaluation functional limitations:  Current functional limitations include sleep disturbance, reaching, vacuuming, putting on coat.   Open a tight or new jar Moderate difficulty   Do heavy household chores (eg, wash walls, floors) Moderate difficulty   Carry a shopping bag or briefcase Moderate difficulty   Wash your back Moderate difficulty   Use a knife to cut food Mild difficulty   Recreational activities in which you take some force or impact through your arm, shoulder or hand (eg, golf, hammering, tennis, etc) Moderate difficulty   During the past week, to what extent has your arm, shoulder or hand problem interfered with your normal social activities with family, friends, neighbors or groups? Moderately   During the past week, were you limited in your work or other regular daily activities as a result of your arm, shoulder or hand problem? Slightly limited   Arm, shoulder or hand pain Moderate   During the past week, how much difficulty have you had sleeping because of the pain in your arm, shoulder or hand? Moderate difficulty     Pain: 5/10      Objective:       Observation/Posture: R shoulder lower, forward head posture, rounded shoulders  Palpation: tenderness/tightness R deltoid mm, upper trap, R biceps tendon, R scalenes    Cervical Screen: L SB 75% L rotation 75% -tightness R upper trap and R scalenes, all  other motions wfl    AROM: (* denotes performed with pain)  Shoulder    Flexion: R 120*; L wnl  Abduction: R 65*; L wnl  ER: R T2/60*; L T2  IR: R T7/60*; L T5    Pain with eccentric movements R     Initial evaluation:     Strength/MMT: (* denotes performed with pain)  Shoulder Elbow Scapular   Flexion: R 3-/5*; L 5/5  Abduction: R 3-/5*; L 5/5  ER: R 3+/5*; L 5/5  IR: R 4/5; L 5/5 Flexion: R 5/5; L 5/5  Extension: R 5/5; L 5/5  Supination: R 5/5; L 5/5  Pronation: R 5/5; L 5/5  Mid trap     R 3+/5* L 4+/5  Lower trap R 3+/5* L 4+/5     Special tests:     Subacromial Pain Syndrome:  Empty Can test: R +pain  Painful Arc Sign: R +  External Rotation Resistance: R + pain  Guerra-Abdiaziz Impingement Sign: R neg  Neer Impingement Test: R neg  Palpable Tenderness Infraspinatus and Supraspinatus: R no tenderness    Long Head Biceps Tendinopathy:     Yergason's Test: R +    Rotator Cuff Tears:   ER Lag Sign: R -  Dropping Sign at 90 Deg ABD and 45 deg ER: R +pain, difficulty        Assessment:   Fair tolerance to active motion above 90 degrees secondary to pain.   Incorporated manual positioning to ensure neutral position of R GH joint and to avoid compensatory movement.   No adverse effect with exercises at lower levels.   Pt reports that cold pack resolved pain at rest.        Goals:   Goals: (to be met in 10 visits) -progressing  Pt will report improved ability to sleep without waking due to shoulder pain   Pt will improve shoulder flexion AROM to >140 degrees to be able to reach into overhead cabinets without pain or restriction   Pt will improve shoulder abduction AROM to >140 degrees to improve ability to don deodorant, don/doff shirts, and wash hair   Pt will increase shoulder AROM ER to 80 to be able to reach and fasten seatbelt   Pt will increase shoulder AROM IR to 70 to be able to reach in back pocket, tuck in shirt, and turn steering wheel without pain  Pt will improve shoulder strength throughout to 4+/5 to  improve function with raising arm up   Pt will demonstrate increased mid/low trap strength to 4+/5 to promote improved shoulder mechanics and stabilization with lifting and reaching   Pt will be independent and compliant with comprehensive HEP to maintain progress achieved in PT     Plan: Continue plan of care as pt tolerates with focus on R shoulder reduction of pain, improve ROM and strength.   Date: 2/21/2024  TX#: 2/10 Date:                 TX#: 3/ Date:                 TX#: 4/ Date:                 TX#: 5/ Date:   Tx#: 6/   Ther Ex:   Sci fit FW 3 min L1  Scap retraction 10 sec x10  Scap retraction + row yellow spri x10  Shoulder ext B yellow spri x10  Standing in front of mirror-scap retraction + B flexion n90-nuluclpa motion  Scaption x3-painfree motion  PROM R shoulder - w manual positioning   AAROM scaption-pain-w manual positioning  AAROM ER SL x10    Pendulum x10       Manual therapy:   STM R RC, deltoid, upper trap, scalenes    Gentle oscillations       Cp x5 min end of session              HEP: Access Code: [email or web address]  URL: [email or web address]  Date: 02/14/2024  Prepared by: Alessandra Cavazos    Exercises  - Seated Scapular Retraction  - 1 x daily - 7 x weekly - 1 sets - 10 reps  - Sidelying Shoulder External Rotation  - 1 x daily - 7 x weekly - 1 sets - 10 reps  - Sidelying Shoulder Abduction Palm Forward  - 1 x daily - 7 x weekly - 1 sets - 10 reps  - Shoulder Flexion Wall Slide with Towel  - 1 x daily - 7 x weekly - 1 sets - 10 reps-AA PRN  - Circular Shoulder Pendulum with Table Support  - 1 x daily - 7 x weekly - 1 sets - 10 reps  - Standing Isometric Shoulder External Rotation with Doorway  - 1 x daily - 7 x weekly - 1 sets - 10 reps    + additions above     Charges: Tex2 30', MT 15'       Total Timed Treatment: 45 min  Total Treatment Time: 50 min

## 2024-02-21 NOTE — PROGRESS NOTES
[facility]    PATIENT'S NAME: TUNDE DIAZ   RADIATION ONCOLOGIST: Moisés Guy M.D.   PATIENT ACCOUNT #: [id number] LOCATION: [facility]   [facility]   MEDICAL RECORD #: [id number] YOB: 1935   FOLLOW-UP DATE: 02/21/2024       RADIATION ONCOLOGY FOLLOW-UP NOTE    REFERRING PHYSICIAN:  Rusty Ma MD    DIAGNOSIS:  Invasive lobular carcinoma of the left breast, T1cNX, ER positive.    REGION TREATED:  Left breast, 4256 cGy, completed 11/20/2023.    INTERVAL SINCE COMPLETION OF RADIATION THERAPY:  Three months.    PATIENT STATUS:  Excellent recovery from therapy.    HISTORY:  The patient is an 88-year-old female who presents today for a routine followup visit.  She has a history of an image-detected malignancy in the left breast.  Routine mammography showed a new focal asymmetry in the left breast.  Additional imaging showed this to be a 1 cm mass in the 12:30 position with normal-appearing lymph nodes.  A biopsy showed invasive lobular carcinoma, and the tumor was ER 90%, AR negative, and HER2 negative .  She saw Dr. Galeano and went to the OR on 09/15/2023.  She had a 1.4 cm invasive lobular carcinoma removed with widely negative margins.  A sentinel lymph node dissection was not performed based upon the patient's advanced age.  The tumor was grade 2.  She then saw Dr. Ma who recommended adjuvant endocrine therapy and started her letrozole.  She was then referred to Radiation Oncology.  I then treated her to 4256 cGy, completing on 11/20/2023.    The patient presents today for her first followup visit since completing radiation.  Overall, she is feeling very well.  She denies any particular issues or complaints.  Specifically, she denies any breast pain, tenderness, or chest wall discomfort.  She is on letrozole and has had some joint pains and fatigue but otherwise tolerating the medication reasonably well.  She denies hot flashes.  The remainder of her medical health is stable.    She has  had no imaging since completing her radiation.    PHYSICAL EXAMINATION:    GENERAL:  An 88-year-old female who is pleasant, cooperative, and alert, awake, oriented x3.  She is in no acute distress.  She has an ECOG performance score of 0 and a current pain score of 0.  VITAL SIGNS:  Blood pressure 133/84, pulse 79, respiratory rate of 16, and temperature 97.3.  Her weight is 158 pounds.  NECK:  Supple with no lymphadenopathy.  LUNGS:  Clear to auscultation bilaterally.  HEART:  Regular rate and rhythm.  Normal S1, S2, and no audible murmurs.  LYMPHATICS:  There is no supraclavicular, axillary, inguinal lymphadenopathy.  ABDOMEN:  Benign.  BREASTS:  On examination of the left breast, the patient has some faint hyperpigmentation but otherwise has healed very well.  There are no palpable masses, skin thickening, or nipple discharge.  The contralateral breast is also clinically negative.    IMPRESSION AND RECOMMENDATIONS:  Overall, the patient is doing very well at this time.  She has recovered nicely from the acute side effects of radiotherapy.  She has no persistent problems at this point.  She continues on letrozole, which she is tolerating well.  She does have followup appointments with Dr. Ma and eventually Dr. Galeano as well.    I am very pleased with the patient's recovery from treatment.  She is doing well and has no lingering issues or problems.  As she continues to follow with both Dr. Ma and Dr. Galeano, I told her that she does not need to continue to see me for routine visits as well.  I told her that I would be happy to see her again if a need should arise, and we gave her our phone number for her to contact in that event.  Otherwise, I wished her well and remain quite optimistic.    Thank you very much for allowing me the opportunity to participate in the care of this patient.  If there should be any questions regarding the radiotherapy, please feel free to contact me at any time.     Dictated By Moisés  MARY Guy M.D.  d: 02/21/2024 11:04:52  t: 02/21/2024 14:53:36  [facility] [id number]  NAD/    cc: JORDY Roman M.D. Amish K. Doshi, MD

## 2024-02-26 ENCOUNTER — OFFICE VISIT (OUTPATIENT)
Dept: PHYSICAL THERAPY | Facility: HOSPITAL | Age: 89
End: 2024-02-26
Attending: PHYSICIAN ASSISTANT
Payer: MEDICARE

## 2024-02-26 PROCEDURE — 97140 MANUAL THERAPY 1/> REGIONS: CPT

## 2024-02-26 PROCEDURE — 97110 THERAPEUTIC EXERCISES: CPT

## 2024-02-26 NOTE — PROGRESS NOTES
Diagnosis:   Acute pain of right shoulder (M25.511)          Referring Provider: Opal  Date of Evaluation:    2/14/2024    Precautions:      L knee impending surgery  R knee 8 years ago    QuickDASH Outcome Score  Score: 40.91 % (2/8/2024  4:38 PM)   Next MD visit:   none scheduled  Date of Surgery: n/a   Insurance Primary/Secondary: DARRYL [facility] / N/A     # Auth Visits: no limit            Subjective:   Patient reports feeling better overall. Feels better when lying down than it did previously.  R shoulder still feels stiff and catches with reaching motions. HEP going ok - sometimes causes pain but not always.  Pt reports that she has to pull herself up with right arm when negotiating 9 steps due to L knee pain .      Initial evaluation functional limitations:  Current functional limitations include sleep disturbance, reaching, vacuuming, putting on coat.   Open a tight or new jar Moderate difficulty   Do heavy household chores (eg, wash walls, floors) Moderate difficulty   Carry a shopping bag or briefcase Moderate difficulty   Wash your back Moderate difficulty   Use a knife to cut food Mild difficulty   Recreational activities in which you take some force or impact through your arm, shoulder or hand (eg, golf, hammering, tennis, etc) Moderate difficulty   During the past week, to what extent has your arm, shoulder or hand problem interfered with your normal social activities with family, friends, neighbors or groups? Moderately   During the past week, were you limited in your work or other regular daily activities as a result of your arm, shoulder or hand problem? Slightly limited   Arm, shoulder or hand pain Moderate   During the past week, how much difficulty have you had sleeping because of the pain in your arm, shoulder or hand? Moderate difficulty     Pain: 5/10      Objective:       Observation/Posture: R shoulder lower, forward head posture, rounded shoulders  Palpation: tenderness/tightness R deltoid mm,  upper trap, R biceps tendon, R scalenes    Cervical Screen: L SB 75% L rotation 75% -tightness R upper trap and R scalenes, all other motions wfl    AROM: (* denotes performed with pain)  Shoulder    Flexion: R 130*; L wnl  Abduction: R 80*; L wnl  ER: R T2/60*; L T2  IR: R T7/60*; L T5    Pain with eccentric movements R     Initial evaluation:     Strength/MMT: (* denotes performed with pain)  Shoulder Elbow Scapular   Flexion: R 3-/5*; L 5/5  Abduction: R 3-/5*; L 5/5  ER: R 3+/5*; L 5/5  IR: R 4/5; L 5/5 Flexion: R 5/5; L 5/5  Extension: R 5/5; L 5/5  Supination: R 5/5; L 5/5  Pronation: R 5/5; L 5/5  Mid trap     R 3+/5* L 4+/5  Lower trap R 3+/5* L 4+/5     Special tests:     Subacromial Pain Syndrome:  Empty Can test: R +pain  Painful Arc Sign: R +  External Rotation Resistance: R + pain  Guerra-Abdiaziz Impingement Sign: R neg  Neer Impingement Test: R neg  Palpable Tenderness Infraspinatus and Supraspinatus: R no tenderness    Long Head Biceps Tendinopathy:     Yergason's Test: R +    Rotator Cuff Tears:   ER Lag Sign: R -  Dropping Sign at 90 Deg ABD and 45 deg ER: R +pain, difficulty        Assessment:   Patient presents with significant R shoulder hiking compensation with active elevation. Improved with exercise including RTC isometrics, isotonics, passive and active scapular ROM. She has limited upward scapular rotation on the R which improved following PROM and cuing. Wall slides modified to focus on scapular movement.        Goals:   Goals: (to be met in 10 visits) -progressing  Pt will report improved ability to sleep without waking due to shoulder pain   Pt will improve shoulder flexion AROM to >140 degrees to be able to reach into overhead cabinets without pain or restriction   Pt will improve shoulder abduction AROM to >140 degrees to improve ability to don deodorant, don/doff shirts, and wash hair   Pt will increase shoulder AROM ER to 80 to be able to reach and fasten seatbelt   Pt will increase  shoulder AROM IR to 70 to be able to reach in back pocket, tuck in shirt, and turn steering wheel without pain  Pt will improve shoulder strength throughout to 4+/5 to improve function with raising arm up   Pt will demonstrate increased mid/low trap strength to 4+/5 to promote improved shoulder mechanics and stabilization with lifting and reaching   Pt will be independent and compliant with comprehensive HEP to maintain progress achieved in PT     Plan: Continue plan of care as pt tolerates with focus on R shoulder reduction of pain, improve ROM and strength.   Date: 2/21/2024  TX#: 2/10 Date:  2/26/24               TX#: 3/10 Date:                 TX#: 4/ Date:                 TX#: 5/ Date:   Tx#: 6/   Ther Ex:   Sci fit FW 3 min L1  Scap retraction 10 sec x10  Scap retraction + row yellow spri x10  Shoulder ext B yellow spri x10  Standing in front of mirror-scap retraction + B flexion [id number] motion  Scaption x3-painfree motion  PROM R shoulder - w manual positioning   AAROM scaption-pain-w manual positioning  AAROM ER SL x10    Pendulum x10 TE:  ER isometric against wall, [id number]  Cable row, 15#, 2x10  Seated shoulder ER, YTB, 3x10  Active scaption pain-free ROM, 3x8  S/l AAROM abduction w/ scapular assist  Serratus wall slide for scapular rotation, 2x8  PROM R shoulder      Manual therapy:   STM R RC, deltoid, upper trap, scalenes    Gentle oscillations Manual therapy:  S/l passive scapular upward rotation  STM R RC, deltoid, upper trap, scalenes  Gentle GHJ AP mobilization        Cp x5 min end of session Cp x10 min end of session             HEP: Access Code: [email or web address]  URL: [email or web address]  Date: 02/14/2024  Prepared by: Alessandra Cavazos    Exercises  - Seated Scapular Retraction  - 1 x daily - 7 x weekly - 1 sets - 10 reps  - Sidelying Shoulder External Rotation  - 1 x daily - 7 x weekly - 1 sets - 10 reps  - Sidelying Shoulder Abduction Palm Forward  - 1 x daily - 7 x weekly - 1 sets - 10  reps  - Serratus wall slide for scapular rotation  - 1 x daily - 7 x weekly - 1 sets - 8 reps-AA PRN  - Circular Shoulder Pendulum with Table Support  - 1 x daily - 7 x weekly - 1 sets - 10 reps  - Standing Isometric Shoulder External Rotation with Doorway  - 1 x daily - 7 x weekly - 1 sets - 10 reps    + additions above     Charges: Tex2 30', MT 15'       Total Timed Treatment: 45 min  Total Treatment Time: 55 min

## 2024-02-29 ENCOUNTER — OFFICE VISIT (OUTPATIENT)
Dept: PHYSICAL THERAPY | Facility: HOSPITAL | Age: 89
End: 2024-02-29
Attending: PHYSICIAN ASSISTANT
Payer: MEDICARE

## 2024-02-29 ENCOUNTER — PATIENT MESSAGE (OUTPATIENT)
Dept: ORTHOPEDICS CLINIC | Facility: CLINIC | Age: 89
End: 2024-02-29

## 2024-02-29 PROCEDURE — 97110 THERAPEUTIC EXERCISES: CPT

## 2024-02-29 PROCEDURE — 97140 MANUAL THERAPY 1/> REGIONS: CPT

## 2024-02-29 NOTE — PROGRESS NOTES
Diagnosis:   Acute pain of right shoulder (M25.511)          Referring Provider: Opal  Date of Evaluation:    2/14/2024    Precautions:      L knee impending surgery  R knee 8 years ago    QuickDASH Outcome Score  Score: 40.91 % (2/8/2024  4:38 PM)   Next MD visit:   none scheduled  Date of Surgery: n/a   Insurance Primary/Secondary: [facility]ARRYL [facility] / N/A     # Auth Visits: no limit            Subjective:   Patient reports feeling better overall. Feels better when lying down than it did previously.  R shoulder still feels stiff and catches with reaching motions. HEP going ok - sometimes causes pain but not always.  Pt reports that she has to pull herself up with right arm when negotiating 9 steps due to L knee pain .      Initial evaluation functional limitations:  Current functional limitations include sleep disturbance, reaching, vacuuming, putting on coat.   Open a tight or new jar Moderate difficulty   Do heavy household chores (eg, wash walls, floors) Moderate difficulty   Carry a shopping bag or briefcase Moderate difficulty   Wash your back Moderate difficulty   Use a knife to cut food Mild difficulty   Recreational activities in which you take some force or impact through your arm, shoulder or hand (eg, golf, hammering, tennis, etc) Moderate difficulty   During the past week, to what extent has your arm, shoulder or hand problem interfered with your normal social activities with family, friends, neighbors or groups? Moderately   During the past week, were you limited in your work or other regular daily activities as a result of your arm, shoulder or hand problem? Slightly limited   Arm, shoulder or hand pain Moderate   During the past week, how much difficulty have you had sleeping because of the pain in your arm, shoulder or hand? Moderate difficulty     Pain: 5/10      Objective:       Observation/Posture: R shoulder lower, forward head posture, rounded shoulders  Palpation: tenderness/tightness R deltoid mm,  upper trap, R biceps tendon, R scalenes    Cervical Screen: L SB 75% L rotation 75% -tightness R upper trap and R scalenes, all other motions wfl    AROM: (* denotes performed with pain)  Shoulder    Flexion: R 130*; L wnl  Abduction: R 80*; L wnl  ER: R T2/60*; L T2  IR: R T7/60*; L T5    Pain with eccentric movements R     Initial evaluation:     Strength/MMT: (* denotes performed with pain)  Shoulder Elbow Scapular   Flexion: R 3-/5*; L 5/5  Abduction: R 3-/5*; L 5/5  ER: R 3+/5*; L 5/5  IR: R 4/5; L 5/5 Flexion: R 5/5; L 5/5  Extension: R 5/5; L 5/5  Supination: R 5/5; L 5/5  Pronation: R 5/5; L 5/5  Mid trap     R 3+/5* L 4+/5  Lower trap R 3+/5* L 4+/5     Special tests:     Subacromial Pain Syndrome:  Empty Can test: R +pain  Painful Arc Sign: R +  External Rotation Resistance: R + pain  Guerra-Abdiaziz Impingement Sign: R neg  Neer Impingement Test: R neg  Palpable Tenderness Infraspinatus and Supraspinatus: R no tenderness    Long Head Biceps Tendinopathy:     Yergason's Test: R +    Rotator Cuff Tears:   ER Lag Sign: R -  Dropping Sign at 90 Deg ABD and 45 deg ER: R +pain, difficulty        Assessment:   Patient presents with significant R shoulder hiking compensation with active elevation. Improved with exercise including RTC isometrics, isotonics, passive and active scapular ROM. She has limited upward scapular rotation on the R which improved following PROM and cuing and use of scap retraction/depression and shoulder model. Wall slides modified to focus on scapular movement.        Goals:   Goals: (to be met in 10 visits) -progressing  Pt will report improved ability to sleep without waking due to shoulder pain   Pt will improve shoulder flexion AROM to >140 degrees to be able to reach into overhead cabinets without pain or restriction   Pt will improve shoulder abduction AROM to >140 degrees to improve ability to don deodorant, don/doff shirts, and wash hair   Pt will increase shoulder AROM ER to 80 to  be able to reach and fasten seatbelt   Pt will increase shoulder AROM IR to 70 to be able to reach in back pocket, tuck in shirt, and turn steering wheel without pain  Pt will improve shoulder strength throughout to 4+/5 to improve function with raising arm up   Pt will demonstrate increased mid/low trap strength to 4+/5 to promote improved shoulder mechanics and stabilization with lifting and reaching   Pt will be independent and compliant with comprehensive HEP to maintain progress achieved in PT     Plan: Continue plan of care as pt tolerates with focus on R shoulder reduction of pain, improve ROM and strength.   Date: 2/21/2024  TX#: 2/10 Date:  2/26/24               TX#: 3/10 Date:     2/29/2024             TX#: 4/ Date:                 TX#: 5/ Date:   Tx#: 6/   Ther Ex:   Sci fit FW 3 min L1  Scap retraction 10 sec x10  Scap retraction + row yellow spri x10  Shoulder ext B yellow spri x10  Standing in front of mirror-scap retraction + B flexion [id number] motion  Scaption x3-painfree motion  PROM R shoulder - w manual positioning   AAROM scaption-pain-w manual positioning  AAROM ER SL x10    Pendulum x10 TE:  ER isometric against wall, [id number]  Cable row, 15#, 2x10  Seated shoulder ER, YTB, 3x10  Active scaption pain-free ROM, 3x8  S/l AAROM abduction w/ scapular assist  Serratus wall slide for scapular rotation, 2x8  PROM R shoulder Ther Ex:   NuStep 5min L1  Shoulder AROM retraining with scap retraction + depression  Explanation w shoulder model  Seated shoulder ER, YTB, 3x10  Issued YTB  Scap retraction + row yellow spri x10  Shoulder ext B yellow spri x10  Standing in front of mirror-scap retraction + B flexion [id number] motion  Serratus wall slide for scapular rotation, 2x8  Pendulum x20 between sets  PROM R shoulder - w manual positioning   AAROM scaption-pain-w manual positioning 3x8  AROM ER SL 2x10     Manual therapy:   STM R RC, deltoid, upper trap, scalenes    Gentle oscillations Manual  therapy:  S/l passive scapular upward rotation  STM R RC, deltoid, upper trap, scalenes  Gentle GHJ AP mobilization   Manual therapy:  S/l passive scapular upward rotation  STM R RC, deltoid, upper trap, scalenes  Gentle GHJ AP mobilization  R GH joint mobes inferior gr II-III multiple bouts      Cp x5 min end of session Cp x10 min end of session Cp x10 min end of session            HEP: Access Code: [email or web address]  URL: [email or web address]  Date: 02/14/2024  Prepared by: Alessandra Cavazos    Exercises  - Seated Scapular Retraction  - 1 x daily - 7 x weekly - 1 sets - 10 reps  - Sidelying Shoulder External Rotation  - 1 x daily - 7 x weekly - 1 sets - 10 reps  - Sidelying Shoulder Abduction Palm Forward  - 1 x daily - 7 x weekly - 1 sets - 10 reps  - Serratus wall slide for scapular rotation  - 1 x daily - 7 x weekly - 1 sets - 8 reps-AA PRN  - Circular Shoulder Pendulum with Table Support  - 1 x daily - 7 x weekly - 1 sets - 10 reps  - Standing Isometric Shoulder External Rotation with Doorway  - 1 x daily - 7 x weekly - 1 sets - 10 reps    + additions above     Charges: Tex2 30', MT 15'       Total Timed Treatment: 45 min  Total Treatment Time: 55 min

## 2024-03-04 ENCOUNTER — HOSPITAL ENCOUNTER (OUTPATIENT)
Dept: PHYSICAL THERAPY | Facility: HOSPITAL | Age: 89
Discharge: HOME | End: 2024-03-04
Attending: ORTHOPAEDIC SURGERY
Payer: MEDICARE

## 2024-03-04 DIAGNOSIS — Z01.818 PRE-OP TESTING: ICD-10-CM

## 2024-03-04 DIAGNOSIS — M17.12 PRIMARY OSTEOARTHRITIS OF LEFT KNEE: ICD-10-CM

## 2024-03-06 ENCOUNTER — TELEPHONE (OUTPATIENT)
Dept: PHYSICAL THERAPY | Facility: HOSPITAL | Age: 89
End: 2024-03-06

## 2024-03-07 ENCOUNTER — APPOINTMENT (OUTPATIENT)
Dept: PHYSICAL THERAPY | Facility: HOSPITAL | Age: 89
End: 2024-03-07
Attending: PHYSICIAN ASSISTANT
Payer: MEDICARE

## 2024-03-11 ENCOUNTER — HOSPITAL ENCOUNTER (OUTPATIENT)
Dept: MAMMOGRAPHY | Facility: HOSPITAL | Age: 89
Discharge: HOME | End: 2024-03-11
Attending: SURGERY
Payer: MEDICARE

## 2024-03-11 ENCOUNTER — TELEPHONE (OUTPATIENT)
Dept: CASE MANAGEMENT | Facility: HOSPITAL | Age: 89
End: 2024-03-11

## 2024-03-11 DIAGNOSIS — C50.912: ICD-10-CM

## 2024-03-11 PROCEDURE — 77065 DX MAMMO INCL CAD UNI: CPT | Performed by: SURGERY

## 2024-03-11 PROCEDURE — 77061 BREAST TOMOSYNTHESIS UNI: CPT | Performed by: SURGERY

## 2024-03-11 NOTE — CM/SW NOTE
Received call from pt who is an 89 y/o woman with scheduled TKR on 4/2 with Dr Cage.  Pt stated that she lives alone in a 2 story home.  She is concerned about needing help at discharge and also a hospital bed.  She has contacted a caregiver to make arrangements for 24 hour caregiver after DC.  She asked about providers for hospital bed rentals.  Resources given.  Also discussed that pt may be able to have family/friends move bed to main level in order to avoid expense of rental.  Pt will consider this as well.  Anticipate [facility] services for RN/PT after DC.  Pt agreeable.  Informed pt of plan for post op therapy evals to identify any other discharge needs.  If pt identified to benefit from rehab placement for therapy services, insurance auth would be needed from pt's [facility] Medicare plan.  All questions/concerns addressed.  SW to follow for post op MD and therapy recommendations for further DC planning.      Shamika Eaton, [facility]  Discharge Planner  [phone]

## 2024-03-12 ENCOUNTER — LAB ENCOUNTER (OUTPATIENT)
Dept: LAB | Age: 89
End: 2024-03-12
Attending: ORTHOPAEDIC SURGERY
Payer: MEDICARE

## 2024-03-12 ENCOUNTER — OFFICE VISIT (OUTPATIENT)
Dept: INTERNAL MEDICINE CLINIC | Facility: CLINIC | Age: 89
End: 2024-03-12
Payer: MEDICARE

## 2024-03-12 VITALS
TEMPERATURE: 98 F | HEART RATE: 76 BPM | RESPIRATION RATE: 16 BRPM | DIASTOLIC BLOOD PRESSURE: 52 MMHG | HEIGHT: 64 IN | WEIGHT: 158.19 LBS | BODY MASS INDEX: 27.01 KG/M2 | SYSTOLIC BLOOD PRESSURE: 116 MMHG | OXYGEN SATURATION: 99 %

## 2024-03-12 DIAGNOSIS — Z01.818 PRE-OP TESTING: ICD-10-CM

## 2024-03-12 DIAGNOSIS — I77.810 AORTIC ROOT DILATION (HCC): ICD-10-CM

## 2024-03-12 DIAGNOSIS — M17.12 PRIMARY OSTEOARTHRITIS OF LEFT KNEE: ICD-10-CM

## 2024-03-12 DIAGNOSIS — E78.00 PURE HYPERCHOLESTEROLEMIA: ICD-10-CM

## 2024-03-12 DIAGNOSIS — I35.1 AORTIC VALVE INSUFFICIENCY, ETIOLOGY OF CARDIAC VALVE DISEASE UNSPECIFIED: ICD-10-CM

## 2024-03-12 DIAGNOSIS — I10 ESSENTIAL HYPERTENSION: ICD-10-CM

## 2024-03-12 DIAGNOSIS — Z01.818 PREOP EXAMINATION: Primary | ICD-10-CM

## 2024-03-12 DIAGNOSIS — J44.89 ASTHMA WITH COPD (CHRONIC OBSTRUCTIVE PULMONARY DISEASE): ICD-10-CM

## 2024-03-12 LAB
ALBUMIN SERPL-MCNC: 4 G/DL (ref 3.4–5)
ALBUMIN/GLOB SERPL: 1.4 {RATIO} (ref 1–2)
ALP LIVER SERPL-CCNC: 71 U/L
ALT SERPL-CCNC: 21 U/L
ANION GAP SERPL CALC-SCNC: 1 MMOL/L (ref 0–18)
ANTIBODY SCREEN: NEGATIVE
APTT PPP: 28 SECONDS (ref 23.3–35.6)
AST SERPL-CCNC: 18 U/L (ref 15–37)
BASOPHILS # BLD AUTO: 0.07 X10(3) UL (ref 0–0.2)
BASOPHILS NFR BLD AUTO: 1.1 %
BILIRUB SERPL-MCNC: 1.2 MG/DL (ref 0.1–2)
BUN BLD-MCNC: 27 MG/DL (ref 9–23)
CALCIUM BLD-MCNC: 9.6 MG/DL (ref 8.5–10.1)
CHLORIDE SERPL-SCNC: 105 MMOL/L (ref 98–112)
CO2 SERPL-SCNC: 30 MMOL/L (ref 21–32)
CREAT BLD-MCNC: 0.99 MG/DL
EGFRCR SERPLBLD CKD-EPI 2021: 56 ML/MIN/1.73M2 (ref 60–?)
EOSINOPHIL # BLD AUTO: 0.18 X10(3) UL (ref 0–0.7)
EOSINOPHIL NFR BLD AUTO: 2.7 %
ERYTHROCYTE [DISTWIDTH] IN BLOOD BY AUTOMATED COUNT: 12.8 %
GLOBULIN PLAS-MCNC: 2.8 G/DL (ref 2.8–4.4)
GLUCOSE BLD-MCNC: 102 MG/DL (ref 70–99)
HCT VFR BLD AUTO: 42 %
HGB BLD-MCNC: 13.6 G/DL
IMM GRANULOCYTES # BLD AUTO: 0.04 X10(3) UL (ref 0–1)
IMM GRANULOCYTES NFR BLD: 0.6 %
INR BLD: 0.96 (ref 0.8–1.2)
LYMPHOCYTES # BLD AUTO: 1.1 X10(3) UL (ref 1–4)
LYMPHOCYTES NFR BLD AUTO: 16.7 %
MCH RBC QN AUTO: 29.6 PG (ref 26–34)
MCHC RBC AUTO-ENTMCNC: 32.4 G/DL (ref 31–37)
MCV RBC AUTO: 91.3 FL
MONOCYTES # BLD AUTO: 0.78 X10(3) UL (ref 0.1–1)
MONOCYTES NFR BLD AUTO: 11.8 %
NEUTROPHILS # BLD AUTO: 4.43 X10 (3) UL (ref 1.5–7.7)
NEUTROPHILS # BLD AUTO: 4.43 X10(3) UL (ref 1.5–7.7)
NEUTROPHILS NFR BLD AUTO: 67.1 %
OSMOLALITY SERPL CALC.SUM OF ELEC: 287 MOSM/KG (ref 275–295)
PLATELET # BLD AUTO: 339 10(3)UL (ref 150–450)
POTASSIUM SERPL-SCNC: 4.5 MMOL/L (ref 3.5–5.1)
PROT SERPL-MCNC: 6.8 G/DL (ref 6.4–8.2)
PROTHROMBIN TIME: 12.8 SECONDS (ref 11.6–14.8)
RBC # BLD AUTO: 4.6 X10(6)UL
RH BLOOD TYPE: POSITIVE
SODIUM SERPL-SCNC: 136 MMOL/L (ref 136–145)
WBC # BLD AUTO: 6.6 X10(3) UL (ref 4–11)

## 2024-03-12 PROCEDURE — 86850 RBC ANTIBODY SCREEN: CPT

## 2024-03-12 PROCEDURE — 36415 COLL VENOUS BLD VENIPUNCTURE: CPT

## 2024-03-12 PROCEDURE — 87081 CULTURE SCREEN ONLY: CPT

## 2024-03-12 PROCEDURE — 80053 COMPREHEN METABOLIC PANEL: CPT

## 2024-03-12 PROCEDURE — 85730 THROMBOPLASTIN TIME PARTIAL: CPT

## 2024-03-12 PROCEDURE — 99214 OFFICE O/P EST MOD 30 MIN: CPT | Performed by: INTERNAL MEDICINE

## 2024-03-12 PROCEDURE — 85025 COMPLETE CBC W/AUTO DIFF WBC: CPT

## 2024-03-12 PROCEDURE — 86901 BLOOD TYPING SEROLOGIC RH(D): CPT

## 2024-03-12 PROCEDURE — 85610 PROTHROMBIN TIME: CPT

## 2024-03-12 PROCEDURE — 86900 BLOOD TYPING SEROLOGIC ABO: CPT

## 2024-03-12 NOTE — H&P (VIEW-ONLY)
[facility]  PRE OP RISK ASSESSMENT    REASON FOR CONSULT: Pre-op risk assessment for surgical procedure: Left total knee arthroplasty planned for: 4/2/2024 with: General anesthesia.    REQUESTING PHYSICIAN: MD Niles    CHIEF COMPLAINT:   Chief Complaint   Patient presents with    Pre-Op     Rm 6        HISTORY OF PRESENT ILLNESS:   The patient is a 88 year old female who presents for preoperative evaluation.    The patient reports a longstanding history of a left knee pain attributed to osteoarthritis for which she has undergone conservative management without sustained improvement.  For this reason she is scheduled for a left TKA on 4/2/2024 by the orthopedic surgery service.  She does take NSAID therapy for intermittent symptoms, which will be held prior to procedure.  Her symptoms do impact her activities of daily living and recreational activities.    Past Medical History:    Past Medical History:   Diagnosis Date    Aortic valve insufficiency     last echo 7/26/23    Asthma (HCC)     Eczema     Exposure to medical diagnostic radiation 11/20/2023    Female climacteric state 06/15/2000    GERD     High blood pressure     High cholesterol     Hx of motion sickness     Left Breast cancer (HCC) 07/2023    Onychocryptosis 08/25/2016    Osteoarthritis     Other and unspecified hyperlipidemia     Pain due to onychomycosis of toenail 08/17/2017    Prediabetes     Primary localized osteoarthrosis of right lower leg 06/10/2015    Visual impairment     glasses        Past Surgical History:    Past Surgical History:   Procedure Laterality Date    APPENDECTOMY      CATARACT      COLONOSCOPY  01/01/2009    FLUOR GID & LOCLZJ NDL/CATH SPI DX/THER NJX  02/03/2014    Procedure: CERVICAL EPIDURAL;  Surgeon: Eugene Garcia MD;  Location: [facility]    HYSTERECTOMY  01/01/1990    partial; one ovary in; heavy menses    INJECTION, W/WO CONTRAST, DX/THERAPEUTIC SUBSTANCE, EPIDURAL/SUBARACHNOID;  LUMBAR/SACRAL  02/03/2014    Procedure: LUMBAR EPIDURAL;  Surgeon: Eugene Garcia MD;  Location: [facility]    INJECTION, W/WO CONTRAST, DX/THERAPEUTIC SUBSTANCE, EPIDURAL/SUBARACHNOID; LUMBAR/SACRAL N/A 07/06/2016    Procedure: LUMBAR EPIDURAL;  Surgeon: Jaswant Pham MD;  Location: [facility]    INJECTION, W/WO CONTRAST, DX/THERAPEUTIC SUBSTANCE, EPIDURAL/SUBARACHNOID; LUMBAR/SACRAL N/A 07/28/2016    Procedure: LUMBAR EPIDURAL;  Surgeon: Jaswant Pham MD;  Location: [facility]    KNEE SURGERY  09/08/2015    right leg    LUMPECTOMY LEFT  09/2023    [facility]    OTHER SURGICAL HISTORY      arthroscopy knee    RADIATION LEFT  2023    TONSILLECTOMY         Current Medications:    Current Outpatient Medications   Medication Sig Dispense Refill    Losartan Potassium-HCTZ 100-12.5 MG Oral Tab Take 1 tablet by mouth daily. 90 tablet 0    letrozole 2.5 MG Oral Tab Take 1 tablet (2.5 mg total) by mouth daily. 90 tablet 3    atorvastatin 40 MG Oral Tab Take 1 tablet (40 mg total) by mouth daily.      TRELEGY ELLIPTA 200-62.5-25 MCG/ACT Inhalation Aerosol Powder, Breath Activated Inhale 1 puff into the lungs daily.      metoprolol succinate ER 25 MG Oral Tablet 24 Hr Take 1 tablet (25 mg total) by mouth daily. 30 tablet 11    AMLODIPINE 5 MG Oral Tab TAKE 1 TABLET BY MOUTH EVERY DAY 90 tablet 0    Blood Pressure Monitoring (BLOOD PRESSURE CUFF) Does not apply Misc For use to measure BP daily 1 each 0    azelastine 0.1 % Nasal Solution as needed.      ipratropium 0.06 % Nasal Solution 2 sprays by Nasal route 3 (three) times daily as needed.      Albuterol Sulfate HFA (PROAIR HFA) 108 (90 Base) MCG/ACT Inhalation Aero Soln Inhale 2 puffs into the lungs every 4 (four) hours as needed. 1 each 5    mometasone 0.1 % External Ointment Apply 1 Application topically daily. (Patient taking differently: Apply 1 Application  topically as needed.) 15 g 1    aspirin 81 MG Oral  Tab Take 1 tablet (81 mg total) by mouth daily.      CRANBERRY EXTRACT OR Take 1 tablet by mouth daily.        Vitamin D3 (VITAMIN D3) 2000 UNITS Oral Cap Take 1 capsule (2,000 Units total) by mouth daily.      PRILOSEC 20 MG OR CPDR Take 1 capsule (20 mg total) by mouth every morning.      VITAMIN E qd      CLOBETASOL PROPIONATE E EX Apply topically as needed.          Allergies:    Allergies as of 03/12/2024 - Review Complete 03/12/2024   Allergen Reaction Noted    Mold  12/20/2013       SOCIAL HISTORY:   Social History     Socioeconomic History    Marital status:      Spouse name: Not on file    Number of children: 3    Years of education: Not on file    Highest education level: Not on file   Occupational History    Occupation: RETIRED TEACHER   Tobacco Use    Smoking status: Never    Smokeless tobacco: Never   Vaping Use    Vaping Use: Never used   Substance and Sexual Activity    Alcohol use: Yes     Comment: rare    Drug use: No    Sexual activity: Not Currently     Partners: Male   Other Topics Concern    Caffeine Concern Not Asked    Exercise Not Asked    Seat Belt Not Asked    Special Diet Not Asked    Stress Concern Not Asked    Weight Concern Not Asked   Social History Narrative    marital status:     occupation: retired teacher elementarycaffeine: amblood tranfusion: nohiv exposure: notravel: home in [place]; [facility] 3 x week; 2 miles    mammo: 2010, 2011, 2012,2013,2014    dexa: 2010    pap: doesn't get    colonoscopy: 2009    ab: 2010,2011, 2012,2013,2014    influenza: 2010, 2011,2012,2013    bdt: ?    pneumovax: yes    zoster: yes     Social Determinants of Health     Financial Resource Strain: Not on file   Food Insecurity: Not on file   Transportation Needs: Not on file   Physical Activity: Not on file   Stress: Not on file   Social Connections: Not on file   Housing Stability: Not on file        FAMILY HISTORY:   Family History   Problem Relation Age of Onset     Breast Cancer Self     Breast Cancer Mother 69    Cancer Father         leukemia    Cancer Brother         prostate ca    Heart Disorder Brother         [pacemaker    Cancer Son         prostate ca    Hypertension Son     Diabetes Maternal Grandmother     Diabetes Maternal Grandfather     Diabetes Paternal Grandmother     Diabetes Paternal Grandfather         REVIEW OF SYSTEMS:    GENERAL: feels well otherwise  SKIN: denies any unusual skin lesions  HEENT: denies blurred vision or double vision denies nasal congestion  LUNGS: denies shortness of breath with exertion  CARDIOVASCULAR: denies chest pain on exertion  GI: denies abdominal pain, denies heartburn  : denies dysuria, urgency, frequency, hematuria  MUSCULOSKELETAL: denies back pain  NEURO: denies headaches    PRE-OP ROS  NSAIDS/PLATELET INH: Hold NSAID therapy taken for osteoarthritis associated pain approximately 7 days prior to procedure, unless otherwise directed by orthopedic surgery service  DIABETIC MEDICATIONS: No  VIVIANA: No  Hx of VTE: No  BLEEDING DISORDER: No  URI, COUGH, CP, FEVER: No  CERVICAL SPINE RESTRICTION: No known.  No history of rheumatoid arthritis.    PHYSICAL EXAM:  /52 (BP Location: Left arm, Patient Position: Sitting, Cuff Size: adult)   Pulse 76   Temp 97.6 °F (36.4 °C) (Skin)   Resp 16   Ht 5' 4\" (1.626 m)   Wt 158 lb 3.2 oz (71.8 kg)   LMP  (LMP Unknown)   SpO2 99%   BMI 27.15 kg/m²   GENERAL: Well developed, well nourished,in no apparent distress  SKIN: No rashes,no suspicious lesions  EYES: Bilateral conjunctiva are clear  HEENT: atraumatic, normocephalic.  Tympanic membrane within normal limits bilaterally.  NECK: supple,no adenopathy,no bruits  LUNGS: clear to auscultation  CARDIO: RRR without murmur  GI: good BS's,no masses, HSM or tenderness    LABS:  None requested are pending    ASSESSMENT AND PLAN:    1. Preop examination  The patient is at acceptable risk of complications for the planned procedure.  She  is scheduled for cardiac clearance.  Able to achieve at least 4 METS.    2. Primary osteoarthritis of left knee    3. Essential hypertension  Low normal diastolic, however otherwise chronically controlled  Continue losartan 100 mg daily, hydrochlorothiazide 12.5 mg daily, amlodipine 5 mg daily, and metoprolol succinate extended release 25 mg daily    4. Pure hypercholesterolemia  LDL at goal: Continue atorvastatin 40 mg daily    5. Asthma with COPD (chronic obstructive pulmonary disease)  Stable and controlled with infrequent albuterol use    6. Aortic valve insufficiency, etiology of cardiac valve disease unspecified  Stable and asymptomatic    7. Aortic root dilation (HCC)  Stable  Continue metoprolol and blood pressure control      Encounter Diagnoses   Name Primary?    Preop examination Yes    Primary osteoarthritis of left knee     Essential hypertension     Pure hypercholesterolemia     Asthma with COPD (chronic obstructive pulmonary disease)     Aortic valve insufficiency, etiology of cardiac valve disease unspecified     Aortic root dilation (HCC)        No orders of the defined types were placed in this encounter.      Imaging & Consults:  None    Brenton Hinojosa MD

## 2024-03-12 NOTE — PROGRESS NOTES
[facility]  PRE OP RISK ASSESSMENT    REASON FOR CONSULT: Pre-op risk assessment for surgical procedure: Left total knee arthroplasty planned for: 4/2/2024 with: General anesthesia.    REQUESTING PHYSICIAN: MD Niles    CHIEF COMPLAINT:   Chief Complaint   Patient presents with    Pre-Op     Rm 6        HISTORY OF PRESENT ILLNESS:   The patient is a 88 year old female who presents for preoperative evaluation.    The patient reports a longstanding history of a left knee pain attributed to osteoarthritis for which she has undergone conservative management without sustained improvement.  For this reason she is scheduled for a left TKA on 4/2/2024 by the orthopedic surgery service.  She does take NSAID therapy for intermittent symptoms, which will be held prior to procedure.  Her symptoms do impact her activities of daily living and recreational activities.    Past Medical History:    Past Medical History:   Diagnosis Date    Aortic valve insufficiency     last echo 7/26/23    Asthma (HCC)     Eczema     Exposure to medical diagnostic radiation 11/20/2023    Female climacteric state 06/15/2000    GERD     High blood pressure     High cholesterol     Hx of motion sickness     Left Breast cancer (HCC) 07/2023    Onychocryptosis 08/25/2016    Osteoarthritis     Other and unspecified hyperlipidemia     Pain due to onychomycosis of toenail 08/17/2017    Prediabetes     Primary localized osteoarthrosis of right lower leg 06/10/2015    Visual impairment     glasses        Past Surgical History:    Past Surgical History:   Procedure Laterality Date    APPENDECTOMY      CATARACT      COLONOSCOPY  01/01/2009    FLUOR GID & LOCLZJ NDL/CATH SPI DX/THER NJX  02/03/2014    Procedure: CERVICAL EPIDURAL;  Surgeon: Eugene Garcia MD;  Location: [facility]    HYSTERECTOMY  01/01/1990    partial; one ovary in; heavy menses    INJECTION, W/WO CONTRAST, DX/THERAPEUTIC SUBSTANCE, EPIDURAL/SUBARACHNOID;  LUMBAR/SACRAL  02/03/2014    Procedure: LUMBAR EPIDURAL;  Surgeon: Eugene Garcia MD;  Location: [facility]    INJECTION, W/WO CONTRAST, DX/THERAPEUTIC SUBSTANCE, EPIDURAL/SUBARACHNOID; LUMBAR/SACRAL N/A 07/06/2016    Procedure: LUMBAR EPIDURAL;  Surgeon: Jaswant Pham MD;  Location: [facility]    INJECTION, W/WO CONTRAST, DX/THERAPEUTIC SUBSTANCE, EPIDURAL/SUBARACHNOID; LUMBAR/SACRAL N/A 07/28/2016    Procedure: LUMBAR EPIDURAL;  Surgeon: Jaswant Pham MD;  Location: [facility]    KNEE SURGERY  09/08/2015    right leg    LUMPECTOMY LEFT  09/2023    [facility]    OTHER SURGICAL HISTORY      arthroscopy knee    RADIATION LEFT  2023    TONSILLECTOMY         Current Medications:    Current Outpatient Medications   Medication Sig Dispense Refill    Losartan Potassium-HCTZ 100-12.5 MG Oral Tab Take 1 tablet by mouth daily. 90 tablet 0    letrozole 2.5 MG Oral Tab Take 1 tablet (2.5 mg total) by mouth daily. 90 tablet 3    atorvastatin 40 MG Oral Tab Take 1 tablet (40 mg total) by mouth daily.      TRELEGY ELLIPTA 200-62.5-25 MCG/ACT Inhalation Aerosol Powder, Breath Activated Inhale 1 puff into the lungs daily.      metoprolol succinate ER 25 MG Oral Tablet 24 Hr Take 1 tablet (25 mg total) by mouth daily. 30 tablet 11    AMLODIPINE 5 MG Oral Tab TAKE 1 TABLET BY MOUTH EVERY DAY 90 tablet 0    Blood Pressure Monitoring (BLOOD PRESSURE CUFF) Does not apply Misc For use to measure BP daily 1 each 0    azelastine 0.1 % Nasal Solution as needed.      ipratropium 0.06 % Nasal Solution 2 sprays by Nasal route 3 (three) times daily as needed.      Albuterol Sulfate HFA (PROAIR HFA) 108 (90 Base) MCG/ACT Inhalation Aero Soln Inhale 2 puffs into the lungs every 4 (four) hours as needed. 1 each 5    mometasone 0.1 % External Ointment Apply 1 Application topically daily. (Patient taking differently: Apply 1 Application  topically as needed.) 15 g 1    aspirin 81 MG Oral  Tab Take 1 tablet (81 mg total) by mouth daily.      CRANBERRY EXTRACT OR Take 1 tablet by mouth daily.        Vitamin D3 (VITAMIN D3) 2000 UNITS Oral Cap Take 1 capsule (2,000 Units total) by mouth daily.      PRILOSEC 20 MG OR CPDR Take 1 capsule (20 mg total) by mouth every morning.      VITAMIN E qd      CLOBETASOL PROPIONATE E EX Apply topically as needed.          Allergies:    Allergies as of 03/12/2024 - Review Complete 03/12/2024   Allergen Reaction Noted    Mold  12/20/2013       SOCIAL HISTORY:   Social History     Socioeconomic History    Marital status:      Spouse name: Not on file    Number of children: 3    Years of education: Not on file    Highest education level: Not on file   Occupational History    Occupation: RETIRED TEACHER   Tobacco Use    Smoking status: Never    Smokeless tobacco: Never   Vaping Use    Vaping Use: Never used   Substance and Sexual Activity    Alcohol use: Yes     Comment: rare    Drug use: No    Sexual activity: Not Currently     Partners: Male   Other Topics Concern    Caffeine Concern Not Asked    Exercise Not Asked    Seat Belt Not Asked    Special Diet Not Asked    Stress Concern Not Asked    Weight Concern Not Asked   Social History Narrative    marital status:     occupation: retired teacher elementarycaffeine: amblood tranfusion: nohiv exposure: notravel: home in [place]; [facility] walking 3 x week; 2 miles    mammo: 2010, 2011, 2012,2013,2014    dexa: 2010    pap: doesn't get    colonoscopy: 2009    ab: 2010,2011, 2012,2013,2014    influenza: 2010, 2011,2012,2013    bdt: ?    pneumovax: yes    zoster: yes     Social Determinants of Health     Financial Resource Strain: Not on file   Food Insecurity: Not on file   Transportation Needs: Not on file   Physical Activity: Not on file   Stress: Not on file   Social Connections: Not on file   Housing Stability: Not on file        FAMILY HISTORY:   Family History   Problem Relation Age of Onset     Breast Cancer Self     Breast Cancer Mother 69    Cancer Father         leukemia    Cancer Brother         prostate ca    Heart Disorder Brother         [pacemaker    Cancer Son         prostate ca    Hypertension Son     Diabetes Maternal Grandmother     Diabetes Maternal Grandfather     Diabetes Paternal Grandmother     Diabetes Paternal Grandfather         REVIEW OF SYSTEMS:    GENERAL: feels well otherwise  SKIN: denies any unusual skin lesions  HEENT: denies blurred vision or double vision denies nasal congestion  LUNGS: denies shortness of breath with exertion  CARDIOVASCULAR: denies chest pain on exertion  GI: denies abdominal pain, denies heartburn  : denies dysuria, urgency, frequency, hematuria  MUSCULOSKELETAL: denies back pain  NEURO: denies headaches    PRE-OP ROS  NSAIDS/PLATELET INH: Hold NSAID therapy taken for osteoarthritis associated pain approximately 7 days prior to procedure, unless otherwise directed by orthopedic surgery service  DIABETIC MEDICATIONS: No  VIVIANA: No  Hx of VTE: No  BLEEDING DISORDER: No  URI, COUGH, CP, FEVER: No  CERVICAL SPINE RESTRICTION: No known.  No history of rheumatoid arthritis.    PHYSICAL EXAM:  /52 (BP Location: Left arm, Patient Position: Sitting, Cuff Size: adult)   Pulse 76   Temp 97.6 °F (36.4 °C) (Skin)   Resp 16   Ht 5' 4\" (1.626 m)   Wt 158 lb 3.2 oz (71.8 kg)   LMP  (LMP Unknown)   SpO2 99%   BMI 27.15 kg/m²   GENERAL: Well developed, well nourished,in no apparent distress  SKIN: No rashes,no suspicious lesions  EYES: Bilateral conjunctiva are clear  HEENT: atraumatic, normocephalic.  Tympanic membrane within normal limits bilaterally.  NECK: supple,no adenopathy,no bruits  LUNGS: clear to auscultation  CARDIO: RRR without murmur  GI: good BS's,no masses, HSM or tenderness    LABS:  None requested are pending    ASSESSMENT AND PLAN:    1. Preop examination  The patient is at acceptable risk of complications for the planned procedure.  She  is scheduled for cardiac clearance.  Able to achieve at least 4 METS.    2. Primary osteoarthritis of left knee    3. Essential hypertension  Low normal diastolic, however otherwise chronically controlled  Continue losartan 100 mg daily, hydrochlorothiazide 12.5 mg daily, amlodipine 5 mg daily, and metoprolol succinate extended release 25 mg daily    4. Pure hypercholesterolemia  LDL at goal: Continue atorvastatin 40 mg daily    5. Asthma with COPD (chronic obstructive pulmonary disease)  Stable and controlled with infrequent albuterol use    6. Aortic valve insufficiency, etiology of cardiac valve disease unspecified  Stable and asymptomatic    7. Aortic root dilation (HCC)  Stable  Continue metoprolol and blood pressure control      Encounter Diagnoses   Name Primary?    Preop examination Yes    Primary osteoarthritis of left knee     Essential hypertension     Pure hypercholesterolemia     Asthma with COPD (chronic obstructive pulmonary disease)     Aortic valve insufficiency, etiology of cardiac valve disease unspecified     Aortic root dilation (HCC)        No orders of the defined types were placed in this encounter.      Imaging & Consults:  None    Brenton Hinojosa MD

## 2024-03-13 ENCOUNTER — OFFICE VISIT (OUTPATIENT)
Dept: PHYSICAL THERAPY | Facility: HOSPITAL | Age: 89
End: 2024-03-13
Attending: PHYSICIAN ASSISTANT
Payer: MEDICARE

## 2024-03-13 PROCEDURE — 97140 MANUAL THERAPY 1/> REGIONS: CPT

## 2024-03-13 PROCEDURE — 97110 THERAPEUTIC EXERCISES: CPT

## 2024-03-13 NOTE — PROGRESS NOTES
Diagnosis:   Acute pain of right shoulder (M25.511)          Referring Provider: Opal  Date of Evaluation:    2/14/2024    Precautions:      L knee impending surgery  R knee 8 years ago    QuickDASH Outcome Score  Score: 40.91 % (2/8/2024  4:38 PM)   Next MD visit:   none scheduled  Date of Surgery: n/a   Insurance Primary/Secondary: DARRYL [facility] / N/A     # Auth Visits: no limit            Subjective:   Wants to do bike for shoulder and knee.  Patient reports feeling better overall.   R shoulder still feels stiff and catches with reaching motions. HEP going ok - sometimes causes pain but not always.  April 2nd L knee TKA planned.  Pt reports that she has to pull herself up with right arm when negotiating 9 steps due to L knee pain . Trying to be careful to not irritate R shoulder.    Initial evaluation functional limitations:  Current functional limitations include sleep disturbance, reaching, vacuuming, putting on coat.   Open a tight or new jar Moderate difficulty   Do heavy household chores (eg, wash walls, floors) Moderate difficulty   Carry a shopping bag or briefcase Moderate difficulty   Wash your back Moderate difficulty   Use a knife to cut food Mild difficulty   Recreational activities in which you take some force or impact through your arm, shoulder or hand (eg, golf, hammering, tennis, etc) Moderate difficulty   During the past week, to what extent has your arm, shoulder or hand problem interfered with your normal social activities with family, friends, neighbors or groups? Moderately   During the past week, were you limited in your work or other regular daily activities as a result of your arm, shoulder or hand problem? Slightly limited   Arm, shoulder or hand pain Moderate   During the past week, how much difficulty have you had sleeping because of the pain in your arm, shoulder or hand? Moderate difficulty     Pain: 5/10      Objective:       Observation/Posture: R shoulder lower, forward head  posture, rounded shoulders  Palpation: tenderness/tightness R deltoid mm, upper trap, R biceps tendon, R scalenes    Cervical Screen: L SB 75% L rotation 75% -tightness R upper trap and R scalenes, all other motions wfl    AROM: (* denotes performed with pain)  Shoulder    Flexion: R 130*; L wnl  Abduction: R 80*; L wnl  ER: R T2/60*; L T2  IR: R T7/60*; L T5    Pain with eccentric movements R     Initial evaluation:     Strength/MMT: (* denotes performed with pain)  Shoulder Elbow Scapular   Flexion: R 3/5*; L 5/5  Abduction: R 3/5*; L 5/5  ER: R 3+/5*; L 5/5  IR: R 4/5; L 5/5 Flexion: R 5/5; L 5/5  Extension: R 5/5; L 5/5  Supination: R 5/5; L 5/5  Pronation: R 5/5; L 5/5  Mid trap     R 3+/5* L 4+/5  Lower trap R 3+/5* L 4+/5     Special tests:     Subacromial Pain Syndrome:  Empty Can test: R +pain  Painful Arc Sign: R +  External Rotation Resistance: R + pain  Guerra-Abdiaziz Impingement Sign: R neg  Neer Impingement Test: R neg  Palpable Tenderness Infraspinatus and Supraspinatus: R no tenderness    Long Head Biceps Tendinopathy:     Yergason's Test: R +    Rotator Cuff Tears:   ER Lag Sign: R -  Dropping Sign at 90 Deg ABD and 45 deg ER: R +pain, difficulty        Assessment:   Patient presents with significant R shoulder hiking compensation with active elevation that improved with exercise including RTC isometrics, isotonics, passive and active scapular ROM. She has limited upward scapular rotation on the R which improved following PROM and cuing and use of scap retraction/depression and shoulder model. Pt initially had crepitus and pain with sidelying abduction that resolved with manual cueing and pt actively performed scapular retraction and depression. Shoulder fatigues easily with AROM. Wall slides modified to focus on scapular movement.        Goals:   Goals: (to be met in 10 visits) -progressing  Pt will report improved ability to sleep without waking due to shoulder pain   Pt will improve shoulder  flexion AROM to >140 degrees to be able to reach into overhead cabinets without pain or restriction   Pt will improve shoulder abduction AROM to >140 degrees to improve ability to don deodorant, don/doff shirts, and wash hair   Pt will increase shoulder AROM ER to 80 to be able to reach and fasten seatbelt   Pt will increase shoulder AROM IR to 70 to be able to reach in back pocket, tuck in shirt, and turn steering wheel without pain  Pt will improve shoulder strength throughout to 4+/5 to improve function with raising arm up   Pt will demonstrate increased mid/low trap strength to 4+/5 to promote improved shoulder mechanics and stabilization with lifting and reaching   Pt will be independent and compliant with comprehensive HEP to maintain progress achieved in PT     Plan: Continue plan of care as pt tolerates with focus on R shoulder reduction of pain, improve ROM and strength.   Date: 2/21/2024  TX#: 2/10 Date:  2/26/24               TX#: 3/10 Date:     2/29/2024             TX#: 4/ Date:     3/13/2024             TX#: 5/ Date:   Tx#: 6/   Ther Ex:   Sci fit FW 3 min L1  Scap retraction 10 sec x10  Scap retraction + row yellow spri x10  Shoulder ext B yellow spri x10  Standing in front of mirror-scap retraction + B flexion [id number] motion  Scaption x3-painfree motion  PROM R shoulder - w manual positioning   AAROM scaption-pain-w manual positioning  AAROM ER SL x10    Pendulum x10 TE:  ER isometric against wall, [id number]  Cable row, 15#, 2x10  Seated shoulder ER, YTB, 3x10  Active scaption pain-free ROM, 3x8  S/l AAROM abduction w/ scapular assist  Serratus wall slide for scapular rotation, 2x8  PROM R shoulder Ther Ex:   NuStep 5min L1  Shoulder AROM retraining with scap retraction + depression  Explanation w shoulder model  Seated shoulder ER, YTB, 3x10  Issued YTB  Scap retraction + row yellow spri x10  Shoulder ext B yellow spri x10  Standing in front of mirror-scap retraction + B flexion [id number]  motion  Serratus wall slide for scapular rotation, 2x8  Pendulum x20 between sets  PROM R shoulder - w manual positioning   AAROM scaption-pain-w manual positioning 3x8  AROM ER SL 2x10 Ther Ex:   NuStep 6 min L1  Shoulder AROM retraining with scap retraction + depression  Seated shoulder ER, YTB, 3x10  Scap retraction + row yellow spri x10  Shoulder ext B yellow spri x10  Standing in front of mirror-scap retraction + B flexion [id number] motion  Serratus wall slide for scapular rotation, 2x8  Wall slides-AAROM 3x10  Pendulum 3x10 between sets of wall slides  PROM R shoulder - w manual positioning   AAROM scaption-pain-w manual positioning 3x8  Isometric ER/IR 5 sec x10    Manual therapy:   STM R RC, deltoid, upper trap, scalenes    Gentle oscillations Manual therapy:  S/l passive scapular upward rotation  STM R RC, deltoid, upper trap, scalenes  Gentle GHJ AP mobilization   Manual therapy:  S/l passive scapular upward rotation  STM R RC, deltoid, upper trap, scalenes  Gentle GHJ AP mobilization  R GH joint mobes inferior gr II-III multiple bouts  Manual therapy:  S/l passive scapular upward rotation  STM R RC, deltoid, upper trap, scalenes  Gentle GHJ AP mobilization  R GH joint mobes inferior gr II-III multiple bouts     Cp x5 min end of session Cp x10 min end of session Cp x10 min end of session Cp x10 min end of session           HEP: Access Code: [email or web address]  URL: [email or web address]  Date: 02/14/2024  Prepared by: Alessandra Cavazos    Exercises  - Seated Scapular Retraction  - 1 x daily - 7 x weekly - 1 sets - 10 reps  - Sidelying Shoulder External Rotation  - 1 x daily - 7 x weekly - 1 sets - 10 reps  - Sidelying Shoulder Abduction Palm Forward  - 1 x daily - 7 x weekly - 1 sets - 10 reps  - Serratus wall slide for scapular rotation  - 1 x daily - 7 x weekly - 1 sets - 8 reps-AA PRN  - Circular Shoulder Pendulum with Table Support  - 1 x daily - 7 x weekly - 1 sets - 10 reps  - Standing Isometric  Shoulder External Rotation with Doorway  - 1 x daily - 7 x weekly - 1 sets - 10 reps    + additions above     Charges: Tex2 30', MT 15'       Total Timed Treatment: 45 min  Total Treatment Time: 55 min

## 2024-03-15 ENCOUNTER — OFFICE VISIT (OUTPATIENT)
Dept: PHYSICAL THERAPY | Facility: HOSPITAL | Age: 89
End: 2024-03-15
Attending: PHYSICIAN ASSISTANT
Payer: MEDICARE

## 2024-03-15 PROCEDURE — 97110 THERAPEUTIC EXERCISES: CPT

## 2024-03-15 NOTE — PROGRESS NOTES
Discharge Summary  Pt has attended 6 visits in Physical Therapy.    Diagnosis:   Acute pain of right shoulder (M25.511)          Referring Provider: Opal  Date of Evaluation:    2/14/2024    Precautions:      L knee impending surgery  R knee 8 years ago    QuickDASH Outcome Score  Score: 40.91 % (2/8/2024  4:38 PM)   Next MD visit:   none scheduled  Date of Surgery: n/a   Insurance Primary/Secondary: [employer] [facility] / N/A     # Auth Visits: no limit            Subjective:   Patient reports that the R shoulder feels about 50% better since beginning therapy. She continues to have pain in the R shoulder with lifting/raising motions. She notices that this is improved when retraction scapulae first but still feels stiff and painful. She is concerned that her R shoulder will cause problems when she has her L TKA in a couple of weeks.   April 2nd L knee TKA planned.  Initial evaluation functional limitations:  Current functional limitations include sleep disturbance, reaching, vacuuming, putting on coat.   Open a tight or new jar Moderate difficulty   Do heavy household chores (eg, wash walls, floors) Moderate difficulty   Carry a shopping bag or briefcase Moderate difficulty   Wash your back Moderate difficulty   Use a knife to cut food Mild difficulty   Recreational activities in which you take some force or impact through your arm, shoulder or hand (eg, golf, hammering, tennis, etc) Moderate difficulty   During the past week, to what extent has your arm, shoulder or hand problem interfered with your normal social activities with family, friends, neighbors or groups? Moderately   During the past week, were you limited in your work or other regular daily activities as a result of your arm, shoulder or hand problem? Slightly limited   Arm, shoulder or hand pain Moderate   During the past week, how much difficulty have you had sleeping because of the pain in your arm, shoulder or hand? Moderate difficulty     Pain:  5/10      Objective:     AROM: (* denotes performed with pain)  Shoulder    Flexion: R 145*; L wnl  Abduction: R 110*; L wnl  ER: R T2/60*; L T2  IR: R T7/60*; L T5    Pain with eccentric movements R     Strength/MMT: (* denotes performed with pain)  Shoulder Elbow Scapular   Flexion: R 3+/5*; L 5/5  Abduction: R 3+/5*; L 5/5  ER: R 4-/5*; L 5/5  IR: R 4+/5; L 5/5 Flexion: R 5/5; L 5/5  Extension: R 5/5; L 5/5  Supination: R 5/5; L 5/5  Pronation: R 5/5; L 5/5  Mid trap     R 4/5* L 4+/5  Lower trap R 4/5* L 4+/5         Assessment:   Patient has improved in shoulder ROM and strength since beginning therapy. She has responded fair to scapular and RTC strengthening. She gets some relief from gentle AAROM stretching in flexion but has increased pain with ER or posterior capsule stretching. Patient has not met goals for therapy but is having TKA in about 2 weeks and would like to be discharged from PT with HEP at this time.         Goals:   Goals: (to be met in 10 visits) -progressing  Pt will report improved ability to sleep without waking due to shoulder pain (N/A)  Pt will improve shoulder flexion AROM to >140 degrees to be able to reach into overhead cabinets without pain or restriction (not met)  Pt will improve shoulder abduction AROM to >140 degrees to improve ability to don deodorant, don/doff shirts, and wash hair (not met)  Pt will increase shoulder AROM ER to 80 to be able to reach and fasten seatbelt (not met)  Pt will increase shoulder AROM IR to 70 to be able to reach in back pocket, tuck in shirt, and turn steering wheel without pain (not met)  Pt will improve shoulder strength throughout to 4+/5 to improve function with raising arm up (not met)  Pt will demonstrate increased mid/low trap strength to 4+/5 to promote improved shoulder mechanics and stabilization with lifting and reaching (not met)  Pt will be independent and compliant with comprehensive HEP to maintain progress achieved in PT  (met)    Plan: Continue plan of care as pt tolerates with focus on R shoulder reduction of pain, improve ROM and strength.   Date: 2/21/2024  TX#: 2/10 Date:  2/26/24               TX#: 3/10 Date:     2/29/2024             TX#: 4/ Date:     3/13/2024             TX#: 5/ Date: 3/15/24  Tx#: 6/10   Ther Ex:   Sci fit FW 3 min L1  Scap retraction 10 sec x10  Scap retraction + row yellow spri x10  Shoulder ext B yellow spri x10  Standing in front of mirror-scap retraction + B flexion [id number] motion  Scaption x3-painfree motion  PROM R shoulder - w manual positioning   AAROM scaption-pain-w manual positioning  AAROM ER SL x10    Pendulum x10 TE:  ER isometric against wall, [id number]  Cable row, 15#, 2x10  Seated shoulder ER, YTB, 3x10  Active scaption pain-free ROM, 3x8  S/l AAROM abduction w/ scapular assist  Serratus wall slide for scapular rotation, 2x8  PROM R shoulder Ther Ex:   NuStep 5min L1  Shoulder AROM retraining with scap retraction + depression  Explanation w shoulder model  Seated shoulder ER, YTB, 3x10  Issued YTB  Scap retraction + row yellow spri x10  Shoulder ext B yellow spri x10  Standing in front of mirror-scap retraction + B flexion [id number] motion  Serratus wall slide for scapular rotation, 2x8  Pendulum x20 between sets  PROM R shoulder - w manual positioning   AAROM scaption-pain-w manual positioning 3x8  AROM ER SL 2x10 Ther Ex:   NuStep 6 min L1  Shoulder AROM retraining with scap retraction + depression  Seated shoulder ER, YTB, 3x10  Scap retraction + row yellow spri x10  Shoulder ext B yellow spri x10  Standing in front of mirror-scap retraction + B flexion [id number] motion  Serratus wall slide for scapular rotation, 2x8  Wall slides-AAROM 3x10  Pendulum 3x10 between sets of wall slides  PROM R shoulder - w manual positioning   AAROM scaption-pain-w manual positioning 3x8  Isometric ER/IR 5 sec x10 Ther Ex:   NuStep 5 min L5  Pulleys, flexion, abduction, 15 each  Doorway pec  stretch,   Posterior capsule stretch against wall  Supine wand flexion stretch, [id number]  Pendulum 3x10 between sets of wall slides  Seated shoulder ER, YTB, 3x10  Review of HEP   Manual therapy:   STM R RC, deltoid, upper trap, scalenes    Gentle oscillations Manual therapy:  S/l passive scapular upward rotation  STM R RC, deltoid, upper trap, scalenes  Gentle GHJ AP mobilization   Manual therapy:  S/l passive scapular upward rotation  STM R RC, deltoid, upper trap, scalenes  Gentle GHJ AP mobilization  R GH joint mobes inferior gr II-III multiple bouts  Manual therapy:  S/l passive scapular upward rotation  STM R RC, deltoid, upper trap, scalenes  Gentle GHJ AP mobilization  R GH joint mobes inferior gr II-III multiple bouts     Cp x5 min end of session Cp x10 min end of session Cp x10 min end of session Cp x10 min end of session           HEP:   Access Code: [email or web address]  URL: [email or web address]  Date: 03/15/2024  Prepared by: Tyler Elizalde    Exercises  - Supine Shoulder Flexion Extension AAROM with Dowel  - 1 x daily - 15 reps  - Supine Shoulder Flexion AAROM  - 1 x daily - 2 sets - 10 reps  - Sidelying Shoulder Abduction Palm Forward  - 1 x daily - 2 sets - 10 reps  - Sidelying Shoulder External Rotation  - 1 x daily - 2 sets - 10 reps    Charges: Tex3 40'      Total Timed Treatment: 40 min  Total Treatment Time: 40 min    Plan: Discharge    Patient/Family/Caregiver was advised of these findings, precautions, and treatment options and has agreed to actively participate in planning and for this course of care.    Thank you for your referral. If you have any questions, please contact me at Dept: [phone].    Sincerely,  Electronically signed by therapist: Tyler Elizalde PT     Physician's certification required:  No

## 2024-03-18 ENCOUNTER — HOSPITAL ENCOUNTER (OUTPATIENT)
Dept: BONE DENSITY | Age: 89
Discharge: HOME | End: 2024-03-18
Attending: INTERNAL MEDICINE
Payer: MEDICARE

## 2024-03-18 DIAGNOSIS — Z78.0 ASYMPTOMATIC MENOPAUSE: ICD-10-CM

## 2024-03-18 DIAGNOSIS — C50.912 INVASIVE LOBULAR CARCINOMA OF LEFT BREAST IN FEMALE (HCC): ICD-10-CM

## 2024-03-18 PROCEDURE — 77080 DXA BONE DENSITY AXIAL: CPT | Performed by: INTERNAL MEDICINE

## 2024-03-19 ENCOUNTER — OFFICE VISIT (OUTPATIENT)
Dept: SURGERY | Facility: CLINIC | Age: 89
End: 2024-03-19
Payer: MEDICARE

## 2024-03-19 VITALS
HEIGHT: 64 IN | RESPIRATION RATE: 16 BRPM | TEMPERATURE: 98 F | HEART RATE: 75 BPM | SYSTOLIC BLOOD PRESSURE: 121 MMHG | OXYGEN SATURATION: 97 % | BODY MASS INDEX: 27.31 KG/M2 | WEIGHT: 160 LBS | DIASTOLIC BLOOD PRESSURE: 69 MMHG

## 2024-03-19 DIAGNOSIS — C50.912: Primary | ICD-10-CM

## 2024-03-19 PROCEDURE — 99213 OFFICE O/P EST LOW 20 MIN: CPT | Performed by: SURGERY

## 2024-03-19 NOTE — PROGRESS NOTES
Breast Surgery Surveillance Visit    Diagnosis: Invasive lobular carcinoma, left breast, s/p lumpectomy on 9/15/2023    Stage: T1 cNX MX    Disease Status:  Surgical treatment complete, letrozole onging per medical oncology, completed RT on 11/20/2023    History of Present Illness:   Ms. Shanti Coy is a 88 year old woman who presents with an imaging detected concern of her left breast.  The patient denies any palpable masses, nipple discharge, skin changes or axillary discomfort.  She does have a family history of breast cancer and has been consistent with annual mammograms.  She has no personal prior history of breast disease or biopsies.  She had a work-up performed at an outside facility.  Her screening mammogram on July 7, 2023 detected a new focal asymmetry with distortion in the left breast for which additional imaging was recommended.  A left diagnostic evaluation confirmed an associated 1 cm mass at 1230, 5 cm from the nipple correlating with the mammographic finding.  Her left axillary lymph nodes were noted to be sonographically normal.  She had ultrasound-guided biopsy on July 27, 2023 and was confirmed to have invasive lobular carcinoma, ER 90%, IL less than 1%, HER2/cecil negative and Ki-67 10 to 20%. She underwent lumpectomy, which occurred without complication. Left diagnostic mammogram on 3/11/2024 showed post lumpectomy changes in the left breast with no suspicious findings. She is here today for evaluation and recommendations for further therapy.        Past Medical History:   Diagnosis Date    Aortic valve insufficiency     last echo 7/26/23    Asthma (HCC)     Eczema     Exposure to medical diagnostic radiation 11/20/2023    Female climacteric state 06/15/2000    GERD     High blood pressure     High cholesterol     Hx of motion sickness     Left Breast cancer (HCC) 07/2023    Onychocryptosis 08/25/2016    Osteoarthritis     Other and unspecified hyperlipidemia     Pain due to onychomycosis  of toenail 2017    Prediabetes     Primary localized osteoarthrosis of right lower leg 06/10/2015    Visual impairment     glasses       Past Surgical History:   Procedure Laterality Date    APPENDECTOMY      CATARACT      COLONOSCOPY  2009    FLUOR GID & LOCLZJ NDL/CATH SPI DX/THER NJX  2014    Procedure: CERVICAL EPIDURAL;  Surgeon: Eugene Garcia MD;  Location: [facility]    HYSTERECTOMY  1990    partial; one ovary in; heavy menses    INJECTION, W/WO CONTRAST, DX/THERAPEUTIC SUBSTANCE, EPIDURAL/SUBARACHNOID; LUMBAR/SACRAL  2014    Procedure: LUMBAR EPIDURAL;  Surgeon: Eugene Garcia MD;  Location: [facility]    INJECTION, W/WO CONTRAST, DX/THERAPEUTIC SUBSTANCE, EPIDURAL/SUBARACHNOID; LUMBAR/SACRAL N/A 2016    Procedure: LUMBAR EPIDURAL;  Surgeon: Jaswant Pham MD;  Location: [facility]    INJECTION, W/WO CONTRAST, DX/THERAPEUTIC SUBSTANCE, EPIDURAL/SUBARACHNOID; LUMBAR/SACRAL N/A 2016    Procedure: LUMBAR EPIDURAL;  Surgeon: Jaswant Pham MD;  Location: [facility]    KNEE SURGERY  2015    right leg    LUMPECTOMY LEFT  2023    [facility]    OTHER SURGICAL HISTORY      arthroscopy knee    RADIATION LEFT      TONSILLECTOMY         Gynecological History:  Pt is a   Pt was 31 years old at time of first pregnancy.    She denies cumulative breastfeeding history   She achieved menarche at age 17 and LMP 52  Age of Menopause: 52  Type: hysterectomy with one ovary removed  She has history of hormone replacement therapy for 15 years, last used 35 years ago .  She has history of oral contraceptive use for 15 years, last in .  She denies infertility treatment to achieve pregnancy.    Medications:     Losartan Potassium-HCTZ 100-12.5 MG Oral Tab Take 1 tablet by mouth daily. 90 tablet 0    letrozole 2.5 MG Oral Tab Take 1 tablet (2.5 mg total) by mouth daily. 90 tablet 3     atorvastatin 40 MG Oral Tab Take 1 tablet (40 mg total) by mouth daily.      TRELEGY ELLIPTA 200-62.5-25 MCG/ACT Inhalation Aerosol Powder, Breath Activated Inhale 1 puff into the lungs daily.      metoprolol succinate ER 25 MG Oral Tablet 24 Hr Take 1 tablet (25 mg total) by mouth daily. 30 tablet 11    AMLODIPINE 5 MG Oral Tab TAKE 1 TABLET BY MOUTH EVERY DAY 90 tablet 0    Blood Pressure Monitoring (BLOOD PRESSURE CUFF) Does not apply Misc For use to measure BP daily 1 each 0    azelastine 0.1 % Nasal Solution as needed.      ipratropium 0.06 % Nasal Solution 2 sprays by Nasal route 3 (three) times daily as needed.      Albuterol Sulfate HFA (PROAIR HFA) 108 (90 Base) MCG/ACT Inhalation Aero Soln Inhale 2 puffs into the lungs every 4 (four) hours as needed. 1 each 5    mometasone 0.1 % External Ointment Apply 1 Application topically daily. (Patient taking differently: Apply 1 Application  topically as needed.) 15 g 1    aspirin 81 MG Oral Tab Take 1 tablet (81 mg total) by mouth daily.      CRANBERRY EXTRACT OR Take 1 tablet by mouth daily.        Vitamin D3 (VITAMIN D3) 2000 UNITS Oral Cap Take 1 capsule (2,000 Units total) by mouth daily.      PRILOSEC 20 MG OR CPDR Take 1 capsule (20 mg total) by mouth every morning.      VITAMIN E qd      CLOBETASOL PROPIONATE E EX Apply topically as needed.         Allergies:    Allergies   Allergen Reactions    Mold        Family History:   Family History   Problem Relation Age of Onset    Breast Cancer Self     Breast Cancer Mother 69    Cancer Father         leukemia    Cancer Brother         prostate ca    Heart Disorder Brother         [pacemaker    Cancer Son         prostate ca    Hypertension Son     Diabetes Maternal Grandmother     Diabetes Maternal Grandfather     Diabetes Paternal Grandmother     Diabetes Paternal Grandfather        She is not of Ashkenazi Jehovah's witness ancestry.    Social History:  History   Alcohol Use    Yes     Comment: rare       History   Smoking  Status    Never   Smokeless Tobacco    Never       Ms. Shanti Coy is  with 3 children. She has 2 siblings. She is currently Retired    Review of Systems:  General:   The patient denies, fever, chills, night sweats, fatigue, generalized weakness, change in appetite or weight loss.    HEENT:     The patient denies eye irritation, +cataracts, redness, glaucoma, yellowing of the eyes, change in vision, color blindness, or +wearing contacts/glasses. The patient denies hearing loss, ringing in the ears, ear drainage, earaches, nasal congestion, nose bleeds, snoring, pain in mouth/throat, hoarseness, change in voice, facial trauma.    Respiratory:  The patient denies chronic cough, phlegm, hemoptysis, pleurisy/chest pain, pneumonia, +asthma, +wheezing, difficulty in breathing with exertion, emphysema, chronic bronchitis, +shortness of breath or abnormal sound when breathing.     Cardiovascular:  There is no history of chest pain, chest pressure/discomfort, palpitations, irregular heartbeat, fainting or near-fainting, difficulty breathing when lying flat, SOB/Coughing at night, swelling of the legs or chest pain while walking.    Breasts:  See history of present illness    Gastrointestinal:     There is no history of difficulty or pain with swallowing, +reflux symptoms, vomiting, dark or bloody stools, constipation, yellowing of the skin, indigestion, nausea, change in bowel habits, diarrhea, abdominal pain or vomiting blood.     Genitourinary:  The patient denies frequent urination, +needing to get up at night to urinate, urinary hesitancy or retaining urine, painful urination, urinary incontinence, decreased urine stream, blood in the urine or vaginal/penile discharge.    Skin:    The patient denies rash, itching, skin lesions, +dry skin, change in skin color or change in moles.     Hematologic/Lymphatic:  The patient denies easily bruising or bleeding or persistent swollen glands or lymph nodes.      Musculoskeletal:  The patient denies muscle aches/pain, joint pain, stiff joints, neck pain, +back pain or bone pain.    Neuropsychiatric:  There is no history of migraines or severe headaches, seizure/epilepsy, speech problems, coordination problems, trembling/tremors, fainting/black outs, dizziness, memory problems, loss of sensation/numbness, problems walking, weakness, tingling or burning in hands/feet. There is no history of abusive relationship, bipolar disorder, sleep disturbance, anxiety, depression or feeling of despair.    Endocrine:    There is no history of poor/slow wound healing, weight loss/gain, fertility or hormone problems, cold intolerance, thyroid disease.     Allergic/Immunologic:  There is no history of hives, hay fever, angioedema or anaphylaxis.    /69 (BP Location: Left arm, Patient Position: Sitting, Cuff Size: adult)   Pulse 75   Temp 97.6 °F (36.4 °C) (Temporal)   Resp 16   Ht 1.626 m (5' 4\")   Wt 72.6 kg (160 lb)   LMP  (LMP Unknown)   SpO2 97%   BMI 27.46 kg/m²     Physical Exam:  The patient is an alert, oriented, well-nourished and  well-developed woman who appears her stated age. Her speech patterns and movements are normal. Her affect is appropriate.    HEENT: The head is normocephalic. The neck is supple. The thyroid is not enlarged and is without palpable masses/nodules. There are no palpable masses. The trachea is in the midline. Conjunctiva are clear, non-icteric.    Chest: The chest expands symmetrically. The lungs are clear to auscultation.    Heart: The rhythm is regular.  There are no murmurs, rubs, gallops or thrills.    Breasts:  Her breasts are symmetrical with a cup size 38D.  Right breast: The skin, nipple ,and areola appear normal. There is no skin dimpling with movement of the pectoralis. There is no nipple retraction. No nipple discharge can be elicited. The parenchyma is mildly nodular. There are no dominant masses in the breast. The axillary tail  is normal.  Left breast:   The skin, nipple, and areola appear normal. There is no skin dimpling with movement of the pectoralis. There is no nipple retraction. No nipple discharge can be elicited. The parenchyma is mildly nodular. There are no dominant masses in the breast. The axillary tail is normal.  There is a well-healed incision with no signs of new or recurrent disease.    Abdomen:  The abdomen is soft, flat and non tender. The liver is not enlarged. There are no palpable masses.    Lymph Nodes:  The supraclavicular, axillary and cervical regions are free of significant lymphadenopathy.    Back: There is no vertebral column tenderness.    Skin: The skin appears normal. There are no suspicious appearing rashes or lesions.    Extremities: The extremities are without deformity, cyanosis or edema.    Impression:   Ms. Shanti Coy is a 88 year old woman presents with family history of breast cancer left breast cancer, s/p lumpectomy.    Recommendations:   I had a discussion with the Patient regarding her breast exam.  She is healing well since surgery with no signs of infection. I  reviewed her pathology which showed invasive lobular carcinoma measuring 1.4cm with negative margins.  No rehana interrogation was performed due to normal clinical and radiological exam and advanced age.  She completed radiation without sequelae and is tolerating her letrozole under the direction of medical oncology.  She will be due for a bilateral diagnostic evaluation in September 2024 with a clinical exam to follow.  I encouraged her to continue monitoring her ROM and strength and explained that a referral to physical therapy may be warranted in the future if she identifies any limitations or restrictions. She was given ample opportunity for questions and those questions were answered to her satisfaction. She was encouraged to contact the office with any questions or concerns prior to her next scheduled appointment.     This  encounter lasted a total of 25 minutes, more than 50% of which was dedicated to the discussion of management options.

## 2024-03-22 ENCOUNTER — APPOINTMENT (OUTPATIENT)
Dept: PHYSICAL THERAPY | Facility: HOSPITAL | Age: 89
End: 2024-03-22
Attending: PHYSICIAN ASSISTANT
Payer: MEDICARE

## 2024-03-27 ENCOUNTER — OFFICE VISIT (OUTPATIENT)
Dept: HEMATOLOGY/ONCOLOGY | Facility: HOSPITAL | Age: 89
End: 2024-03-27
Attending: INTERNAL MEDICINE
Payer: MEDICARE

## 2024-03-27 VITALS
BODY MASS INDEX: 28.21 KG/M2 | HEIGHT: 62.91 IN | DIASTOLIC BLOOD PRESSURE: 78 MMHG | HEART RATE: 77 BPM | SYSTOLIC BLOOD PRESSURE: 131 MMHG | TEMPERATURE: 97 F | OXYGEN SATURATION: 98 % | WEIGHT: 159.19 LBS | RESPIRATION RATE: 16 BRPM

## 2024-03-27 DIAGNOSIS — C50.912 INVASIVE LOBULAR CARCINOMA OF LEFT BREAST IN FEMALE (HCC): Primary | ICD-10-CM

## 2024-03-27 PROCEDURE — 99214 OFFICE O/P EST MOD 30 MIN: CPT | Performed by: INTERNAL MEDICINE

## 2024-03-27 NOTE — PROGRESS NOTES
Cancer Center Report of Consultation    Patient Name: Shanti Coy   YOB: 1935   Medical Record Number: [id number]   CSN: [id number]   Consulting Physician: Rusty Ma MD  Referring Physician(s): No ref. provider found  Date of Consultation: 9/26/2023     Reason for Consultation:  Shanti Coy was seen today in the Cancer Center for evaluation and management of a new diagnosis of left breast cancer.    History of Present Illness:     She has been on Letrozole since 10/2023. She has noticed increased diffuse arthralgias. She has left knee pain from DJD. She has a planned knee replacement surgery.     She has no new focal pain. She has no fever or sweats. She has no hot flashes. She has no other new complaints.    She had abnormal screening mammogram on 7/7/2023 that showed a new spiculated asymmetry in the upper outer left breast. She had a diagnostic mammogram and US on 7/18/2023 that showed a 7 x 7 x 10 mm mass at the 12:30 left breast. She had a biopsy on 7/27/2023 that showed invasive lobular carcinoma, grade II with ER 90% and NC < 1%. The Ki-67 was 10 to 20%. The Her2 was IHC 0.     She had a left breast lumpectomy on 9/15/2023. This showed a 1.4 cm invasive lobular carcinoma with negative margins.     Past Medical History:  Past Medical History:   Diagnosis Date    Aortic valve insufficiency     last echo 7/26/23    Asthma (HCC)     Eczema     Exposure to medical diagnostic radiation 11/20/2023    Female climacteric state 06/15/2000    GERD     High blood pressure     High cholesterol     Hx of motion sickness     Left Breast cancer (HCC) 07/2023    Onychocryptosis 08/25/2016    Osteoarthritis     Other and unspecified hyperlipidemia     Pain due to onychomycosis of toenail 08/17/2017    Prediabetes     Primary localized osteoarthrosis of right lower leg 06/10/2015    Visual impairment     glasses       Past Surgical History:  Past Surgical History:   Procedure Laterality Date     APPENDECTOMY      CATARACT      COLONOSCOPY  2009    FLUOR GID & LOCLZJ NDL/CATH SPI DX/THER NJX  2014    Procedure: CERVICAL EPIDURAL;  Surgeon: Eugene Garcia MD;  Location: [facility]    HYSTERECTOMY  1990    partial; one ovary in; heavy menses    INJECTION, W/WO CONTRAST, DX/THERAPEUTIC SUBSTANCE, EPIDURAL/SUBARACHNOID; LUMBAR/SACRAL  2014    Procedure: LUMBAR EPIDURAL;  Surgeon: Eugene Garcia MD;  Location: [facility]    INJECTION, W/WO CONTRAST, DX/THERAPEUTIC SUBSTANCE, EPIDURAL/SUBARACHNOID; LUMBAR/SACRAL N/A 2016    Procedure: LUMBAR EPIDURAL;  Surgeon: Jaswant Pham MD;  Location: [facility]    INJECTION, W/WO CONTRAST, DX/THERAPEUTIC SUBSTANCE, EPIDURAL/SUBARACHNOID; LUMBAR/SACRAL N/A 2016    Procedure: LUMBAR EPIDURAL;  Surgeon: Jaswant Pham MD;  Location: [facility]    KNEE SURGERY  2015    right leg    LUMPECTOMY LEFT  2023    ILC    OTHER SURGICAL HISTORY      arthroscopy knee    RADIATION LEFT      TONSILLECTOMY         Family Medical History:  Family History   Problem Relation Age of Onset    Breast Cancer Self     Breast Cancer Mother 69    Cancer Father         leukemia    Cancer Brother         prostate ca    Heart Disorder Brother         [pacemaker    Cancer Son         prostate ca    Hypertension Son     Diabetes Maternal Grandmother     Diabetes Maternal Grandfather     Diabetes Paternal Grandmother     Diabetes Paternal Grandfather        Gyne History:  OB History    Para Term  AB Living   3 3 0 0 0 0   SAB IAB Ectopic Multiple Live Births   0 0 0 0 0   Obstetric Comments   Menarche: 15 y/o   Menopause: 51 y/o   OCP use x 5 yrs   HRT use x 7 yrs   Breastfeed: NO   BRA SIZE: 38D       Psychosocial History:  Social History     Socioeconomic History    Marital status:      Spouse name: Not on file    Number of children: 3    Years of  education: Not on file    Highest education level: Not on file   Occupational History    Occupation: RETIRED TEACHER   Tobacco Use    Smoking status: Never    Smokeless tobacco: Never   Vaping Use    Vaping Use: Never used   Substance and Sexual Activity    Alcohol use: Yes     Comment: rare    Drug use: No    Sexual activity: Not Currently     Partners: Male   Other Topics Concern    Caffeine Concern Not Asked    Exercise Not Asked    Seat Belt Not Asked    Special Diet Not Asked    Stress Concern Not Asked    Weight Concern Not Asked   Social History Narrative    marital status:     occupation: retired teacher elementarycaffeine: amblood tranfusion: nohiv exposure: notravel[place] walking 3 x week; 2 miles    mammo: 2010, 2011, 2012,2013,2014    dexa: 2010    pap: doesn't get    colonoscopy: 2009    ab: 2010,2011, 2012,2013,2014    influenza: 2010, 2011,2012,2013    bdt: ?    pneumovax: yes    zoster: yes     Social Determinants of Health     Financial Resource Strain: Not on file   Food Insecurity: Not on file   Transportation Needs: Not on file   Physical Activity: Not on file   Stress: Not on file   Social Connections: Not on file   Housing Stability: Not on file       Allergies:   Allergies   Allergen Reactions    Mold        Current Medications:    Current Outpatient Medications:     Losartan Potassium-HCTZ 100-12.5 MG Oral Tab, Take 1 tablet by mouth daily., Disp: 90 tablet, Rfl: 0    letrozole 2.5 MG Oral Tab, Take 1 tablet (2.5 mg total) by mouth daily., Disp: 90 tablet, Rfl: 3    atorvastatin 40 MG Oral Tab, Take 1 tablet (40 mg total) by mouth daily., Disp: , Rfl:     TRELEGY ELLIPTA 200-62.5-25 MCG/ACT Inhalation Aerosol Powder, Breath Activated, Inhale 1 puff into the lungs daily., Disp: , Rfl:     metoprolol succinate ER 25 MG Oral Tablet 24 Hr, Take 1 tablet (25 mg total) by mouth daily., Disp: 30 tablet, Rfl: 11    AMLODIPINE 5 MG Oral Tab, TAKE 1 TABLET  BY MOUTH EVERY DAY, Disp: 90 tablet, Rfl: 0    Blood Pressure Monitoring (BLOOD PRESSURE CUFF) Does not apply Misc, For use to measure BP daily, Disp: 1 each, Rfl: 0    azelastine 0.1 % Nasal Solution, as needed., Disp: , Rfl:     ipratropium 0.06 % Nasal Solution, 2 sprays by Nasal route 3 (three) times daily as needed., Disp: , Rfl:     Albuterol Sulfate HFA (PROAIR HFA) 108 (90 Base) MCG/ACT Inhalation Aero Soln, Inhale 2 puffs into the lungs every 4 (four) hours as needed., Disp: 1 each, Rfl: 5    mometasone 0.1 % External Ointment, Apply 1 Application topically daily. (Patient taking differently: Apply 1 Application  topically as needed.), Disp: 15 g, Rfl: 1    aspirin 81 MG Oral Tab, Take 1 tablet (81 mg total) by mouth daily., Disp: , Rfl:     CRANBERRY EXTRACT OR, Take 1 tablet by mouth daily.  , Disp: , Rfl:     Vitamin D3 (VITAMIN D3) 2000 UNITS Oral Cap, Take 1 capsule (2,000 Units total) by mouth daily., Disp: , Rfl:     PRILOSEC 20 MG OR CPDR, Take 1 capsule (20 mg total) by mouth every morning., Disp: , Rfl:     VITAMIN E, qd, Disp: , Rfl:     CLOBETASOL PROPIONATE E EX, Apply topically as needed., Disp: , Rfl:     Review of Systems:    Constitutional: Negative for anorexia, fatigue, fevers, chills, night sweats and weight loss.  Eyes: Negative for visual disturbance, irritation and redness.  Respiratory: Negative for cough, hemoptysis, chest pain, or dyspnea.  Cardiovascular: Negative for angina, orthopnea or palpitations.  Gastrointestinal: Negative for nausea, vomiting, change in bowel habits, diarrhea, constipation and abdominal pain.  Integument/breast: Negative for rash, skin lesions, and pruritus.  Hematologic/lymphatic: Negative for easy bruising, bleeding, and lymphadenopathy.  Genitourinary: Negative for dysuria or hematuria  Neurological: Negative for headaches, dizziness, speech problems, gait problems and focal weakness.  Psychiatric: The patient's mood was calm and appropriate for this  visit.    The pertinent positives and negatives were described in the HPI and above. All other systems were negative.      Vital Signs:  Height: 159.8 cm (5' 2.91\") (03/27 1113)  Weight: 72.2 kg (159 lb 3.2 oz) (03/27 1113)  BSA (Calculated - sq m): 1.75 sq meters (03/27 1113)  Pulse: 77 (03/27 1113)  BP: 131/78 (03/27 1113)  Temp: 97.2 °F (36.2 °C) (03/27 1113)  Do Not Use - Resp Rate: --  SpO2: 98 % (03/27 1113)    Physical Examination:    Constitutional: Patient is alert and not in acute distress.  Respiratory: Clear to auscultation and percussion. No rales.  No wheezes.  Cardiovascular: Regular rate and rhythm.   Gastrointestinal: Soft, non tender with good bowel sounds.  Extremities: No edema. No calf tenderness.  Neurological: Grossly intact without focal motor or sensory deficit.  Lymphatics: There is no palpable lymphadenopathy throughout in the cervical, supraclavicular, or axillary regions.    Labs reviewed at this visit:  Lab Results   Component Value Date    WBC 6.6 03/12/2024    RBC 4.60 03/12/2024    HGB 13.6 03/12/2024    HCT 42.0 03/12/2024    MCV 91.3 03/12/2024    MCH 29.6 03/12/2024    MCHC 32.4 03/12/2024    RDW 12.8 03/12/2024    .0 03/12/2024     Lab Results   Component Value Date     03/12/2024    K 4.5 03/12/2024     03/12/2024    CO2 30.0 03/12/2024    BUN 27 (H) 03/12/2024    CREATSERUM 0.99 03/12/2024     (H) 03/12/2024    CA 9.6 03/12/2024    ALKPHO 71 03/12/2024    ALT 21 03/12/2024    AST 18 03/12/2024    BILT 1.2 03/12/2024    ALB 4.0 03/12/2024    TP 6.8 03/12/2024          Radiologic imaging reviewed at this visit:    DEXA on 3/18/2024:  LUMBAR SPINE ANALYSIS RESULTS:      Bone mineral density (BMD) (g/cm2):  .987    Lumbar T-Score:  -0.5      % young normals:  94      % age matched controls:  135      Change from prior spine examination:  -5.7*%               TOTAL HIP ANALYSIS RESULTS:        Bone mineral density (BMD) (g/cm2):  .860      Total Hip  T-Score:  -0.7      % young normals:  91      % age matched controls:  131      Change from prior hip examination:  -5.0*%               FEMORAL NECK ANALYSIS RESULTS:        Bone mineral density (BMD) (g/cm2):  .724      Femoral neck T-Score:  -1.1      % young normals:  85      % age matched controls:  127      Change from prior hip examination:  -5.3*%         Mammogram on 7/18/2023:  COMPARISON(S): 7/7/2023, 6/21/2022, 6/15/2021     BREAST COMPOSITION: (B) The breasts are composed of scattered areas of fibroglandular density.     LEFT DIAGNOSTIC MAMMOGRAM FINDINGS:  1:00, 5 cm from the nipple there is a 5 mm mass with tomographic mild spiculation.     LEFT BREAST/LEFT AXILLARY ULTRASOUND FINDINGS: 12:30, 5 cm from the nipple irregular hypoechoic 7 x 7 x 10 mm mass with angulated margin. This correlates with mammography. No enlarged left axillary lymph node.       Assessment/Plan:    Invasive lobular carcinoma of the left breast diagnosed on 7/27/2023:  Lumpectomy on 9/15/2023  1.4 cm ILC  Grade II  ER 90%  RI < 1%  Ki-67 10 to 20%  Her2 IHC 0    The patient had good surgical margins. She will continue with the aromatase inhibitor. She has had normal bone density dexa scan 2 years ago. Continue Letrozole 2.5 mg daily.     I will follow her at six month intervals. She will get a repeat Dexa scan prior to this next visit.    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 We decided to follow this with repeat dexa in 2 years.    Osteoarthritis:    She can proceed with knee replacement with routine ortho management.      Rusty Ma MD

## 2024-03-27 NOTE — PROGRESS NOTES
Patient is here for follow up for breast cancer on letrozole.  She is having a lot of stiffness in the joints of her hands.  She has been taking tylenol to help the discomfort.   She is scheduled for left knee replacement next week.   She is eating well.  Her energy is fine.   She denies any fever, cough or shortness of breath.     Education Record    Learner:  Patient    Disease / Diagnosis: Breast cancer    Barriers / Limitations:  None   Comments:    Method:  Discussion   Comments:    General Topics:  Side effects and symptom management   Comments:    Outcome:  Shows understanding   Comments:

## 2024-04-02 ENCOUNTER — HOSPITAL ENCOUNTER (INPATIENT)
Facility: HOSPITAL | Age: 89
LOS: 2 days | Discharge: HOME HEALTH | End: 2024-04-04
Attending: ORTHOPAEDIC SURGERY | Admitting: ORTHOPAEDIC SURGERY
Payer: MEDICARE

## 2024-04-02 ENCOUNTER — APPOINTMENT (OUTPATIENT)
Dept: GENERAL RADIOLOGY | Facility: HOSPITAL | Age: 89
End: 2024-04-02
Attending: ORTHOPAEDIC SURGERY
Payer: MEDICARE

## 2024-04-02 ENCOUNTER — ANESTHESIA (OUTPATIENT)
Dept: SURGERY | Facility: HOSPITAL | Age: 89
End: 2024-04-02
Payer: MEDICARE

## 2024-04-02 ENCOUNTER — ANESTHESIA EVENT (OUTPATIENT)
Dept: SURGERY | Facility: HOSPITAL | Age: 89
End: 2024-04-02
Payer: MEDICARE

## 2024-04-02 DIAGNOSIS — Z01.818 PRE-OP TESTING: ICD-10-CM

## 2024-04-02 DIAGNOSIS — M17.12 PRIMARY OSTEOARTHRITIS OF LEFT KNEE: Primary | ICD-10-CM

## 2024-04-02 PROCEDURE — 99223 1ST HOSP IP/OBS HIGH 75: CPT | Performed by: INTERNAL MEDICINE

## 2024-04-02 PROCEDURE — 27447 TOTAL KNEE ARTHROPLASTY: CPT | Performed by: ORTHOPAEDIC SURGERY

## 2024-04-02 PROCEDURE — 76942 ECHO GUIDE FOR BIOPSY: CPT | Performed by: STUDENT IN AN ORGANIZED HEALTH CARE EDUCATION/TRAINING PROGRAM

## 2024-04-02 PROCEDURE — 3E0T3BZ INTRODUCTION OF ANESTHETIC AGENT INTO PERIPHERAL NERVES AND PLEXI, PERCUTANEOUS APPROACH: ICD-10-PCS | Performed by: STUDENT IN AN ORGANIZED HEALTH CARE EDUCATION/TRAINING PROGRAM

## 2024-04-02 PROCEDURE — 73560 X-RAY EXAM OF KNEE 1 OR 2: CPT | Performed by: ORTHOPAEDIC SURGERY

## 2024-04-02 PROCEDURE — 0SRD0J9 REPLACEMENT OF LEFT KNEE JOINT WITH SYNTHETIC SUBSTITUTE, CEMENTED, OPEN APPROACH: ICD-10-PCS | Performed by: ORTHOPAEDIC SURGERY

## 2024-04-02 DEVICE — TIBIAL INSERT FIXED SPHERE FLEX   #2/10 MM L E-CROSS
Type: IMPLANTABLE DEVICE | Site: KNEE | Status: FUNCTIONAL
Brand: GMK SPHERE TOTAL KNEE SYSTEM

## 2024-04-02 DEVICE — GMK SPHERE KNEE: Type: IMPLANTABLE DEVICE | Site: KNEE

## 2024-04-02 DEVICE — PATELLA RESURFACING # 2 E-CROSS
Type: IMPLANTABLE DEVICE | Site: KNEE | Status: FUNCTIONAL
Brand: GMK

## 2024-04-02 DEVICE — IMPLANTABLE DEVICE
Type: IMPLANTABLE DEVICE | Site: KNEE | Status: FUNCTIONAL
Brand: BIOMET® BONE CEMENT R

## 2024-04-02 DEVICE — FIXED TIBIAL TRAY CEMENTED LEFT, SIZE 2
Type: IMPLANTABLE DEVICE | Site: KNEE | Status: FUNCTIONAL
Brand: GMK PRIMARY TOTAL KNEE SYSTEM

## 2024-04-02 DEVICE — FEMUR SPHERE CEMENTED LEFT, SIZE 3+
Type: IMPLANTABLE DEVICE | Site: KNEE | Status: FUNCTIONAL
Brand: GMK SPHERE TOTAL KNEE SYSTEM

## 2024-04-02 RX ORDER — BISACODYL 10 MG
10 SUPPOSITORY, RECTAL RECTAL
Status: DISCONTINUED | OUTPATIENT
Start: 2024-04-02 | End: 2024-04-04

## 2024-04-02 RX ORDER — AZELASTINE 1 MG/ML
2 SPRAY, METERED NASAL 2 TIMES DAILY PRN
Status: DISCONTINUED | OUTPATIENT
Start: 2024-04-02 | End: 2024-04-04

## 2024-04-02 RX ORDER — ACETAMINOPHEN 325 MG/1
650 TABLET ORAL
Status: DISCONTINUED | OUTPATIENT
Start: 2024-04-02 | End: 2024-04-02

## 2024-04-02 RX ORDER — TRAMADOL HYDROCHLORIDE 50 MG/1
50 TABLET ORAL EVERY 12 HOURS SCHEDULED
Status: DISCONTINUED | OUTPATIENT
Start: 2024-04-02 | End: 2024-04-04

## 2024-04-02 RX ORDER — HYDROMORPHONE HYDROCHLORIDE 1 MG/ML
0.6 INJECTION, SOLUTION INTRAMUSCULAR; INTRAVENOUS; SUBCUTANEOUS EVERY 5 MIN PRN
Status: DISCONTINUED | OUTPATIENT
Start: 2024-04-02 | End: 2024-04-02 | Stop reason: HOSPADM

## 2024-04-02 RX ORDER — MELATONIN
325
Status: DISCONTINUED | OUTPATIENT
Start: 2024-04-03 | End: 2024-04-04

## 2024-04-02 RX ORDER — ACETAMINOPHEN 325 MG/1
TABLET ORAL
Status: COMPLETED
Start: 2024-04-02 | End: 2024-04-02

## 2024-04-02 RX ORDER — SENNOSIDES 8.6 MG
17.2 TABLET ORAL NIGHTLY
Status: DISCONTINUED | OUTPATIENT
Start: 2024-04-02 | End: 2024-04-04

## 2024-04-02 RX ORDER — SODIUM CHLORIDE, SODIUM LACTATE, POTASSIUM CHLORIDE, CALCIUM CHLORIDE 600; 310; 30; 20 MG/100ML; MG/100ML; MG/100ML; MG/100ML
INJECTION, SOLUTION INTRAVENOUS CONTINUOUS
Status: DISCONTINUED | OUTPATIENT
Start: 2024-04-02 | End: 2024-04-02 | Stop reason: HOSPADM

## 2024-04-02 RX ORDER — ACETAMINOPHEN 325 MG/1
650 TABLET ORAL ONCE
Status: COMPLETED | OUTPATIENT
Start: 2024-04-02 | End: 2024-04-02

## 2024-04-02 RX ORDER — DEXAMETHASONE SODIUM PHOSPHATE 10 MG/ML
INJECTION, SOLUTION INTRAMUSCULAR; INTRAVENOUS AS NEEDED
Status: DISCONTINUED | OUTPATIENT
Start: 2024-04-02 | End: 2024-04-02 | Stop reason: SURG

## 2024-04-02 RX ORDER — LETROZOLE 2.5 MG/1
2.5 TABLET, FILM COATED ORAL DAILY
Status: DISCONTINUED | OUTPATIENT
Start: 2024-04-03 | End: 2024-04-04

## 2024-04-02 RX ORDER — METOPROLOL SUCCINATE 25 MG/1
25 TABLET, EXTENDED RELEASE ORAL DAILY
Status: DISCONTINUED | OUTPATIENT
Start: 2024-04-03 | End: 2024-04-04

## 2024-04-02 RX ORDER — ENEMA 19; 7 G/133ML; G/133ML
1 ENEMA RECTAL ONCE AS NEEDED
Status: DISCONTINUED | OUTPATIENT
Start: 2024-04-02 | End: 2024-04-04

## 2024-04-02 RX ORDER — DOCUSATE SODIUM 100 MG/1
100 CAPSULE, LIQUID FILLED ORAL 2 TIMES DAILY
Status: DISCONTINUED | OUTPATIENT
Start: 2024-04-02 | End: 2024-04-04

## 2024-04-02 RX ORDER — OXYCODONE HYDROCHLORIDE 5 MG/1
5 TABLET ORAL EVERY 4 HOURS PRN
Status: DISCONTINUED | OUTPATIENT
Start: 2024-04-02 | End: 2024-04-04

## 2024-04-02 RX ORDER — ONDANSETRON 2 MG/ML
4 INJECTION INTRAMUSCULAR; INTRAVENOUS EVERY 6 HOURS PRN
Status: DISCONTINUED | OUTPATIENT
Start: 2024-04-02 | End: 2024-04-04

## 2024-04-02 RX ORDER — ATORVASTATIN CALCIUM 40 MG/1
40 TABLET, FILM COATED ORAL DAILY
Status: DISCONTINUED | OUTPATIENT
Start: 2024-04-02 | End: 2024-04-04

## 2024-04-02 RX ORDER — DIPHENHYDRAMINE HYDROCHLORIDE 50 MG/ML
12.5 INJECTION INTRAMUSCULAR; INTRAVENOUS AS NEEDED
Status: DISCONTINUED | OUTPATIENT
Start: 2024-04-02 | End: 2024-04-02 | Stop reason: HOSPADM

## 2024-04-02 RX ORDER — MEPERIDINE HYDROCHLORIDE 25 MG/ML
12.5 INJECTION INTRAMUSCULAR; INTRAVENOUS; SUBCUTANEOUS AS NEEDED
Status: DISCONTINUED | OUTPATIENT
Start: 2024-04-02 | End: 2024-04-02 | Stop reason: HOSPADM

## 2024-04-02 RX ORDER — HYDROMORPHONE HYDROCHLORIDE 1 MG/ML
0.4 INJECTION, SOLUTION INTRAMUSCULAR; INTRAVENOUS; SUBCUTANEOUS EVERY 2 HOUR PRN
Status: DISCONTINUED | OUTPATIENT
Start: 2024-04-02 | End: 2024-04-04

## 2024-04-02 RX ORDER — NALOXONE HYDROCHLORIDE 0.4 MG/ML
0.08 INJECTION, SOLUTION INTRAMUSCULAR; INTRAVENOUS; SUBCUTANEOUS AS NEEDED
Status: DISCONTINUED | OUTPATIENT
Start: 2024-04-02 | End: 2024-04-02 | Stop reason: HOSPADM

## 2024-04-02 RX ORDER — HYDROMORPHONE HYDROCHLORIDE 1 MG/ML
0.2 INJECTION, SOLUTION INTRAMUSCULAR; INTRAVENOUS; SUBCUTANEOUS EVERY 2 HOUR PRN
Status: DISCONTINUED | OUTPATIENT
Start: 2024-04-02 | End: 2024-04-04

## 2024-04-02 RX ORDER — SODIUM CHLORIDE, SODIUM LACTATE, POTASSIUM CHLORIDE, CALCIUM CHLORIDE 600; 310; 30; 20 MG/100ML; MG/100ML; MG/100ML; MG/100ML
INJECTION, SOLUTION INTRAVENOUS CONTINUOUS
Status: DISCONTINUED | OUTPATIENT
Start: 2024-04-02 | End: 2024-04-04

## 2024-04-02 RX ORDER — CEFAZOLIN SODIUM/WATER 2 G/20 ML
2 SYRINGE (ML) INTRAVENOUS EVERY 8 HOURS
Status: COMPLETED | OUTPATIENT
Start: 2024-04-02 | End: 2024-04-03

## 2024-04-02 RX ORDER — DIPHENHYDRAMINE HCL 25 MG
25 CAPSULE ORAL EVERY 4 HOURS PRN
Status: DISCONTINUED | OUTPATIENT
Start: 2024-04-02 | End: 2024-04-04

## 2024-04-02 RX ORDER — ONDANSETRON 2 MG/ML
4 INJECTION INTRAMUSCULAR; INTRAVENOUS EVERY 6 HOURS PRN
Status: DISCONTINUED | OUTPATIENT
Start: 2024-04-02 | End: 2024-04-02 | Stop reason: HOSPADM

## 2024-04-02 RX ORDER — OXYCODONE HYDROCHLORIDE 5 MG/1
2.5 TABLET ORAL EVERY 4 HOURS PRN
Status: DISCONTINUED | OUTPATIENT
Start: 2024-04-02 | End: 2024-04-04

## 2024-04-02 RX ORDER — ALBUTEROL SULFATE 90 UG/1
2 AEROSOL, METERED RESPIRATORY (INHALATION) EVERY 4 HOURS PRN
Status: DISCONTINUED | OUTPATIENT
Start: 2024-04-02 | End: 2024-04-04

## 2024-04-02 RX ORDER — HYDROMORPHONE HYDROCHLORIDE 1 MG/ML
0.2 INJECTION, SOLUTION INTRAMUSCULAR; INTRAVENOUS; SUBCUTANEOUS EVERY 5 MIN PRN
Status: DISCONTINUED | OUTPATIENT
Start: 2024-04-02 | End: 2024-04-02 | Stop reason: HOSPADM

## 2024-04-02 RX ORDER — ACETAMINOPHEN 500 MG
1000 TABLET ORAL ONCE
Status: DISCONTINUED | OUTPATIENT
Start: 2024-04-02 | End: 2024-04-02 | Stop reason: HOSPADM

## 2024-04-02 RX ORDER — ASPIRIN 81 MG/1
81 TABLET ORAL 2 TIMES DAILY
Status: DISCONTINUED | OUTPATIENT
Start: 2024-04-02 | End: 2024-04-04

## 2024-04-02 RX ORDER — MIDAZOLAM HYDROCHLORIDE 1 MG/ML
INJECTION INTRAMUSCULAR; INTRAVENOUS AS NEEDED
Status: DISCONTINUED | OUTPATIENT
Start: 2024-04-02 | End: 2024-04-02 | Stop reason: SURG

## 2024-04-02 RX ORDER — AMLODIPINE BESYLATE 5 MG/1
5 TABLET ORAL DAILY
Status: DISCONTINUED | OUTPATIENT
Start: 2024-04-02 | End: 2024-04-04

## 2024-04-02 RX ORDER — DIPHENHYDRAMINE HYDROCHLORIDE 50 MG/ML
25 INJECTION INTRAMUSCULAR; INTRAVENOUS ONCE AS NEEDED
Status: ACTIVE | OUTPATIENT
Start: 2024-04-02 | End: 2024-04-02

## 2024-04-02 RX ORDER — CEFAZOLIN SODIUM/WATER 2 G/20 ML
2 SYRINGE (ML) INTRAVENOUS ONCE
Status: COMPLETED | OUTPATIENT
Start: 2024-04-02 | End: 2024-04-02

## 2024-04-02 RX ORDER — METOCLOPRAMIDE HYDROCHLORIDE 5 MG/ML
5 INJECTION INTRAMUSCULAR; INTRAVENOUS EVERY 8 HOURS PRN
Status: DISCONTINUED | OUTPATIENT
Start: 2024-04-02 | End: 2024-04-04

## 2024-04-02 RX ORDER — TRANEXAMIC ACID 10 MG/ML
1000 INJECTION, SOLUTION INTRAVENOUS ONCE
Status: DISCONTINUED | OUTPATIENT
Start: 2024-04-02 | End: 2024-04-02 | Stop reason: HOSPADM

## 2024-04-02 RX ORDER — POLYETHYLENE GLYCOL 3350 17 G/17G
17 POWDER, FOR SOLUTION ORAL DAILY PRN
Status: DISCONTINUED | OUTPATIENT
Start: 2024-04-02 | End: 2024-04-04

## 2024-04-02 RX ORDER — ONDANSETRON 2 MG/ML
INJECTION INTRAMUSCULAR; INTRAVENOUS AS NEEDED
Status: DISCONTINUED | OUTPATIENT
Start: 2024-04-02 | End: 2024-04-02 | Stop reason: SURG

## 2024-04-02 RX ORDER — HYDROMORPHONE HYDROCHLORIDE 1 MG/ML
INJECTION, SOLUTION INTRAMUSCULAR; INTRAVENOUS; SUBCUTANEOUS
Status: COMPLETED
Start: 2024-04-02 | End: 2024-04-02

## 2024-04-02 RX ORDER — ACETAMINOPHEN 325 MG/1
650 TABLET ORAL
Status: DISCONTINUED | OUTPATIENT
Start: 2024-04-02 | End: 2024-04-04

## 2024-04-02 RX ORDER — HYDROMORPHONE HYDROCHLORIDE 1 MG/ML
0.4 INJECTION, SOLUTION INTRAMUSCULAR; INTRAVENOUS; SUBCUTANEOUS EVERY 5 MIN PRN
Status: DISCONTINUED | OUTPATIENT
Start: 2024-04-02 | End: 2024-04-02 | Stop reason: HOSPADM

## 2024-04-02 RX ORDER — DIPHENHYDRAMINE HYDROCHLORIDE 50 MG/ML
12.5 INJECTION INTRAMUSCULAR; INTRAVENOUS EVERY 4 HOURS PRN
Status: DISCONTINUED | OUTPATIENT
Start: 2024-04-02 | End: 2024-04-04

## 2024-04-02 RX ORDER — DEXAMETHASONE SODIUM PHOSPHATE 4 MG/ML
VIAL (ML) INJECTION AS NEEDED
Status: DISCONTINUED | OUTPATIENT
Start: 2024-04-02 | End: 2024-04-02 | Stop reason: SURG

## 2024-04-02 RX ORDER — MIDAZOLAM HYDROCHLORIDE 1 MG/ML
1 INJECTION INTRAMUSCULAR; INTRAVENOUS EVERY 5 MIN PRN
Status: DISCONTINUED | OUTPATIENT
Start: 2024-04-02 | End: 2024-04-02 | Stop reason: HOSPADM

## 2024-04-02 RX ORDER — TRANEXAMIC ACID 10 MG/ML
INJECTION, SOLUTION INTRAVENOUS AS NEEDED
Status: DISCONTINUED | OUTPATIENT
Start: 2024-04-02 | End: 2024-04-02 | Stop reason: SURG

## 2024-04-02 RX ORDER — METOCLOPRAMIDE HYDROCHLORIDE 5 MG/ML
10 INJECTION INTRAMUSCULAR; INTRAVENOUS EVERY 8 HOURS PRN
Status: DISCONTINUED | OUTPATIENT
Start: 2024-04-02 | End: 2024-04-02 | Stop reason: HOSPADM

## 2024-04-02 RX ORDER — DEXAMETHASONE SODIUM PHOSPHATE 10 MG/ML
8 INJECTION, SOLUTION INTRAMUSCULAR; INTRAVENOUS ONCE
Status: COMPLETED | OUTPATIENT
Start: 2024-04-03 | End: 2024-04-03

## 2024-04-02 RX ADMIN — CEFAZOLIN SODIUM/WATER 2 G: 2 G/20 ML SYRINGE (ML) INTRAVENOUS at 14:09:00

## 2024-04-02 RX ADMIN — DEXAMETHASONE SODIUM PHOSPHATE 2 MG: 10 INJECTION, SOLUTION INTRAMUSCULAR; INTRAVENOUS at 14:06:00

## 2024-04-02 RX ADMIN — ONDANSETRON 4 MG: 2 INJECTION INTRAMUSCULAR; INTRAVENOUS at 15:20:00

## 2024-04-02 RX ADMIN — TRANEXAMIC ACID 1000 MG: 10 INJECTION, SOLUTION INTRAVENOUS at 14:10:00

## 2024-04-02 RX ADMIN — DEXAMETHASONE SODIUM PHOSPHATE 4 MG: 4 MG/ML VIAL (ML) INJECTION at 15:20:00

## 2024-04-02 RX ADMIN — MIDAZOLAM HYDROCHLORIDE 2 MG: 1 INJECTION INTRAMUSCULAR; INTRAVENOUS at 13:56:00

## 2024-04-02 RX ADMIN — SODIUM CHLORIDE, SODIUM LACTATE, POTASSIUM CHLORIDE, CALCIUM CHLORIDE: 600; 310; 30; 20 INJECTION, SOLUTION INTRAVENOUS at 15:22:00

## 2024-04-02 NOTE — OPERATIVE REPORT
Operative Note    Patient Name/: Shanti Coy 1935  Date: 2024  Location: [facility]  Preoperative Diagnosis: Left knee osteoarthritis  Postoperative Diagnosis: Same  Operation: Left total knee arthroplasty mechanical alignment  Primary Surgeon: Sanjeev Cage  Assistant:  Chela ANTONIO surgical assistant first assist.  David Nowak also assisted in this case. Please note a skilled surgical assistant was necessary for this case in order to assist with patient positioning, prepping, draping, incision, exposure, implant placement, wound closure.  Indications: 88-year-old female with end-stage left knee osteoarthritis failed nonsurgical treatment  Surgical Findings: End-stage left knee OA  Operative Report:  After informed consent, the left lower extremity was marked.  Patient was brought to the operating room where spinal anesthesia was induced.  Preoperative exam demonstrated range of motion from full extension, gravity flexion 1 40-1 50. The left lower extremity was prepped and draped in sterile fashion.  Timeout was performed to verify correct surgical site and procedure.  2 g of Ancef was given within 1 hour of incision.  Additionally, 1 g tranexamic acid was given intravenously.  Next the limb was gravity exsanguinated and tourniquet inflated.  Incision was made anteriorly from 2 fingerbreadths proximal to the patella down along the medial aspect of the tibial tubercle.  This was carried down to the extensor mechanism.  Medial and lateral flaps were developed.  The VMO muscle belly was identified.  Full-thickness medial parapatellar arthrotomy was made from 2 fingerbreadths proximal to the patella along the VMO muscle belly, around the medial patella and patellar tendon.  The fat pad was dissected free from the patellar tendon and reflected medially.  Subperiosteal dissection was carried out posteriorly about the proximal medial aspect of the tibia.  Osteophytes were removed from the proximal  medial tibia.  The lateral patellofemoral plica was incised, and the patella was everted and denervated.  The synovium over the anterior distal femur was teed open.  Next the knee was flexed up, and remnant of the ACL and lateral meniscus were excised.  Whitesides line was marked on the trochlear groove.  I used an intramedullary reamer to open up the femur for our intramedullary alignment guide.  The distal femoral cutting block was placed on the distal femur to take off 9.5 mm of bone.  It sat down on the distal medial femur, measuring to take off more medial bone than lateral.  This was pinned in place.  A melba's wing was used to check the cut, and the cut was performed.  The distal medial femur fragment measured 7.5 mm.  Next the knee was flexed and the tibial crest was marked.  An extra medullary tibial cutting guide was placed above the ankle, measuring to take off a few millimeters off the medial side, pinned in line with the tibial crest in the coronal plane and with a few degrees of posterior slope in the sagittal plane.  The proximal tibia was cut, using a Z retractor to protect MCL, patellar tendon and lateral soft tissues.  The cut measured 3 medial, 4 lateral.  This was in line with our preoperative template.  After this portion of bone was removed, the knee was brought into extension and a lamina  was used to open up the extension space.  What remained of the medial and lateral menisci were removed.  Spacer block was placed with a T-handle drop-down ishaan to verify appropriate cut angle.  The knee was balanced to varus and valgus stress.  Next the knee was flexed up, and the femoral sizing block with stylus was used.  This sized for a size 3+ femur.  Pins were placed, and the sizing block removed.  Pins were noted to be parallel to the epicondylar axis and perpendicular to Whitesides line.  The 4-in-1 cutting block was placed and screwed down.  Anterior distal femur was cut, demonstrating a  positive grand piano sign.  Posterior condyles were cut, measuring 7.5 on the posterior medial cut.  Chamfers were then cut.  The cutting block was removed, and the knee was flexed with a lamina  to open up the posterior knee joint.  Notch contents and PCL were removed.  Posterior condylar recesses were opened, and posterior osteophytes chiseled out.  The knee was balanced to a flexion block as well.  The knee was brought into extension, and sized for a tibial baseplate.  Trials were then placed.  With the 3+ femur, the 3 tibia, and a 10 polyethylene liner, the knee came to full extension with excellent varus valgus stability.  In mid flexion, there was 2 to 3 mm of varus laxity, no valgus laxity, Lachman's were taken on the medial side.  In 90 degrees, anterior drawer was less than 5 mm rotating around the medial femoral condyle.  Gravity flexion was to 150 degrees with the patella tracking centrally.  The tibial component rotation was marked, and the tibial trial removed.  The femoral lugs were drilled, and the anterior chamfer finishing guide was placed to cut the finishing anterior chamfer cut.  The knee was then flexed, and the tibia brought forward to place the tibial baseplate trial.  It was noted that the size 3 was actually overhanging when I attempted optimal coverage and rotation, and therefore I did downsized to the size 2 tibia.  This was pinned in place, and the drill and keel punch were used to prepare the proximal tibia.  The knee was then brought into extension, the patella everted, and it was freehand cut from a thickness of 23 mm and cut to 13 mm.  Next final components were opened, bony surfaces were washed and dried, and periarticular pain cocktail was injected with 30 mL in the posterior capsule, 10 mL into lateral femoral and tibial periosteum, 10 mL into medial femoral and tibial periosteum, and 10 mL into extensor mechanism.  Cement was then mixed.  The tibial component was cemented  along with the proximal tibial bone surface, the component was impacted into place followed by removal of excess cement.  The femur was reduced onto the tibia, and cement was placed on the cut surface of the femur.  The cemented femoral component was then impacted down, with removal of excess cement.  The knee was brought into full extension and an axial load was held across the joint.  The patella was cemented along with the button, and this was compressed and held in place.  Once cement had hardened, the knee was trialed again.  Optimal stability was achieved with a 10 mm polyethylene liner, with exam demonstrating same as with trials.  The trial poly was removed, the knee was washed out, any loose bodies removed, and final poly was placed.  Betadine lavage was performed.  Closure was performed with 5 Ethibond for the arthrotomy, 2-0 Vicryl for skin, followed by staples.  A sterile dressing was applied, and the patient was brought to PACU in good condition.  Anesthesia: Spinal plus adductor canal block  Complications: None  Specimens: None  Blood loss: 25 mL  Fluids: See anesthesia report  Implants: Medacta GM K sphere 3+ femur, 2 tibia, 10 poly-, 2 patella  Drains: None  Condition: Good  Disposition: Floor    -Weightbearing as tolerated, PT OT  -DVT prophylaxis mechanical plus aspirin twice daily  -Pain control with oral meds  -Perioperative Ancef    Sanjeev Cage MD, [facility]  [facility]  Phone [phone]  Fax [phone]

## 2024-04-02 NOTE — INTERVAL H&P NOTE
Pre-op Diagnosis: Primary osteoarthritis of left knee [M17.12]    The above referenced H&P was reviewed by Sanjeev Cage MD on 4/2/2024, the patient was examined and no significant changes have occurred in the patient's condition since the H&P was performed.  I discussed with the patient and/or legal representative the potential benefits, risks and side effects of this procedure; the likelihood of the patient achieving goals; and potential problems that might occur during recuperation.  I discussed reasonable alternatives to the procedure, including risks, benefits and side effects related to the alternatives and risks related to not receiving this procedure.  We will proceed with procedure as planned.

## 2024-04-02 NOTE — ANESTHESIA PROCEDURE NOTES
Regional Block    Date/Time: 4/2/2024 2:02 PM    Performed by: Austin Reza MD  Authorized by: Austin Reza MD      General Information and Staff    Start Time:  4/2/2024 2:02 PM  End Time:  4/2/2024 2:06 PM  Anesthesiologist:  Austin Reza MD  Performed by:  Anesthesiologist  Patient Location:  OR      Site Identification: real time ultrasound guided and image stored and retrievable    Block site/laterality marked before start: site marked  Reason for Block: at surgeon's request and post-op pain management    Preanesthetic Checklist: 2 patient identifers, IV checked, risks and benefits discussed, monitors and equipment checked, pre-op evaluation, timeout performed, anesthesia consent, sterile technique used, no prohibitive neurological deficits and no local skin infection at insertion site      Procedure Details    Patient Position:  Supine  Prep: ChloraPrep    Monitoring:  Cardiac monitor, continuous pulse ox and blood pressure cuff  Block Type:  Adductor canal  Laterality:  Left  Injection Technique:  Single-shot    Needle    Needle Type:  Short-bevel and echogenic  Needle Gauge:  22 G  Needle Length:  100 mm  Needle Localization:  Ultrasound guidance  Reason for Ultrasound Use: appropriate spread of the medication was noted in real time and no ultrasound evidence of intravascular and/or intraneural injection            Assessment    Injection Assessment:  Good spread noted, negative resistance, negative aspiration for heme, incremental injection and low pressure  Heart Rate Change: No    - Patient tolerated block procedure well without evidence of immediate block related complications.     Medications  4/2/2024 2:02 PM      Additional Comments    Medication:  Ropivacaine 0.375% 20mL with 2mg PF Decadron

## 2024-04-02 NOTE — DISCHARGE INSTRUCTIONS
Sometimes managing your health at home requires assistance.  The [facility] team has recognized your preference to use [facility].  They can be reached by phone at [phone].  The fax number for your reference is [phone].  A representative from the home health agency will contact you or your family to schedule your first visit.         Joint Replacement Surgery: Patient Discharge Instructions   The best way to contact your surgeon or their team is by phone [phone] (preferred for urgent/acute matters) or through [facility].    What to Expect:  A certain amount of pain, swelling, and bruising is expected for several weeks after surgery.    Pain Medications:   Below is a list of commonly prescribed pain medications. You may have received prescriptions for some, all, or even additional pain medications not listed.  If you are taking the following medications for pain, these are some general guidelines for using and discontinuing them.  You may also want to preemptively take your pain medication before physical therapy (at least 30 minutes prior to the session).  If you are concerned you are suffering side effects from any of these medications please contact your surgeon’s office:    Ultram (Tramadol): Take this as prescribed 3 times per day with meals until your first postoperative appointment. If your pain levels are low, you can stop taking this on a scheduled basis and start taking it as needed.Tylenol (Acetaminophen): Take this as prescribed 3 times per day until your first postoperative appointment. Do NOT exceed 4g (4000mg) of acetaminophen daily.Celebrex (Celecoxib): this medication will treat swelling and pain and should be taken as directed until your first postoperative appointment.Oxycodone IR (immediate release): This is your “rescue” drug. Take this only as needed for pain that is not controlled (rated more than 4 out of 10) by other medications.You may wean yourself  off prescription pain medication to a non-prescription pain reliever by substituting two 500mg extra-strength Tylenol (Acetaminophen) tablets in place of your prescription pain medications up to four times per day. Do NOT exceed 4g (4000mg) of acetaminophen daily.*We will discuss pain management and weaning off pain medicine at your 1st postoperative appointment. To avoid delays in getting your narcotic pain medicine refilled, please contact the office prior to running out of medication either by phone [phone] or through [facility]. Please allow 24-72 hours for prescriptions to be filled. Your doctor may need to physically sign a hand-written prescription -- some medicines cannot be re-ordered electronically/or via phone. If you need to  a hand-written prescription, you can do so at the office located at [address] [place] [address], [address].  Pain Medications can be constipating. Below is a guideline for preventing or managing postoperative constipation:  Drink plenty of fluids. Aim to drink at least of 8oz of water 8 times per day (total of 64oz).  Eat a high-fiber diet (whatever helps you stay regular).  Make sure you are taking Senekot S (Docusate Sodium + Sennosides) or Colace (Docusate Sodium), 1-2 tablets twice daily, while on narcotics. You may decrease to once daily if you develop diarrhea. You will be sent home with a prescription.  If you have not had normal bowel movements the following over the counter medications can be helpful:  Add daily Miralax™ or Metamucil™ as directed on bottle.  If still no results, add a dose of Milk of Magnesia™ as directed on bottle.  If still no results, take one Dulcolax™ (Bisacodyl) suppository as directed on package.  If you still have no results and it has been more than 3 days since you had a bowel movement, be sure to contact your surgeon's office.    Swelling:  You may apply a cloth covered ice pack for up to 20 minutes each hour, or as needed, to  your incision to help control pain and swelling. Do not apply directly to your skin.  For the first 7 days after you leave the hospital, it is critical that you elevate your leg above the level of your heart 4 times per day for 1 hour each time. After a week, elevate your leg as needed if swelling is noted.   Call your Surgeons office if you have:  A temperature greater than 100.4 F.  Increasing pain or numbness at the incision or on your operated leg.  Increasing redness, drainage, or odor from the wound.  You WILL be swollen the first week or two after surgery; however, swelling that continues to get worse to your surgical leg or the opposite leg and is accompanied by discomfort or redness should be reported.  **Go to the Emergency Room if you have chest pain or shortness of breath**  Activities:  Your activity order is:   Weight bearing as tolerated  If you have home health care, a physical therapist (PT) will come to your home 2-3 times per week. The number of PT visits will depend on your needs and your insurance coverage.   At your first postoperative appointment with your surgeon, you will be given a prescription for outpatient physical therapy if you have not already started outpatient therapy.  Rules of the road: No driving until it is approved by your care team, and you are no longer taking narcotic pain medicine.    Wound Care/ Bathing:   Aquacel (waterproof brown rubberized dressing) should remain in place for 7 days and may then be removed. While this waterproof dressing is in place, you may shower and let water run over the dressing (ensure first that the dressing seal is intact and none of the edges have rolled up exposing the wound - if the dressing has become open to the environment, do not allow water to enter into this area)  Once the original postoperative dressing is removed, you do not need to keep your incision covered. If you would like to keep it covered for comfort you may - use a clean new  dressing each day, or more frequently if needed (if the dressing is soiled or it is not staying fixed to your skin). Use the dressings suggested by the nurse at discharge.  After the original postoperative dressing is removed, you should continue to keep the incision covered when showering to prevent it from getting wet prior to your first postoperative appointment. Before showering, be sure to cover the incision with saran wrap or a plastic bag to keep dry. After showering, pat the incision with a clean towel to ensure it is dry. Do this until you are cleared to get the incision wet (usually after the first postoperative appointment).  Once cleared to get the incision wet, you may gently allow soap and water to rinse over the area. Be sure to rinse the area well and gently pat dry.  Do not apply soap, lotion, cream, ointments, or powders to your incision. Keep the incision clean and dry.  Do not soak your incision in water. No tub baths, pools or hot tubs for at least 6 weeks after surgery and not until the wound is completely healed.  If you have stitches or staples, they will be removed at your first postoperative appointment or the week after. Do not remove steri-strips, if you have any. These will come off on their own and do not need to be replaced.  If you have a home care nurse they should:  Examine the incision during visits and call your surgeon if there are any signs of infection or other cause for concern.  Change your dressing and may remove staples (when indicated).  Take your vitals and draw your labs.    Remember, good hand washing with soap at all times helps prevent infections. Wash your hands with soap and water prior to performing any dressing changes or wound evaluation.    Medications/Special Instructions:  Do not restart your blood pressure medication until cleared by your physician, or until your systolic blood pressure (top number) is above 130 on 2 consecutive checks and then continue your  medication as prescribed.    Blood Thinners (you will be on blood thinners for a total of six weeks):  Aspirin: some patients will be prescribed Aspirin to prevent blood clots after surgery. If you are prescribed aspirin, take one 81mg tablet by mouth twice per day for six weeks.  DO NOT restart ttyvb2k/fish oils, glucosamine, nonsteroidal antiinflammatories [NSAIDs, such as such as Ibuprofen (Advil, Motrin), Naproxen (Naprosyn, Aleve), Diclofenac (Voltaren), Meloxicam (Mobic)], tumeric, cinnamon, progesterone, estrogen, or Aspirin (unless told differently) until you have completed your blood thinner. These will increase your risk of bleeding.   Protonix (Omeprazole/Nexium, etc.) may be ordered if Aspirin is to be continued while on your blood thinner and should be taken daily during that time. This will help to protect your stomach.    X-Ray  X-rays needed: none      If you do not have a follow-up appointment with your surgeon, or you need to change your follow-up appointment date/time, please call today to schedule or change this appointment: [phone]. Our phones are open to schedule appointments from 8:30am to 4:30pm, Monday through Friday.  If you have any questions or concerns during the postoperative period (for example: wound issues, pain, swelling, redness or drainage) call our office FIRST at [phone] so that we may appropriately direct you or set up a clinic appointment.

## 2024-04-02 NOTE — ANESTHESIA PROCEDURE NOTES
Spinal Block    Date/Time: 4/2/2024 1:56 PM    Performed by: Austin Reza MD  Authorized by: Austin Reza MD      General Information and Staff    Start Time:  4/2/2024 1:56 PM  End Time:  4/2/2024 2:02 PM  Anesthesiologist:  Austin Reza MD  Performed by:  Anesthesiologist  Patient Location:  OR  Site identification: surface landmarks    Preanesthetic Checklist: patient identified, IV checked, risks and benefits discussed, monitors and equipment checked, pre-op evaluation, timeout performed, anesthesia consent and sterile technique used      Procedure Details    Patient Position:  Sitting  Prep: ChloraPrep    Monitoring:  Cardiac monitor, heart rate and continuous pulse ox  Approach:  Midline  Location:  L3-4  Injection Technique:  Single-shot    Needle    Needle Type:  Sprotte  Needle Gauge:  24 G  Needle Length:  3.5 in    Assessment    Sensory Level:   Events: clear CSF, CSF aspirated, well tolerated and blood negative      Additional Comments

## 2024-04-02 NOTE — ANESTHESIA POSTPROCEDURE EVALUATION
[facility]    Shanti Coy Patient Status:  Inpatient   Age/Gender 88 year old female MRN [id number]   Location [facility] POST ANESTHESIA CARE UNIT Attending Sanjeev Cage MD   Hosp Day # 0 PCP Brenton Hinojosa MD       Anesthesia Post-op Note    LEFT TOTAL KNEE ARTHROPLASTY.    Procedure Summary       Date: 04/02/24 Room / Location:  MAIN OR 14 / [facility] MAIN OR    Anesthesia Start: 1353 Anesthesia Stop: 1554    Procedure: LEFT TOTAL KNEE ARTHROPLASTY. (Left: Knee) Diagnosis:       Primary osteoarthritis of left knee      (Primary osteoarthritis of left knee [M17.12])    Surgeons: Sanjeev Cage MD Anesthesiologist: Austin Reza MD    Anesthesia Type: spinal, regional ASA Status: 3            Anesthesia Type: spinal, regional    Vitals Value Taken Time   /87 04/02/24 1608   Temp 97.4 04/02/24 1609   Pulse 87 04/02/24 1609   Resp 16 04/02/24 1609   SpO2 100 % 04/02/24 1609   Vitals shown include unfiled device data.    Patient Location: PACU    Anesthesia Type: spinal and regional    Airway Patency: patent    Postop Pain Control: adequate    Mental Status: preanesthetic baseline    Nausea/Vomiting: none    Cardiopulmonary/Hydration status: stable euvolemic    Complications: no apparent anesthesia related complications    Postop vital signs: stable    Dental Exam: Unchanged from Preop    Patient to be discharged from PACU when criteria met.

## 2024-04-02 NOTE — ANESTHESIA PREPROCEDURE EVALUATION
PRE-OP EVALUATION    Patient Name: Shanti Coy    Admit Diagnosis: Primary osteoarthritis of left knee [M17.12]    Pre-op Diagnosis: Primary osteoarthritis of left knee [M17.12]    LEFT TOTAL KNEE ARTHROPLASTY.    Anesthesia Procedure: LEFT TOTAL KNEE ARTHROPLASTY. (Left)    Surgeon(s) and Role:     * Sanjeev Cage MD - Primary    Pre-op vitals reviewed.        Body mass index is 27.12 kg/m².    Current medications reviewed.  Hospital Medications:  • acetaminophen (Tylenol Extra Strength) tab 1,000 mg  1,000 mg Oral Once   • lactated ringers infusion   Intravenous Continuous   • tranexamic acid in sodium chloride 0.7% (Cyklokapron) 1000 mg/100mL infusion premix 1,000 mg  1,000 mg Intravenous Once   • ceFAZolin (Ancef) 2 g in 20mL IV syringe premix  2 g Intravenous Once       Outpatient Medications:     Medications Prior to Admission   Medication Sig Dispense Refill Last Dose   • Losartan Potassium-HCTZ 100-12.5 MG Oral Tab Take 1 tablet by mouth daily. 90 tablet 0    • letrozole 2.5 MG Oral Tab Take 1 tablet (2.5 mg total) by mouth daily. 90 tablet 3    • atorvastatin 40 MG Oral Tab Take 1 tablet (40 mg total) by mouth daily.      • TRELEGY ELLIPTA 200-62.5-25 MCG/ACT Inhalation Aerosol Powder, Breath Activated Inhale 1 puff into the lungs daily.      • metoprolol succinate ER 25 MG Oral Tablet 24 Hr Take 1 tablet (25 mg total) by mouth daily. 30 tablet 11    • AMLODIPINE 5 MG Oral Tab TAKE 1 TABLET BY MOUTH EVERY DAY 90 tablet 0    • azelastine 0.1 % Nasal Solution as needed.      • ipratropium 0.06 % Nasal Solution 2 sprays by Nasal route 3 (three) times daily as needed.      • Albuterol Sulfate HFA (PROAIR HFA) 108 (90 Base) MCG/ACT Inhalation Aero Soln Inhale 2 puffs into the lungs every 4 (four) hours as needed. 1 each 5    • mometasone 0.1 % External Ointment Apply 1 Application topically daily. (Patient taking differently: Apply 1 Application  topically as needed.) 15 g 1    • aspirin 81 MG Oral Tab  Take 1 tablet (81 mg total) by mouth daily.      • CRANBERRY EXTRACT OR Take 1 tablet by mouth daily.        • Vitamin D3 (VITAMIN D3) 2000 UNITS Oral Cap Take 1 capsule (2,000 Units total) by mouth daily.      • PRILOSEC 20 MG OR CPDR Take 1 capsule (20 mg total) by mouth every morning.      • VITAMIN E qd      • CLOBETASOL PROPIONATE E EX Apply topically as needed.      • Blood Pressure Monitoring (BLOOD PRESSURE CUFF) Does not apply Misc For use to measure BP daily 1 each 0        Allergies: Mold      Anesthesia Evaluation    Patient summary reviewed.    Anesthetic Complications  (-) history of anesthetic complications         GI/Hepatic/Renal             (+) chronic renal disease and CRI                   Cardiovascular  Comment: Conclusions:     1. Left ventricle: The cavity size was normal. Wall thickness was normal.      Systolic function was normal. The estimated ejection fraction was 60-65%.      No regional wall motion abnormalities. Left ventricular diastolic      function parameters were normal for the patient's age.   2. Aortic valve: There was mild to moderate regurgitation.   3. Aortic root: The aortic root was 3.9cm diameter.   4. Ascending aorta: The ascending aorta was 4.0cm diameter.   5. Right ventricle: The cavity size was normal. Systolic function was      normal.   Impressions:  This study is compared with previous dated 5/3/2018: Could not   open/visualize images from prior study in [facility].       ECG reviewed.  Exercise tolerance: good     MET: >4      (+) hypertension   (+) hyperlipidemia          (+) valvular problems/murmurs and AI                       Endo/Other    Negative endo/other ROS.                              Pulmonary      (+) asthma  (+) COPD                   Neuro/Psych                 (+) neuromuscular disease                 Past Surgical History:   Procedure Laterality Date   • APPENDECTOMY     • CATARACT     • COLONOSCOPY  01/01/2009   • FLUOR GID & LOCLZJ NDL/CATH SPI  DX/THER NJX  02/03/2014    Procedure: CERVICAL EPIDURAL;  Surgeon: Eugene Garcia MD;  Location: [facility]   • HYSTERECTOMY  01/01/1990    partial; one ovary in; heavy menses   • INJECTION, W/WO CONTRAST, DX/THERAPEUTIC SUBSTANCE, EPIDURAL/SUBARACHNOID; LUMBAR/SACRAL  02/03/2014    Procedure: LUMBAR EPIDURAL;  Surgeon: Eugene Garcia MD;  Location: [facility]   • INJECTION, W/WO CONTRAST, DX/THERAPEUTIC SUBSTANCE, EPIDURAL/SUBARACHNOID; LUMBAR/SACRAL N/A 07/06/2016    Procedure: LUMBAR EPIDURAL;  Surgeon: Jaswant Pham MD;  Location: [facility]   • INJECTION, W/WO CONTRAST, DX/THERAPEUTIC SUBSTANCE, EPIDURAL/SUBARACHNOID; LUMBAR/SACRAL N/A 07/28/2016    Procedure: LUMBAR EPIDURAL;  Surgeon: Jaswant Pham MD;  Location: [facility]   • KNEE SURGERY  09/08/2015    right leg   • LUMPECTOMY LEFT  09/2023    [facility]   • OTHER SURGICAL HISTORY      arthroscopy knee   • RADIATION LEFT  2023   • TONSILLECTOMY       Social History     Socioeconomic History   • Marital status:    • Number of children: 3   Occupational History   • Occupation: RETIRED TEACHER   Tobacco Use   • Smoking status: Never   • Smokeless tobacco: Never   Vaping Use   • Vaping Use: Never used   Substance and Sexual Activity   • Alcohol use: Yes     Comment: rare   • Drug use: No   • Sexual activity: Not Currently     Partners: Male     History   Drug Use No     Available pre-op labs reviewed.  Lab Results   Component Value Date    WBC 6.6 03/12/2024    RBC 4.60 03/12/2024    HGB 13.6 03/12/2024    HCT 42.0 03/12/2024    MCV 91.3 03/12/2024    MCH 29.6 03/12/2024    MCHC 32.4 03/12/2024    RDW 12.8 03/12/2024    .0 03/12/2024     Lab Results   Component Value Date     03/12/2024    K 4.5 03/12/2024     03/12/2024    CO2 30.0 03/12/2024    BUN 27 (H) 03/12/2024    CREATSERUM 0.99 03/12/2024     (H) 03/12/2024    CA 9.6 03/12/2024     Lab  Results   Component Value Date    INR 0.96 03/12/2024         Airway      Mallampati: II  Mouth opening: >3 FB  TM distance: > 6 cm  Neck ROM: full Cardiovascular    Cardiovascular exam normal.  Rhythm: regular  Rate: normal     Dental             Pulmonary    Pulmonary exam normal.  Breath sounds clear to auscultation bilaterally.               Other findings        ASA: 3   Plan: spinal and regional  NPO status verified and patient meets guidelines.    Post-procedure pain management plan discussed with surgeon and patient.  Surgeon requests: regional block    Plan/risks discussed with: patient            Present on Admission:  **None**

## 2024-04-03 LAB
ANION GAP SERPL CALC-SCNC: 4 MMOL/L (ref 0–18)
BASOPHILS # BLD AUTO: 0.01 X10(3) UL (ref 0–0.2)
BASOPHILS NFR BLD AUTO: 0.1 %
BUN BLD-MCNC: 22 MG/DL (ref 9–23)
CALCIUM BLD-MCNC: 9.5 MG/DL (ref 8.5–10.1)
CHLORIDE SERPL-SCNC: 107 MMOL/L (ref 98–112)
CO2 SERPL-SCNC: 26 MMOL/L (ref 21–32)
CREAT BLD-MCNC: 1.02 MG/DL
EGFRCR SERPLBLD CKD-EPI 2021: 54 ML/MIN/1.73M2 (ref 60–?)
EOSINOPHIL # BLD AUTO: 0 X10(3) UL (ref 0–0.7)
EOSINOPHIL NFR BLD AUTO: 0 %
ERYTHROCYTE [DISTWIDTH] IN BLOOD BY AUTOMATED COUNT: 12.5 %
GLUCOSE BLD-MCNC: 129 MG/DL (ref 70–99)
HCT VFR BLD AUTO: 33.9 %
HGB BLD-MCNC: 11.7 G/DL
IMM GRANULOCYTES # BLD AUTO: 0.04 X10(3) UL (ref 0–1)
IMM GRANULOCYTES NFR BLD: 0.4 %
LYMPHOCYTES # BLD AUTO: 0.51 X10(3) UL (ref 1–4)
LYMPHOCYTES NFR BLD AUTO: 4.7 %
MCH RBC QN AUTO: 30.2 PG (ref 26–34)
MCHC RBC AUTO-ENTMCNC: 34.5 G/DL (ref 31–37)
MCV RBC AUTO: 87.4 FL
MONOCYTES # BLD AUTO: 0.67 X10(3) UL (ref 0.1–1)
MONOCYTES NFR BLD AUTO: 6.2 %
NEUTROPHILS # BLD AUTO: 9.57 X10 (3) UL (ref 1.5–7.7)
NEUTROPHILS # BLD AUTO: 9.57 X10(3) UL (ref 1.5–7.7)
NEUTROPHILS NFR BLD AUTO: 88.6 %
OSMOLALITY SERPL CALC.SUM OF ELEC: 289 MOSM/KG (ref 275–295)
PLATELET # BLD AUTO: 241 10(3)UL (ref 150–450)
POTASSIUM SERPL-SCNC: 4.5 MMOL/L (ref 3.5–5.1)
RBC # BLD AUTO: 3.88 X10(6)UL
SODIUM SERPL-SCNC: 137 MMOL/L (ref 136–145)
WBC # BLD AUTO: 10.8 X10(3) UL (ref 4–11)

## 2024-04-03 PROCEDURE — 99024 POSTOP FOLLOW-UP VISIT: CPT | Performed by: ORTHOPAEDIC SURGERY

## 2024-04-03 PROCEDURE — 99232 SBSQ HOSP IP/OBS MODERATE 35: CPT | Performed by: INTERNAL MEDICINE

## 2024-04-03 RX ORDER — CELECOXIB 200 MG/1
200 CAPSULE ORAL DAILY
Qty: 30 CAPSULE | Refills: 0 | Status: SHIPPED | OUTPATIENT
Start: 2024-04-03 | End: 2024-05-03

## 2024-04-03 RX ORDER — ACETAMINOPHEN 500 MG
1000 TABLET ORAL EVERY 8 HOURS
Qty: 90 TABLET | Refills: 0 | Status: SHIPPED | OUTPATIENT
Start: 2024-04-03 | End: 2024-04-18

## 2024-04-03 RX ORDER — CALCIUM CARBONATE 500 MG/1
1000 TABLET, CHEWABLE ORAL 3 TIMES DAILY PRN
Status: DISCONTINUED | OUTPATIENT
Start: 2024-04-03 | End: 2024-04-04

## 2024-04-03 RX ORDER — PANTOPRAZOLE SODIUM 20 MG/1
20 TABLET, DELAYED RELEASE ORAL
Status: DISCONTINUED | OUTPATIENT
Start: 2024-04-03 | End: 2024-04-04

## 2024-04-03 RX ORDER — SENNA AND DOCUSATE SODIUM 50; 8.6 MG/1; MG/1
1 TABLET, FILM COATED ORAL 2 TIMES DAILY PRN
Qty: 30 TABLET | Refills: 0 | Status: SHIPPED | OUTPATIENT
Start: 2024-04-03

## 2024-04-03 RX ORDER — TRAMADOL HYDROCHLORIDE 50 MG/1
50 TABLET ORAL EVERY 12 HOURS SCHEDULED
Qty: 30 TABLET | Refills: 0 | Status: SHIPPED | OUTPATIENT
Start: 2024-04-03 | End: 2024-04-18

## 2024-04-03 RX ORDER — ASPIRIN 81 MG/1
81 TABLET ORAL 2 TIMES DAILY
Qty: 80 TABLET | Refills: 0 | Status: SHIPPED | OUTPATIENT
Start: 2024-04-03 | End: 2024-05-13

## 2024-04-03 RX ORDER — OXYCODONE HYDROCHLORIDE 5 MG/1
TABLET ORAL EVERY 4 HOURS PRN
Qty: 40 TABLET | Refills: 0 | Status: SHIPPED | OUTPATIENT
Start: 2024-04-03

## 2024-04-03 NOTE — PROGRESS NOTES
[facility] Ortho Progress Note    Subjective: No acute events.  She worked with therapy this morning, feels that her pain did increase following getting up and moving.  It did improve with medications but still a little bit worse than it was earlier this morning.  She has been tolerating a diet.    Objective: Patient appears comfortable, very pleasant.  Left lower extremity dressing clean dry and intact.  Neurovascular intact left lower extremity.      Assessment/Plan: 88 year old female postop day # 1 status post left total knee arthroplasty.  -Weightbearing as tolerated, PT OT  -DVT prophylaxis mechanical plus aspirin twice daily  -Pain control with oral meds  -Perioperative Ancef  Disposition: Floor, patient will stay an additional night for pain control as well as additional therapy to finish clearing stairs and car transfers    Sanjeev Cage MD, FAAOS  [facility] Orthopaedic Surgery  Phone [phone]  Fax [phone]

## 2024-04-03 NOTE — CONSULTS
[facility]  Report of Consultation    Shanti Coy Patient Status:  Inpatient    1935 MRN [id number]   Location [facility] 3SW-A Attending Sanjeev Cage MD   Hosp Day # 0 PCP Brenton Hinojosa MD     Reason for Consultation:  Medical management    REFERRING PHYSICIAN: Sanjeev Samuel MD    Chief Complaint: Status post left total knee arthroplasty by Ortho Dr. Cage    History of Present Illness:  Shanti Coy is a  88 year old female admitted Status post left total knee arthroplasty by Ortho Dr. Cage.  We are consulted for medical management of chronic medical problems including hypertension, hyperlipidemia, asthma, prediabetes which are currently controlled with medications.  Patient complains of expected incisional pain 4 out of 10 pain.  Denies any nausea vomiting.  Tolerating diet.  Patient seen with patient's sons and granddaughter at bedside.  Denies any chest pain or shortness of breath.  Denies any abdominal pain.      History:  Past Medical History:   Diagnosis Date    Aortic valve insufficiency     last echo 23    Asthma (HCC)     Eczema     Exposure to medical diagnostic radiation 2023    Female climacteric state 06/15/2000    GERD     High blood pressure     High cholesterol     Hx of motion sickness     Left Breast cancer (HCC) 2023    Onychocryptosis 2016    Osteoarthritis     Other and unspecified hyperlipidemia     Pain due to onychomycosis of toenail 2017    Prediabetes     Primary localized osteoarthrosis of right lower leg 06/10/2015    Visual impairment     glasses     Past Surgical History:   Procedure Laterality Date    APPENDECTOMY      CATARACT      COLONOSCOPY  2009    FLUOR GID & LOCLZJ NDL/CATH SPI DX/THER NJX  2014    Procedure: CERVICAL EPIDURAL;  Surgeon: Eugene Garcia MD;  Location: [facility]    HYSTERECTOMY  1990    partial; one ovary in; heavy menses    INJECTION, W/WO CONTRAST,  DX/THERAPEUTIC SUBSTANCE, EPIDURAL/SUBARACHNOID; LUMBAR/SACRAL  02/03/2014    Procedure: LUMBAR EPIDURAL;  Surgeon: Eugene Garcia MD;  Location: [facility]    INJECTION, W/WO CONTRAST, DX/THERAPEUTIC SUBSTANCE, EPIDURAL/SUBARACHNOID; LUMBAR/SACRAL N/A 07/06/2016    Procedure: LUMBAR EPIDURAL;  Surgeon: Jaswant Pham MD;  Location: [facility]    INJECTION, W/WO CONTRAST, DX/THERAPEUTIC SUBSTANCE, EPIDURAL/SUBARACHNOID; LUMBAR/SACRAL N/A 07/28/2016    Procedure: LUMBAR EPIDURAL;  Surgeon: Jaswant Pham MD;  Location: [facility]    KNEE SURGERY  09/08/2015    right leg    LUMPECTOMY LEFT  09/2023    ILC    OTHER SURGICAL HISTORY      arthroscopy knee    RADIATION LEFT  2023    TONSILLECTOMY       Family History   Problem Relation Age of Onset    Breast Cancer Self     Breast Cancer Mother 69    Cancer Father         leukemia    Cancer Brother         prostate ca    Heart Disorder Brother         [pacemaker    Cancer Son         prostate ca    Hypertension Son     Diabetes Maternal Grandmother     Diabetes Maternal Grandfather     Diabetes Paternal Grandmother     Diabetes Paternal Grandfather      Social History:   reports that she has never smoked. She has never used smokeless tobacco. She reports current alcohol use. She reports that she does not use drugs.    Allergies:  Allergies   Allergen Reactions    Mold        Medications:    Current Facility-Administered Medications:     lactated ringers infusion, , Intravenous, Continuous    tiZANidine (Zanaflex) partial tab 1 mg, 1 mg, Oral, Q6H PRN    [START ON 4/3/2024] dexAMETHasone PF (Decadron) 10 MG/ML injection 8 mg, 8 mg, Intravenous, Once    traMADol (Ultram) tab 50 mg, 50 mg, Oral, 2 times per day    oxyCODONE immediate release tab 2.5 mg, 2.5 mg, Oral, Q4H PRN **OR** oxyCODONE immediate release tab 5 mg, 5 mg, Oral, Q4H PRN    HYDROmorphone (Dilaudid) 1 MG/ML injection 0.2 mg, 0.2 mg,  Intravenous, Q2H PRN **OR** HYDROmorphone (Dilaudid) 1 MG/ML injection 0.4 mg, 0.4 mg, Intravenous, Q2H PRN    albuterol (Ventolin HFA) 108 (90 Base) MCG/ACT inhaler 2 puff, 2 puff, Inhalation, Q4H PRN    atorvastatin (Lipitor) tab 40 mg, 40 mg, Oral, Daily    azelastine (Astelin) 0.1 % nasal solution 2 spray, 2 spray, Each Nare, BID PRN    [START ON 4/3/2024] letrozole (Femara) tab 2.5 mg, 2.5 mg, Oral, Daily    [START ON 4/3/2024] fluticasone-umeclidin-vilant (Trelegy Ellipta) 200-62.5-25 MCG/ACT inhaler 1 puff, 1 puff, Inhalation, Daily    sodium chloride 0.9 % IV bolus 500 mL, 500 mL, Intravenous, Once PRN    sennosides (Senokot) tab 17.2 mg, 17.2 mg, Oral, Nightly    docusate sodium (Colace) cap 100 mg, 100 mg, Oral, BID    polyethylene glycol (PEG 3350) (Miralax) 17 g oral packet 17 g, 17 g, Oral, Daily PRN    magnesium hydroxide (Milk of Magnesia) 400 MG/5ML oral suspension 30 mL, 30 mL, Oral, Daily PRN    bisacodyl (Dulcolax) 10 MG rectal suppository 10 mg, 10 mg, Rectal, Daily PRN    fleet enema (Fleet) 7-19 GM/118ML rectal enema 133 mL, 1 enema, Rectal, Once PRN    ondansetron (Zofran) 4 MG/2ML injection 4 mg, 4 mg, Intravenous, Q6H PRN    metoclopramide (Reglan) 5 mg/mL injection 5 mg, 5 mg, Intravenous, Q8H PRN    diphenhydrAMINE (Benadryl) 50 mg/mL  injection 25 mg, 25 mg, Intravenous, Once PRN    diphenhydrAMINE (Benadryl) cap/tab 25 mg, 25 mg, Oral, Q4H PRN **OR** diphenhydrAMINE (Benadryl) 50 mg/mL  injection 12.5 mg, 12.5 mg, Intravenous, Q4H PRN    [START ON 4/3/2024] ferrous sulfate DR tab 325 mg, 325 mg, Oral, Daily with breakfast    aspirin DR tab 81 mg, 81 mg, Oral, BID    ceFAZolin (Ancef) 2 g in 20mL IV syringe premix, 2 g, Intravenous, Q8H    acetaminophen (Tylenol) tab 650 mg, 650 mg, Oral, Q4H While awake    Review of Systems:  A comprehensive 14 point review of systems was completed.  Pertinent positives and negatives noted in the the HPI.    Physical Exam:  Vital signs: Blood pressure  121/73, pulse 84, temperature 98.6 °F (37 °C), temperature source Oral, resp. rate 18, height 5' 4\" (1.626 m), weight 159 lb (72.1 kg), SpO2 97%, not currently breastfeeding.  General: No acute distress. Alert and oriented x 3.  HEENT: Moist mucous membranes.   Respiratory: Clear to auscultation bilaterally.  No wheezes. No rhonchi.  Cardiovascular: S1, S2.  Regular rate and rhythm.    Abdomen: Soft, nontender, nondistended.  Positive bowel sounds.   Neurologic: Awake alert, no focal neurological deficits.  Musculoskeletal: Left knee incision and dressing  Psychiatric: Appropriate mood and affect.    Laboratory Data:     CBC and BMP ordered for morning    ASSESSMENT / PLAN:   Status post left total knee arthroplasty by Ortho Dr. Cage-managed by Ortho    Hypertension  Continue home metoprolol, patient states she already took today's dose this morning before surgery with a sip of water  Will restart home metoprolol with holding parameters to hold if systolic blood pressure less than 120  We will hold home losartan hydrochlorothiazide at this time as blood pressure normotensive at this time, will restart tomorrow if blood pressure starts trending higher  Hyperlipidemia  Continue home statin  Mild intermittent asthma/COPD  Continue home inhalers  Mild prediabetes  Hemoglobin A1c last checked was 7/3/2023 which was 6.3 on chart review  Patient not taking any medications for this at home  Blood sugar has been well-controlled in the past on chart review  Follow Accu-Cheks as needed        Quality:  DVT Prophylaxis: SCD.  Aspirin 81 mg twice daily for DVT prophylaxis per Ortho  CODE status: Full  Daley: No    Plan of care discussed with patient, patient's sons and granddaughter at bedside.  Discussed with RN        Thank you for allowing me to participate in the care of your patient. Will continue to follow while in hospital.    Steph Ojeda MD  4/2/2024  7:23 PM

## 2024-04-03 NOTE — PLAN OF CARE
Problem: PAIN - ADULT  Goal: Verbalizes/displays adequate comfort level or patient's stated pain goal  Description: INTERVENTIONS:  - Encourage pt to monitor pain and request assistance  - Assess pain using appropriate pain scale  - Administer analgesics based on type and severity of pain and evaluate response  - Implement non-pharmacological measures as appropriate and evaluate response  - Consider cultural and social influences on pain and pain management  - Manage/alleviate anxiety  - Utilize distraction and/or relaxation techniques  - Monitor for opioid side effects  - Notify MD/LIP if interventions unsuccessful or patient reports new pain  - Anticipate increased pain with activity and pre-medicate as appropriate  Outcome: Progressing   Minimal pain to left surgical knee, rated pain level at 2.  Patient on Joint Pain protocol.   Denies having numbness/tingling or calf pain to LLE.    Problem: SAFETY ADULT - FALL  Goal: Free from fall injury  Description: INTERVENTIONS:  - Assess pt frequently for physical needs  - Identify cognitive and physical deficits and behaviors that affect risk of falls.  - [place] fall precautions as indicated by assessment.  - Educate pt/family on patient safety including physical limitations  - Instruct pt to call for assistance with activity based on assessment  - Modify environment to reduce risk of injury  - Provide assistive devices as appropriate  - Consider OT/PT consult to assist with strengthening/mobility  - Encourage toileting schedule  Outcome: Progressing

## 2024-04-03 NOTE — PROGRESS NOTES
[facility]   part of [facility]     Hospitalist Progress Note     Shanti Coy Patient Status:  Inpatient    1935 MRN [id number]   Location [facility] 3SW-A Attending Sanjeev Cage MD   Hosp Day # 1 PCP Brenton Hinojosa MD     Chief Complaint: Medical management    Subjective:     Patient feels good, expected incisional pain up to 6-7 out of 10 after working with physical therapy according to patient, took 1 Tylenol half an hour back which is helping with the pain at this time according to patient.  Patient currently sitting up in the chair.  Denies any chest pain or shortness of breath.  Denies nausea vomiting.  Tolerating diet.    Objective:    Review of Systems:   A comprehensive review of systems was completed; pertinent positive and negatives stated in subjective.    Vital signs:  Temp:  [97.6 °F (36.4 °C)-98.6 °F (37 °C)] 97.9 °F (36.6 °C)  Pulse:  [68-94] 84  Resp:  [11-24] 18  BP: (104-152)/(59-96) 131/61  SpO2:  [92 %-100 %] 92 %    Physical Exam:    /61 (BP Location: Right arm)   Pulse 84   Temp 97.9 °F (36.6 °C) (Oral)   Resp 18   Ht 5' 4\" (1.626 m)   Wt 159 lb (72.1 kg)   LMP  (LMP Unknown)   SpO2 92%   BMI 27.29 kg/m²     General: No acute distress. Alert and oriented x 3.  HEENT: Moist mucous membranes.   Respiratory: Clear to auscultation bilaterally.  No wheezes. No rhonchi.  Cardiovascular: S1, S2.  Regular rate and rhythm.    Abdomen: Soft, nontender, nondistended.  Positive bowel sounds.   Neurologic: Awake alert, no focal neurological deficits.  Musculoskeletal: Left knee incision and dressing  Psychiatric: Appropriate mood and affect.       Diagnostic Data:    Labs:  Recent Labs   Lab 24   WBC 10.8   HGB 11.7*   MCV 87.4   .0       Recent Labs   Lab 24   *   BUN 22   CREATSERUM 1.02   CA 9.5      K 4.5      CO2 26.0       Estimated Creatinine Clearance: 34.2 mL/min (based on SCr of 1.02 mg/dL).    No  results for input(s): \"TROP\", \"TROPHS\", \"CK\" in the last 168 hours.    No results for input(s): \"PTP\", \"INR\" in the last 168 hours.               Microbiology    No results found for this visit on 04/02/24.      Imaging: Reviewed in Epic.    Medications:    pantoprazole  20 mg Oral QAM AC    traMADol  50 mg Oral 2 times per day    atorvastatin  40 mg Oral Daily    letrozole  2.5 mg Oral Daily    fluticasone-umeclidin-vilant  1 puff Inhalation Daily    sennosides  17.2 mg Oral Nightly    docusate sodium  100 mg Oral BID    ferrous sulfate  325 mg Oral Daily with breakfast    aspirin  81 mg Oral BID    acetaminophen  650 mg Oral Q4H While awake    metoprolol succinate ER  25 mg Oral Daily    amLODIPine  5 mg Oral Daily       Assessment & Plan:      Status post left total knee arthroplasty by Ortho Dr. Cage-managed by Ortho     Hypertension  Continue  home metoprolol with holding parameters to hold if systolic blood pressure less than 120  We will continue home losartan hydrochlorothiazide at time of discharge  Hyperlipidemia  Continue home statin  Mild intermittent asthma/COPD  Continue home inhalers  Mild prediabetes  Hemoglobin A1c last checked was 7/3/2023 which was 6.3 on chart review  Patient not taking any medications for this at home  Blood sugar has been well-controlled in the past on chart review  Follow Accu-Cheks as needed  Discussed with patient, discussed with RN.  Discharge planning per Ortho, wound care and activity per Ortho, final discharge order from Ortho who is the primary attending.  Follow-up with regular outpatient primary care physician within 1 week in office.      Steph Ojeda MD    Supplementary Documentation:     Quality:  DVT Mechanical Prophylaxis:   SCDs, Early ambuation  DVT Pharmacologic Prophylaxis   Medication   None         DVT Pharmacologic prophylaxis: Aspirin 81 mg      Code Status: Not on file  Daley: External urinary catheter in place  Daley Duration (in days):   Central  line:    MIRNA: 4/4/2024    Discharge is dependent on: Per Ortho  At this point Ms. Coy is expected to be discharge to: Home    The 21st Century Cures Act makes medical notes like these available to patients in the interest of transparency. Please be advised this is a medical document. Medical documents are intended to carry relevant information, facts as evident, and the clinical opinion of the practitioner. The medical note is intended as peer to peer communication and may appear blunt or direct. It is written in medical language and may contain abbreviations or verbiage that are unfamiliar.

## 2024-04-03 NOTE — OCCUPATIONAL THERAPY NOTE
OCCUPATIONAL THERAPY EVALUATION - INPATIENT    Room Number: 376/376-A  Evaluation Date: 4/3/2024     Type of Evaluation: Initial  Presenting Problem: S/p L TKA mechanical alignment    Physician Order: IP Consult to Occupational Therapy  Reason for Therapy:  ADL/IADL Dysfunction and Discharge Planning    OCCUPATIONAL THERAPY ASSESSMENT   Patient is a 88 year old female admitted on 4/2/2024 from home for Presenting Problem: S/p L TKA mechanical alignment. Co-Morbidities : CKD, COPD, HTN, asthma, Breast CA   Patient met all OT goals at Supervision to MOD I level.  Patient participated in all necessary education and reports no further questions/concerns at this time.     WEIGHT BEARING RESTRICTION  Weight Bearing Restriction: L lower extremity           L Lower Extremity: Weight Bearing as Tolerated    Recommendations for nursing staff:   Transfers: SBA with RW  Toileting location: commode over Toilet    EVALUATION SESSION:  Patient at start of session: semi-supine   FUNCTIONAL TRANSFER ASSESSMENT  Sit to Stand: Edge of Bed  Edge of Bed: Supervision  Toilet Transfer: Supervision (w/ RW and commode over toilet for elevated height and to simulate home)    BED MOBILITY  Supine to Sit : Supervision  Scooting: SBA    BALANCE ASSESSMENT  Static Sitting: Independent  Sitting Bilateral: Independent  Static Standing: Modified Independent  Standing Bilateral: Stand-by Assist    FUNCTIONAL ADL ASSESSMENT  Grooming Standing: Modified Independent  UB Dressing Seated: Supervision  LB Dressing Seated: Supervision  LB Dressing Standing: Supervision  Toileting Seated: Supervision  Toileting Standing: Supervision      ACTIVITY TOLERANCE: Pt on room air and denies SOB, dizziness or lightheadedness throughout session. No significant change in vitals noted.                          O2 SATURATIONS       COGNITION  Overall Cognitive Status:  WFL - within functional limits  COGNITION ASSESSMENTS       Upper Extremity:   ROM: within  functional limits   Strength: is within functional limits   Coordination:  Gross motor: intact   Fine motor: intact     EDUCATION PROVIDED  Patient: Role of Occupational Therapy; Plan of Care; Adaptive Equipment Recommendations; DME Recommendations; Fall Prevention; Functional Transfer Techniques; Weight Bear Status; Posture/Positioning; Energy Conservation; Compensatory ADL Techniques; Proper Body Mechanics  Patient's Response to Education: Verbalized Understanding; Returned Demonstration    Equipment used: RW, commode, GB  Demonstrates functional use    Therapist comments: Pt is pleasant and motivated to participate in therapy. Education given re: adaptive strategies for Adls, AE/DME recommendations and use, safety and fall prevention with shower transfers/bathing and nighttime toileting. Discussed IADL management, pt has necessary assist arranged.     Patient End of Session: Up in chair;Needs met;Call light within reach;RN aware of session/findings;All patient questions and concerns addressed;SCDs in place;Ice applied    OCCUPATIONAL PROFILE    HOME SITUATION  Type of Home: House  Home Layout: Two level (has bed set up on main if needed)  Lives With: Alone    Toilet and Equipment: Standard height toilet;Toilet riser with arms;3-in-1 commode  Shower/Tub and Equipment: Walk-in shower;Tub-shower combo;Grab bar;Shower chair;Tub transfer bench  Other Equipment:  (cane)    Occupation/Status: retired teacher     Drives: Yes  Patient Regularly Uses: Glasses    Prior Level of Function: Pt lives alone and is typically independent with ADL/IADLs and mobility without device. She has cleaning assist 1x/month. Supportive sons live locally. Pt has procured above listed AE/DME from [employer] in anticipation of this surgery. She prepared meals ahead of time and friends have also arranged a schedule to bring her food. Family moved bed to first floor.     SUBJECTIVE  \"I went to the [facility].\"    PAIN  ASSESSMENT  Ratin  Location: LLE  Management Techniques: Activity promotion;Body mechanics;Ice    OBJECTIVE  Precautions: Bed/chair alarm  Fall Risk: Standard fall risk    WEIGHT BEARING RESTRICTION  Weight Bearing Restriction: L lower extremity           L Lower Extremity: Weight Bearing as Tolerated    AM-PAC ‘6-Clicks’ Inpatient Daily Activity Short Form  -   Putting on and taking off regular lower body clothing?: None  -   Bathing (including washing, rinsing, drying)?: A Little  -   Toileting, which includes using toilet, bedpan or urinal? : None  -   Putting on and taking off regular upper body clothing?: None  -   Taking care of personal grooming such as brushing teeth?: None  -   Eating meals?: None    AM-PAC Score:  Score: 23  Approx Degree of Impairment: 15.86%  Standardized Score (AM-PAC Scale): 51.12      ADDITIONAL TESTS     NEUROLOGICAL FINDINGS        PLAN   Patient has been evaluated and presents with no skilled Occupational Therapy needs at this time.  Patient discharged from Occupational Therapy services.  Please re-order if a new functional limitation presents during this admission.      Patient Evaluation Complexity Level:   Occupational Profile/Medical History LOW - Brief history including review of medical or therapy records    Specific performance deficits impacting engagement in ADL/IADL LOW  1 - 3 performance deficits    Client Assessment/Performance Deficits MODERATE - Comorbidities and min to mod modifications of tasks    Clinical Decision Making LOW - Analysis of occupational profile, problem-focused assessments, limited treatment options    Overall Complexity LOW     OT Session Time: 30 minutes  Self-Care Home Management: 15 minutes

## 2024-04-03 NOTE — PHYSICAL THERAPY NOTE
PHYSICAL THERAPY JOINT EVALUATION - INPATIENT     Room Number: 376/376-A  Evaluation Date: 4/3/2024  Type of Evaluation: Initial  Physician Order: PT Eval and Treat    Presenting Problem: L TKA 4/2/24  Co-Morbidities : CKD, COPD, HTN, asthma, Breast CA  Reason for Therapy: Mobility Dysfunction and Discharge Planning    PHYSICAL THERAPY ASSESSMENT   Patient is currently functioning below baseline with bed mobility, transfers, gait, stair negotiation, and standing prolonged periods. Prior to admission, patient's baseline is independent with all self care and mobility..   Patient is requiring supervision and contact guard assist as a result of the following impairments: decreased functional strength, decreased endurance/aerobic capacity, pain, impaired dynamic standing balance, and decreased muscular endurance.  Physical Therapy will continue to follow for duration of hospitalization.    Pt appropriate for dc to home when medically cleared from PT standpoint. If pt remains hospitalized will see one additional session for review of stair training and car transfer training. Pt will benefit from walker for home use.     Patient will benefit from continued skilled PT Services at discharge to promote functional independence in home.  Anticipate patient will return home with home health PT.    PLAN     Rehab Potential : Good          CURRENT GOALS  Goal #1  Patient is able to demonstrate supine - sit EOB @ level: supervision  MET   Goal #2  Patient is able to demonstrate transfers Sit to/from Stand at assistance level: supervision  MET     Goal #3    Patient is able to ambulate 150 feet with assistive device at assistance level: supervision  MET   Goal #4    Patient will negotiate 4 stairs/one curb w/ assistive device and supervision  ongoing   Goal #5       Goal #6      Goal Comments: Goals established on 4/3/2024      PHYSICAL THERAPY MEDICAL/SOCIAL HISTORY  History related to current admission: Patient is a 88  year old female admitted on 2024 from home for elective L TKA completed 24.    HOME SITUATION  Type of Home: House   Home Layout: Two level (has bed set up on main if needed)  Stairs to Enter : 2  Railing: No  Stairs to Bedroom: 12  Railing: Yes    Lives With: Alone  Drives: Yes  Patient Owned Equipment: Cane  Patient Regularly Uses: Glasses    Prior Level of [place]: Pt typically ind with all self care and mobility. Pt denies history of falls. Pt reports sons live nearby, however will not be staying the night. Pt reports she will be sleeping on main level of home initially.       SUBJECTIVE  Pt pleasant and cooperative.  \"I hope I can stay another night, since PT didn't see me yesterday\" referring to POD#0      OBJECTIVE  Precautions: Bed/chair alarm  Fall Risk: Standard fall risk    WEIGHT BEARING RESTRICTION  Weight Bearing Restriction: L lower extremity           L Lower Extremity: Weight Bearing as Tolerated    PAIN ASSESSMENT  Ratin  Location: L knee  Management Techniques: Activity promotion;Body mechanics;Repositioning      COGNITION  Overall Cognitive Status:  WFL - within functional limits  Safety Judgement:  good awareness of safety precautions     RANGE OF MOTION AND STRENGTH ASSESSMENT  Upper extremity ROM and strength are within functional limits      Lower extremity ROM is within functional limits except for the following: L knee limited due to surgical site, grossly 0-90 deg     Lower extremity strength is within functional limits except for the following:  L knee limited due to surgical site. Good quad set and straight leg raise.      BALANCE  Static Sitting: Good  Dynamic Sitting: Good  Static Standing: Fair -  Dynamic Standing: Fair -    ADDITIONAL TESTS                                    ACTIVITY TOLERANCE  Pulse: 90  Heart Rate Source: Monitor                   O2 WALK  Oxygen Therapy  SPO2% on Room Air at Rest: 96  SPO2% Ambulation on Room Air: 95    NEUROLOGICAL  FINDINGS  Neurological Findings: None                     AM-PAC '6-Clicks' INPATIENT SHORT FORM - BASIC MOBILITY  How much difficulty does the patient currently have...  Patient Difficulty: Turning over in bed (including adjusting bedclothes, sheets and blankets)?: None   Patient Difficulty: Sitting down on and standing up from a chair with arms (e.g., wheelchair, bedside commode, etc.): None   Patient Difficulty: Moving from lying on back to sitting on the side of the bed?: None   How much help from another person does the patient currently need...   Help from Another: Moving to and from a bed to a chair (including a wheelchair)?: A Little   Help from Another: Need to walk in hospital room?: A Little   Help from Another: Climbing 3-5 steps with a railing?: A Little       AM-PAC Score:  Raw Score: 21   Approx Degree of Impairment: 28.97%   Standardized Score (AM-PAC Scale): 50.25   CMS Modifier (G-Code): CJ    FUNCTIONAL ABILITY STATUS  Gait Assessment   Functional Mobility/Gait Assessment  Gait Assistance: Supervision  Distance (ft): 150, 100  Assistive Device: Rolling walker  Pattern: L Decreased stance time  Stairs: Stairs;Stoop/curb;Car transfer  How Many Stairs: 4  Device: 1 Rail;Cane  Assist: Contact guard assist  Pattern: Ascend and Descend  Ascend and Descend : Step to  Stoop/Curb: r/w with 8 inch stoop with cga  Car transfer: sba    Skilled Therapy Provided    Bed Mobility:  Rolling: NT  Supine to sit: NT               Sit to supine: sba                 Transfer Mobility:  Sit to stand: sba   Stand to sit: sba  Gait = sba, no LOB noted, cues for walker mgmt and posture.     Therapist's comments:Pt presents seated in chair. Agreeable to PT eval. Pt educated on goals of session..  Pt educaton on WBAT status. No evidence of knee buckling noted in standing or during gait.   Mobility as above. Stair training with education on sequencing with repeated cues.  Pt education on TKA exercises for glut sets, quad  sets, ankle pumps, heel slides, SLR.   Following session RN reports pt requesting additional training on stairs and car transfers. Will see one additional session for review of these. Pt is staying overnight.      Exercise/Education Provided:  Bed mobility  Body mechanics  Energy conservation  Functional activity tolerated  Gait training  Posture  ROM  Strengthening  Lower therapeutic exercise:  Ankle pumps  Glut sets  Heel slides  Quad sets  Transfer training    Patient End of Session: In bed;Needs met;Call light within reach;RN aware of session/findings;All patient questions and concerns addressed;SCDs in place;Ice applied;Alarm set    Patient Evaluation Complexity Level:  History Low - no personal factors and/or co-morbidities   Examination of body systems Low - addressing 1-2 elements   Clinical Presentation Low - Stable   Clinical Decision Making Low Complexity       PT Session Time: 45 minutes  Gait Training: 15 minutes  Therapeutic Activity: 15 minutes    Therapeutic Exercise: 5 minutes

## 2024-04-03 NOTE — PLAN OF CARE
Alert and oriented x4. VSS. On RA. . IS encouraged. SCDs on BLE. Tolerating diet. Pain controlled with PRN medication. Aquacel dressing to left knee, C/D/I. Ambulating with min assist and walker. Voiding freely. Plan is PT/OT to see and dc home with [facility] when medically cleared.

## 2024-04-03 NOTE — HOME CARE LIAISON
Met with patient at the bedside and is agreeable for home health care. Demographics confirmed at the bedside.

## 2024-04-03 NOTE — CM/SW NOTE
04/03/24 1200   CM/SW Referral Data   Referral Source Social Work (self-referral)   Reason for Referral Discharge planning   Informant EMR;Clinical Staff Member   Discharge Needs   Anticipated D/C needs Home health care;Medical equipment       Patient is an 89 y/o woman admitted s/p TKR.  Pt with pre-operative plan for [facility] [place].  PT recommending [facility] services at discharge.  Agnieszka at [facility] confirmed pt accepted for [facility] services at [place].  CPM arranged through [facility].  No other DC needs/concerns identified at this time.  / to remain available for support and/or discharge planning.     Shamika Eaton, [facility]  Discharge Planner  [phone]

## 2024-04-04 VITALS
WEIGHT: 159 LBS | HEART RATE: 62 BPM | HEIGHT: 64 IN | OXYGEN SATURATION: 94 % | DIASTOLIC BLOOD PRESSURE: 47 MMHG | TEMPERATURE: 98 F | BODY MASS INDEX: 27.14 KG/M2 | RESPIRATION RATE: 18 BRPM | SYSTOLIC BLOOD PRESSURE: 121 MMHG

## 2024-04-04 PROBLEM — K59.00 CONSTIPATION: Status: ACTIVE | Noted: 2024-04-04

## 2024-04-04 PROCEDURE — 99232 SBSQ HOSP IP/OBS MODERATE 35: CPT | Performed by: INTERNAL MEDICINE

## 2024-04-04 NOTE — PLAN OF CARE
A/o x4. RA//IS encouraged. SCD's/ankle pumps encouraged. Regular diet. LBM 4/2. Voiding without difficulty. Pain managed. WBAT. Up sb with walker. Ambulated in hallway this evening tolerated well. PT rec home with C. POC updated with pt. All safety measures in place. Call light within reach instructed pt to call for help or assistance

## 2024-04-04 NOTE — PROGRESS NOTES
[facility]   part of [facility]     Hospitalist Progress Note     Shanti Coy Patient Status:  Inpatient    1935 MRN [id number]   Location [facility] 3SW-A Attending Sanjeev Cage MD   Hosp Day # 2 PCP Brenton Hinojosa MD     Chief Complaint: Medical management    Subjective:     Patient feels good, expected incisional pain 6 out of 10, Tylenol helping with the pain at this time according to patient.  Patient currently sitting up in the chair.  Denies any chest pain or shortness of breath.  Denies nausea vomiting.  Tolerating diet.  Has some constipation according to patient    Objective:    Review of Systems:   A comprehensive review of systems was completed; pertinent positive and negatives stated in subjective.    Vital signs:  Temp:  [97.9 °F (36.6 °C)-98.4 °F (36.9 °C)] 98.4 °F (36.9 °C)  Pulse:  [62-89] 62  Resp:  [16-18] 18  BP: (121-140)/(47-69) 121/47  SpO2:  [92 %-99 %] 94 %    Physical Exam:    /47 (BP Location: Right arm)   Pulse 62   Temp 98.4 °F (36.9 °C) (Oral)   Resp 18   Ht 5' 4\" (1.626 m)   Wt 159 lb (72.1 kg)   LMP  (LMP Unknown)   SpO2 94%   BMI 27.29 kg/m²     General: No acute distress. Alert and oriented x 3.  HEENT: Moist mucous membranes.   Respiratory: Clear to auscultation bilaterally.  No wheezes. No rhonchi.  Cardiovascular: S1, S2.  Regular rate and rhythm.    Abdomen: Soft, nontender, nondistended.  Positive bowel sounds.   Neurologic: Awake alert, no focal neurological deficits.  Musculoskeletal: Left knee incision and dressing  Psychiatric: Appropriate mood and affect.       Diagnostic Data:    Labs:  Recent Labs   Lab 24  043   WBC 10.8   HGB 11.7*   MCV 87.4   .0       Recent Labs   Lab 24  0437   *   BUN 22   CREATSERUM 1.02   CA 9.5      K 4.5      CO2 26.0       Estimated Creatinine Clearance: 34.2 mL/min (based on SCr of 1.02 mg/dL).    No results for input(s): \"TROP\", \"TROPHS\", \"CK\" in the last 168  hours.    No results for input(s): \"PTP\", \"INR\" in the last 168 hours.               Microbiology    No results found for this visit on 04/02/24.      Imaging: Reviewed in Epic.    Medications:    pantoprazole  20 mg Oral QAM AC    traMADol  50 mg Oral 2 times per day    atorvastatin  40 mg Oral Daily    letrozole  2.5 mg Oral Daily    fluticasone-umeclidin-vilant  1 puff Inhalation Daily    sennosides  17.2 mg Oral Nightly    docusate sodium  100 mg Oral BID    ferrous sulfate  325 mg Oral Daily with breakfast    aspirin  81 mg Oral BID    acetaminophen  650 mg Oral Q4H While awake    metoprolol succinate ER  25 mg Oral Daily    amLODIPine  5 mg Oral Daily       Assessment & Plan:      Status post left total knee arthroplasty by Ortho Dr. Cage-managed by Ortho     Hypertension  Continue  home metoprolol with holding parameters to hold if systolic blood pressure less than 120  We will continue home losartan hydrochlorothiazide at time of discharge  Hyperlipidemia  Continue home statin  Mild intermittent asthma/COPD  Continue home inhalers  Mild prediabetes  Hemoglobin A1c last checked was 7/3/2023 which was 6.3 on chart review  Patient not taking any medications for this at home  Blood sugar has been well-controlled in the past on chart review  Follow Accu-Cheks as needed  Constipation  Colace  Senna  Dulcolax suppository as needed  Enema as needed  Discussed with patient, discussed with RN.  Discharge planning per Ortho, wound care and activity per Ortho, final discharge order from Ortho who is the primary attending.  Follow-up with regular outpatient primary care physician within 1 week in office.      Steph Ojeda MD    Supplementary Documentation:     Quality:  DVT Mechanical Prophylaxis:   SCDs, Early ambuation  DVT Pharmacologic Prophylaxis   Medication   None         DVT Pharmacologic prophylaxis: Aspirin 81 mg      Code Status: Not on file  Daley: No urinary catheter in place  Daley Duration (in days):    Central line:    MIRNA: 4/4/2024    Discharge is dependent on: Per Ortho  At this point Ms. Coy is expected to be discharge to: Home    The 21st Century Cures Act makes medical notes like these available to patients in the interest of transparency. Please be advised this is a medical document. Medical documents are intended to carry relevant information, facts as evident, and the clinical opinion of the practitioner. The medical note is intended as peer to peer communication and may appear blunt or direct. It is written in medical language and may contain abbreviations or verbiage that are unfamiliar.

## 2024-04-04 NOTE — PROGRESS NOTES
Anesthesia Pain Service    POD# 2 s/p TKA    Block type: Lower extremity: Adductor canal    Laterality: left    Injection technique: Single shot    Post op review: No evidence of immediate block related complications    Plan discussed with/by: Pain Service

## 2024-04-04 NOTE — PHYSICAL THERAPY NOTE
PHYSICAL THERAPY TREATMENT NOTE - INPATIENT    Room Number: 376/376-A     Session: 1            Presenting Problem: L TKA 4/2/24  Co-Morbidities : CKD, COPD, HTN, asthma, Breast CA    ASSESSMENT   Patient demonstrates excellent progress this session, goals   met this session for IP PT  .    Patient continues to function near baseline with bed mobility and transfers.  Contributing factors to remaining limitations include decreased functional strength, pain, and impaired standing dynamic  balance.    Patient continues to benefit from continued skilled PT services: at discharge to promote functional independence in home.  Anticipate patient will return home with home health PT.    PLAN     Rehab Potential : Good       CURRENT GOALS       Goal #1  Patient is able to demonstrate supine - sit EOB @ level: supervision  MET   Goal #2  Patient is able to demonstrate transfers Sit to/from Stand at assistance level: supervision  MET      Goal #3     Patient is able to ambulate 150 feet with assistive device at assistance level: supervision  MET   Goal #4     Patient will negotiate 4 stairs/one curb w/ assistive device and supervision  ongoing   Goal #5        Goal #6        Goal Comments: Goals established on 4/3/2024  4/4/2024 all goals ongoing    SUBJECTIVE  \"I just needed to review\"     OBJECTIVE  Precautions: Bed/chair alarm    WEIGHT BEARING RESTRICTION  Weight Bearing Restriction: L lower extremity           L Lower Extremity: Weight Bearing as Tolerated    PAIN ASSESSMENT   Rating: Unable to rate  Location: L knee  Management Techniques: Activity promotion;Body mechanics;Breathing techniques;Relaxation;Repositioning    BALANCE                                                                                                                       Static Sitting: Good  Dynamic Sitting: Good           Static Standing: Fair  Dynamic Standing: Fair -    ACTIVITY TOLERANCE                         O2 WALK         AM-PAC  '6-Clicks' INPATIENT SHORT FORM - BASIC MOBILITY  How much difficulty does the patient currently have...  Patient Difficulty: Turning over in bed (including adjusting bedclothes, sheets and blankets)?: None   Patient Difficulty: Sitting down on and standing up from a chair with arms (e.g., wheelchair, bedside commode, etc.): None   Patient Difficulty: Moving from lying on back to sitting on the side of the bed?: None   How much help from another person does the patient currently need...   Help from Another: Moving to and from a bed to a chair (including a wheelchair)?: None   Help from Another: Need to walk in hospital room?: None   Help from Another: Climbing 3-5 steps with a railing?: A Little       AM-PAC Score:  Raw Score: 23   Approx Degree of Impairment: 11.2%   Standardized Score (AM-PAC Scale): 56.93   CMS Modifier (G-Code): CI    FUNCTIONAL ABILITY STATUS  Gait Assessment   Functional Mobility/Gait Assessment  Gait Assistance: Modified independent  Distance (ft): 200  Assistive Device: Rolling walker  Pattern: L Decreased stance time;L Foot flat  Stairs: Stairs;Stoop/curb;Car transfer  How Many Stairs: 4x3  Device: 1 Rail;2 Rails;Cane  Assist: Supervision  Pattern: Ascend and Descend  Ascend and Descend : Step to  Stoop/Curb: supervision  Car transfer: supervision    Skilled Therapy Provided  Pt presents seated in BS chair    Significance of achieving good ROM in a timely fashion explained. Pt performed seated  therex per TKA protocol. Pt gait trained c RW cues for reciprocal gait pattern, WBAT and proper integration of RW with good return demo.   Pt t/f trained as noted above c cues for sequencing.   Pt was vended  RW for home use, therapist sized RW appropriately for pt . Pt left in chair, needs met. All questions and concerns addressed.        Bed Mobility:  Rolling:    Supine<>Sit:    Sit<>Supine:      Transfer Mobility:  Sit<>Stand: mod I    Stand<>Sit: mod I    Gait: supervision c RW     Therapist's  Comments: pt states her son will assist her at d/c as needed       THERAPEUTIC EXERCISES  Lower Extremity Heel slides     Upper Extremity      Position Sitting     Repetitions   10   Sets   1     Patient End of Session: Up in chair;Needs met;Call light within reach;RN aware of session/findings;All patient questions and concerns addressed    PT Session Time: 25 minutes  Gait Training: 10 minutes  Therapeutic Activity: 15 minutes  Therapeutic Exercise:  minutes   Neuromuscular Re-education:  minutes

## 2024-04-04 NOTE — PROGRESS NOTES
AVS reviewed, IV dc'd, discharge video viewed, will dc home w/ [facility] & a 24 hr caregiver , verbalized understanding of discharge instructions , will dc once son arrives .

## 2024-04-04 NOTE — PLAN OF CARE
Patient is alert and oriented x4. VSS on RA. Pain controlled at rest. No complaint of numbness or tingling. Pulses +2. Plantar and dorsi flexions moderate bilaterally. Motor strength intact.  Aquacel dressing CDI. IS and ankle pumps encouraged. Belongings within reach. Encouraged to call for any needs.

## 2024-04-05 ENCOUNTER — TELEPHONE (OUTPATIENT)
Dept: INTERNAL MEDICINE CLINIC | Facility: CLINIC | Age: 89
End: 2024-04-05

## 2024-04-05 ENCOUNTER — TELEPHONE (OUTPATIENT)
Dept: ORTHOPEDICS CLINIC | Facility: CLINIC | Age: 89
End: 2024-04-05

## 2024-04-05 ENCOUNTER — PATIENT OUTREACH (OUTPATIENT)
Dept: CASE MANAGEMENT | Age: 89
End: 2024-04-05

## 2024-04-05 DIAGNOSIS — M17.12 PRIMARY OSTEOARTHRITIS OF LEFT KNEE: Primary | ICD-10-CM

## 2024-04-05 DIAGNOSIS — Z02.9 ENCOUNTERS FOR ADMINISTRATIVE PURPOSE: ICD-10-CM

## 2024-04-05 PROCEDURE — 1111F DSCHRG MED/CURRENT MED MERGE: CPT

## 2024-04-05 NOTE — TELEPHONE ENCOUNTER
Spoke to patient for TCM today (Left Total Knee) .  Patient does not have an appointment scheduled at this time. She states that she does not really feel an appt is necessary unless PCP needs to see her.  TCM appointment recommended by 4/18/24 as patient is a Moderate risk for readmission.  Please discuss with PCP and follow up with patient if appt is needed. Thank you.    BOOK BY DATE (last date for TCM): 4/18/24

## 2024-04-05 NOTE — TELEPHONE ENCOUNTER
Pt stated she will call us back to schedule a surgery f/u from 4/2.  Pt stated she is the middle of scheduling PT and HH visits, will call us back to schedule.

## 2024-04-05 NOTE — TELEPHONE ENCOUNTER
Attempted to call patient.      Per Lakeisha, she does need a TCM appointment by 4/18.  , please help patient schedule when she calls back.  Thanks.

## 2024-04-05 NOTE — PROGRESS NOTES
Initial Post Discharge Follow Up   Discharge Date: 4/4/24  Contact Date: 4/5/2024    Consent Verification:  Assessment Completed With: Patient  HIPAA Verified?  Yes    Discharge Dx:   Left Total Knee    General:   How have you been since your discharge from the hospital? I do have more pain than I thought I would.  Do you have any pain since discharge?  Yes  Where: left knee   Rating on pain scale 1-10, 10 being the worst pain you have ever experienced, what is current pain: 8  Alleviating factors: rest  Aggravating factors: activity  Is the pain manageable at home? Yes  How well was your pain managed while in the hospital?   On a scale of 1-5   1- Very Poor and 5- Very well   Very Well  When you were leaving the hospital were your discharge instructions reviewed with you? Yes  How well were your discharge instructions explained to you?   On a scale of 1-5   1- Very Poor and 5- Very well   Very Well  Do you have any questions about your discharge instructions?  No  Before leaving the hospital was your diagnoses explained to you? Yes  Do you have any questions about your diagnoses? No  Are you able to perform normal daily activities of living as you have prior to your hospital stay (dressing, bathing, ambulating to the bathroom, etc)? yes  (NCM) Was patient given a different diet per AVS? no    Medications:   Current Outpatient Medications   Medication Sig Dispense Refill    aspirin 81 MG Oral Tab EC Take 1 tablet (81 mg total) by mouth in the morning and 1 tablet (81 mg total) before bedtime. 80 tablet 0    acetaminophen 500 MG Oral Tab Take 2 tablets (1,000 mg total) by mouth every 8 (eight) hours for 15 days. 90 tablet 0    celecoxib 200 MG Oral Cap Take 1 capsule (200 mg total) by mouth daily. 30 capsule 0    senna-docusate 8.6-50 MG Oral Tab Take 1 tablet by mouth 2 (two) times daily as needed. 30 tablet 0    oxyCODONE 5 MG Oral Tab Take 0.5-1 tablets (2.5-5 mg total) by mouth every 4 (four) hours as needed. 40  tablet 0    traMADol 50 MG Oral Tab Take 1 tablet (50 mg total) by mouth every 12 (twelve) hours for 15 days. 30 tablet 0    Losartan Potassium-HCTZ 100-12.5 MG Oral Tab Take 1 tablet by mouth daily. 90 tablet 0    letrozole 2.5 MG Oral Tab Take 1 tablet (2.5 mg total) by mouth daily. 90 tablet 3    atorvastatin 40 MG Oral Tab Take 1 tablet (40 mg total) by mouth daily.      TRELEGY ELLIPTA 200-62.5-25 MCG/ACT Inhalation Aerosol Powder, Breath Activated Inhale 1 puff into the lungs daily.      metoprolol succinate ER 25 MG Oral Tablet 24 Hr Take 1 tablet (25 mg total) by mouth daily. 30 tablet 11    AMLODIPINE 5 MG Oral Tab TAKE 1 TABLET BY MOUTH EVERY DAY 90 tablet 0    Blood Pressure Monitoring (BLOOD PRESSURE CUFF) Does not apply Misc For use to measure BP daily 1 each 0    azelastine 0.1 % Nasal Solution 2 sprays by Nasal route 2 (two) times daily as needed.      ipratropium 0.06 % Nasal Solution 2 sprays by Nasal route 3 (three) times daily as needed.      Albuterol Sulfate HFA (PROAIR HFA) 108 (90 Base) MCG/ACT Inhalation Aero Soln Inhale 2 puffs into the lungs every 4 (four) hours as needed. 1 each 5    mometasone 0.1 % External Ointment Apply 1 Application topically daily. 15 g 1    CRANBERRY EXTRACT OR Take 1 tablet by mouth daily.        Vitamin D3 (VITAMIN D3) 2000 UNITS Oral Cap Take 1 capsule (2,000 Units total) by mouth daily.      PRILOSEC 20 MG OR CPDR Take 1 capsule (20 mg total) by mouth every morning.      VITAMIN E Take 1 capsule by mouth daily.       Were there any changes to your current medication(s) noted on the AVS? Yes  If so, were these medication changes discussed with you prior to leaving the hospital? Yes  If a new medication was prescribed:    Was the new medication's purpose & side effects reviewed? Yes  Do you have any questions about your new medication? No  Did you  your discharge medications when you left the hospital? Yes  Let's go over your medications together to make  sure we are not missing anything. Medications Reviewed  Are there any reasons that keep you from taking your medication as prescribed? No  Are you having any concerns with constipation? Yes      Discharge medications reviewed/discussed/and reconciled against outpatient medications with patient.  Any changes or updates to medications sent to PCP.  Patient Acknowledged     Referrals/orders at D/C:  Referrals/orders placed at D/C? yes  What services:   Home health   (If HH was ordered) Has HH been set up?  No, the nurse is coming today at 9:30a      DME ordered at D/C? No      Discharge orders, AVS reviewed and discussed with patient. Any changes or updates to orders sent to PCP.  Patient Acknowledged      SDOH:   Transportation Needs: No Transportation Needs (4/5/2024)    Transportation Needs     Lack of Transportation: No     Financial Resource Strain: Low Risk  (4/5/2024)    Financial Resource Strain     Difficulty of Paying Living Expenses: Not hard at all     Med Affordability: No         Diagnosis specifics: Ortho Spine:  Has there been a change in your incision/wound since d/c?  no  Are you following your exercise program (exercises for total joints and walking only for spine surgery)  yes   When you were in the hospital after your surgery, were you offered anything other than medications to help with your pain management?  yes  Do you remember what this might have been?         (put X by all the patient mentions:           repositioning            sleep/relaxation kit              music           TV         I-pad                       reading            X Cold-pack      other__________________________)   Did you understand when to take your pain medication at home? yes  On a scale of 1-5   1- Very Poor and 5- Very well   3  Comments:  Was it clear how to use less narcotic pain medication as your pain decreased at home? yes   On a scale of 1-5   1- Very Poor/unclear and 5- Very well/clear  3  Comments:  Were you  satisfied with the discharge process?  yes        Follow up appointments:      Your appointments       Date & Time Appointment Department ([place])    Apr 17, 2024  9:40 AM CDT Post Op Visit with Rick Gentile PA-C [facility], [address] [place] [address] [facility]        Apr 19, 2024  8:30 AM CDT PT Ortho Post Op Evaluation with Tyler Elizalde PT [facility] ([facility])        Apr 22, 2024 10:00 AM CDT Physical Therapy Ortho Treatment with Tyler Elizalde PT [facility] ([facility])        Apr 24, 2024 10:00 AM CDT Physical Therapy Ortho Treatment with Tyler Elizalde PT [facility] ([facility])        Apr 29, 2024 10:00 AM CDT Physical Therapy Ortho Treatment with Tyler Elizalde PT [facility] ([facility])        May 01, 2024 10:00 AM CDT Physical Therapy Ortho Treatment with Tyler Elizalde PT [facility] ([facility])        May 06, 2024 10:00 AM CDT Physical Therapy Ortho Treatment with Tyler Elizalde PT [facility] ([facility])        May 08, 2024 10:00 AM CDT Physical Therapy Ortho Treatment with Tyler Elizalde PT [facility] ([facility])        May 13, 2024 10:00 AM CDT Physical Therapy Ortho Treatment with Tyler Elizalde PT [facility] ([facility])        May 15, 2024 10:00 AM CDT Physical Therapy Ortho Treatment with Tyler Elizalde PT [facility] ([facility])        May 20, 2024 10:00 AM CDT Physical Therapy Ortho Treatment with Tyler Elizalde PT [facility] ([facility])        May 22, 2024 10:00 AM CDT  Physical Therapy Ortho Treatment with Tyler Elizalde PT [facility] ([facility])        May 29, 2024 10:00 AM CDT Physical Therapy Ortho Treatment with Tyler Elizalde PT [facility] ([facility])        Jul 11, 2024 10:00 AM CDT MA Supervisit with Brenton Hinojosa MD [facility], [address] [place] [address] ([facility] [place])        Sep 09, 2024 10:00 AM CDT DIAGNOSTIC MAMMOGRAM with [address] [facility] ([facility])    Please arrive 15 minutes prior to your appointment. If you are late to your appointment, you will be rescheduled.    If breastfeeding, pump or breastfeed one hour prior to your appointment time.    Continuous glucose monitors must be removed so the appointment should be scheduled near the normal replacement date.    If you have had a mammogram within the last 5 years at a facility other than an [facility] or [facility] facility, you will need to bring those films to your appointment otherwise you will be rescheduled.    Do NOT use perfumes, deodorant, talcum powder, lotions, or creams on your breasts or underarms. They leave a coating that may be picked up by the x-rays, thereby distorting the mammogram.    Wear a two piece outfit the day of the exam. This allows you to be more comfortable during the exam.    There are no eating or activity restrictions for this exam.    The estimated duration of this exam could take up to 2 hours if an ultrasound is required. If you have questions regarding this exam, please contact a mammography technologist at [facility] at [phone] or [facility] at [phone]..      Sep 16, 2024  3:00 PM CDT HEMATOLOGY ONCOLOGY FOLLOW UP with Rusty Ma MD [facility] [place] ([facility])        Sep 24, 2024  8:45 AM CDT Office Visit with Froylan  MD [place] ([facility] [place])              [facility]  [address]  [facility] [address]  [phone] [facility]  [facility]  [address]  [facility] [address]  [phone] [facility]  [facility]  [address]  [facility] [address]  [phone]    [address]  [facility] [place]  [address]  [facility] [id number] [phone] [address] [facility]  [address]  [facility] [id number] [phone] [facility]  [facility] [place]  [address]  [facility] [id number] [phone]            TCC  Was TCC ordered: No      PCP (If no TCC appointment)  Does patient already have a PCP appointment scheduled? No  NCM Attempted to schedule PCP office TCM appointment with patient   If no appointment scheduled: Explain-pt declined, NCM sent TE to PCP office.     Specialist    Does the patient have any other follow up appointment(s) needing to be scheduled? No, pt already has post op scheduled for 4/17      Is there any reason as to why you cannot make your appointment(s)?  No     Needs post D/C:   Now that you are home, are there any needs or concerns you need addressed before your next visit with your PCP?  (DME, meds, questions, etc.): No    Interventions by NCM:   NCM reviewed discharge instructions and when to seek medical attention with the patient. She states that she is having a bit of pain right now and took tylenol a while ago with not much relief. NCM instructed on when and how to take the narcotic pain medication; she v/u. NCM instructed on s/s of infection; she v/u and  states that dressing looks clean. She does have some constipation; last BM was 2 days ago. She is passing gas. NCM instructed on ways to relieve constipation; she v/u and will try warm prune juice. She has not checked her bp yet but will. She denied having any fever, n/v, sob, lightheadedness, HA or any other new or worsening symptoms. Med review completed. She denied having any questions or concerns at this time.       CCM referral placed:    No    BOOK BY DATE: 4/18/24

## 2024-04-05 NOTE — PAYOR COMM NOTE
--------------  DISCHARGE REVIEW    Payor: [employer]  Subscriber #:  [id number]  Authorization Number: [id number]    Admit date: 4/2/24  Admit time:  11:13 AM  Discharge Date: 4/4/2024  3:30 PM     Admitting Physician: Sanjeev Cage MD  Attending Physician:  No att. providers found  Primary Care Physician: Brenton Hinojosa MD       Discharge Summary Notes    No notes of this type exist for this encounter.         4/3 ORTHO NOTE  [facility] Progress Note     Subjective: No acute events.  She worked with therapy this morning, feels that her pain did increase following getting up and moving.  It did improve with medications but still a little bit worse than it was earlier this morning.  She has been tolerating a diet.     Objective: Patient appears comfortable, very pleasant.  Left lower extremity dressing clean dry and intact.  Neurovascular intact left lower extremity.        Assessment/Plan: 88 year old female postop day # 1 status post left total knee arthroplasty.  -Weightbearing as tolerated, PT OT  -DVT prophylaxis mechanical plus aspirin twice daily  -Pain control with oral meds  -Perioperative Ancef  Disposition: Floor, patient will stay an additional night for pain control as well as additional therapy to finish clearing stairs and car transfers        04/03/24 1200   CM/SW Referral Data   Referral Source Social Work (self-referral)   Reason for Referral Discharge planning   Informant EMR;Clinical Staff Member   Discharge Needs   Anticipated D/C needs Home health care;Medical equipment         Patient is an 87 y/o woman admitted s/p TKR.  Pt with pre-operative plan for [facility].  PT recommending [facility] services at discharge.  Agnieszka at [facility] confirmed pt accepted for [facility] services at [facility].  CPM arranged through [employer].  No other [facility] needs/concerns identified at this time.  / to remain available for support and/or discharge planning.

## 2024-04-05 NOTE — TELEPHONE ENCOUNTER
RENATOI   DOS: 4/2/24  - sanguineous drainage   - Maribel from [facility] dressing is falling off and is saturated post exercise.  Original surgical Dressing is being changed at this time as it cannot be reinforced at this point.     - instructions given on what dressings to get     Reviewed with Physical therapist   Wound Care/ Bathing:  Aquacel (waterproof brown rubberized dressing) should remain in place for 7 days and may then be removed.  While this waterproof dressing is in place, you may shower and let water run over the dressing (ensure first  that the dressing seal is intact and none of the edges have rolled up exposing the wound - if the dressing has  become open to the environment, do not allow water to enter into this area)  Once the original postoperative dressing is removed, you do not need to keep your incision covered. If you  would like to keep it covered for comfort you may - use a clean new dressing each day, or more frequently if  needed (if the dressing is soiled or it is not staying fixed to your skin). Use the dressings suggested by the  nurse at discharge.  After the original postoperative dressing is removed, you should continue to keep the incision covered when  showering to prevent it from getting wet prior to your first postoperative appointment. Before showering, be  sure to cover the incision with saran wrap or a plastic bag to keep dry. After showering, pat the incision with a  Shanti Coy (MRN: [id number])  Printed at 4/4/2024 10:29 AM Page 4 of 16      Future Appointments   Date Time Provider Department Center   4/17/2024  9:40 AM Rick Gentile PA-C [facility]

## 2024-04-05 NOTE — TELEPHONE ENCOUNTER
Patient had total left knee arthroplasty on 4/2.  Does she need to be seen for post op/TCM appointment? Patient does not feel it is necessary.

## 2024-04-08 ENCOUNTER — TELEPHONE (OUTPATIENT)
Dept: ORTHOPEDICS CLINIC | Facility: CLINIC | Age: 89
End: 2024-04-08

## 2024-04-08 NOTE — TELEPHONE ENCOUNTER
Called patient to discuss her incision.  She notes that at the end of last week, she did have blood drainage from her Aquacel incision and this is what led to having the dressing removed.  She states over the weekend, she has been changing with a clean dressing once per day.  She notes that there has not really been much in the way of drainage.  Her dressing was replaced yesterday at 11 AM, and now 24 hours later was removed by the home nurse who she reports states there was no concern for any issues with the incision.  She notes 1 area of a yellow spot on the dressing near the mid incision that was a quarter size.  There is no active drainage from the incision.    Encouraged continued elevation and icing to help to decrease inflammation.  Continue daily dressing change and incision inspection.  Avoid getting the incision wet at all in the shower.  Encouraged her to call if any worsening of drainage or concerns for infection otherwise we will see her at her follow-up appointment next week.

## 2024-04-08 NOTE — DISCHARGE SUMMARY
[facility]  Discharge Summary    Shanti Coy Patient Status:  Inpatient    1935 MRN [id number]   Location [facility] 3SW-A Attending No att. providers found   Hosp Day # 2 PCP Brenton Hinojosa MD     Date of Admission: 2024    Date of Discharge: 2024  3:30 PM    Admitting Diagnosis: Primary osteoarthritis of left knee [M17.12]    Discharge Diagnosis:   Patient Active Problem List   Diagnosis    Pure hypercholesterolemia    Essential hypertension    Insomnia    Spinal stenosis of lumbar region    Lumbar radiculitis    History of total knee replacement    Pre-diabetes    Asthma with COPD (chronic obstructive pulmonary disease) (HCC)    Aortic root dilation (HCC)    Spondylolisthesis at L4-L5 level    CKD (chronic kidney disease) stage 3, GFR 30-59 ml/min (HCC)    Inflammatory spondylopathy of lumbar region (HCC)    Frequent UTI    Primary osteoarthritis of left knee    Invasive lobular carcinoma of left breast in female (HCC)    Infiltrating lobular carcinoma of breast, stage 1, left (HCC)    Constipation       Reason for Admission: Left knee replacement    Physical Exam: Awake and alert, neurovascular intact left lower extremity    History of Present Illness: 88-year-old female admitted for left knee replacement after failing nonsurgical treatment    Hospital Course: Admitted for left total knee.  Operation without complications.  Patient did require stay until postoperative day #2 for therapy for safe discharge home as well as pain control    Consultations: PT OT, case management social work    Procedures: Left total knee arthroplasty    Complications: None    Disposition: Home Health Care Services    Discharge Condition: Good    Discharge Medications:   Discharge Medication List as of 2024 10:29 AM        START taking these medications    Details   aspirin 81 MG Oral Tab EC Take 1 tablet (81 mg total) by mouth in the morning and 1 tablet (81 mg total) before bedtime., Normal, Disp-80  tablet, R-0      acetaminophen 500 MG Oral Tab Take 2 tablets (1,000 mg total) by mouth every 8 (eight) hours for 15 days., Normal, Disp-90 tablet, R-0      celecoxib 200 MG Oral Cap Take 1 capsule (200 mg total) by mouth daily., Normal, Disp-30 capsule, R-0      senna-docusate 8.6-50 MG Oral Tab Take 1 tablet by mouth 2 (two) times daily as needed., Normal, Disp-30 tablet, R-0      oxyCODONE 5 MG Oral Tab Take 0.5-1 tablets (2.5-5 mg total) by mouth every 4 (four) hours as needed., Normal, Disp-40 tablet, R-0      traMADol 50 MG Oral Tab Take 1 tablet (50 mg total) by mouth every 12 (twelve) hours for 15 days., Normal, Disp-30 tablet, R-0           CONTINUE these medications which have NOT CHANGED    Details   Losartan Potassium-HCTZ 100-12.5 MG Oral Tab Take 1 tablet by mouth daily., Normal, Disp-90 tablet, R-0      letrozole 2.5 MG Oral Tab Take 1 tablet (2.5 mg total) by mouth daily., Normal, Disp-90 tablet, R-3      atorvastatin 40 MG Oral Tab Take 1 tablet (40 mg total) by mouth daily., Historical      TRELEGY ELLIPTA 200-62.5-25 MCG/ACT Inhalation Aerosol Powder, Breath Activated Inhale 1 puff into the lungs daily., Historical, BRIELLE      metoprolol succinate ER 25 MG Oral Tablet 24 Hr Take 1 tablet (25 mg total) by mouth daily., Historical, Disp-30 tablet, R-11      AMLODIPINE 5 MG Oral Tab TAKE 1 TABLET BY MOUTH EVERY DAY, Normal, Disp-90 tablet, R-0      Blood Pressure Monitoring (BLOOD PRESSURE CUFF) Does not apply Misc For use to measure BP daily, Print Script, Disp-1 each, R-0      azelastine 0.1 % Nasal Solution 2 sprays by Nasal route 2 (two) times daily as needed., Historical      ipratropium 0.06 % Nasal Solution 2 sprays by Nasal route 3 (three) times daily as needed., Historical      Albuterol Sulfate HFA (PROAIR HFA) 108 (90 Base) MCG/ACT Inhalation Aero Soln Inhale 2 puffs into the lungs every 4 (four) hours as needed., Normal, Disp-1 each, R-5      mometasone 0.1 % External Ointment Apply 1  Application topically daily., Normal, Disp-15 g, R-1      CRANBERRY EXTRACT OR Take 1 tablet by mouth daily.  , Historical      Vitamin D3 (VITAMIN D3) 2000 UNITS Oral Cap Take 1 capsule (2,000 Units total) by mouth daily., Historical      PRILOSEC 20 MG OR CPDR Take 1 capsule (20 mg total) by mouth every morning., Historical      VITAMIN E Take 1 capsule by mouth daily., Historical           STOP taking these medications       aspirin 81 MG Oral Tab              Follow up Visits: Follow-up with orthopedics in 2 weeks    Other Discharge Instructions: Weightbearing as tolerated    Sanjeev Cage MD  4/8/2024  1:30 PM

## 2024-04-12 DIAGNOSIS — M17.12 PRIMARY OSTEOARTHRITIS OF LEFT KNEE: ICD-10-CM

## 2024-04-12 RX ORDER — TRAMADOL HYDROCHLORIDE 50 MG/1
50 TABLET ORAL EVERY 12 HOURS SCHEDULED
Qty: 30 TABLET | Refills: 0 | Status: SHIPPED | OUTPATIENT
Start: 2024-04-12 | End: 2024-04-27

## 2024-04-12 NOTE — TELEPHONE ENCOUNTER
Eugene from [facility] called requesting a refill of traMADol 50mg oral tab for pt.  Pt takes this pill twice a day and only has 5 tabs left.  Please advise.

## 2024-04-12 NOTE — TELEPHONE ENCOUNTER
Tramadol    DOS: 4/2/24  Left total knee arthroscopy  Last OV: 1/15/24  Last refill date: 4/3/24 #/refills: 30/0  Upcoming appt:   Future Appointments   Date Time Provider Department Center   4/17/2024  9:40 AM Rick Gentile PA-C [facility]     Eugene from [facility] called requesting a refill of traMADol 50mg oral tab for pt. Pt takes this pill twice a day and only has 5 tabs left

## 2024-04-17 ENCOUNTER — OFFICE VISIT (OUTPATIENT)
Dept: ORTHOPEDICS CLINIC | Facility: CLINIC | Age: 89
End: 2024-04-17
Payer: MEDICARE

## 2024-04-17 VITALS — WEIGHT: 159 LBS | HEIGHT: 64 IN | BODY MASS INDEX: 27.14 KG/M2

## 2024-04-17 DIAGNOSIS — Z96.652 STATUS POST LEFT KNEE REPLACEMENT: Primary | ICD-10-CM

## 2024-04-17 PROCEDURE — 99024 POSTOP FOLLOW-UP VISIT: CPT | Performed by: PHYSICIAN ASSISTANT

## 2024-04-17 NOTE — PROGRESS NOTES
[facility] Progress Note    Subjective: Patient returns to clinic 2 weeks status post left total knee arthroplasty performed on 4/2/24. They have been taking Tylenol and Tramadol for pain control.  They continue to take aspirin for DVT prophylaxis. They are overall satisfied with their progress.  Denies any fevers, chills, night sweats.  No redness or drainage from the incision.concerning for infection.  She is ambulating inside of the home independently and using a walker outside of her home.    Objective: Patient is seated comfortably in the exam chair. Alert and oriented. Nonlabored breathing. Grossly neurologically intact. Incision is clean, dry, and intact with staples in place without any redness or drainage concerning for infection. Demonstrates active knee extension to 0 and knee flexion to 120. Calves are soft and nontender.     Assessment/Plan: Staples were removed in clinic today.  The incision was swabbed with Betadine, Mastisol was applied to either side of the incision, and Steri-Strips were applied over the incision.  I instructed the patient to avoid getting the incision wet in the shower for the next 2 days to allow the staple sites to reepithelialize.  I also recommended avoiding soaking the incision in a pool or tub until the patient is 6 weeks postop.  Continue with aspirin for DVT prophylaxis.  Outpatient physical therapy referral written. Follow-up in 4 weeks with Dr. Cage for routine 6-week postoperative visit or sooner if any concerns.  The patient's questions were sought and answered satisfactorily.  They are happy with the plan and will follow as advised.    X-rays needed at next visit: Postoperative radiographs left knee and full leg length films        Rick Gentile PA-C  [facility]    This note was dictated using Dragon software.  While it was briefly proofread prior to completion, some grammatical, spelling, and word choice errors due  to dictation may still occur.

## 2024-04-18 ENCOUNTER — TELEPHONE (OUTPATIENT)
Dept: PHYSICAL THERAPY | Facility: HOSPITAL | Age: 89
End: 2024-04-18

## 2024-04-19 ENCOUNTER — OFFICE VISIT (OUTPATIENT)
Dept: PHYSICAL THERAPY | Facility: HOSPITAL | Age: 89
End: 2024-04-19
Attending: ORTHOPAEDIC SURGERY
Payer: MEDICARE

## 2024-04-19 DIAGNOSIS — M17.12 PRIMARY OSTEOARTHRITIS OF LEFT KNEE: ICD-10-CM

## 2024-04-19 DIAGNOSIS — Z96.652 STATUS POST LEFT KNEE REPLACEMENT: Primary | ICD-10-CM

## 2024-04-19 PROCEDURE — 97110 THERAPEUTIC EXERCISES: CPT

## 2024-04-19 PROCEDURE — 97161 PT EVAL LOW COMPLEX 20 MIN: CPT

## 2024-04-19 NOTE — PROGRESS NOTES
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  Pt describes pain level current 0/10, at best 0/10, at worst 6/10.   Current functional limitations include walking, stairs, getting in<>out of shower.   Burning pain at night medial knee  Not sleeping well  Tramadol for 1 week, taking tylenol  Home therapy for 2 weeks  Walks in [place] and 2 blocks - uses rollator because doesn't feel confident without it. Had a caregiver for first 2 weeks but not any more. Lives alone.  Uses no assistive device in the house  Difficulty getting in and out of shower due to worry of falling  Stairs ok - non-reciprocally using hand rail    Previous R TKA about 10 years ago. Has always felt tight around the knee after that surgery. Since current surgery has had some increase in pain on the R side.    Shanti describes prior level of function unrestricted. Pt goals include walk 3 miles.  Past medical history was reviewed with Shanti. Significant findings include   Past Medical History:    Aortic valve insufficiency    last echo 7/26/23    Asthma (HCC)    Eczema    Exposure to medical diagnostic radiation    Female climacteric state    GERD    High blood pressure    High cholesterol    Hx of motion sickness    Left Breast cancer (HCC)    Onychocryptosis    Osteoarthritis    Other and unspecified hyperlipidemia    Pain due to onychomycosis of toenail    Prediabetes    Primary localized osteoarthrosis of right lower leg    Visual impairment    glasses        ASSESSMENT  Shanti presents to physical therapy evaluation with primary c/o L knee pain s/l L TKA on 4/2/24. The results  of the objective tests and measures show limited knee extension>flexion, impaired balance, impaired LE strength, post-operative swelling.  Functional deficits include but are not limited to walking, stairs, shower transfers.  Signs and symptoms are consistent with diagnosis of L TKA. Pt and PT discussed evaluation findings, pathology, POC and HEP.  Pt voiced understanding and performs HEP correctly without reported pain. Skilled Physical Therapy is medically necessary to address the above impairments and reach functional goals.     OBJECTIVE:   Observation/Skin: surgical wound covered by steristrips. Moderate post-operative swelling  Palpation: mildly increased warmth  Sensation: intact to LT    AROM: (* denotes performed with pain)   Knee    Flexion: R 130; L 120   Extension: R 0; L -10     PROM: (* denotes performed with pain)   Knee    Flexion: R 130; L 123   Extension: R 0; L -10     Patellar Mobility/Accessory motion: mildly limited    Flexibility:   Hamstrings: R mildly limited; L mildly limited  Gastroc-soleus: R moderately limited; L moderately limited  Piriformis: R N/T; L N/T  Quads: R N/T; L N/T    Strength/MMT: (* denotes performed with pain)  Hip Knee   Flexion: R 5/5; L 4/5  Extension: R 4/5; L 4-/5  Abduction: R 4/5; L 4-/5  ER: R 4/5; L 4/5 Flexion: R 5/5; L 4/5  Extension: R 5/5; L 4-/5        Balance:   Feet together: >10 sec  Modified tandem: R back: >10 sec; L back >10 sec  Tandem: R back >10 sec; L back 7 sec  SLS: R 7 sec, L 3 sec    Functional Mobility:  5x sit<>stand: 15  TUG (AD, time): N/T sec    Gait: pt ambulates on level ground with decreased step length  , decreased stance phase  , and decreased hip/knee flex or ext   .     Today’s Treatment and Response:   Passive knee extension and flexion  Review of current HEP  Knee extension stretch on chair  Access Code: [email or web address]  URL: [email or web address]  Date: 04/19/2024  Prepared by: Tyler Elizalde    Exercises  - Seated  Knee Extension with Resistance  - 1 x daily - 2 sets - 10 reps  - Seated Hamstring Curls with Resistance  - 1 x daily - 2 sets - 10 reps  Pt education was provided on exam findings, treatment diagnosis, treatment plan, expectations, and prognosis. Pt was also provided recommendations for possible soreness after evaluation, modalities as needed [ice/heat], importance of remaining active, and strategies to reduce fall risk at home. Patient was instructed in and issued a HEP for: knee ROM, LE strength, gait training    Charges: PT Eval Low Complexity, TE      Total Timed Treatment: 15 min     Total Treatment Time: 45 min     PLAN OF CARE:    Goals: (To be met in 10 visits)   Pt will improve knee extension ROM to 0 deg to allow proper heel strike during gait and terminal knee extension in stance  Pt will improve knee AROM flexion to >130 degrees to improve ability to perform stairs  Pt will improve quad strength to 5/5 to ascend 1 flight of stairs reciprocally without UE assist  Pt will increase hip and knee strength to grossly 4+/5 to be able to get up and down from the floor safely  Pt will demonstrate increased hip ER/ABD strength to 5+/5 to perform stepping and squatting activities without excessive femoral IR/ADD  Pt will improve SLS to >10s to improve safety and independence with gait on uneven surfaces such as grass  Pt will be independent and compliant with comprehensive HEP to maintain progress achieved in PT    Frequency / Duration: Patient will be seen for 1-2 x/week or a total of 10 visits over a 90 day period.  Treatment will include: Gait training, Manual Therapy, Neuromuscular Re-education, and Therapeutic Exercise    Education or treatment limitation: None  Rehab Potential:excellent    LEFS Score  LEFS Score: 47.5 % (4/12/2024 11:20 AM)      Patient/Family/Caregiver was advised of these findings, precautions, and treatment options and has agreed to actively participate in planning and for this course of  care.    Thank you for your referral. Please co-sign or sign and return this letter via fax as soon as possible to [phone]. If you have any questions, please contact me at Dept: [phone]    Sincerely,  Electronically signed by therapist: Tyler Elizalde PT  Physician's certification required: Yes  I certify the need for these services furnished under this plan of treatment and while under my care.    X___________________________________________________ Date____________________    Certification From: 4/19/2024  To:7/18/2024

## 2024-04-22 ENCOUNTER — OFFICE VISIT (OUTPATIENT)
Dept: PHYSICAL THERAPY | Facility: HOSPITAL | Age: 89
End: 2024-04-22
Attending: ORTHOPAEDIC SURGERY
Payer: MEDICARE

## 2024-04-22 PROCEDURE — 97110 THERAPEUTIC EXERCISES: CPT

## 2024-04-22 NOTE — PROGRESS NOTES
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 Still tight and sore but HEP is going well.     Pain: 6/10 worst      Objective:     AROM: (* denotes performed with pain)   Knee    Flexion: R 130; L 120   Extension: R 0; L -10      PROM: (* denotes performed with pain)   Knee    Flexion: R 130; L 123   Extension: R 0; L -10           Assessment: Patient continues to be limited in knee extension ROM and single leg balance on the L. Tolerated additional exercises, including FSU on 4\" step and SLR with 1.5#. No changes to HEP at this time.       Goals:   (To be met in 10 visits)   Pt will improve knee extension ROM to 0 deg to allow proper heel strike during gait and terminal knee extension in stance  Pt will improve knee AROM flexion to >130 degrees to improve ability to perform stairs  Pt will improve quad strength to 5/5 to ascend 1 flight of stairs reciprocally without UE assist  Pt will increase hip and knee strength to grossly 4+/5 to be able to get up and down from the floor safely  Pt will demonstrate increased hip ER/ABD strength to 5+/5 to perform stepping and squatting activities without excessive femoral IR/ADD  Pt will improve SLS to >10s to improve safety and independence with gait on uneven surfaces such as grass  Pt will be independent and compliant with comprehensive HEP to maintain progress achieved in PT    Plan: continue PT to improve ROM, strength, and functional mobility  Date: 4/22/2024  TX#: 2/10 Date:                 TX#: 3/ Date:                 TX#: 4/ Date:                 TX#: 5/ Date:   Tx#: 6/   TE:  Nustep, lv 5  SB knee flexion, 15  Single leg knee extension, 10  SLR, 1.5#, 2x10  Bridge,  15  S/l hip abduction, 2. X10  Standing TKE, GTB, 20  FSU, 4\" step, 2x10  HS stretch on 4\" step, 3x10 sec  Tandem walk  Walking march  SLS practice                              HEP:   Access Code: [email or web address]  URL: [email or web address]  Date: 04/19/2024  Prepared by: Tyler Elizalde     Exercises  - Seated Knee Extension with Resistance  - 1 x daily - 2 sets - 10 reps  - Seated Hamstring Curls with Resistance  - 1 x daily - 2 sets - 10 reps  Pt education was provided on exam findings, treatment diagnosis, treatment plan, expectations, and prognosis. Pt was also provided recommendations for possible soreness after evaluation, modalities as needed [ice/heat], importance of remaining active, and strategies to reduce fall risk at home. Patient was instructed in and issued a HEP for: knee ROM, LE strength, gait training       Charges: 3TE       Total Timed Treatment: 40 min  Total Treatment Time: 40 min   No

## 2024-04-24 ENCOUNTER — OFFICE VISIT (OUTPATIENT)
Dept: PHYSICAL THERAPY | Facility: HOSPITAL | Age: 89
End: 2024-04-24
Attending: ORTHOPAEDIC SURGERY
Payer: MEDICARE

## 2024-04-24 PROCEDURE — 97110 THERAPEUTIC EXERCISES: CPT

## 2024-04-24 NOTE — PROGRESS NOTES
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 Pain has decreased but is a little more sore this morning. Took tylenol this morning.     Pain: 4/10 worst      Objective:     AROM: (* denotes performed with pain)   Knee    Flexion: R 130; L 120   Extension: R 0; L -10      PROM: (* denotes performed with pain)   Knee    Flexion: R 130; L 129   Extension: R 0; L -8           Assessment: Patient continues to be limited in knee extension ROM and single leg balance on the L. Tolerated additional exercises, including squat and modified SLS. HEP updated accordingly.       Goals:   (To be met in 10 visits)   Pt will improve knee extension ROM to 0 deg to allow proper heel strike during gait and terminal knee extension in stance  Pt will improve knee AROM flexion to >130 degrees to improve ability to perform stairs  Pt will improve quad strength to 5/5 to ascend 1 flight of stairs reciprocally without UE assist  Pt will increase hip and knee strength to grossly 4+/5 to be able to get up and down from the floor safely  Pt will demonstrate increased hip ER/ABD strength to 5+/5 to perform stepping and squatting activities without excessive femoral IR/ADD  Pt will improve SLS to >10s to improve safety and independence with gait on uneven surfaces such as grass  Pt will be independent and compliant with comprehensive HEP to maintain progress achieved in PT    Plan: continue PT to improve ROM, strength, and functional mobility  Date: 4/22/2024  TX#: 2/10 Date: 4/24/24                TX#: 3/10 Date:                 TX#: 4/ Date:                 TX#: 5/ Date:   Tx#: 6/   TE:  Nustep, lv 5  SB knee flexion, 15  Single leg knee extension, 10  SLR,  1.5#, 2x10  Bridge, 15  S/l hip abduction, 2. X10  Standing TKE, GTB, 20  FSU, 4\" step, 2x10  HS stretch on 4\" step, 3x10 sec  Tandem walk  Walking march  SLS practice   TE:  Nustep, lv 5  Standing knee flexion, 4#, 2x10  Lateral step, 3 laps  Lateral step to SLS  Supine active HS stretch, 10x3 sec  FSU, 4\" step, 2x10  LSR, 4\" step, 10  Squat, 10  Tandem walk, 3 laps  Airex FSU and over, 10  Modified SLS  Passive knee flexion and extension, 5 min                               HEP:   Access Code: [email or web address]  URL: [email or web address]  Date: 04/19/2024  Prepared by: Tyler Elizalde     Exercises  - Seated Knee Extension with Resistance  - 1 x daily - 2 sets - 10 reps  - Seated Hamstring Curls with Resistance  - 1 x daily - 2 sets - 10 reps  Modified SLS  squat         Charges: 3TE       Total Timed Treatment: 40 min  Total Treatment Time: 40 min

## 2024-04-29 ENCOUNTER — OFFICE VISIT (OUTPATIENT)
Dept: PHYSICAL THERAPY | Facility: HOSPITAL | Age: 89
End: 2024-04-29
Attending: ORTHOPAEDIC SURGERY
Payer: MEDICARE

## 2024-04-29 PROCEDURE — 97110 THERAPEUTIC EXERCISES: CPT

## 2024-04-29 NOTE — PROGRESS NOTES
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 Pain was decreased over the weekend but feels some pain currently in the medial and posterior L knee. This feels like a lock or catch.    Pain: 4/10 worst      Objective:     AROM: (* denotes performed with pain)   Knee    Flexion: R 130; L 120   Extension: R 0; L -5      PROM: (* denotes performed with pain)   Knee    Flexion: R 130; L 129   Extension: R 0; L -5           Assessment: Patient demonstrates improved knee extension ROM with gait and passively. She responded well to exercises and passive stretching with decreased posteromedial knee pain by the end of the session. She improved in SLS, especially with visual cuing looking at her feet. She is going to work on this at home until next session.       Goals:   (To be met in 10 visits)   Pt will improve knee extension ROM to 0 deg to allow proper heel strike during gait and terminal knee extension in stance  Pt will improve knee AROM flexion to >130 degrees to improve ability to perform stairs  Pt will improve quad strength to 5/5 to ascend 1 flight of stairs reciprocally without UE assist  Pt will increase hip and knee strength to grossly 4+/5 to be able to get up and down from the floor safely  Pt will demonstrate increased hip ER/ABD strength to 5+/5 to perform stepping and squatting activities without excessive femoral IR/ADD  Pt will improve SLS to >10s to improve safety and independence with gait on uneven surfaces such as grass  Pt will be independent and compliant with comprehensive HEP to maintain progress achieved in PT    Plan: continue PT to improve ROM, strength, and functional mobility  Date: 4/22/2024  TX#: 2/10  Date: 4/24/24                TX#: 3/10 Date:  4/29/24               TX#: 4/10 Date:                 TX#: 5/ Date:   Tx#: 6/   TE:  Nustep, lv 5  SB knee flexion, 15  Single leg knee extension, 10  SLR, 1.5#, 2x10  Bridge, 15  S/l hip abduction, 2. X10  Standing TKE, GTB, 20  FSU, 4\" step, 2x10  HS stretch on 4\" step, 3x10 sec  Tandem walk  Walking march  SLS practice   TE:  Nustep, lv 5  Standing knee flexion, 4#, 2x10  Lateral step, 3 laps  Lateral step to SLS  Supine active HS stretch, 10x3 sec  FSU, 4\" step, 2x10  LSR, 4\" step, 10  Squat, 10  Tandem walk, 3 laps  Airex FSU and over, 10  Modified SLS  Passive knee flexion and extension, 5 min     TE:  Nustep, lv 5, 5 min  Lateral walk, YTB, 3 laps  SAQ, 1.5#, 2x12  SLR circles, 1.5#, 5x5  FSU, 4\" step, 2x10  Airex weight shift, 10  Rotational step, 15  BOSU lunge, 2x6  SLS looking at feet, 5 min  Shuttle DLP, 37#, 2x10  Shuttle SLP, 28#, 2x10  Passive knee flexion and extension, 5 min                          HEP:   Access Code: [email or web address]  URL: [email or web address]  Date: 04/19/2024  Prepared by: Tyler Elizalde     Exercises  - Seated Knee Extension with Resistance  - 1 x daily - 2 sets - 10 reps  - Seated Hamstring Curls with Resistance  - 1 x daily - 2 sets - 10 reps  SLS  squat         Charges: 3TE       Total Timed Treatment: 43 min  Total Treatment Time: 43 min

## 2024-05-01 ENCOUNTER — OFFICE VISIT (OUTPATIENT)
Dept: PHYSICAL THERAPY | Facility: HOSPITAL | Age: 89
End: 2024-05-01
Attending: ORTHOPAEDIC SURGERY
Payer: MEDICARE

## 2024-05-01 PROCEDURE — 97112 NEUROMUSCULAR REEDUCATION: CPT

## 2024-05-01 PROCEDURE — 97110 THERAPEUTIC EXERCISES: CPT

## 2024-05-01 NOTE — PROGRESS NOTES
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 Still feels somewhat fearful of falling.    Pain: 4/10 worst      Objective:     AROM: (* denotes performed with pain)   Knee    Flexion: R 130; L 125   Extension: R 0; L -5      PROM: (* denotes performed with pain)   Knee    Flexion: R 130; L 133   Extension: R 0; L -3       5x sit<>stand: 15 sec  TUG (AD, time): 10 sec           Assessment: Patient continues to improve in knee ROM. She did not improve in 5x sit<>stand yet. Added additional LE strengthening exercises to focus on functional LE strength for HEP. She does demonstrate improved ability to balance on the L leg.       Goals:   (To be met in 10 visits)   Pt will improve knee extension ROM to 0 deg to allow proper heel strike during gait and terminal knee extension in stance  Pt will improve knee AROM flexion to >130 degrees to improve ability to perform stairs  Pt will improve quad strength to 5/5 to ascend 1 flight of stairs reciprocally without UE assist  Pt will increase hip and knee strength to grossly 4+/5 to be able to get up and down from the floor safely  Pt will demonstrate increased hip ER/ABD strength to 5+/5 to perform stepping and squatting activities without excessive femoral IR/ADD  Pt will improve SLS to >10s to improve safety and independence with gait on uneven surfaces such as grass  Pt will be independent and compliant with comprehensive HEP to maintain progress achieved in PT    Plan: continue PT to improve ROM, strength, and functional mobility  Date: 4/22/2024  TX#: 2/10 Date: 4/24/24                TX#: 3/10 Date:  4/29/24               TX#: 4/10  Date:  51/24               TX#: 5/10 Date:   Tx#: 6/   TE:  Nustep, lv 5  SB knee flexion, 15  Single leg knee extension, 10  SLR, 1.5#, 2x10  Bridge, 15  S/l hip abduction, 2. X10  Standing TKE, GTB, 20  FSU, 4\" step, 2x10  HS stretch on 4\" step, 3x10 sec  Tandem walk  Walking march  SLS practice   TE:  Nustep, lv 5  Standing knee flexion, 4#, 2x10  Lateral step, 3 laps  Lateral step to SLS  Supine active HS stretch, 10x3 sec  FSU, 4\" step, 2x10  LSR, 4\" step, 10  Squat, 10  Tandem walk, 3 laps  Airex FSU and over, 10  Modified SLS  Passive knee flexion and extension, 5 min     TE:  Nustep, lv 5, 5 min  Lateral walk, YTB, 3 laps  SAQ, 1.5#, 2x12  SLR circles, 1.5#, 5x5  FSU, 6\" step, 2x10  Airex weight shift, 10  Rotational step, 15  BOSU lunge, 2x6  SLS looking at feet, 5 min  Shuttle DLP, 37#, 2x10  Shuttle SLP, 18#, 2x10  Passive knee flexion and extension, 5 min TE:  Treadmill walk, 1.2 mph, 2 min w/ hands, 1.0 mph, 3 min no hands  FSD from 2\" step, 2x8  SLS looking at feet, 5 min  Heel raise off 2\" step, 15  Heel raise to single leg lower, 2x5  Retro walk for knee extension, 3 laps  Sit<>stand, 5#, 2x10  Shuttle DLP, 37#, 15  Shuttle SLP, 25#, 15       Nreed:  Airex small WERO w/ perturbations  Airex ball toss  Lateral lean to lateral lunge, 2x5                  HEP:   Single leg balance practice  Sit<>stand, 3x8-12  Walking around neighborhood - record time         Charges: 3TE, nreed       Total Timed Treatment: 53 min  Total Treatment Time: 53 min

## 2024-05-06 ENCOUNTER — PATIENT MESSAGE (OUTPATIENT)
Dept: ORTHOPEDICS CLINIC | Facility: CLINIC | Age: 89
End: 2024-05-06

## 2024-05-06 ENCOUNTER — TELEPHONE (OUTPATIENT)
Dept: ORTHOPEDICS CLINIC | Facility: CLINIC | Age: 89
End: 2024-05-06

## 2024-05-06 ENCOUNTER — OFFICE VISIT (OUTPATIENT)
Dept: PHYSICAL THERAPY | Facility: HOSPITAL | Age: 89
End: 2024-05-06
Attending: ORTHOPAEDIC SURGERY
Payer: MEDICARE

## 2024-05-06 DIAGNOSIS — M25.562 LEFT KNEE PAIN, UNSPECIFIED CHRONICITY: Primary | ICD-10-CM

## 2024-05-06 DIAGNOSIS — Z96.652 STATUS POST LEFT KNEE REPLACEMENT: ICD-10-CM

## 2024-05-06 DIAGNOSIS — M79.605 LEFT LEG PAIN: ICD-10-CM

## 2024-05-06 PROCEDURE — 97110 THERAPEUTIC EXERCISES: CPT

## 2024-05-06 PROCEDURE — 97112 NEUROMUSCULAR REEDUCATION: CPT

## 2024-05-06 NOTE — TELEPHONE ENCOUNTER
Patient wanted to know if she will need to get xrays prior to her upcoming appointment on 5/15/24.  Please advise.

## 2024-05-06 NOTE — PROGRESS NOTES
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 Not using straight cane at all at this point.     Pain: 4/10 worst      Objective:     AROM: (* denotes performed with pain)   Knee    Flexion: R 130; L 125   Extension: R 0; L -5      PROM: (* denotes performed with pain)   Knee    Flexion: R 130; L 133   Extension: R 0; L -3       5x sit<>stand: 15 sec  TUG (AD, time): 10 sec         LE girth mid patella: R 40 cm; L 44 cm      Assessment: Patient presents with 4 cm difference in limb girth at mid patella. She continues to have limited extension passively and actively with gait. Improves with stretching and cuing. Changed heel prop on towel to seated extension stretch w/ foot on coffee table or chair.       Goals:   (To be met in 10 visits)   Pt will improve knee extension ROM to 0 deg to allow proper heel strike during gait and terminal knee extension in stance  Pt will improve knee AROM flexion to >130 degrees to improve ability to perform stairs  Pt will improve quad strength to 5/5 to ascend 1 flight of stairs reciprocally without UE assist  Pt will increase hip and knee strength to grossly 4+/5 to be able to get up and down from the floor safely  Pt will demonstrate increased hip ER/ABD strength to 5+/5 to perform stepping and squatting activities without excessive femoral IR/ADD  Pt will improve SLS to >10s to improve safety and independence with gait on uneven surfaces such as grass  Pt will be independent and compliant with comprehensive HEP to maintain progress achieved in PT    Plan: continue PT to improve ROM, strength, and functional mobility  Date: 4/22/2024  TX#: 2/10 Date:  4/24/24                TX#: 3/10 Date:  4/29/24               TX#: 4/10 Date:  5/1/24               TX#: 5/10 Date: 5/6/24  Tx#: 6/10   TE:  Nustep, lv 5  SB knee flexion, 15  Single leg knee extension, 10  SLR, 1.5#, 2x10  Bridge, 15  S/l hip abduction, 2. X10  Standing TKE, GTB, 20  FSU, 4\" step, 2x10  HS stretch on 4\" step, 3x10 sec  Tandem walk  Walking march  SLS practice   TE:  Nustep, lv 5  Standing knee flexion, 4#, 2x10  Lateral step, 3 laps  Lateral step to SLS  Supine active HS stretch, 10x3 sec  FSU, 4\" step, 2x10  LSR, 4\" step, 10  Squat, 10  Tandem walk, 3 laps  Airex FSU and over, 10  Modified SLS  Passive knee flexion and extension, 5 min     TE:  Nustep, lv 5, 5 min  Lateral walk, YTB, 3 laps  SAQ, 1.5#, 2x12  SLR circles, 1.5#, 5x5  FSU, 6\" step, 2x10  Airex weight shift, 10  Rotational step, 15  BOSU lunge, 2x6  SLS looking at feet, 5 min  Shuttle DLP, 37#, 2x10  Shuttle SLP, 18#, 2x10  Passive knee flexion and extension, 5 min TE:  Treadmill walk, 1.2 mph, 2 min w/ hands, 1.0 mph, 3 min no hands  FSD from 2\" step, 2x8  SLS looking at feet, 5 min  Heel raise off 2\" step, 15  Heel raise to single leg lower, 2x5  Retro walk for knee extension, 3 laps  Sit<>stand, 5#, 2x10  Shuttle DLP, 37#, 15  Shuttle SLP, 25#, 15 TE:  Treadmill walk, 1.3 mph, 5 min  SB wall sit, 2x10  Retro walk, YTB, 3 laps   Lateral walk, YTB, 2 laps  Seated knee extension, 20#, 2x10  Seated knee flexion, 15#, 2x10  Passive knee extension w/ sup patellar glide  Seated heel prop knee extension stretch, [id number]      Nreed:  Airex small WERO w/ perturbations  Airex ball toss  Lateral lean to lateral lunge, 2x5 Nreed:  Tiltboard AP  Tiltboard ML  Airex marching                 HEP:   Single leg balance practice  Sit<>stand, 3x8-12  Seated heel prop extension stretch  Walking around neighborhood - record time         Charges: 2TE, nreed       Total Timed Treatment: 45 min  Total Treatment Time: 45 min

## 2024-05-06 NOTE — TELEPHONE ENCOUNTER
Future Appointments   Date Time Provider Department Center   5/8/2024 10:00 AM Tyler Elizalde, PT E[facility] PHYS TH [facility]   5/13/2024 10:00 AM Tyler Elizalde PT [facility] PHYS [facility]   5/13/2024 10:45 AM NAP XR RM1 NAP XRAY [facility] [place]   5/13/2024 11:15 AM NAP XR RM1 NAP XRAY [facility] [place]   5/13/2024 11:30 AM NAP XR RM1 NAP XRAY [facility] [place]   5/15/2024  9:40 AM Sanjeev Cage MD [facility]   5/17/2024 10:00 AM Tyler Elizalde PT [facility] PHYS TH [facility]   5/20/2024 10:00 AM Tyler Elizalde PT [facility] PHYS TH [facility]   5/22/2024 10:00 AM Tyler Elizalde PT [facility] PHYS TH [facility]   5/29/2024 10:00 AM Tyler Elizalde PT [facility] PHYS TH [facility]   7/11/2024 10:00 AM Brenton Hinojosa MD [facility]   9/9/2024 10:00 AM EH WIL RM1 EH MAMMO [facility]   9/16/2024  3:00 PM Rusty Ma MD [facility] HEM ONC [facility]   9/24/2024  8:45 AM Ninoska Galeano MD [facility]

## 2024-05-06 NOTE — TELEPHONE ENCOUNTER
Xray ordered, please schedule with patient.  Thank you    LOV 4/17/24 \"X-rays needed at next visit: Postoperative radiographs left knee and full leg length films\"

## 2024-05-07 NOTE — TELEPHONE ENCOUNTER
From: Shanti Coy  To: Sanjeev Cage  Sent: 5/6/2024 1:53 PM CDT  Subject: Post OP for knee replacement follow up    I was under the impression the day of the Post OP visit for a knee replacement I could stop across the [place] for an x-ray before the follow up visit. I stopped in your office to double check today to see if I was correct. The person at the desk said she would check. I received on My Chart later this morning that I needed to make an appt. for 3 different x-rays of the knee with four more views and they only did them on Fri.at the [facility] street location.   Please clarify if I can do it across the [place] with one x-ray the day of appt. or I need to make an appt for all these other views someplace else.  Thank you   Shanti Coy  11/26/1935

## 2024-05-07 NOTE — TELEPHONE ENCOUNTER
Called patient and spoke with them to ensure that everything was squared away appropriately with their X-Rays. Patient will get X-Rays after PT appt on 05/13/24. All questions were answered and patient expressed their appreciation and understanding.

## 2024-05-08 ENCOUNTER — OFFICE VISIT (OUTPATIENT)
Dept: PHYSICAL THERAPY | Facility: HOSPITAL | Age: 89
End: 2024-05-08
Attending: ORTHOPAEDIC SURGERY
Payer: MEDICARE

## 2024-05-08 PROCEDURE — 97112 NEUROMUSCULAR REEDUCATION: CPT

## 2024-05-08 PROCEDURE — 97110 THERAPEUTIC EXERCISES: CPT

## 2024-05-08 NOTE — PROGRESS NOTES
Diagnosis:   Status post left knee replacement (Z96.652)          Referring Provider: Sanjeev Cage  Date of Evaluation:    4/19/24    Precautions:   WBAT Next MD visit:   none scheduled  Date of Surgery: n/a   Insurance Primary/Secondary: [employer] [facility] / N/A     # Auth Visits: 10            Subjective: Patient reports having increased swelling with prolonged standing and walking but feeling ok otherwise.     Pain: 4/10 worst      Objective:     AROM: (* denotes performed with pain)   Knee    Flexion: R 130; L 125   Extension: R 0; L -5      PROM: (* denotes performed with pain)   Knee    Flexion: R 130; L 133   Extension: R 0; L -1       5x sit<>stand: 15 sec  TUG (AD, time): 10 sec         LE girth mid patella: R 40 cm; L 44 cm      Assessment: Patient continues to progress in balance and was able to maintain SLS for 8 sec bilaterally. Limited knee flexion actively with gait and had difficulty with sheryl step overs. Tolerated increased difficulty of closed chain strengthening, including FSU with weight and modified SL sit<>stand. Doing very well overall.       Goals:   (To be met in 10 visits)   Pt will improve knee extension ROM to 0 deg to allow proper heel strike during gait and terminal knee extension in stance  Pt will improve knee AROM flexion to >130 degrees to improve ability to perform stairs  Pt will improve quad strength to 5/5 to ascend 1 flight of stairs reciprocally without UE assist  Pt will increase hip and knee strength to grossly 4+/5 to be able to get up and down from the floor safely  Pt will demonstrate increased hip ER/ABD strength to 5+/5 to perform stepping and squatting activities without excessive femoral IR/ADD  Pt will improve SLS to >10s to improve safety and independence with gait on uneven surfaces such as grass  Pt will be independent and compliant with comprehensive HEP to maintain progress achieved in PT    Plan: continue PT to improve ROM, strength, and functional mobility  Date:  4/22/2024  TX#: 2/10 Date: 4/24/24                TX#: 3/10 Date:  4/29/24               TX#: 4/10 Date:  5/1/24               TX#: 5/10 Date: 5/6/24  Tx#: 6/10 Date: 5/8/24  Tx#: 7/10   TE:  Nustep, lv 5  SB knee flexion, 15  Single leg knee extension, 10  SLR, 1.5#, 2x10  Bridge, 15  S/l hip abduction, 2. X10  Standing TKE, GTB, 20  FSU, 4\" step, 2x10  HS stretch on 4\" step, 3x10 sec  Tandem walk  Walking march  SLS practice   TE:  Nustep, lv 5  Standing knee flexion, 4#, 2x10  Lateral step, 3 laps  Lateral step to SLS  Supine active HS stretch, 10x3 sec  FSU, 4\" step, 2x10  LSR, 4\" step, 10  Squat, 10  Tandem walk, 3 laps  Airex FSU and over, 10  Modified SLS  Passive knee flexion and extension, 5 min     TE:  Nustep, lv 5, 5 min  Lateral walk, YTB, 3 laps  SAQ, 1.5#, 2x12  SLR circles, 1.5#, 5x5  FSU, 6\" step, 2x10  Airex weight shift, 10  Rotational step, 15  BOSU lunge, 2x6  SLS looking at feet, 5 min  Shuttle DLP, 37#, 2x10  Shuttle SLP, 18#, 2x10  Passive knee flexion and extension, 5 min TE:  Treadmill walk, 1.2 mph, 2 min w/ hands, 1.0 mph, 3 min no hands  FSD from 2\" step, 2x8  SLS looking at feet, 5 min  Heel raise off 2\" step, 15  Heel raise to single leg lower, 2x5  Retro walk for knee extension, 3 laps  Sit<>stand, 5#, 2x10  Shuttle DLP, 37#, 15  Shuttle SLP, 25#, 15 TE:  Treadmill walk, 1.3 mph, 5 min  SB wall sit, 2x10  Retro walk, YTB, 3 laps   Lateral walk, YTB, 2 laps  Seated knee extension, 20#, 2x10  Seated knee flexion, 15#, 2x10  Passive knee extension w/ sup patellar glide  Seated heel prop knee extension stretch, [id number] TE:  Treadmill walk, 1.3 mph, 5 min  Standing knee flexion, GTB, 2x10  Standing hip flexion, 2x10  FSU, 6\" step, 10#, 3x5  Supine active HS stretch, 15  Modified single leg stand>sit, 2x8  Supine active knee extension w/ OP and hold, 3 min  Passive knee flexion and extension, 5 min      Nreed:  Airex small WERO w/ perturbations  Airex ball toss  Lateral lean to lateral  ezequiel, 2x5 Nreed:  Tiltboard AP  Tiltboard ML  Airex marching Nreed:  Martin step over forward and backward  SLS                   HEP:   Single leg balance practice  Sit<>stand, 3x8-12  Seated heel prop extension stretch  Walking around neighborhood - record time         Charges: 2TE, nreed       Total Timed Treatment: 45 min  Total Treatment Time: 45 min

## 2024-05-13 ENCOUNTER — OFFICE VISIT (OUTPATIENT)
Dept: PHYSICAL THERAPY | Facility: HOSPITAL | Age: 89
End: 2024-05-13
Attending: ORTHOPAEDIC SURGERY
Payer: MEDICARE

## 2024-05-13 ENCOUNTER — HOSPITAL ENCOUNTER (OUTPATIENT)
Dept: GENERAL RADIOLOGY | Age: 89
Discharge: HOME | End: 2024-05-13
Attending: ORTHOPAEDIC SURGERY

## 2024-05-13 DIAGNOSIS — M79.605 LEFT LEG PAIN: ICD-10-CM

## 2024-05-13 DIAGNOSIS — Z96.652 STATUS POST LEFT KNEE REPLACEMENT: ICD-10-CM

## 2024-05-13 DIAGNOSIS — M25.562 LEFT KNEE PAIN, UNSPECIFIED CHRONICITY: ICD-10-CM

## 2024-05-13 PROCEDURE — 73562 X-RAY EXAM OF KNEE 3: CPT | Performed by: ORTHOPAEDIC SURGERY

## 2024-05-13 PROCEDURE — 97112 NEUROMUSCULAR REEDUCATION: CPT

## 2024-05-13 PROCEDURE — 77073 BONE LENGTH STUDIES: CPT | Performed by: ORTHOPAEDIC SURGERY

## 2024-05-13 PROCEDURE — 97110 THERAPEUTIC EXERCISES: CPT

## 2024-05-13 NOTE — PROGRESS NOTES
Diagnosis:   Status post left knee replacement (Z96.652)          Referring Provider: Sanjeev Cage  Date of Evaluation:    4/19/24    Precautions:   WBAT Next MD visit:   none scheduled  Date of Surgery: n/a   Insurance Primary/Secondary: [employer]ET[employer] [facility] / N/A     # Auth Visits: 10            Subjective: Patient reports continued stiffness in the L knee after prolonged sitting (~30 min) which takes several minutes to feel loose again.     Pain: 4/10 worst      Objective:     AROM: (* denotes performed with pain)   Knee    Flexion: R 130; L 125   Extension: R 0; L -5      PROM: (* denotes performed with pain)   Knee    Flexion: R 130; L 133   Extension: R 0; L -1       5x sit<>stand: 15 sec  TUG (AD, time): 10 sec         LE girth mid patella: R 40 cm; L 44 cm      Assessment: Patient continues to have limited knee extension with gait. Responded well to cuing to use heel to toe pattern. Patient tolerated increased resistance with LE strengthening. Responded well to manual therapy with tib/fem mobs in prone with improved passive extension.       Goals:   (To be met in 10 visits)   Pt will improve knee extension ROM to 0 deg to allow proper heel strike during gait and terminal knee extension in stance  Pt will improve knee AROM flexion to >130 degrees to improve ability to perform stairs  Pt will improve quad strength to 5/5 to ascend 1 flight of stairs reciprocally without UE assist  Pt will increase hip and knee strength to grossly 4+/5 to be able to get up and down from the floor safely  Pt will demonstrate increased hip ER/ABD strength to 5+/5 to perform stepping and squatting activities without excessive femoral IR/ADD  Pt will improve SLS to >10s to improve safety and independence with gait on uneven surfaces such as grass  Pt will be independent and compliant with comprehensive HEP to maintain progress achieved in PT    Plan: continue PT to improve ROM, strength, and functional mobility  Date: 4/22/2024  TX#: 2/10  Date: 4/24/24                TX#: 3/10 Date:  4/29/24               TX#: 4/10 Date:  5/1/24               TX#: 5/10 Date: 5/6/24  Tx#: 6/10 Date: 5/8/24  Tx#: 7/10 Date: 5/13/24  Tx#: 8/10   TE:  Nustep, lv 5  SB knee flexion, 15  Single leg knee extension, 10  SLR, 1.5#, 2x10  Bridge, 15  S/l hip abduction, 2. X10  Standing TKE, GTB, 20  FSU, 4\" step, 2x10  HS stretch on 4\" step, 3x10 sec  Tandem walk  Walking march  SLS practice   TE:  Nustep, lv 5  Standing knee flexion, 4#, 2x10  Lateral step, 3 laps  Lateral step to SLS  Supine active HS stretch, 10x3 sec  FSU, 4\" step, 2x10  LSR, 4\" step, 10  Squat, 10  Tandem walk, 3 laps  Airex FSU and over, 10  Modified SLS  Passive knee flexion and extension, 5 min     TE:  Nustep, lv 5, 5 min  Lateral walk, YTB, 3 laps  SAQ, 1.5#, 2x12  SLR circles, 1.5#, 5x5  FSU, 6\" step, 2x10  Airex weight shift, 10  Rotational step, 15  BOSU lunge, 2x6  SLS looking at feet, 5 min  Shuttle DLP, 37#, 2x10  Shuttle SLP, 18#, 2x10  Passive knee flexion and extension, 5 min TE:  Treadmill walk, 1.2 mph, 2 min w/ hands, 1.0 mph, 3 min no hands  FSD from 2\" step, 2x8  SLS looking at feet, 5 min  Heel raise off 2\" step, 15  Heel raise to single leg lower, 2x5  Retro walk for knee extension, 3 laps  Sit<>stand, 5#, 2x10  Shuttle DLP, 37#, 15  Shuttle SLP, 25#, 15 TE:  Treadmill walk, 1.3 mph, 5 min  SB wall sit, 2x10  Retro walk, YTB, 3 laps   Lateral walk, YTB, 2 laps  Seated knee extension, 20#, 2x10  Seated knee flexion, 15#, 2x10  Passive knee extension w/ sup patellar glide  Seated heel prop knee extension stretch, [id number] TE:  Treadmill walk, 1.3 mph, 5 min  Standing knee flexion, GTB, 2x10  Standing hip flexion, 2x10  FSU, 6\" step, 10#, 3x5  Supine active HS stretch, 15  Modified single leg stand>sit, 2x8  Supine active knee extension w/ OP and hold, 3 min  Passive knee flexion and extension, 5 min TE:  Treadmill walk, 1.3 mph, 5 min  Standing knee extension stretch on chair  Cable  retro step w/ cuing for extension, 15#, 5  SB wall sit, 3x10  Shuttle DLP, 50#, 2x10  Shuttle SLP, 25#, 2x10  Passive knee extension, prone, 5 min  Supine TKE w/ therapist OP, 15   3   Nreed:  Airex small WERO w/ perturbations  Airex ball toss  Lateral lean to lateral lunge, 2x5 Nreed:  Tiltboard AP  Tiltboard ML  Airex marching Nreed:  Martin step over forward and backward  SLS Nreed:  Martin step over forward and backward, lateral  Martin step over to airex, 5 min                     HEP:   Single leg balance practice  Sit<>stand, 3x8-12  Seated heel prop extension stretch  Walking around neighborhood - record time         Charges: 2TE (35 min), nreed (10 min)       Total Timed Treatment: 45 min  Total Treatment Time: 45 min

## 2024-05-13 NOTE — TELEPHONE ENCOUNTER
Xr orders cancelled and correct order placed   As patient arrived for images to be completed today

## 2024-05-15 ENCOUNTER — OFFICE VISIT (OUTPATIENT)
Dept: ORTHOPEDICS CLINIC | Facility: CLINIC | Age: 89
End: 2024-05-15

## 2024-05-15 ENCOUNTER — APPOINTMENT (OUTPATIENT)
Dept: PHYSICAL THERAPY | Facility: HOSPITAL | Age: 89
End: 2024-05-15
Attending: ORTHOPAEDIC SURGERY
Payer: MEDICARE

## 2024-05-15 DIAGNOSIS — Z96.652 STATUS POST LEFT KNEE REPLACEMENT: Primary | ICD-10-CM

## 2024-05-15 PROCEDURE — 99024 POSTOP FOLLOW-UP VISIT: CPT | Performed by: ORTHOPAEDIC SURGERY

## 2024-05-15 NOTE — PROGRESS NOTES
[facility] Progress Note    Subjective: Patient returns to clinic 6 weeks postop from left knee replacement.  She reports that she has been improving, doing well.  She is ambulating without assist device.  She is not taking any medications for pain.  She finished her aspirin DVT prophylaxis.  She has not had any issues with her incision.  She is working with Tyler Elizalde for therapy, states that she is very happy working with him and her postsurgical physical therapy experience has been much better than what she had with her right knee in the past.  Therapy notes document left knee stiffness after prolonged sitting.  Pain 4 out of 10 at worst.  Range of motion 5-1 25 active, 1-1 33 passive.    Objective: Patient appears comfortable, very pleasant.  Left knee incision is well-healed.  She demonstrates extension within 5 degrees of full, flexion 130 in clinic.  Knee is appropriate stable to varus and valgus stress throughout range of motion.      Imaging: X-rays of the left knee and full-length films personally viewed, independently interpreted and radiology report read.  Full-length x-rays demonstrate improvement in hip knee ankle axis, preoperatively through the center of the lateral compartment, post operatively along the lateral portion of the trochlear groove.  Dedicated knee films demonstrate tibial component within about a degree or 2 of neutral AP mechanical axis, lateral view with 3 degrees posterior slope.  Patella tracking centrally.      Assessment/Plan: 88-year-old female 6 weeks postop status post left knee replacement.  Patient is doing very well.  Continue activities as tolerated without restriction.  No restrictions on the incision at this point.  Did demonstrate exercises for her to perform on her own to help achieve full extension.  She is done with aspirin DVT prophylaxis.  No pain medications required.  She has a few sessions of therapy left and will continue home exercise program.  Would  have her follow-up at 1 year from date of surgery with repeat x-rays.    Sanjeev Cage MD, FAAOS  [facility] Orthopedic Surgery  Phone [phone]  Fax [phone]

## 2024-05-17 ENCOUNTER — OFFICE VISIT (OUTPATIENT)
Dept: PHYSICAL THERAPY | Facility: HOSPITAL | Age: 89
End: 2024-05-17
Attending: ORTHOPAEDIC SURGERY
Payer: MEDICARE

## 2024-05-17 PROCEDURE — 97110 THERAPEUTIC EXERCISES: CPT

## 2024-05-17 NOTE — PROGRESS NOTES
Diagnosis:   Status post left knee replacement (Z96.652)          Referring Provider: Sanjeev Cage  Date of Evaluation:    4/19/24    Precautions:   WBAT Next MD visit:   none scheduled  Date of Surgery: n/a   Insurance Primary/Secondary: [employer]ETD[employer] [facility] / N/A     # Auth Visits: 10            Subjective: Patient saw Dr. Cage Wednesday who is happy with progress and does not need to see her until 1 year post op. She did more activity on Wednesday and had increased swelling and soreness. Has been wearing knee sleeve which makes it feel better. Pain can still get up to a 4/10 at night at times.     Pain: 4/10 worst      Objective:     AROM: (* denotes performed with pain)   Knee    Flexion: R 130; L 125   Extension: R 0; L -3      PROM: (* denotes performed with pain)   Knee    Flexion: R 130; L 134   Extension: R 0; L -1       5x sit<>stand: 15 sec  TUG (AD, time): 10 sec         LE girth mid patella: R 40 cm; L 43 cm      Assessment: Patient presents with slightly improved swelling in the L knee. She has improved extension overall, especially after stretching. Some weakness with lateral step down and she is going to work on this for HEP until next session. 1-2 more sessions prior to discharge.       Goals:   (To be met in 10 visits)   Pt will improve knee extension ROM to 0 deg to allow proper heel strike during gait and terminal knee extension in stance  Pt will improve knee AROM flexion to >130 degrees to improve ability to perform stairs  Pt will improve quad strength to 5/5 to ascend 1 flight of stairs reciprocally without UE assist  Pt will increase hip and knee strength to grossly 4+/5 to be able to get up and down from the floor safely  Pt will demonstrate increased hip ER/ABD strength to 5+/5 to perform stepping and squatting activities without excessive femoral IR/ADD  Pt will improve SLS to >10s to improve safety and independence with gait on uneven surfaces such as grass  Pt will be independent and  compliant with comprehensive HEP to maintain progress achieved in PT    Plan: continue PT to improve ROM, strength, and functional mobility. Continue progressing single leg closed chain strength.  Date: 4/22/2024  TX#: 2/10 Date: 4/24/24                TX#: 3/10 Date:  4/29/24               TX#: 4/10 Date:  5/1/24               TX#: 5/10 Date: 5/6/24  Tx#: 6/10 Date: 5/8/24  Tx#: 7/10 Date: 5/13/24  Tx#: 8/10 Date: 5/17/24  Tx#: 9/10   TE:  Nustep, lv 5  SB knee flexion, 15  Single leg knee extension, 10  SLR, 1.5#, 2x10  Bridge, 15  S/l hip abduction, 2. X10  Standing TKE, GTB, 20  FSU, 4\" step, 2x10  HS stretch on 4\" step, 3x10 sec  Tandem walk  Walking march  SLS practice   TE:  Nustep, lv 5  Standing knee flexion, 4#, 2x10  Lateral step, 3 laps  Lateral step to SLS  Supine active HS stretch, 10x3 sec  FSU, 4\" step, 2x10  LSR, 4\" step, 10  Squat, 10  Tandem walk, 3 laps  Airex FSU and over, 10  Modified SLS  Passive knee flexion and extension, 5 min     TE:  Nustep, lv 5, 5 min  Lateral walk, YTB, 3 laps  SAQ, 1.5#, 2x12  SLR circles, 1.5#, 5x5  FSU, 6\" step, 2x10  Airex weight shift, 10  Rotational step, 15  BOSU lunge, 2x6  SLS looking at feet, 5 min  Shuttle DLP, 37#, 2x10  Shuttle SLP, 18#, 2x10  Passive knee flexion and extension, 5 min TE:  Treadmill walk, 1.2 mph, 2 min w/ hands, 1.0 mph, 3 min no hands  FSD from 2\" step, 2x8  SLS looking at feet, 5 min  Heel raise off 2\" step, 15  Heel raise to single leg lower, 2x5  Retro walk for knee extension, 3 laps  Sit<>stand, 5#, 2x10  Shuttle DLP, 37#, 15  Shuttle SLP, 25#, 15 TE:  Treadmill walk, 1.3 mph, 5 min  SB wall sit, 2x10  Retro walk, YTB, 3 laps   Lateral walk, YTB, 2 laps  Seated knee extension, 20#, 2x10  Seated knee flexion, 15#, 2x10  Passive knee extension w/ sup patellar glide  Seated heel prop knee extension stretch, [id number] TE:  Treadmill walk, 1.3 mph, 5 min  Standing knee flexion, GTB, 2x10  Standing hip flexion, 2x10  FSU, 6\" step, 10#,  3x5  Supine active HS stretch, 15  Modified single leg stand>sit, 2x8  Supine active knee extension w/ OP and hold, 3 min  Passive knee flexion and extension, 5 min TE:  Treadmill walk, 1.3 mph, 5 min  Standing knee extension stretch on chair  Cable retro step w/ cuing for extension, 15#, 5  SB wall sit, 3x10  Shuttle DLP, 50#, 2x10  Shuttle SLP, 25#, 2x10  Passive knee extension, prone, 5 min  Supine TKE w/ therapist OP, 15 TE:  Treadmill walk, 1.3 mph, 5 min  Gastroc stretch on slantboard  Heel raise to single leg eccentric off step,   Standing hip extension RTB  FSD to heel tap, 4\"  Bridge staggered on step, 2x10  Lateral step down, 4\" step, 3x10  Passive knee flexion, extension in supine  Supine TKE w/ therapist OP, 15   3   Nreed:  Airex small WERO w/ perturbations  Airex ball toss  Lateral lean to lateral lunge, 2x5 Nreed:  Tiltboard AP  Tiltboard ML  Airex marching Nreed:  Martin step over forward and backward  SLS Nreed:  Martin step over forward and backward, lateral  Martin step over to airex, 5 min                        HEP:   Single leg balance practice  Sit<>stand, 3x8-12  Seated heel prop extension stretch  Walking around neighborhood - record time         Charges: 3TE (40 min)   Total Timed Treatment: 40 min  Total Treatment Time: 40 min

## 2024-05-20 ENCOUNTER — OFFICE VISIT (OUTPATIENT)
Dept: PHYSICAL THERAPY | Facility: HOSPITAL | Age: 89
End: 2024-05-20
Attending: ORTHOPAEDIC SURGERY
Payer: MEDICARE

## 2024-05-20 PROCEDURE — 97110 THERAPEUTIC EXERCISES: CPT

## 2024-05-20 NOTE — PROGRESS NOTES
Progress Summary  Pt has attended 10 visits in Physical Therapy.    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 Still complains of some soreness at night and increased swelling with increased activity.     Pain: 3/10 worst      Objective:     AROM: (* denotes performed with pain)   Knee    Flexion: R 130; L 127   Extension: R 0; L -3      PROM: (* denotes performed with pain)   Knee    Flexion: R 130; L 135   Extension: R 0; L -1       5x sit<>stand: 13 sec  TUG (AD, time): 9 sec         LE girth mid patella: R 40 cm; L 43 cm      Assessment: Patient has improved very well overall since beginning therapy. She has normalized ROM. She has slight limitation in knee extension with gait and has residual swelling and quad weakness. She will benefit from continued therapy for 2 more sessions to finalize home program and maximize functional mobility prior to discharge.       Goals:   (To be met in 10 visits)   Pt will improve knee extension ROM to 0 deg to allow proper heel strike during gait and terminal knee extension in stance (nearly met)  Pt will improve knee AROM flexion to >130 degrees to improve ability to perform stairs (met)  Pt will improve quad strength to 5/5 to ascend 1 flight of stairs reciprocally without UE assist (progressing)  Pt will increase hip and knee strength to grossly 4+/5 to be able to get up and down from the floor safely (progressing)  Pt will demonstrate increased hip ER/ABD strength to 5+/5 to perform stepping and squatting activities without excessive femoral IR/ADD (progressing)  Pt  will improve SLS to >10s to improve safety and independence with gait on uneven surfaces such as grass (met)  Pt will be independent and compliant with comprehensive HEP to maintain progress achieved in PT(ongoing)    Date: 4/22/2024  TX#: 2/10 Date: 4/24/24                TX#: 3/10 Date:  4/29/24               TX#: 4/10 Date:  5/1/24               TX#: 5/10 Date: 5/6/24  Tx#: 6/10 Date: 5/8/24  Tx#: 7/10 Date: 5/13/24  Tx#: 8/10 Date: 5/17/24  Tx#: 9/10 Date: 5/20/24  Tx#: 10/10   TE:  Nustep, lv 5  SB knee flexion, 15  Single leg knee extension, 10  SLR, 1.5#, 2x10  Bridge, 15  S/l hip abduction, 2. X10  Standing TKE, GTB, 20  FSU, 4\" step, 2x10  HS stretch on 4\" step, 3x10 sec  Tandem walk  Walking march  SLS practice   TE:  Nustep, lv 5  Standing knee flexion, 4#, 2x10  Lateral step, 3 laps  Lateral step to SLS  Supine active HS stretch, 10x3 sec  FSU, 4\" step, 2x10  LSR, 4\" step, 10  Squat, 10  Tandem walk, 3 laps  Airex FSU and over, 10  Modified SLS  Passive knee flexion and extension, 5 min     TE:  Nustep, lv 5, 5 min  Lateral walk, YTB, 3 laps  SAQ, 1.5#, 2x12  SLR circles, 1.5#, 5x5  FSU, 6\" step, 2x10  Airex weight shift, 10  Rotational step, 15  BOSU lunge, 2x6  SLS looking at feet, 5 min  Shuttle DLP, 37#, 2x10  Shuttle SLP, 18#, 2x10  Passive knee flexion and extension, 5 min TE:  Treadmill walk, 1.2 mph, 2 min w/ hands, 1.0 mph, 3 min no hands  FSD from 2\" step, 2x8  SLS looking at feet, 5 min  Heel raise off 2\" step, 15  Heel raise to single leg lower, 2x5  Retro walk for knee extension, 3 laps  Sit<>stand, 5#, 2x10  Shuttle DLP, 37#, 15  Shuttle SLP, 25#, 15 TE:  Treadmill walk, 1.3 mph, 5 min  SB wall sit, 2x10  Retro walk, YTB, 3 laps   Lateral walk, YTB, 2 laps  Seated knee extension, 20#, 2x10  Seated knee flexion, 15#, 2x10  Passive knee extension w/ sup patellar glide  Seated heel prop knee extension stretch, [id number] TE:  Treadmill walk, 1.3 mph, 5 min  Standing knee flexion, GTB,  2x10  Standing hip flexion, 2x10  FSU, 6\" step, 10#, 3x5  Supine active HS stretch, 15  Modified single leg stand>sit, 2x8  Supine active knee extension w/ OP and hold, 3 min  Passive knee flexion and extension, 5 min TE:  Treadmill walk, 1.3 mph, 5 min  Standing knee extension stretch on chair  Cable retro step w/ cuing for extension, 15#, 5  SB wall sit, 3x10  Shuttle DLP, 50#, 2x10  Shuttle SLP, 25#, 2x10  Passive knee extension, prone, 5 min  Supine TKE w/ therapist OP, 15 TE:  Treadmill walk, 1.3 mph, 5 min  Gastroc stretch on slantboard  Heel raise to single leg eccentric off step,   Standing hip extension RTB  FSD to heel tap, 4\"  Bridge staggered on step, 2x10  Lateral step down, 4\" step, 3x10  Passive knee flexion, extension in supine  Supine TKE w/ therapist OP, 15 TE:  Treadmill walk, 1.3 mph, 5 min  Lateral step down  Standing quad stretch  Prone quad stretch  Lateral walk, BTB, 3 laps  Slider hip extension stretch, 10  Supine SB flexion stretch  Seated lumbar flexion stretch  Supine TKE w/ therapist OP, 15   3   Nreed:  Airex small WERO w/ perturbations  Airex ball toss  Lateral lean to lateral lunge, 2x5 Nreed:  Tiltboard AP  Tiltboard ML  Airex marching Nreed:  Martin step over forward and backward  SLS Nreed:  Martin step over forward and backward, lateral  Martin step over to airex, 5 min  Nreed:  Martin step over to airex, 5 min                         HEP:   Single leg balance practice  Sit<>stand, 3x8-12  Seated heel prop extension stretch  Walking around neighborhood - record time         Charges: 3TE (40 min), 0 nreed (5 min)   Total Timed Treatment: 45 min  Total Treatment Time: 45 min  Post LEFS Score  Post LEFS Score: 63.75 % (5/20/2024 10:47 AM)    16.25 % improvement    Plan: Continue skilled Physical Therapy 1 x/week or a total of 2 visits over a 90 day period. Treatment will include: therapeutic exercise, neuro shaun, manual therapy       Patient/Family/Caregiver was advised of these  findings, precautions, and treatment options and has agreed to actively participate in planning and for this course of care.    Thank you for your referral. If you have any questions, please contact me at Dept: [phone].    Sincerely,  Electronically signed by therapist: Tyler Elizalde PT     Physician's certification required:  Yes  Please co-sign or sign and return this letter via fax as soon as possible to [phone].   I certify the need for these services furnished under this plan of treatment and while under my care.    X___________________________________________________ Date____________________    Certification From: 5/21/2024  To:8/19/2024

## 2024-05-22 ENCOUNTER — OFFICE VISIT (OUTPATIENT)
Dept: PHYSICAL THERAPY | Facility: HOSPITAL | Age: 89
End: 2024-05-22
Attending: ORTHOPAEDIC SURGERY
Payer: MEDICARE

## 2024-05-22 PROCEDURE — 97112 NEUROMUSCULAR REEDUCATION: CPT

## 2024-05-22 PROCEDURE — 97110 THERAPEUTIC EXERCISES: CPT

## 2024-05-22 NOTE — PROGRESS NOTES
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 She thinks it may get more swollen than the R one did because she is more active after this surgery.     Pain: 3/10 worst      Objective:     AROM: (* denotes performed with pain)   Knee    Flexion: R 130; L 127   Extension: R 0; L -3      PROM: (* denotes performed with pain)   Knee    Flexion: R 130; L 135   Extension: R 0; L -1       5x sit<>stand: 13 sec  TUG (AD, time): 9 sec         LE girth mid patella: R 40 cm; L 43 cm      Assessment: Patient had some difficulty with rotational step up on the L side with less stability in single leg. She also had some difficulty with single leg rockerboard on the L compared to the R. Still slight limitation in extension on the L. 1 more visit scheduled prior to discharge.       Goals:   (To be met in 10 visits)   Pt will improve knee extension ROM to 0 deg to allow proper heel strike during gait and terminal knee extension in stance (nearly met)  Pt will improve knee AROM flexion to >130 degrees to improve ability to perform stairs (met)  Pt will improve quad strength to 5/5 to ascend 1 flight of stairs reciprocally without UE assist (progressing)  Pt will increase hip and knee strength to grossly 4+/5 to be able to get up and down from the floor safely (progressing)  Pt will demonstrate increased hip ER/ABD strength to 5+/5 to perform stepping and squatting activities without excessive femoral IR/ADD (progressing)  Pt will improve SLS to >10s to improve safety and independence with gait on uneven surfaces such as grass (met)  Pt will be independent and compliant with  comprehensive HEP to maintain progress achieved in PT(ongoing)    Plan: discharge next session  Date: 4/22/2024  TX#: 2/10 Date: 4/24/24                TX#: 3/10 Date:  4/29/24               TX#: 4/10 Date:  5/1/24               TX#: 5/10 Date: 5/6/24  Tx#: 6/10 Date: 5/8/24  Tx#: 7/10 Date: 5/13/24  Tx#: 8/10 Date: 5/17/24  Tx#: 9/10 Date: 5/20/24  Tx#: 10/10 Date: 5/22/24  Tx#: 11/12   TE:  Nustep, lv 5  SB knee flexion, 15  Single leg knee extension, 10  SLR, 1.5#, 2x10  Bridge, 15  S/l hip abduction, 2. X10  Standing TKE, GTB, 20  FSU, 4\" step, 2x10  HS stretch on 4\" step, 3x10 sec  Tandem walk  Walking march  SLS practice   TE:  Nustep, lv 5  Standing knee flexion, 4#, 2x10  Lateral step, 3 laps  Lateral step to SLS  Supine active HS stretch, 10x3 sec  FSU, 4\" step, 2x10  LSR, 4\" step, 10  Squat, 10  Tandem walk, 3 laps  Airex FSU and over, 10  Modified SLS  Passive knee flexion and extension, 5 min     TE:  Nustep, lv 5, 5 min  Lateral walk, YTB, 3 laps  SAQ, 1.5#, 2x12  SLR circles, 1.5#, 5x5  FSU, 6\" step, 2x10  Airex weight shift, 10  Rotational step, 15  BOSU lunge, 2x6  SLS looking at feet, 5 min  Shuttle DLP, 37#, 2x10  Shuttle SLP, 18#, 2x10  Passive knee flexion and extension, 5 min TE:  Treadmill walk, 1.2 mph, 2 min w/ hands, 1.0 mph, 3 min no hands  FSD from 2\" step, 2x8  SLS looking at feet, 5 min  Heel raise off 2\" step, 15  Heel raise to single leg lower, 2x5  Retro walk for knee extension, 3 laps  Sit<>stand, 5#, 2x10  Shuttle DLP, 37#, 15  Shuttle SLP, 25#, 15 TE:  Treadmill walk, 1.3 mph, 5 min  SB wall sit, 2x10  Retro walk, YTB, 3 laps   Lateral walk, YTB, 2 laps  Seated knee extension, 20#, 2x10  Seated knee flexion, 15#, 2x10  Passive knee extension w/ sup patellar glide  Seated heel prop knee extension stretch, [id number] TE:  Treadmill walk, 1.3 mph, 5 min  Standing knee flexion, GTB, 2x10  Standing hip flexion, 2x10  FSU, 6\" step, 10#, 3x5  Supine active HS stretch, 15  Modified single leg  stand>sit, 2x8  Supine active knee extension w/ OP and hold, 3 min  Passive knee flexion and extension, 5 min TE:  Treadmill walk, 1.3 mph, 5 min  Standing knee extension stretch on chair  Cable retro step w/ cuing for extension, 15#, 5  SB wall sit, 3x10  Shuttle DLP, 50#, 2x10  Shuttle SLP, 25#, 2x10  Passive knee extension, prone, 5 min  Supine TKE w/ therapist OP, 15 TE:  Treadmill walk, 1.3 mph, 5 min  Gastroc stretch on slantboard  Heel raise to single leg eccentric off step,   Standing hip extension RTB  FSD to heel tap, 4\"  Bridge staggered on step, 2x10  Lateral step down, 4\" step, 3x10  Passive knee flexion, extension in supine  Supine TKE w/ therapist OP, 15 TE:  Treadmill walk, 1.3 mph, 5 min  Lateral step down  Standing quad stretch  Prone quad stretch  Lateral walk, BTB, 3 laps  Slider hip extension stretch, 10  Supine SB flexion stretch  Seated lumbar flexion stretch  Supine TKE w/ therapist OP, 15 TE:  Treadmill walk, 1.5 mph, 5 min  Forward lunge, 2x10  Lateral lunge, 10  March, 4#, 2x10  Rotational step up, 6\" step, 2x10  Slantboard gastroc stretch  Supine SLR w/ circles, 5x8  Supine quad set w/ foot on FR, [id number]  Passive knee extension stretching, 5 min   3   Nreed:  Airex small WERO w/ perturbations  Airex ball toss  Lateral lean to lateral lunge, 2x5 Nreed:  Tiltboard AP  Tiltboard ML  Airex marching Nreed:  Martin step over forward and backward  SLS Nreed:  Martin step over forward and backward, lateral  Martin step over to airex, 5 min  Nreed:  Martin step over to airex, 5 min Nreed:  Tiltboard single leg w/ UE support, [id number]  Seated knee flexion/extension manual resistance for speed  SLS, 3 min                           HEP:   Single leg balance practice  Sit<>stand, 3x8-12  Seated heel prop extension stretch  Walking around neighborhood - record time         Charges: 2TE (30 min), 1 nreed (10 min)   Total Timed Treatment: 40 min  Total Treatment Time: 40 min

## 2024-05-29 ENCOUNTER — OFFICE VISIT (OUTPATIENT)
Dept: PHYSICAL THERAPY | Facility: HOSPITAL | Age: 89
End: 2024-05-29
Attending: ORTHOPAEDIC SURGERY
Payer: MEDICARE

## 2024-05-29 PROCEDURE — 97110 THERAPEUTIC EXERCISES: CPT

## 2024-05-29 NOTE — PROGRESS NOTES
Discharge Summary  Pt has attended 12 visits in Physical Therapy.    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 Only residual complaint is swelling and I/M knee stiffness.     Pain: 110 worst      Objective:     AROM: (* denotes performed with pain)   Knee    Flexion: R 130; L 130   Extension: R 0; L 0      PROM: (* denotes performed with pain)   Knee    Flexion: R 130; L 135   Extension: R 0; L 0       5x sit<>stand: 13 sec  TUG (AD, time): 9 sec       SLS: R 14 sec; L 9 sec    LE girth mid patella: R 40 cm; L 43 cm      Assessment: Patient demonstrates normalized knee ROM, LE strength, static and dynamic balance. She demonstrates understanding of HEP and has met all goals for PT. Discharge with HEP.       Goals:   (To be met in 10 visits)   Pt will improve knee extension ROM to 0 deg to allow proper heel strike during gait and terminal knee extension in stance (met)  Pt will improve knee AROM flexion to >130 degrees to improve ability to perform stairs (met)  Pt will improve quad strength to 5/5 to ascend 1 flight of stairs reciprocally without UE assist (met)  Pt will increase hip and knee strength to grossly 4+/5 to be able to get up and down from the floor safely (met)  Pt will demonstrate increased hip ER/ABD strength to 5+/5 to perform stepping and squatting activities without excessive femoral IR/ADD (met)  Pt will improve SLS to >10s to improve safety and independence with gait on uneven surfaces such as grass (met)  Pt will be independent and compliant with comprehensive HEP to maintain progress achieved in PT(met)    Plan: discharge next session  Date: 4/22/2024  TX#: 2/10 Date: 4/24/24                 TX#: 3/10 Date:  4/29/24               TX#: 4/10 Date:  5/1/24               TX#: 5/10 Date: 5/6/24  Tx#: 6/10 Date: 5/8/24  Tx#: 7/10 Date: 5/13/24  Tx#: 8/10 Date: 5/17/24  Tx#: 9/10 Date: 5/20/24  Tx#: 10/10 Date: 5/22/24  Tx#: 11/12 Date: 5/29/24  Tx#: 12/12   TE:  Nustep, lv 5  SB knee flexion, 15  Single leg knee extension, 10  SLR, 1.5#, 2x10  Bridge, 15  S/l hip abduction, 2. X10  Standing TKE, GTB, 20  FSU, 4\" step, 2x10  HS stretch on 4\" step, 3x10 sec  Tandem walk  Walking march  SLS practice   TE:  Nustep, lv 5  Standing knee flexion, 4#, 2x10  Lateral step, 3 laps  Lateral step to SLS  Supine active HS stretch, 10x3 sec  FSU, 4\" step, 2x10  LSR, 4\" step, 10  Squat, 10  Tandem walk, 3 laps  Airex FSU and over, 10  Modified SLS  Passive knee flexion and extension, 5 min     TE:  Nustep, lv 5, 5 min  Lateral walk, YTB, 3 laps  SAQ, 1.5#, 2x12  SLR circles, 1.5#, 5x5  FSU, 6\" step, 2x10  Airex weight shift, 10  Rotational step, 15  BOSU lunge, 2x6  SLS looking at feet, 5 min  Shuttle DLP, 37#, 2x10  Shuttle SLP, 18#, 2x10  Passive knee flexion and extension, 5 min TE:  Treadmill walk, 1.2 mph, 2 min w/ hands, 1.0 mph, 3 min no hands  FSD from 2\" step, 2x8  SLS looking at feet, 5 min  Heel raise off 2\" step, 15  Heel raise to single leg lower, 2x5  Retro walk for knee extension, 3 laps  Sit<>stand, 5#, 2x10  Shuttle DLP, 37#, 15  Shuttle SLP, 25#, 15 TE:  Treadmill walk, 1.3 mph, 5 min  SB wall sit, 2x10  Retro walk, YTB, 3 laps   Lateral walk, YTB, 2 laps  Seated knee extension, 20#, 2x10  Seated knee flexion, 15#, 2x10  Passive knee extension w/ sup patellar glide  Seated heel prop knee extension stretch, [id number] TE:  Treadmill walk, 1.3 mph, 5 min  Standing knee flexion, GTB, 2x10  Standing hip flexion, 2x10  FSU, 6\" step, 10#, 3x5  Supine active HS stretch, 15  Modified single leg stand>sit, 2x8  Supine active knee extension w/ OP and hold, 3 min  Passive knee flexion and extension, 5 min  TE:  Treadmill walk, 1.3 mph, 5 min  Standing knee extension stretch on chair  Cable retro step w/ cuing for extension, 15#, 5  SB wall sit, 3x10  Shuttle DLP, 50#, 2x10  Shuttle SLP, 25#, 2x10  Passive knee extension, prone, 5 min  Supine TKE w/ therapist OP, 15 TE:  Treadmill walk, 1.3 mph, 5 min  Gastroc stretch on slantboard  Heel raise to single leg eccentric off step,   Standing hip extension RTB  FSD to heel tap, 4\"  Bridge staggered on step, 2x10  Lateral step down, 4\" step, 3x10  Passive knee flexion, extension in supine  Supine TKE w/ therapist OP, 15 TE:  Treadmill walk, 1.3 mph, 5 min  Lateral step down  Standing quad stretch  Prone quad stretch  Lateral walk, BTB, 3 laps  Slider hip extension stretch, 10  Supine SB flexion stretch  Seated lumbar flexion stretch  Supine TKE w/ therapist OP, 15 TE:  Treadmill walk, 1.5 mph, 5 min  Forward lunge, 2x10  Lateral lunge, 10  March, 4#, 2x10  Rotational step up, 6\" step, 2x10  Slantboard gastroc stretch  Supine SLR w/ circles, 5x8  Supine quad set w/ foot on FR, [id number]  Passive knee extension stretching, 5 min TE:  Treadmill walk, 1.5 mph, 5 min  Review of HEP  Seated knee extension, BTB, 2x10  Martin step over, 5 laps  Squat at table, 2x10  Squat to wall, 2x10  SLR, GTB, 2x10  Lateral walk, GTB, 3 laps  Standing knee flexion, 20   3   Nreed:  Airex small WERO w/ perturbations  Airex ball toss  Lateral lean to lateral lunge, 2x5 Nreed:  Tiltboard AP  Tiltboard ML  Airex marching Nreed:  Martin step over forward and backward  SLS Nreed:  Martin step over forward and backward, lateral  Martin step over to airex, 5 min  Nreed:  Martin step over to airex, 5 min Nreed:  Tiltboard single leg w/ UE support, [id number]  Seated knee flexion/extension manual resistance for speed  SLS, 3 min                              HEP:   Access Code: [email or web address]  URL: [email or web address]  Date: 05/29/2024  Prepared by: Tyler Elizalde    Exercises  - Seated Knee  Extension with Resistance  - 1 x daily - 2 sets - 10 reps  - Side Stepping with Resistance at Ankles  - 1 x daily - 2 sets - 10 reps  - Small Range Straight Leg Raise  - 1 x daily - 2 sets - 10 reps  - Walking March  - 1 x daily - 3 sets - 10 reps  - Squat with Chair Touch  - 1 x daily - 3 sets - 10 reps         Charges: 3TE (40 min) Total Timed Treatment: 40 min  Total Treatment Time: 40 min  Post LEFS Score  Post LEFS Score: 63.75 % (5/20/2024 10:47 AM)    16.25 % improvement    Plan: discharge    Patient/Family/Caregiver was advised of these findings, precautions, and treatment options and has agreed to actively participate in planning and for this course of care.    Thank you for your referral. If you have any questions, please contact me at Dept: [phone].    Sincerely,  Electronically signed by therapist: Tyler Elizalde PT     Physician's certification required:  No

## 2024-06-19 ENCOUNTER — PATIENT MESSAGE (OUTPATIENT)
Dept: ORTHOPEDICS CLINIC | Facility: CLINIC | Age: 89
End: 2024-06-19

## 2024-06-20 NOTE — TELEPHONE ENCOUNTER
From: Shanti Coy  To: Sanjeev Cage  Sent: 6/19/2024 4:50 PM CDT  Subject: Fall    I had a left knee replacement April 2. I fell this week end. My left knee is bruised, swollen and warm. I have very little pain and can bend it. Is there a possibility I did damage to the replacement? I have been putting ice on it as well as elevating it.   Would appreciate your input.   Thank you,   Shanti Coy  11/26/1935)

## 2024-06-20 NOTE — TELEPHONE ENCOUNTER
Spoke with patient who states she fell on her replaced knee last Saturday.  I does not hurt to bear weight on it, it is swollen and red/bruised.  She is using ice, elevating and taking ibuprofen.  Patient will call back in a week or so if it hasn't improve, but this writer stated that is she is able to bear weight without pain, the replacement should be in good shape.

## 2024-06-26 ENCOUNTER — TELEPHONE (OUTPATIENT)
Dept: INTERNAL MEDICINE CLINIC | Facility: CLINIC | Age: 89
End: 2024-06-26

## 2024-06-26 DIAGNOSIS — Z00.00 ROUTINE GENERAL MEDICAL EXAMINATION AT A HEALTH CARE FACILITY: ICD-10-CM

## 2024-06-26 DIAGNOSIS — E78.00 PURE HYPERCHOLESTEROLEMIA: Primary | ICD-10-CM

## 2024-06-26 DIAGNOSIS — I10 ESSENTIAL HYPERTENSION: ICD-10-CM

## 2024-06-26 NOTE — TELEPHONE ENCOUNTER
Future Appointments   Date Time Provider Department Center   7/11/2024 10:00 AM Brenton Hinojosa MD [facility]   9/9/2024 10:00 AM  [facility]  [facility]   9/16/2024  3:00 PM Rusty Ma MD  [facility]M ON[facility]   9/24/2024  8:45 AM Ninoska Galeano MD [facility]   5/14/2025  9:40 AM Sanjeev Cage MD [facility]     CBC and BMP completed on 04/03/2024. CMP, Lipid, and TSH pended.

## 2024-07-09 ENCOUNTER — LAB ENCOUNTER (OUTPATIENT)
Dept: LAB | Age: 89
End: 2024-07-09
Attending: INTERNAL MEDICINE
Payer: MEDICARE

## 2024-07-09 DIAGNOSIS — E78.00 PURE HYPERCHOLESTEROLEMIA: ICD-10-CM

## 2024-07-09 DIAGNOSIS — Z00.00 ROUTINE GENERAL MEDICAL EXAMINATION AT A HEALTH CARE FACILITY: ICD-10-CM

## 2024-07-09 DIAGNOSIS — I10 ESSENTIAL HYPERTENSION: ICD-10-CM

## 2024-07-09 LAB
ALBUMIN SERPL-MCNC: 3.7 G/DL (ref 3.4–5)
ALBUMIN/GLOB SERPL: 1.3 {RATIO} (ref 1–2)
ALP LIVER SERPL-CCNC: 94 U/L
ALT SERPL-CCNC: 20 U/L
ANION GAP SERPL CALC-SCNC: 7 MMOL/L (ref 0–18)
AST SERPL-CCNC: 14 U/L (ref 15–37)
BILIRUB SERPL-MCNC: 0.9 MG/DL (ref 0.1–2)
BUN BLD-MCNC: 17 MG/DL (ref 9–23)
CALCIUM BLD-MCNC: 9.3 MG/DL (ref 8.5–10.1)
CHLORIDE SERPL-SCNC: 104 MMOL/L (ref 98–112)
CHOLEST SERPL-MCNC: 164 MG/DL (ref ?–200)
CO2 SERPL-SCNC: 25 MMOL/L (ref 21–32)
CREAT BLD-MCNC: 0.82 MG/DL
EGFRCR SERPLBLD CKD-EPI 2021: 70 ML/MIN/1.73M2 (ref 60–?)
FASTING PATIENT LIPID ANSWER: YES
FASTING STATUS PATIENT QL REPORTED: YES
GLOBULIN PLAS-MCNC: 2.8 G/DL (ref 2.8–4.4)
GLUCOSE BLD-MCNC: 122 MG/DL (ref 70–99)
HDLC SERPL-MCNC: 50 MG/DL (ref 40–59)
LDLC SERPL CALC-MCNC: 87 MG/DL (ref ?–100)
NONHDLC SERPL-MCNC: 114 MG/DL (ref ?–130)
OSMOLALITY SERPL CALC.SUM OF ELEC: 285 MOSM/KG (ref 275–295)
POTASSIUM SERPL-SCNC: 3.8 MMOL/L (ref 3.5–5.1)
PROT SERPL-MCNC: 6.5 G/DL (ref 6.4–8.2)
SODIUM SERPL-SCNC: 136 MMOL/L (ref 136–145)
TRIGL SERPL-MCNC: 159 MG/DL (ref 30–149)
TSI SER-ACNC: 1.33 MIU/ML (ref 0.36–3.74)
VLDLC SERPL CALC-MCNC: 25 MG/DL (ref 0–30)

## 2024-07-09 PROCEDURE — 36415 COLL VENOUS BLD VENIPUNCTURE: CPT

## 2024-07-09 PROCEDURE — 80053 COMPREHEN METABOLIC PANEL: CPT

## 2024-07-09 PROCEDURE — 84443 ASSAY THYROID STIM HORMONE: CPT

## 2024-07-09 PROCEDURE — 80061 LIPID PANEL: CPT

## 2024-07-11 ENCOUNTER — HOSPITAL ENCOUNTER (OUTPATIENT)
Dept: GENERAL RADIOLOGY | Age: 89
Discharge: HOME | End: 2024-07-11
Attending: INTERNAL MEDICINE
Payer: MEDICARE

## 2024-07-11 ENCOUNTER — OFFICE VISIT (OUTPATIENT)
Dept: INTERNAL MEDICINE CLINIC | Facility: CLINIC | Age: 89
End: 2024-07-11
Payer: MEDICARE

## 2024-07-11 ENCOUNTER — TELEPHONE (OUTPATIENT)
Dept: INTERNAL MEDICINE CLINIC | Facility: CLINIC | Age: 89
End: 2024-07-11

## 2024-07-11 VITALS
WEIGHT: 157 LBS | OXYGEN SATURATION: 99 % | DIASTOLIC BLOOD PRESSURE: 68 MMHG | TEMPERATURE: 98 F | HEIGHT: 64 IN | RESPIRATION RATE: 18 BRPM | SYSTOLIC BLOOD PRESSURE: 124 MMHG | BODY MASS INDEX: 26.8 KG/M2 | HEART RATE: 87 BPM

## 2024-07-11 DIAGNOSIS — J45.41 MODERATE PERSISTENT ASTHMA WITH EXACERBATION (HCC): ICD-10-CM

## 2024-07-11 DIAGNOSIS — I10 PRIMARY HYPERTENSION: ICD-10-CM

## 2024-07-11 DIAGNOSIS — I35.1 NONRHEUMATIC AORTIC VALVE INSUFFICIENCY: ICD-10-CM

## 2024-07-11 DIAGNOSIS — M46.96 INFLAMMATORY SPONDYLOPATHY OF LUMBAR REGION (HCC): ICD-10-CM

## 2024-07-11 DIAGNOSIS — I35.1 AORTIC VALVE INSUFFICIENCY, ETIOLOGY OF CARDIAC VALVE DISEASE UNSPECIFIED: ICD-10-CM

## 2024-07-11 DIAGNOSIS — M43.16 SPONDYLOLISTHESIS AT L4-L5 LEVEL: ICD-10-CM

## 2024-07-11 DIAGNOSIS — M17.12 PRIMARY OSTEOARTHRITIS OF LEFT KNEE: ICD-10-CM

## 2024-07-11 DIAGNOSIS — I77.810 AORTIC ROOT DILATION (HCC): ICD-10-CM

## 2024-07-11 DIAGNOSIS — I25.10 CORONARY ARTERY DISEASE INVOLVING NATIVE CORONARY ARTERY OF NATIVE HEART WITHOUT ANGINA PECTORIS: ICD-10-CM

## 2024-07-11 DIAGNOSIS — Z00.00 ENCOUNTER FOR ANNUAL HEALTH EXAMINATION: Primary | ICD-10-CM

## 2024-07-11 DIAGNOSIS — C50.912 INVASIVE LOBULAR CARCINOMA OF LEFT BREAST IN FEMALE (HCC): ICD-10-CM

## 2024-07-11 DIAGNOSIS — M48.061 SPINAL STENOSIS OF LUMBAR REGION WITHOUT NEUROGENIC CLAUDICATION: ICD-10-CM

## 2024-07-11 DIAGNOSIS — R73.03 PRE-DIABETES: ICD-10-CM

## 2024-07-11 DIAGNOSIS — R73.03 PREDIABETES: ICD-10-CM

## 2024-07-11 DIAGNOSIS — J44.89 ASTHMA WITH COPD (CHRONIC OBSTRUCTIVE PULMONARY DISEASE) (HCC): ICD-10-CM

## 2024-07-11 DIAGNOSIS — G47.00 INSOMNIA, UNSPECIFIED TYPE: ICD-10-CM

## 2024-07-11 DIAGNOSIS — Z96.653 HISTORY OF TOTAL BILATERAL KNEE REPLACEMENT: ICD-10-CM

## 2024-07-11 DIAGNOSIS — I10 ESSENTIAL HYPERTENSION: ICD-10-CM

## 2024-07-11 DIAGNOSIS — M54.16 LUMBAR RADICULITIS: ICD-10-CM

## 2024-07-11 DIAGNOSIS — E78.00 PURE HYPERCHOLESTEROLEMIA: ICD-10-CM

## 2024-07-11 DIAGNOSIS — C50.912: ICD-10-CM

## 2024-07-11 DIAGNOSIS — Z96.651 HISTORY OF TOTAL RIGHT KNEE REPLACEMENT: ICD-10-CM

## 2024-07-11 PROBLEM — N18.30 CKD (CHRONIC KIDNEY DISEASE) STAGE 3, GFR 30-59 ML/MIN (HCC): Status: RESOLVED | Noted: 2019-06-24 | Resolved: 2024-07-11

## 2024-07-11 PROBLEM — N39.0 FREQUENT UTI: Status: RESOLVED | Noted: 2020-06-02 | Resolved: 2024-07-11

## 2024-07-11 PROBLEM — K59.00 CONSTIPATION: Status: RESOLVED | Noted: 2024-04-04 | Resolved: 2024-07-11

## 2024-07-11 PROCEDURE — 73562 X-RAY EXAM OF KNEE 3: CPT | Performed by: INTERNAL MEDICINE

## 2024-07-11 NOTE — PROGRESS NOTES
Subjective:   Shanti Coy is a 88 year old female who presents for a MA AHA (Medicare Advantage Annual Health Assessment) and Medicare Subsequent Annual Wellness visit (Pt already had Initial Annual Wellness) and scheduled follow up of multiple significant but stable problems.     Following left knee replacement the patient the patient experienced favorable range of motion and pain both of which exceeded the expectations by her physical therapist.  However, following a fall approximately 2 weeks ago with trauma to the left knee she experienced a significant pain that is regularly impacting range of motion and pain, resulting in reduced weightbearing and ambulation.  She has used NSAID therapy and has applied ice without significant improvement.  She has otherwise been well and denies any acute concerns.    History/Other:   Fall Risk Assessment:   She has been screened for Falls and is low risk.      Cognitive Assessment:   She had a completely normal cognitive assessment - see flowsheet entries     Functional Ability/Status:   Shanti Coy has a completely normal functional assessment. See flowsheet for details.        Depression Screening (PHQ):  PHQ-2 SCORE: 0  , done 7/11/2024        5 minutes spent screening and counseling for depression    Advanced Directives:   She does NOT have a Living Will. [Do you have a living will?: No]  She does NOT have a Power of  for Health Care. [Do you have a healthcare power of ?: No]  Discussed Advance Care Planning with patient (and family/surrogate if present). Standard forms made available to patient in After Visit Summary.      Patient Active Problem List   Diagnosis    Pure hypercholesterolemia    Essential hypertension    Insomnia    Spinal stenosis of lumbar region    Lumbar radiculitis    History of total knee replacement    Pre-diabetes    Asthma with COPD (chronic obstructive pulmonary disease) (HCC)    Aortic root dilation (HCC)     Spondylolisthesis at L4-L5 level    CKD (chronic kidney disease) stage 3, GFR 30-59 ml/min (HCC)    Inflammatory spondylopathy of lumbar region (HCC)    Frequent UTI    Primary osteoarthritis of left knee    Invasive lobular carcinoma of left breast in female (HCC)    Infiltrating lobular carcinoma of breast, stage 1, left (HCC)    Constipation    Coronary artery disease involving native coronary artery of native heart without angina pectoris    Nonrheumatic aortic valve insufficiency     Allergies:  She is allergic to mold.    Current Medications:  Outpatient Medications Marked as Taking for the 7/11/24 encounter (Office Visit) with Brenton Hinojosa MD   Medication Sig    Losartan Potassium-HCTZ 100-12.5 MG Oral Tab Take 1 tablet by mouth daily.    letrozole 2.5 MG Oral Tab Take 1 tablet (2.5 mg total) by mouth daily.    atorvastatin 40 MG Oral Tab Take 1 tablet (40 mg total) by mouth daily.    TRELEGY ELLIPTA 200-62.5-25 MCG/ACT Inhalation Aerosol Powder, Breath Activated Inhale 1 puff into the lungs daily.    metoprolol succinate ER 25 MG Oral Tablet 24 Hr Take 1 tablet (25 mg total) by mouth daily.    AMLODIPINE 5 MG Oral Tab TAKE 1 TABLET BY MOUTH EVERY DAY    Blood Pressure Monitoring (BLOOD PRESSURE CUFF) Does not apply Misc For use to measure BP daily    azelastine 0.1 % Nasal Solution 2 sprays by Nasal route 2 (two) times daily as needed.    ipratropium 0.06 % Nasal Solution 2 sprays by Nasal route 3 (three) times daily as needed.    Albuterol Sulfate HFA (PROAIR HFA) 108 (90 Base) MCG/ACT Inhalation Aero Soln Inhale 2 puffs into the lungs every 4 (four) hours as needed.    mometasone 0.1 % External Ointment Apply 1 Application topically daily.    CRANBERRY EXTRACT OR Take 1 tablet by mouth daily.      Vitamin D3 (VITAMIN D3) 2000 UNITS Oral Cap Take 1 capsule (2,000 Units total) by mouth daily.    PRILOSEC 20 MG OR CPDR Take 1 capsule (20 mg total) by mouth every morning.    VITAMIN E Take 1 capsule by mouth  daily.       Medical History:  She  has a past medical history of Aortic valve insufficiency, Asthma (HCC), Eczema, Exposure to medical diagnostic radiation (11/20/2023), Female climacteric state (06/15/2000), GERD, High blood pressure, High cholesterol, motion sickness, Left Breast cancer (HCC) (07/2023), Onychocryptosis (08/25/2016), Osteoarthritis, Other and unspecified hyperlipidemia, Pain due to onychomycosis of toenail (08/17/2017), Prediabetes, Primary localized osteoarthrosis of right lower leg (06/10/2015), and Visual impairment.  Surgical History:  She  has a past surgical history that includes hysterectomy (01/01/1990); tonsillectomy; appendectomy; cataract; colonoscopy (01/01/2009); injection, w/wo contrast, dx/therapeutic substance, epidural/subarachnoid; lumbar/sacral (02/03/2014); fluor gid & loclzj ndl/cath spi dx/ther njx (02/03/2014); other surgical history; knee surgery (09/08/2015); injection, w/wo contrast, dx/therapeutic substance, epidural/subarachnoid; lumbar/sacral (N/A, 07/06/2016); injection, w/wo contrast, dx/therapeutic substance, epidural/subarachnoid; lumbar/sacral (N/A, 07/28/2016); lumpectomy left (09/2023); and radiation left (2023).   Family History:  Her family history includes Breast Cancer in her self; Breast Cancer (age of onset: 69) in her mother; Cancer in her brother, father, and son; Diabetes in her maternal grandfather, maternal grandmother, paternal grandfather, and paternal grandmother; Heart Disorder in her brother; Hypertension in her son.  Social History:  She  reports that she has never smoked. She has never used smokeless tobacco. She reports current alcohol use. She reports that she does not use drugs.    Tobacco:  She has never smoked tobacco.    CAGE Alcohol Screen:   CAGE screening score of 0 on 7/11/2024, showing low risk of alcohol abuse.      Patient Care Team:  Brenton Hinojosa MD as PCP - General (Internal Medicine)  Madiha Joseph PA-C (Physician  Assistant)  Davi Meade MD (SURGERY, ORTHOPEDIC)  Janki Hartmann, PT as Physical Therapist (Physical Therapy)  Sara Barragan, PT as Physical Therapist  Ninoska Galeano MD (Surgical Oncology)  Reggie Adams MD (CARDIOLOGY)  Daisy Love, RN as Registered Nurse (Registered Nurse)  Moisés Tam MD (Radiation Oncology)  Monica Merrill, PT as Physical Therapist  Mychart, Generic Provider  Alessandra Cavazos, FERNIE (Physical Therapy)  Alessandra Cavazos PT as Physical Therapist (Physical Therapy)  Tyler Elizalde PT as Physical Therapist  Reggie Adams MD (CARDIOLOGY)    Review of Systems  GENERAL: feels well otherwise  SKIN: denies any unusual skin lesions  EYES: denies blurred vision or double vision  HEENT: denies nasal congestion, sinus pain or ST  LUNGS: denies shortness of breath with exertion  CARDIOVASCULAR: denies chest pain on exertion  GI: denies abdominal pain, denies heartburn  : denies dysuria, vaginal discharge or itching, no complaint of urinary incontinence   MUSCULOSKELETAL: denies back pain  NEURO: denies headaches  PSYCHE: denies depression or anxiety  HEMATOLOGIC: denies hx of anemia  ENDOCRINE: denies thyroid history  ALL/ASTHMA: denies hx of allergy or asthma    Objective:   Physical Exam  General Appearance:  Alert, cooperative, no distress, appears stated age   Head:  Normocephalic, without obvious abnormality, atraumatic   Eyes:  Bilateral conjunctiva within normal limits   Ears:  Tympanic membrane within normal limits bilaterally   Nose: Deferred   Throat: Deferred   Neck: Supple, symmetrical, trachea midline, no adenopathy;  thyroid: not enlarged, symmetric, no tenderness/mass/nodules; no carotid bruit or JVD   Back:   Symmetric, no curvature, ROM normal, no CVA tenderness   Lungs:   Clear to auscultation bilaterally, respirations unlabored   Heart:  Regular rate and rhythm, S1 and S2 normal, no murmur, rub, or gallop   Abdomen:   Soft, non-tender, bowel sounds active all four  quadrants,  no masses, no organomegaly   Pelvic: Deferred   Extremities: No edema   Pulses: 2+ and symmetric   Skin: Skin color, texture, turgor normal, no rashes or lesions   Lymph nodes: Cervical nodes normal   Neurologic: Grossly normal       /68   Pulse 87   Temp 97.5 °F (36.4 °C)   Resp 18   Ht 5' 4\" (1.626 m)   Wt 157 lb (71.2 kg)   LMP  (LMP Unknown)   SpO2 99%   BMI 26.95 kg/m²  Estimated body mass index is 26.95 kg/m² as calculated from the following:    Height as of this encounter: 5' 4\" (1.626 m).    Weight as of this encounter: 157 lb (71.2 kg).    Medicare Hearing Assessment:   Hearing Screening    Time taken: 7/11/2024  1:00 PM  Entry User: Brenton Hinojosa MD  Screening Method: Whisper Test  Whisper Test Result: Pass         Visual Acuity:   Right Eye Visual Acuity: Corrected Right Eye Chart Acuity: 20/30   Left Eye Visual Acuity: Corrected Left Eye Chart Acuity: 20/30   Both Eyes Visual Acuity: Corrected Both Eyes Chart Acuity: 20/25   Able To Tolerate Visual Acuity: Yes        Assessment & Plan:   Shanti Coy is a 88 year old female who presents for a Medicare Assessment.     Outstanding screening and preventive measures:  None    Osteoarthritis of left knee:  Post replacement with now new onset of pain following trauma  Given the degree of current symptoms, an x-ray of the left knee has been ordered  She is in communication with the orthopedic surgery service for management    CAD:  Stable and without angina  Referred to alternate cardiology service, per patient request    Invasive lobular carcinoma of left breast:  Dx: 7/27/2023  Lumpectomy 9/15/2023  Taking aromatase inhibitor  Following with oncology service regularly    Asthma and COPD:  Improved following initiation of Trelegy  Infrequent inhaled Alayna use  Following with pulmonary service regularly    Aortic root dilation:  Stable  Following with cardiology service  Continue metoprolol     Benign essential  hypertension:  Currently 124/68 mmHg   Continue current management    Inflammatory spondylopathy of lumbar region:  Stable   Continue self-directed physical therapy and infrequent medical management    Prediabetes:  Improved hemoglobin A1c from 6.3% to 6.0%; continue efforts to improve dietary and lifestyle habits    Osteoarthritis of the right knee:  Post replacement with intermittent pain and stiffness    Spinal stenosis of lumbar region and lumbar radiculitis and spondylolisthesis at L4-L5:  Stable  No focal neurological deficits    Insomnia:  Stable  Continue current management    1. Coronary artery disease involving native coronary artery of native heart without angina pectoris (Primary)  -     CARDIO - INTERNAL  2. Prediabetes  -     Cancel: POC Hgb A1C  3. Asthma with COPD (chronic obstructive pulmonary disease) (HCC)  4. Moderate persistent asthma with exacerbation (HCC)  5. Primary osteoarthritis of left knee  -     XR KNEE (3 VIEWS), LEFT (CPT=73562); Future; Expected date: 07/11/2024  6. History of total bilateral knee replacement  Overview:  Overview:   Diagnosis replacer utility for ICD-10 conversion  Orders:  -     XR KNEE (3 VIEWS), LEFT (CPT=73562); Future; Expected date: 07/11/2024  7. Pure hypercholesterolemia  -     CARDIO - INTERNAL  8. Essential hypertension  -     CARDIO - INTERNAL  9. Nonrheumatic aortic valve insufficiency  -     CARDIO - INTERNAL  10. Encounter for annual health examination    The patient indicates understanding of these issues and agrees to the plan.  Reinforced healthy diet, lifestyle, and exercise.      Return in 6 months (on 1/11/2025).     Brenton Hinojosa MD, 7/11/2024     Supplementary Documentation:   General Health:  In the past six months, have you lost more than 10 pounds without trying?: 2 - No  Has your appetite been poor?: No  Type of Diet: Balanced  How does the patient maintain a good energy level?: Other  How would you describe your daily physical activity?:  Moderate  How would you describe your current health state?: Good  How do you maintain positive mental well-being?: Social Interaction;Visiting Family  On a scale of 0 to 10, with 0 being no pain and 10 being severe pain, what is your pain level?: 0 - (None)  In the past six months, have you experienced urine leakage?: 0-No  At any time do you feel concerned for the safety/well-being of yourself and/or your children, in your home or elsewhere?: No  Have you had any immunizations at another office such as Influenza, Hepatitis B, Tetanus, or Pneumococcal?: No    Health Maintenance   Topic Date Due    MA Annual Health Assessment  01/01/2024    COVID-19 Vaccine (7 - 2023-24 season) 01/28/2024    Influenza Vaccine (1) 10/01/2024    Mammogram  03/11/2025    Annual Depression Screening  Completed    Fall Risk Screening (Annual)  Completed    Pneumococcal Vaccine: 65+ Years  Completed    Zoster Vaccines  Completed

## 2024-07-11 NOTE — TELEPHONE ENCOUNTER
Nallely from outpatient called. She states pt is there for knee x-ray but there is no order in the system. Informed her order was just placed. She refreshed her screen and was able to see the order.

## 2024-07-17 ENCOUNTER — TELEPHONE (OUTPATIENT)
Dept: FAMILY MEDICINE CLINIC | Facility: CLINIC | Age: 89
End: 2024-07-17

## 2024-07-17 DIAGNOSIS — G89.29 CHRONIC KNEE PAIN AFTER TOTAL REPLACEMENT OF LEFT KNEE JOINT: ICD-10-CM

## 2024-07-17 DIAGNOSIS — M25.562 CHRONIC KNEE PAIN AFTER TOTAL REPLACEMENT OF LEFT KNEE JOINT: ICD-10-CM

## 2024-07-17 DIAGNOSIS — M17.12 PRIMARY OSTEOARTHRITIS OF LEFT KNEE: Primary | ICD-10-CM

## 2024-07-17 DIAGNOSIS — Z96.652 CHRONIC KNEE PAIN AFTER TOTAL REPLACEMENT OF LEFT KNEE JOINT: ICD-10-CM

## 2024-07-17 DIAGNOSIS — Z96.652 HISTORY OF TOTAL LEFT KNEE REPLACEMENT: ICD-10-CM

## 2024-07-17 NOTE — TELEPHONE ENCOUNTER
Brenton Hinojosa MD  7/11/2024  5:07 PM CDT       Osseous structures are intact; reassure the patient. There is however, significant swelling. If symptoms persist despite NSAID use, follow-up with ortho service.     Triage call transferred.   Spoke with pt stating had spoken with PCP regarding PT- pt prefers to start with PT first vs Ortho.  Also, PCP had recommended two OTC sleep aids- no documentation noted. Pt inquiring OTC sleep med recommendations.   Informed will relay to PCP requests  No further questions or concerns. Pt verbalized understanding and agreed with POC.    Please advise. Thank you.

## 2024-07-17 NOTE — TELEPHONE ENCOUNTER
Physical therapy ordered    Magnesium glycinate: 200 to 250 mg 30 minutes prior to bedtime.  May alternatively take Ashwaghanda, however I do not have specific dosing recommendations.

## 2024-08-08 ENCOUNTER — TELEPHONE (OUTPATIENT)
Dept: PHYSICAL THERAPY | Facility: HOSPITAL | Age: 89
End: 2024-08-08

## 2024-08-12 ENCOUNTER — OFFICE VISIT (OUTPATIENT)
Dept: PHYSICAL THERAPY | Facility: HOSPITAL | Age: 89
End: 2024-08-12
Attending: INTERNAL MEDICINE
Payer: MEDICARE

## 2024-08-12 DIAGNOSIS — Z96.652 HISTORY OF TOTAL LEFT KNEE REPLACEMENT: ICD-10-CM

## 2024-08-12 DIAGNOSIS — Z96.652 CHRONIC KNEE PAIN AFTER TOTAL REPLACEMENT OF LEFT KNEE JOINT: ICD-10-CM

## 2024-08-12 DIAGNOSIS — M25.562 CHRONIC KNEE PAIN AFTER TOTAL REPLACEMENT OF LEFT KNEE JOINT: ICD-10-CM

## 2024-08-12 DIAGNOSIS — M17.12 PRIMARY OSTEOARTHRITIS OF LEFT KNEE: Primary | ICD-10-CM

## 2024-08-12 DIAGNOSIS — G89.29 CHRONIC KNEE PAIN AFTER TOTAL REPLACEMENT OF LEFT KNEE JOINT: ICD-10-CM

## 2024-08-12 PROCEDURE — 97110 THERAPEUTIC EXERCISES: CPT

## 2024-08-12 PROCEDURE — 97161 PT EVAL LOW COMPLEX 20 MIN: CPT

## 2024-08-12 NOTE — PROGRESS NOTES
LOWER EXTREMITY EVALUATION:     Diagnosis:   Primary osteoarthritis of left knee (M17.12)  History of total left knee replacement (Z96.652)  Chronic knee pain after total replacement of left knee joint (M25.562,G89.29,Z96.652)      Referring Provider: Brenton Hinojosa  Date of Evaluation:    8/12/2024    Precautions:  None Next MD visit:   none scheduled  Date of Surgery: 4/2/24     PATIENT SUMMARY   Shanti Coy is a 88 year old female who presents to therapy today with complaints of L knee pain following a fall on stairs while our of town with family. She was ascending stairs and had several stairs without hand rail. She fell forward directly on both knees. Had increased swelling and pain. Prednisone - has been off for 10 days. Feeling better currently but still having some pain and stiffness in the L knee. Had L TKA 4/2/24 with very good result after rehab with ROM WNL and no pain. Besides soreness and stiffness, the knee feels warm at night and she feels tightness in the L calf. Unable to walk for than a block currently. Was up to 1 mile prior to falling. Up to 2-3 miles before surgery. Denies N/T. She feels that her balance is more impaired since falling.  Pt describes pain level current 0/10, at best 0/10, at worst 4/10.   Current functional limitations include walking, squatting.     Shanti describes prior level of function unrestricted. Pt goals include decrease pain and improve ability to walk without pain, improve balance.  Past medical history was reviewed with Shanti. Significant findings include   Past Medical History:    Aortic valve insufficiency    last echo 7/26/23    Asthma (HCC)    Eczema    Exposure to medical diagnostic radiation    Female climacteric state    GERD    High blood pressure    High cholesterol    Hx of motion sickness    Left Breast cancer (HCC)    Onychocryptosis    Osteoarthritis    Other and unspecified hyperlipidemia    Pain due to onychomycosis of toenail    Prediabetes     Primary localized osteoarthrosis of right lower leg    Visual impairment    glasses        ASSESSMENT  Shanti presents to physical therapy evaluation with primary c/o L knee pain following a fall and TKA 24. The results of the objective tests and measures show ROM intact, L patellar hypomobility, muscle spasm of the L quad, impaired balance, quad and gluteal weakness.  Functional deficits include but are not limited to walking, squatting.  Signs and symptoms are consistent with diagnosis of L knee pain following L TKA. Pt and PT discussed evaluation findings, pathology, POC and HEP.  Pt voiced understanding and performs HEP correctly without reported pain. Skilled Physical Therapy is medically necessary to address the above impairments and reach functional goals.     OBJECTIVE:   Observation: mild L knee effusion  Palpation: muscle spasm of L quadriceps, TTP medial and lateral patella  Sensation: intact to LT shin LE    AROM: (* denotes performed with pain)  Hip Knee   Flexion: R WNL; L WNL  Extension: R WNL; L WNL  Abduction: R nt; L nt  ER: R WNL; L WNL  IR: R WNL; L WNL Flexion: R WNL; L WNL  Extension: R WNL; L WNL        Accessory motion: hypomobile medial and lateral glide L patella    Flexibility:  Hip Flexor: R WNL, L WNL  Hamstrings: R mildly limited; L mildly limited  Quads: R WNL; L WNL  Gastroc-soleus: R mildly limited; L mildly limited    Strength/MMT: (* denotes performed with pain)  Hip Knee   Flexion: R nt/5; L nt/5  Extension: R 4/5; L 4-/5  Abduction: R 4/5; L 3+/5  ER: R 4/5; L 4-/5  IR: R nt/5; L nt/5 Flexion: R 5/5; L 4/5  Extension: R 5/5; L 5-/5        Special tests:   None performed    Gait: pt ambulates on level ground with antalgia  Balance: SLS: R 8 sec, L 4 sec    30 sec sit<>stand: 9 sec  TU sec    Today’s Treatment and Response:   Access Code: [email or web address]  URL: [email or web address]  Date: 2024  Prepared by: Tyler Elizalde    Exercises  - Sidelying Hip  Abduction  - 1 x daily - 3 sets - 10 reps  - Supine Bridge  - 1 x daily - 3 sets - 10 reps  - Seated Knee Extension with Resistance  - 1 x daily - 3 sets - 10 reps  Pt education was provided on exam findings, treatment diagnosis, treatment plan, expectations, and prognosis. Pt was also provided recommendations for activity modifications and importance of remaining active.  Patient was instructed in and issued a HEP for: LE strengthening    Charges: PT Eval Low Complexity, TE      Total Timed Treatment: 10 min     Total Treatment Time: 40 min     PLAN OF CARE:    Goals: (to be met in 8 visits)  Patient will demonstrate 30 sec sit<>stand 11 to improve ability to squat without pain within 4 weeks.  Patient will tolerate walking 2 miles to improve community involvement within 4 weeks.   Patient will demonstrate SLS for 10 sec shin to decrease risk of falling within 4 weeks.  Patient will achieve MCID in LEFS score within 4 weeks to improve functional mobility.      Frequency / Duration: Patient will be seen for 1-2 x/week or a total of 8 visits over a 90 day period. Treatment will include: Manual Therapy, Neuromuscular Re-education, Therapeutic Exercise, and Home Exercise Program instruction    Education or treatment limitation: None  Rehab Potential:excellent    LEFS Score  LEFS Score: 51.25 % (8/5/2024  9:42 AM)      Patient/Family/Caregiver was advised of these findings, precautions, and treatment options and has agreed to actively participate in planning and for this course of care.    Thank you for your referral. Please co-sign or sign and return this letter via fax as soon as possible to [phone]. If you have any questions, please contact me at Dept: [phone]    Sincerely,  Electronically signed by therapist: Tyler Elizalde PT  Physician's certification required: Yes  I certify the need for these services furnished under this plan of treatment and while under my  care.    X___________________________________________________ Date____________________    Certification From: 8/12/2024  To:11/10/2024

## 2024-08-16 ENCOUNTER — OFFICE VISIT (OUTPATIENT)
Dept: PHYSICAL THERAPY | Facility: HOSPITAL | Age: 89
End: 2024-08-16
Attending: INTERNAL MEDICINE
Payer: MEDICARE

## 2024-08-16 PROCEDURE — 97140 MANUAL THERAPY 1/> REGIONS: CPT

## 2024-08-16 PROCEDURE — 97112 NEUROMUSCULAR REEDUCATION: CPT

## 2024-08-16 PROCEDURE — 97110 THERAPEUTIC EXERCISES: CPT

## 2024-08-16 NOTE — PROGRESS NOTES
Diagnosis:   Primary osteoarthritis of left knee (M17.12)  History of total left knee replacement (Z96.652)  Chronic knee pain after total replacement of left knee joint (M25.562,G89.29,Z96.652)         Referring Provider: Brenton Hinojosa  Date of Evaluation:    8/12/24    Precautions:  None Next MD visit:   none scheduled  Date of Surgery: n/a   Insurance Primary/Secondary: [employer] MCR / N/A     # Auth Visits: 8 per POC            Subjective: Patient reports her knee is feeling less stiff compared to IE. She feels that patellar mobilizations were helpful    Pain: 0/10      Objective:   Balance: SLS: R 8 sec, L 5 sec   Hypomobile R C1-4 upslope    Assessment: Patient demonstrates slightly improved single leg balance but still impaired, L>R. She complains of feeling \"off\" with balance and feels a little dizzy. This is increased with turning the head. She felt this with active cervical rotation and with trunk rotation with a stable head indicating possible cervicogenic dizziness. She has hypomobility of the upper cervical spine on the R. Added upper cervical rotation AROM to see if this improves her subjective symptoms.      Goals:   (to be met in 8 visits)  Patient will demonstrate 30 sec sit<>stand 11 to improve ability to squat without pain within 4 weeks.  Patient will tolerate walking 2 miles to improve community involvement within 4 weeks.   Patient will demonstrate SLS for 10 sec shin to decrease risk of falling within 4 weeks.  Patient will achieve MCID in LEFS score within 4 weeks to improve functional mobility.      Plan: continue PT to decrease knee pain and improve balance  Date: 8/16/2024  TX#: 2/8 Date:                 TX#: 3/ Date:                 TX#: 4/ Date:                 TX#: 5/ Date:   Tx#: 6/   TE:  S/l hip abduction, 2x12  Bridge, 2x10  Seated upper Cx rotation AROM, 15       Nreed:  SLS practice, 5 min  Martin step over and back, 15  Martin lateral step over and back, 15  Cone tap, 3'        MT:  Patellar mobilization, 8 min              HEP:   Access Code: [email or web address]  URL: [email or web address]  Date: 08/12/2024  Prepared by: Tyler Elizalde     Exercises  - Sidelying Hip Abduction  - 1 x daily - 3 sets - 10 reps  - Supine Bridge  - 1 x daily - 3 sets - 10 reps  - Seated Knee Extension with Resistance  - 1 x daily - 3 sets - 10 reps  Upper cervical rotation AROM, 15    Charges: MT (8 min), Nreed (17 min), TE (15 min)       Total Timed Treatment: 45 min  Total Treatment Time: 45 min

## 2024-08-19 ENCOUNTER — OFFICE VISIT (OUTPATIENT)
Dept: PHYSICAL THERAPY | Facility: HOSPITAL | Age: 89
End: 2024-08-19
Attending: INTERNAL MEDICINE
Payer: MEDICARE

## 2024-08-19 PROCEDURE — 97110 THERAPEUTIC EXERCISES: CPT

## 2024-08-19 PROCEDURE — 97112 NEUROMUSCULAR REEDUCATION: CPT

## 2024-08-19 NOTE — PROGRESS NOTES
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 Feels better now. Also feels less \"off\" and that balance is improving.     Pain: 0/10      Objective:   Balance: SLS: R 10 sec, L 6 sec   Hypomobile R C1-4 upslope    Assessment: Patient demonstrates improved single leg balance. She did very well with gait training with physical and cognitive challenges. Some difficulty with close focusing while walking. Continues to have limited cervical mobility with possible components of cervicogenic dizziness. Will message Dr. Hinojosa for possible referral to vestibular therapist for differential diagnosis.       Goals:   (to be met in 8 visits)  Patient will demonstrate 30 sec sit<>stand 11 to improve ability to squat without pain within 4 weeks.  Patient will tolerate walking 2 miles to improve community involvement within 4 weeks.   Patient will demonstrate SLS for 10 sec shin to decrease risk of falling within 4 weeks.  Patient will achieve MCID in LEFS score within 4 weeks to improve functional mobility.      Plan: continue PT to decrease knee pain and improve balance  Date: 8/16/2024  TX#: 2/8 Date:   8/19/24              TX#: 3/8 Date:                 TX#: 4/ Date:                 TX#: 5/ Date:   Tx#: 6/   TE:  S/l hip abduction, 2x12  Bridge, 2x10  Seated upper Cx rotation AROM, 15 TE:  Shuttle SLP, 37#, 2x15  Seated self cervical snag rotation  Supine cervical rotation      Nreed:  SLS practice, 5 min  Martin step over and back, 15  Martin lateral step  over and back, 15  Cone tap, 3' Nreed:  SLS practice, 5 min  AP tiltboard FSU to SLS, [id number]  Cable lateral step, 15#, 2x5 shin  Gait training w/ head turns, cognitive challenge, 5 min      MT:  Patellar mobilization, 8 min              HEP:   Access Code: [email or web address]  URL: [email or web address]  Date: 08/12/2024  Prepared by: Tyler Elizalde     Exercises  - Sidelying Hip Abduction  - 1 x daily - 3 sets - 10 reps  - Supine Bridge  - 1 x daily - 3 sets - 10 reps  - Seated Knee Extension with Resistance  - 1 x daily - 3 sets - 10 reps  Upper cervical rotation AROM, 15    Charges: 2Nreed (25 min), TE (15 min)       Total Timed Treatment: 40 min  Total Treatment Time: 40 min

## 2024-08-23 ENCOUNTER — APPOINTMENT (OUTPATIENT)
Dept: PHYSICAL THERAPY | Facility: HOSPITAL | Age: 89
End: 2024-08-23
Attending: INTERNAL MEDICINE
Payer: MEDICARE

## 2024-08-26 ENCOUNTER — OFFICE VISIT (OUTPATIENT)
Dept: PHYSICAL THERAPY | Facility: HOSPITAL | Age: 89
End: 2024-08-26
Attending: INTERNAL MEDICINE
Payer: MEDICARE

## 2024-08-26 PROCEDURE — 97112 NEUROMUSCULAR REEDUCATION: CPT

## 2024-08-26 PROCEDURE — 97110 THERAPEUTIC EXERCISES: CPT

## 2024-08-26 NOTE — PROGRESS NOTES
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 Also still has some L sided knee \"tightness\" but not necessarily pain.    Pain: 2/10 R side of neck with R rotation      Objective:   Balance: SLS: R 10 sec, L 6 sec   Hypomobile R C4-T1 UPA    Assessment: Patient has hypomobility of the cervical spine lower near the CTJ. She continues to have good knee ROM but mildly increased warmth to palpation on the L side compared to the R. Will contact dr. Hinojosa again to ask about referral for vestibular PT.      Goals:   (to be met in 8 visits)  Patient will demonstrate 30 sec sit<>stand 11 to improve ability to squat without pain within 4 weeks.  Patient will tolerate walking 2 miles to improve community involvement within 4 weeks.   Patient will demonstrate SLS for 10 sec shin to decrease risk of falling within 4 weeks.  Patient will achieve MCID in LEFS score within 4 weeks to improve functional mobility.      Plan: continue PT to decrease knee pain and improve balance  Date: 8/16/2024  TX#: 2/8 Date:   8/19/24              TX#: 3/8 Date: 8/26/24                TX#: 4/8 Date:                 TX#: 5/ Date:   Tx#: 6/   TE:  S/l hip abduction, 2x12  Bridge, 2x10  Seated upper Cx rotation AROM, 15 TE:  Shuttle SLP, 37#, 2x15  Seated self cervical snag rotation  Supine cervical rotation TE:  Supine cervical retraction, [id number]  Seated cervical retraction, [id number]  Wall upper thoracic extension stretch  Cable row w/ cervical  retraction#, 25#, 2x15     Nreed:  SLS practice, 5 min  Martin step over and back, 15  Martin lateral step over and back, 15  Cone tap, 3' Nreed:  SLS practice, 5 min  AP tiltboard FSU to SLS, [id number]  Cable lateral step, 15#, 2x5 shin  Gait training w/ head turns, cognitive challenge, 5 min Nreed:  SLS practice, 5 min  AP tiltboard FSU to SLS, [id number]     MT:  Patellar mobilization, 8 min              HEP:   Access Code: [email or web address]  URL: [email or web address]  Date: 08/12/2024  Prepared by: Tyler Elizalde     Exercises  - Sidelying Hip Abduction  - 1 x daily - 3 sets - 10 reps  - Supine Bridge  - 1 x daily - 3 sets - 10 reps  - Seated Knee Extension with Resistance  - 1 x daily - 3 sets - 10 reps  Upper cervical rotation AROM, 15    Charges: 1Nreed (15 min), 2TE (25 min)       Total Timed Treatment: 40 min  Total Treatment Time: 40 min

## 2024-09-06 ENCOUNTER — OFFICE VISIT (OUTPATIENT)
Dept: PHYSICAL THERAPY | Facility: HOSPITAL | Age: 89
End: 2024-09-06
Attending: INTERNAL MEDICINE
Payer: MEDICARE

## 2024-09-06 ENCOUNTER — TELEPHONE (OUTPATIENT)
Dept: INTERNAL MEDICINE CLINIC | Facility: CLINIC | Age: 89
End: 2024-09-06

## 2024-09-06 PROCEDURE — 97112 NEUROMUSCULAR REEDUCATION: CPT

## 2024-09-06 PROCEDURE — 97110 THERAPEUTIC EXERCISES: CPT

## 2024-09-06 NOTE — TELEPHONE ENCOUNTER
Patient came up stating the physical therapist brendon Acharya states they have messaged Dr. Hinojosa 2 times about extending her physical therapy and they have not heard back from him yet. Patient not understanding why this is such a problem with our office.     Please call patient back to discuss extending her physical therapy

## 2024-09-06 NOTE — TELEPHONE ENCOUNTER
RN x3 call attempts to reach physical therapy office, all unsuccessful.  No documentation in pt's chart that prior contact attempts by PT have been made.  Writing RN will send to in-office staff to see if request for more visits have been sent to the office.  Will continue to try and reach physical therapy office to request more information about additional visits/ensure they have the correct fax number for the office.

## 2024-09-06 NOTE — TELEPHONE ENCOUNTER
RN to PT call for more information regarding request for additional visits as there is no documentation that prior attempts have been made.    On hold x9 minutes, will continue to attempt to reach office.  Will ensure PT has correct fax number for this office.

## 2024-09-06 NOTE — PROGRESS NOTES
Discharge Summary  Pt has attended 5 visits in Physical Therapy.    Diagnosis:   Primary osteoarthritis of left knee (M17.12)  History of total left knee replacement (Z96.652)  Chronic knee pain after total replacement of left knee joint (M25.562,G89.29,Z96.652)         Referring Provider: Brenton Hinojosa  Date of Evaluation:    24    Precautions:  None Next MD visit:   none scheduled  Date of Surgery: n/a   Insurance Primary/Secondary: AETNA [facility] / N/A     # Auth Visits: 8 per POC            Subjective: Patient reports that her L knee is feeling better. Still gets tight after being on her feet for a long time but feels better after being off her feet.     Pain: 0/10      Objective:   Balance: SLS: R 10 sec, L 6 sec   TU sec    Assessment: Patient has improved in functional mobility since beginning this episode. She has good function of both LE's but does have remaining mild swelling in the L knee compared to the R. Balance has improved but she does have compensatory trunk movements to maintain balance with tandem and single leg stance. She is being discharged from PT at this time.      Goals:   (to be met in 8 visits)  Patient will demonstrate 30 sec sit<>stand 11 to improve ability to squat without pain within 4 weeks. (Met)  Patient will tolerate walking 2 miles to improve community involvement within 4 weeks. (Met)  Patient will demonstrate SLS for 10 sec shin to decrease risk of falling within 4 weeks. (Partially met)  Patient will achieve MCID in LEFS score within 4 weeks to improve functional mobility.  (N/A)    Date: 2024  TX#:  Date:   24              TX#: 3/8 Date: 24                TX#:  Date:   24              TX#:  Date:   Tx#: 6/   TE:  S/l hip abduction, 2x12  Bridge, 2x10  Seated upper Cx rotation AROM, 15 TE:  Shuttle SLP, 37#, 2x15  Seated self cervical snag rotation  Supine cervical rotation TE:  Supine cervical retraction, [id number]  Seated cervical retraction,  [id number]  Wall upper thoracic extension stretch  Cable row w/ cervical retraction#, 25#, 2x15 TE:    Single leg partial squat, 3x8  SLS practice  Knee flexion stretch on chair  Crate lift from floor  Hip ER stretch  Supine ankle pumps in 90/90, 2x20    Nreed:  SLS practice, 5 min  Martin step over and back, 15  Martin lateral step over and back, 15  Cone tap, 3' Nreed:  SLS practice, 5 min  AP tiltboard FSU to SLS, [id number]  Cable lateral step, 15#, 2x5 shin  Gait training w/ head turns, cognitive challenge, 5 min Nreed:  SLS practice, 5 min  AP tiltboard FSU to SLS, [id number] Nreed:  Tandem stance w/ lateral reach  Tandem walk, 2 laps    MT:  Patellar mobilization, 8 min              HEP:   Access Code: [email or web address]  URL: [email or web address]  Date: 08/12/2024  Prepared by: Tyler Elizalde     Exercises  - Sidelying Hip Abduction  - 1 x daily - 3 sets - 10 reps  - Supine Bridge  - 1 x daily - 3 sets - 10 reps  - Seated Knee Extension with Resistance  - 1 x daily - 3 sets - 10 reps  Upper cervical rotation AROM, 15    Charges: 1Nreed (15 min), 2TE (25 min)       Total Timed Treatment: 40 min  Total Treatment Time: 40 min      Plan: Discharge    Patient/Family/Caregiver was advised of these findings, precautions, and treatment options and has agreed to actively participate in planning and for this course of care.    Thank you for your referral. If you have any questions, please contact me at Dept: [phone].    Sincerely,  Electronically signed by therapist: Tyler Elizalde PT     Physician's certification required:  No

## 2024-09-09 ENCOUNTER — HOSPITAL ENCOUNTER (OUTPATIENT)
Dept: MAMMOGRAPHY | Facility: HOSPITAL | Age: 89
Discharge: HOME | End: 2024-09-09
Attending: SURGERY
Payer: MEDICARE

## 2024-09-09 DIAGNOSIS — C50.912: ICD-10-CM

## 2024-09-09 PROCEDURE — 77062 BREAST TOMOSYNTHESIS BI: CPT | Performed by: SURGERY

## 2024-09-09 PROCEDURE — 77066 DX MAMMO INCL CAD BI: CPT | Performed by: SURGERY

## 2024-09-10 ENCOUNTER — PATIENT MESSAGE (OUTPATIENT)
Dept: INTERNAL MEDICINE CLINIC | Facility: CLINIC | Age: 89
End: 2024-09-10

## 2024-09-10 DIAGNOSIS — Z96.653 HISTORY OF TOTAL BILATERAL KNEE REPLACEMENT: ICD-10-CM

## 2024-09-10 DIAGNOSIS — M54.2 CERVICALGIA: ICD-10-CM

## 2024-09-10 DIAGNOSIS — G89.29 CHRONIC KNEE PAIN AFTER TOTAL REPLACEMENT OF LEFT KNEE JOINT: ICD-10-CM

## 2024-09-10 DIAGNOSIS — M17.12 PRIMARY OSTEOARTHRITIS OF LEFT KNEE: Primary | ICD-10-CM

## 2024-09-10 DIAGNOSIS — M25.562 CHRONIC KNEE PAIN AFTER TOTAL REPLACEMENT OF LEFT KNEE JOINT: ICD-10-CM

## 2024-09-10 DIAGNOSIS — Z96.652 CHRONIC KNEE PAIN AFTER TOTAL REPLACEMENT OF LEFT KNEE JOINT: ICD-10-CM

## 2024-09-11 NOTE — TELEPHONE ENCOUNTER
Last PT visit 9/6/24 session #5 of 8 sessions completed. Pt will need new referral for neck issues.   Dr. Hinojosa- have you received any requests or info from PT?  Please advise. Thank you.

## 2024-09-11 NOTE — TELEPHONE ENCOUNTER
From: Shanti Coy  To: Brenton Hinojosa  Sent: 9/10/2024 1:50 PM CDT  Subject: Return Call Back    I have been seeing Tyler Elizalde PT with [facility]. He message Dr Hinojosa twice to ask about additional PT because of a neck muscle issue. He never heard back. After my last appt at PT I went up to your office to ask about it. The office person I spoke with said she would have a nurse call me. I have not received a call.   I had the same problem when I had breast surgery a year ago. I finally had to come to the office to  the info and take it to the surgeon’s off who is with [facility] so she could perform the surgery.  I had the same problem when my  was ill before he passed away.   I just don’t understand.   Shanti Coy

## 2024-09-11 NOTE — TELEPHONE ENCOUNTER
I do not see any communication from her physical therapist to me or our office.  I am happy to order further physical therapy; order placed.

## 2024-09-12 NOTE — TELEPHONE ENCOUNTER
AD - See TE 9/6/24 regarding communication with PT. Pt stated she is having neck issue, PT order dx codes are for knee issues. Please advise if needs office visit for neck pain eval

## 2024-09-13 NOTE — TELEPHONE ENCOUNTER
MydishT message has been sent to pt regarding referral for PT on her neck ordered by DR ISIDRO ZARATE on 9/12/24..

## 2024-09-16 ENCOUNTER — OFFICE VISIT (OUTPATIENT)
Dept: HEMATOLOGY/ONCOLOGY | Facility: HOSPITAL | Age: 89
End: 2024-09-16
Attending: INTERNAL MEDICINE
Payer: MEDICARE

## 2024-09-16 DIAGNOSIS — C50.912 INVASIVE LOBULAR CARCINOMA OF LEFT BREAST IN FEMALE (HCC): Primary | ICD-10-CM

## 2024-09-16 DIAGNOSIS — Z08 ENCOUNTER FOR FOLLOW-UP EXAMINATION AFTER COMPLETED TREATMENT FOR MALIGNANT NEOPLASM: ICD-10-CM

## 2024-09-16 PROCEDURE — 99213 OFFICE O/P EST LOW 20 MIN: CPT | Performed by: INTERNAL MEDICINE

## 2024-09-16 NOTE — PROGRESS NOTES
Cancer Center Report of Consultation    Patient Name: Shanti Coy   YOB: 1935   Medical Record Number: [id number]   CSN: [id number]   Consulting Physician: Rusty Ma MD  Referring Physician(s): No ref. provider found  Date of Consultation: 9/26/2023     Reason for Consultation:  Shanti Coy was seen today in the Cancer Center for evaluation and management of a new diagnosis of left breast cancer.    History of Present Illness:     She has been on Letrozole since 10/2023. She has no new joint pains. She had a left knee replacement about six months ago. She has no new focal pain. She has no fever or sweats. She has no hot flashes. She has had some increased fatigue recently.  She has no other new complaints.    She had abnormal screening mammogram on 7/7/2023 that showed a new spiculated asymmetry in the upper outer left breast. She had a diagnostic mammogram and US on 7/18/2023 that showed a 7 x 7 x 10 mm mass at the 12:30 left breast. She had a biopsy on 7/27/2023 that showed invasive lobular carcinoma, grade II with ER 90% and WV < 1%. The Ki-67 was 10 to 20%. The Her2 was IHC 0.     She had a left breast lumpectomy on 9/15/2023. This showed a 1.4 cm invasive lobular carcinoma with negative margins.     Past Medical History:  Past Medical History:    Aortic valve insufficiency    last echo 7/26/23    Asthma (HCC)    Eczema    Exposure to medical diagnostic radiation    Female climacteric state    GERD    High blood pressure    High cholesterol    Hx of motion sickness    Left Breast cancer (HCC)    Onychocryptosis    Osteoarthritis    Other and unspecified hyperlipidemia    Pain due to onychomycosis of toenail    Prediabetes    Primary localized osteoarthrosis of right lower leg    Visual impairment    glasses       Past Surgical History:  Past Surgical History:   Procedure Laterality Date    Appendectomy      Cataract      Colonoscopy  01/01/2009    Fluor gid & loclzj ndl/cath spi  dx/ther njx  2014    Procedure: CERVICAL EPIDURAL;  Surgeon: Eugene Garcia MD;  Location: [facility]    Hysterectomy  1990    partial; one ovary in; heavy menses    Injection, w/wo contrast, dx/therapeutic substance, epidural/subarachnoid; lumbar/sacral  2014    Procedure: LUMBAR EPIDURAL;  Surgeon: Eugene Garcia MD;  Location: [facility]    Injection, w/wo contrast, dx/therapeutic substance, epidural/subarachnoid; lumbar/sacral N/A 2016    Procedure: LUMBAR EPIDURAL;  Surgeon: Jaswant Pham MD;  Location: [facility]    Injection, w/wo contrast, dx/therapeutic substance, epidural/subarachnoid; lumbar/sacral N/A 2016    Procedure: LUMBAR EPIDURAL;  Surgeon: Jaswant Pham MD;  Location: [facility]    Knee surgery  2015    right leg    Lumpectomy left  2023    [facility]    Other surgical history      arthroscopy knee    Radiation left      Tonsillectomy         Family Medical History:  Family History   Problem Relation Age of Onset    Breast Cancer Self     Breast Cancer Mother 69    Cancer Father         leukemia    Cancer Brother         prostate ca    Heart Disorder Brother         [pacemaker    Cancer Son         prostate ca    Hypertension Son     Diabetes Maternal Grandmother     Diabetes Maternal Grandfather     Diabetes Paternal Grandmother     Diabetes Paternal Grandfather        Gyne History:  OB History    Para Term  AB Living   3 3 0 0 0 0   SAB IAB Ectopic Multiple Live Births   0 0 0 0 0   Obstetric Comments   Menarche: 17 y/o   Menopause: 51 y/o   OCP use x 5 yrs   HRT use x 7 yrs   Breastfeed: NO   BRA SIZE: 38D       Psychosocial History:  Social History     Socioeconomic History    Marital status:      Spouse name: Not on file    Number of children: 3    Years of education: Not on file    Highest education level: Not on file   Occupational History     Occupation: RETIRED TEACHER   Tobacco Use    Smoking status: Never    Smokeless tobacco: Never    Tobacco comments:     Updated 5/15/24   Vaping Use    Vaping status: Never Used   Substance and Sexual Activity    Alcohol use: Yes     Comment: rare    Drug use: No    Sexual activity: Not Currently     Partners: Male   Other Topics Concern    Caffeine Concern Not Asked    Exercise Not Asked    Seat Belt Not Asked    Special Diet Not Asked    Stress Concern Not Asked    Weight Concern Not Asked   Social History Narrative    marital status:     occupation: retired teacher elementarycaffeine: amblood tranfusion: nohiv exposure: notravel: home in [place]; [facility]ercise: walking 3 x week; 2 miles    mammo: 2010, 2011, 2012,2013,2014    dexa: 2010    pap: doesn't get    colonoscopy: 2009    ab: 2010,2011, 2012,2013,2014    influenza: 2010, 2011,2012,2013    bdt: ?    pneumovax: yes    zoster: yes     Social Determinants of Health     Financial Resource Strain: Low Risk  (4/5/2024)    Financial Resource Strain     Difficulty of Paying Living Expenses: Not hard at all     Med Affordability: No   Food Insecurity: Unknown (4/2/2024)    Food Insecurity     Food Insecurity: Patient declined   Transportation Needs: No Transportation Needs (4/5/2024)    Transportation Needs     Lack of Transportation: No   Physical Activity: Not on file   Stress: Not on file   Social Connections: Unknown (3/14/2021)    [facility]    Social Connections     Conversations with friends/family/neighbors per week: Not on file   Housing Stability: Unknown (4/2/2024)    Housing Stability     Housing Instability: Patient declined     Housing Instability Emergency: Not on file     Crib or Bassinette: Not on file       Allergies:   Allergies   Allergen Reactions    Mold        Current Medications:    Current Outpatient Medications:     Losartan Potassium-HCTZ 100-12.5 MG Oral  Tab, Take 1 tablet by mouth daily., Disp: 90 tablet, Rfl: 0    letrozole 2.5 MG Oral Tab, Take 1 tablet (2.5 mg total) by mouth daily., Disp: 90 tablet, Rfl: 3    atorvastatin 40 MG Oral Tab, Take 1 tablet (40 mg total) by mouth daily., Disp: , Rfl:     TRELEGY ELLIPTA 200-62.5-25 MCG/ACT Inhalation Aerosol Powder, Breath Activated, Inhale 1 puff into the lungs daily., Disp: , Rfl:     AMLODIPINE 5 MG Oral Tab, TAKE 1 TABLET BY MOUTH EVERY DAY, Disp: 90 tablet, Rfl: 0    Blood Pressure Monitoring (BLOOD PRESSURE CUFF) Does not apply Misc, For use to measure BP daily, Disp: 1 each, Rfl: 0    azelastine 0.1 % Nasal Solution, 2 sprays by Nasal route 2 (two) times daily as needed., Disp: , Rfl:     ipratropium 0.06 % Nasal Solution, 2 sprays by Nasal route 3 (three) times daily as needed., Disp: , Rfl:     Albuterol Sulfate HFA (PROAIR HFA) 108 (90 Base) MCG/ACT Inhalation Aero Soln, Inhale 2 puffs into the lungs every 4 (four) hours as needed., Disp: 1 each, Rfl: 5    mometasone 0.1 % External Ointment, Apply 1 Application topically daily., Disp: 15 g, Rfl: 1    CRANBERRY EXTRACT OR, Take 1 tablet by mouth daily.  , Disp: , Rfl:     Vitamin D3 (VITAMIN D3) 2000 UNITS Oral Cap, Take 1 capsule (2,000 Units total) by mouth daily., Disp: , Rfl:     PRILOSEC 20 MG OR CPDR, Take 1 capsule (20 mg total) by mouth every morning., Disp: , Rfl:     VITAMIN E, Take 1 capsule by mouth daily., Disp: , Rfl:     Review of Systems:    Constitutional: Negative for anorexia, fatigue, fevers, chills, night sweats and weight loss.  Eyes: Negative for visual disturbance, irritation and redness.  Respiratory: Negative for cough, hemoptysis, chest pain, or dyspnea.  Cardiovascular: Negative for angina, orthopnea or palpitations.  Gastrointestinal: Negative for nausea, vomiting, change in bowel habits, diarrhea, constipation and abdominal pain.  Integument/breast: Negative for rash, skin lesions, and pruritus.  Hematologic/lymphatic: Negative  for easy bruising, bleeding, and lymphadenopathy.  Genitourinary: Negative for dysuria or hematuria  Neurological: Negative for headaches, dizziness, speech problems, gait problems and focal weakness.  Psychiatric: The patient's mood was calm and appropriate for this visit.    The pertinent positives and negatives were described in the HPI and above. All other systems were negative.      Vital Signs:  Height: --  Weight: --  BSA (Calculated - sq m): --  Pulse: --  BP: --  Temp: --  Do Not Use - Resp Rate: --  SpO2: --    Physical Examination:    Constitutional: Patient is alert and not in acute distress.  Respiratory: Clear to auscultation and percussion. No rales.  No wheezes.  Cardiovascular: Regular rate and rhythm.   Gastrointestinal: Soft, non tender with good bowel sounds.  Extremities: No edema. No calf tenderness.  Neurological: Grossly intact without focal motor or sensory deficit.  Lymphatics: There is no palpable lymphadenopathy throughout in the cervical, supraclavicular, or axillary regions.    Labs reviewed at this visit:  Lab Results   Component Value Date    WBC 10.8 04/03/2024    RBC 3.88 04/03/2024    HGB 11.7 (L) 04/03/2024    HCT 33.9 (L) 04/03/2024    MCV 87.4 04/03/2024    MCH 30.2 04/03/2024    MCHC 34.5 04/03/2024    RDW 12.5 04/03/2024    .0 04/03/2024     Lab Results   Component Value Date     07/09/2024    K 3.8 07/09/2024     07/09/2024    CO2 25.0 07/09/2024    BUN 17 07/09/2024    CREATSERUM 0.82 07/09/2024     (H) 07/09/2024    CA 9.3 07/09/2024    ALKPHO 94 07/09/2024    ALT 20 07/09/2024    AST 14 (L) 07/09/2024    BILT 0.9 07/09/2024    ALB 3.7 07/09/2024    TP 6.5 07/09/2024          Radiologic imaging reviewed at this visit:    DEXA on 3/18/2024:  LUMBAR SPINE ANALYSIS RESULTS:      Bone mineral density (BMD) (g/cm2):  .987    Lumbar T-Score:  -0.5      % young normals:  94      % age matched controls:  135      Change from prior spine examination:   -5.7*%               TOTAL HIP ANALYSIS RESULTS:        Bone mineral density (BMD) (g/cm2):  .860      Total Hip T-Score:  -0.7      % young normals:  91      % age matched controls:  131      Change from prior hip examination:  -5.0*%               FEMORAL NECK ANALYSIS RESULTS:        Bone mineral density (BMD) (g/cm2):  .724      Femoral neck T-Score:  -1.1      % young normals:  85      % age matched controls:  127      Change from prior hip examination:  -5.3*%         Mammogram on 7/18/2023:  COMPARISON(S): 7/7/2023, 6/21/2022, 6/15/2021     BREAST COMPOSITION: (B) The breasts are composed of scattered areas of fibroglandular density.     LEFT DIAGNOSTIC MAMMOGRAM FINDINGS:  1:00, 5 cm from the nipple there is a 5 mm mass with tomographic mild spiculation.     LEFT BREAST/LEFT AXILLARY ULTRASOUND FINDINGS: 12:30, 5 cm from the nipple irregular hypoechoic 7 x 7 x 10 mm mass with angulated margin. This correlates with mammography. No enlarged left axillary lymph node.       Assessment/Plan:    Invasive lobular carcinoma of the left breast diagnosed on 7/27/2023:  Lumpectomy on 9/15/2023  1.4 cm ILC  Grade II  ER 90%  OR < 1%  Ki-67 10 to 20%  Her2 IHC 0    The patient had good surgical margins. She will continue with the aromatase inhibitor. Continue Letrozole 2.5 mg daily.     I will follow her at six month intervals.     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 We decided to follow this with repeat dexa in 2 years.    Osteoarthritis:    She can proceed with knee replacement with routine ortho management.      Rusty Ma MD

## 2024-09-16 NOTE — PROGRESS NOTES
Patient here for 6 month f/u for h/o breast cancer. Patient taking letrozole as directed, patient states she has increased fatigue for last year. Patient will have breast exam today.     Education Record    Learner:  Patient    Disease / Diagnosis: breast cancer     Barriers / Limitations:  None   Comments:    Method:  Discussion   Comments:    General Topics:  Medication and Side effects and symptom management   Comments:    Outcome:  Shows understanding   Comments:

## 2024-09-26 ENCOUNTER — TELEPHONE (OUTPATIENT)
Dept: INTERNAL MEDICINE CLINIC | Facility: CLINIC | Age: 89
End: 2024-09-26

## 2024-09-26 ENCOUNTER — OFFICE VISIT (OUTPATIENT)
Dept: SURGERY | Facility: CLINIC | Age: 89
End: 2024-09-26
Payer: MEDICARE

## 2024-09-26 VITALS
WEIGHT: 158.19 LBS | OXYGEN SATURATION: 100 % | BODY MASS INDEX: 27 KG/M2 | HEART RATE: 82 BPM | TEMPERATURE: 98 F | RESPIRATION RATE: 18 BRPM | SYSTOLIC BLOOD PRESSURE: 122 MMHG | DIASTOLIC BLOOD PRESSURE: 68 MMHG

## 2024-09-26 DIAGNOSIS — C50.912: ICD-10-CM

## 2024-09-26 PROCEDURE — 99214 OFFICE O/P EST MOD 30 MIN: CPT

## 2024-09-26 NOTE — TELEPHONE ENCOUNTER
Patient presented to the  with a sealed envelope for a placard.  Patient states that this can be mailed back, as she included a self-addressed stamped envelope.  Please call patient at [phone] when forms are completed.  Placed forms in Dr. Brenton Hinojosa folder up at the .

## 2024-09-30 NOTE — PROGRESS NOTES
Breast Surgery Surveillance Visit    Diagnosis: Invasive lobular carcinoma, left breast, s/p lumpectomy on 9/15/2023    Stage: T1 cNX MX    Disease Status:  Surgical treatment complete, letrozole onging per medical oncology, completed RT on 11/20/2023    History of Present Illness:   Ms. Shanti Coy is a 88 year old woman who presents with an imaging detected concern of her left breast.  The patient denies any palpable masses, nipple discharge, skin changes or axillary discomfort.  She does have a family history of breast cancer and has been consistent with annual mammograms.  She has no personal prior history of breast disease or biopsies.  She had a work-up performed at an outside facility.  Her screening mammogram on July 7, 2023 detected a new focal asymmetry with distortion in the left breast for which additional imaging was recommended.  A left diagnostic evaluation confirmed an associated 1 cm mass at 1230, 5 cm from the nipple correlating with the mammographic finding.  Her left axillary lymph nodes were noted to be sonographically normal.  She had ultrasound-guided biopsy on July 27, 2023 and was confirmed to have invasive lobular carcinoma, ER 90%, GA less than 1%, HER2/cecil negative and Ki-67 10 to 20%. She underwent lumpectomy, which occurred without complication.  Bilateral diagnostic mammogram on 9/9/2024 showed postlumpectomy changes in the left breast with no suspicious findings.  She is here today for evaluation and recommendations for further therapy.        Past Medical History:    Aortic valve insufficiency    last echo 7/26/23    Asthma (HCC)    Eczema    Exposure to medical diagnostic radiation    Female climacteric state    GERD    High blood pressure    High cholesterol    Hx of motion sickness    Left Breast cancer (HCC)    Onychocryptosis    Osteoarthritis    Other and unspecified hyperlipidemia    Pain due to onychomycosis of toenail    Prediabetes    Primary localized osteoarthrosis  of right lower leg    Visual impairment    glasses       Past Surgical History:   Procedure Laterality Date    Appendectomy      Cataract      Colonoscopy  2009    Fluor gid & loclzj ndl/cath spi dx/ther njx  2014    Procedure: CERVICAL EPIDURAL;  Surgeon: Eugene Garcia MD;  Location: [facility]    Hysterectomy  1990    partial; one ovary in; heavy menses    Injection, w/wo contrast, dx/therapeutic substance, epidural/subarachnoid; lumbar/sacral  2014    Procedure: LUMBAR EPIDURAL;  Surgeon: Eugene Garcia MD;  Location: [facility]    Injection, w/wo contrast, dx/therapeutic substance, epidural/subarachnoid; lumbar/sacral N/A 2016    Procedure: LUMBAR EPIDURAL;  Surgeon: Jaswant Pham MD;  Location: [facility]    Injection, w/wo contrast, dx/therapeutic substance, epidural/subarachnoid; lumbar/sacral N/A 2016    Procedure: LUMBAR EPIDURAL;  Surgeon: Jaswant Pham MD;  Location: [facility]    Knee surgery  2015    right leg    Lumpectomy left  2023    [facility]    Other surgical history      arthroscopy knee    Radiation left      Tonsillectomy         Gynecological History:  Pt is a   Pt was 31 years old at time of first pregnancy.    She denies cumulative breastfeeding history   She achieved menarche at age 17 and LMP 52  Age of Menopause: 52  Type: hysterectomy with one ovary removed  She has history of hormone replacement therapy for 15 years, last used 35 years ago .  She has history of oral contraceptive use for 15 years, last in .  She denies infertility treatment to achieve pregnancy.    Medications:     Losartan Potassium-HCTZ 100-12.5 MG Oral Tab Take 1 tablet by mouth daily. 90 tablet 0    letrozole 2.5 MG Oral Tab Take 1 tablet (2.5 mg total) by mouth daily. 90 tablet 3    atorvastatin 40 MG Oral Tab Take 1 tablet (40 mg total) by mouth daily.      TRELEGY ELLIPTA  200-62.5-25 MCG/ACT Inhalation Aerosol Powder, Breath Activated Inhale 1 puff into the lungs daily.      AMLODIPINE 5 MG Oral Tab TAKE 1 TABLET BY MOUTH EVERY DAY 90 tablet 0    Blood Pressure Monitoring (BLOOD PRESSURE CUFF) Does not apply Misc For use to measure BP daily 1 each 0    azelastine 0.1 % Nasal Solution 2 sprays by Nasal route 2 (two) times daily as needed.      ipratropium 0.06 % Nasal Solution 2 sprays by Nasal route 3 (three) times daily as needed.      Albuterol Sulfate HFA (PROAIR HFA) 108 (90 Base) MCG/ACT Inhalation Aero Soln Inhale 2 puffs into the lungs every 4 (four) hours as needed. 1 each 5    mometasone 0.1 % External Ointment Apply 1 Application topically daily. 15 g 1    CRANBERRY EXTRACT OR Take 1 tablet by mouth daily.        Vitamin D3 (VITAMIN D3) 2000 UNITS Oral Cap Take 1 capsule (2,000 Units total) by mouth daily.      PRILOSEC 20 MG OR CPDR Take 1 capsule (20 mg total) by mouth every morning.      VITAMIN E Take 1 capsule by mouth daily.         Allergies:    Allergies   Allergen Reactions    Mold        Family History:   Family History   Problem Relation Age of Onset    Breast Cancer Self     Breast Cancer Mother 69    Cancer Father         leukemia    Cancer Brother         prostate ca    Heart Disorder Brother         [pacemaker    Cancer Son         prostate ca    Hypertension Son     Diabetes Maternal Grandmother     Diabetes Maternal Grandfather     Diabetes Paternal Grandmother     Diabetes Paternal Grandfather        She is not of Ashkenazi Confucianist ancestry.    Social History:  History   Alcohol Use    Yes     Comment: rare       History   Smoking Status    Never   Smokeless Tobacco    Never       Ms. Shanti Coy is  with 3 children. She has 2 siblings. She is currently Retired    Review of Systems:  General:   The patient denies, fever, chills, night sweats, fatigue, generalized weakness, change in appetite or weight loss.    HEENT:     The patient denies eye  irritation, +cataracts, redness, glaucoma, yellowing of the eyes, change in vision, color blindness, or +wearing contacts/glasses. The patient denies hearing loss, ringing in the ears, ear drainage, earaches, nasal congestion, nose bleeds, snoring, pain in mouth/throat, hoarseness, change in voice, facial trauma.    Respiratory:  The patient denies chronic cough, phlegm, hemoptysis, pleurisy/chest pain, pneumonia, +asthma, +wheezing, difficulty in breathing with exertion, emphysema, chronic bronchitis, +shortness of breath or abnormal sound when breathing.     Cardiovascular:  There is no history of chest pain, chest pressure/discomfort, palpitations, irregular heartbeat, fainting or near-fainting, difficulty breathing when lying flat, SOB/Coughing at night, swelling of the legs or chest pain while walking.    Breasts:  See history of present illness    Gastrointestinal:     There is no history of difficulty or pain with swallowing, +reflux symptoms, vomiting, dark or bloody stools, constipation, yellowing of the skin, indigestion, nausea, change in bowel habits, diarrhea, abdominal pain or vomiting blood.     Genitourinary:  The patient denies frequent urination, +needing to get up at night to urinate, urinary hesitancy or retaining urine, painful urination, urinary incontinence, decreased urine stream, blood in the urine or vaginal/penile discharge.    Skin:    The patient denies rash, itching, skin lesions, +dry skin, change in skin color or change in moles.     Hematologic/Lymphatic:  The patient denies easily bruising or bleeding or persistent swollen glands or lymph nodes.     Musculoskeletal:  The patient denies muscle aches/pain, joint pain, stiff joints, neck pain, +back pain or bone pain.    Neuropsychiatric:  There is no history of migraines or severe headaches, seizure/epilepsy, speech problems, coordination problems, trembling/tremors, fainting/black outs, dizziness, memory problems, loss of  sensation/numbness, problems walking, weakness, tingling or burning in hands/feet. There is no history of abusive relationship, bipolar disorder, sleep disturbance, anxiety, depression or feeling of despair.    Endocrine:    There is no history of poor/slow wound healing, weight loss/gain, fertility or hormone problems, cold intolerance, thyroid disease.     Allergic/Immunologic:  There is no history of hives, hay fever, angioedema or anaphylaxis.    /68 (BP Location: Right arm, Patient Position: Sitting, Cuff Size: adult)   Pulse 82   Temp 98.2 °F (36.8 °C) (Temporal)   Resp 18   Wt 71.8 kg (158 lb 3.2 oz)   LMP  (LMP Unknown)   SpO2 100%   BMI 27.15 kg/m²     Physical Exam:  The patient is an alert, oriented, well-nourished and  well-developed woman who appears her stated age. Her speech patterns and movements are normal. Her affect is appropriate.    HEENT: The head is normocephalic. The neck is supple. The thyroid is not enlarged and is without palpable masses/nodules. There are no palpable masses. The trachea is in the midline. Conjunctiva are clear, non-icteric.    Chest: The chest expands symmetrically. The lungs are clear to auscultation.    Heart: The rhythm is regular.  There are no murmurs, rubs, gallops or thrills.    Breasts:  Her breasts are symmetrical with a cup size 38D.  Right breast: The skin, nipple ,and areola appear normal. There is no skin dimpling with movement of the pectoralis. There is no nipple retraction. No nipple discharge can be elicited. The parenchyma is mildly nodular. There are no dominant masses in the breast. The axillary tail is normal.  Left breast:   The skin, nipple, and areola appear normal. There is no skin dimpling with movement of the pectoralis. There is no nipple retraction. No nipple discharge can be elicited. The parenchyma is mildly nodular. There are no dominant masses in the breast. The axillary tail is normal.  There is a well-healed incision with no  signs of new or recurrent disease.    Abdomen:  The abdomen is soft, flat and non tender. The liver is not enlarged. There are no palpable masses.    Lymph Nodes:  The supraclavicular, axillary and cervical regions are free of significant lymphadenopathy.    Back: There is no vertebral column tenderness.    Skin: The skin appears normal. There are no suspicious appearing rashes or lesions.    Extremities: The extremities are without deformity, cyanosis or edema.    Impression:   Ms. Shanti Coy is a 88 year old woman presents with family history of breast cancer left breast cancer, s/p lumpectomy.    Recommendations:   I had a discussion with the Patient regarding her breast exam.  She has healed well since surgery with no signs of local recurrence.  I  reviewed her pathology which showed invasive lobular carcinoma measuring 1.4cm with negative margins.  No rehana interrogation was performed due to normal clinical and radiological exam and advanced age.  She completed radiation without sequelae and is tolerating her letrozole under the direction of medical oncology.  She will be due for a left diagnostic evaluation in March 2025 with a clinical exam to follow.  I encouraged her to continue monitoring her ROM and strength and explained that a referral to physical therapy may be warranted in the future if she identifies any limitations or restrictions. She was given ample opportunity for questions and those questions were answered to her satisfaction. She was encouraged to contact the office with any questions or concerns prior to her next scheduled appointment.

## 2024-10-18 ENCOUNTER — NURSE TRIAGE (OUTPATIENT)
Dept: INTERNAL MEDICINE CLINIC | Facility: CLINIC | Age: 89
End: 2024-10-18

## 2024-10-18 NOTE — TELEPHONE ENCOUNTER
Action Requested: Summary for Provider     []  Critical Lab, Recommendations Needed  [] Need Additional Advice  []   FYI    []   Need Orders  [] Need Medications Sent to Pharmacy  []  Other     SUMMARY: Received call from pt. Patient stated she has hx of back issues, received injections in the past but not for about 3 years. Patient was working in the yard 4 days ago, bending triggered back pain to return. Patient has been using heat/ice, which helps some. Patient stated pain is worse in the morning and improves throughout the day. Denies radiating pain/numbness/tingling. Patient calling today to set up appointment for next week. Offered appointment Monday with Dr. Khanna, but pt stated she had another appointment at that time. No other in office appts. Offered appointment Tuesday with Dr. Khanna, no other providers available for in office conchisal pt agreeable and appreciative. Patient said she is going to call her other doctor to try and move appointment Monday so she can make Monday appointment with Dr. Khanna, she will call us back if able to do so. Also added appointment to waitlist,  warnings provided. Patient stated understanding.     Reason for call: Acute  Onset: 4 days      Reason for Disposition   MODERATE back pain (e.g., interferes with normal activities) and present > 3 days   Patient wants to be seen    Protocols used: Back Pain-A-[place]

## 2024-10-22 ENCOUNTER — OFFICE VISIT (OUTPATIENT)
Dept: INTERNAL MEDICINE CLINIC | Facility: CLINIC | Age: 89
End: 2024-10-22
Payer: MEDICARE

## 2024-10-22 VITALS
SYSTOLIC BLOOD PRESSURE: 138 MMHG | BODY MASS INDEX: 27 KG/M2 | OXYGEN SATURATION: 96 % | DIASTOLIC BLOOD PRESSURE: 68 MMHG | WEIGHT: 159 LBS | HEART RATE: 76 BPM | TEMPERATURE: 98 F

## 2024-10-22 DIAGNOSIS — S80.02XA CONTUSION OF LEFT KNEE, INITIAL ENCOUNTER: ICD-10-CM

## 2024-10-22 DIAGNOSIS — Z96.653 S/P TKR (TOTAL KNEE REPLACEMENT), BILATERAL: ICD-10-CM

## 2024-10-22 DIAGNOSIS — G89.29 CHRONIC RIGHT-SIDED LOW BACK PAIN WITHOUT SCIATICA: Primary | ICD-10-CM

## 2024-10-22 DIAGNOSIS — M54.50 CHRONIC RIGHT-SIDED LOW BACK PAIN WITHOUT SCIATICA: Primary | ICD-10-CM

## 2024-10-22 PROCEDURE — 99214 OFFICE O/P EST MOD 30 MIN: CPT | Performed by: FAMILY MEDICINE

## 2024-10-22 NOTE — PROGRESS NOTES
Shanti Coy  11/26/1935    Chief Complaint   Patient presents with    Back Pain     Back pain + knee pain + leg pain   Hx of knee replacement within the past 6 months   Knee xray back in July - pain is still persistent        HPI:   Shanti Coy is a 88 year old female who presents for evaluation of chronic low back pain. She has had history of lumbar epidural injections in the past. She has also had a recent knee replacement about 6 months in her left knee and suffered a fall a few months ago and having persistent pain. XR at that time was negative for acute osseous injury. Her back pain is mainly stiffness and discomfort after long periods of sitting or laying.  No true radicular symptoms.  She is interested in starting physical therapy.    Current Outpatient Medications   Medication Sig Dispense Refill    Losartan Potassium-HCTZ 100-12.5 MG Oral Tab Take 1 tablet by mouth daily. 90 tablet 0    letrozole 2.5 MG Oral Tab Take 1 tablet (2.5 mg total) by mouth daily. 90 tablet 3    atorvastatin 40 MG Oral Tab Take 1 tablet (40 mg total) by mouth daily.      TRELEGY ELLIPTA 200-62.5-25 MCG/ACT Inhalation Aerosol Powder, Breath Activated Inhale 1 puff into the lungs daily.      AMLODIPINE 5 MG Oral Tab TAKE 1 TABLET BY MOUTH EVERY DAY 90 tablet 0    Blood Pressure Monitoring (BLOOD PRESSURE CUFF) Does not apply Misc For use to measure BP daily 1 each 0    azelastine 0.1 % Nasal Solution 2 sprays by Nasal route 2 (two) times daily as needed.      ipratropium 0.06 % Nasal Solution 2 sprays by Nasal route 3 (three) times daily as needed.      Albuterol Sulfate HFA (PROAIR HFA) 108 (90 Base) MCG/ACT Inhalation Aero Soln Inhale 2 puffs into the lungs every 4 (four) hours as needed. 1 each 5    mometasone 0.1 % External Ointment Apply 1 Application topically daily. 15 g 1    CRANBERRY EXTRACT OR Take 1 tablet by mouth daily.        Vitamin D3 (VITAMIN D3) 2000 UNITS Oral Cap Take 1 capsule (2,000 Units total) by  mouth daily.      PRILOSEC 20 MG OR CPDR Take 1 capsule (20 mg total) by mouth every morning.      VITAMIN E Take 1 capsule by mouth daily.        Allergies[1]   Past Medical History:    Aortic valve insufficiency    last echo 7/26/23    Asthma (HCC)    Eczema    Exposure to medical diagnostic radiation    Female climacteric state    GERD    High blood pressure    High cholesterol    Hx of motion sickness    Left Breast cancer (HCC)    Onychocryptosis    Osteoarthritis    Other and unspecified hyperlipidemia    Pain due to onychomycosis of toenail    Prediabetes    Primary localized osteoarthrosis of right lower leg    Visual impairment    glasses      Patient Active Problem List   Diagnosis    Pure hypercholesterolemia    Primary hypertension    Insomnia    Spinal stenosis of lumbar region    Lumbar radiculitis    History of total knee replacement    Pre-diabetes    Asthma with COPD (chronic obstructive pulmonary disease) (HCC)    Aortic root dilation (HCC)    Spondylolisthesis at L4-L5 level    Inflammatory spondylopathy of lumbar region (HCC)    Primary osteoarthritis of left knee    Invasive lobular carcinoma of left breast in female (HCC)    Infiltrating lobular carcinoma of breast, stage 1, left (HCC)    Coronary artery disease involving native coronary artery of native heart without angina pectoris    Aortic valve regurgitation    Encounter for annual health examination    Moderate persistent asthma with exacerbation (HCC)      Past Surgical History:   Procedure Laterality Date    Appendectomy      Cataract      Colonoscopy  01/01/2009    Fluor gid & loclzj ndl/cath spi dx/ther njx  02/03/2014    Procedure: CERVICAL EPIDURAL;  Surgeon: Eugene Garcia MD;  Location: [facility]    Hysterectomy  01/01/1990    partial; one ovary in; heavy menses    Injection, w/wo contrast, dx/therapeutic substance, epidural/subarachnoid; lumbar/sacral  02/03/2014    Procedure: LUMBAR EPIDURAL;  Surgeon:  Eugene Garcia MD;  Location: [facility]    Injection, w/wo contrast, dx/therapeutic substance, epidural/subarachnoid; lumbar/sacral N/A 07/06/2016    Procedure: LUMBAR EPIDURAL;  Surgeon: Jaswant Pham MD;  Location: [facility]    Injection, w/wo contrast, dx/therapeutic substance, epidural/subarachnoid; lumbar/sacral N/A 07/28/2016    Procedure: LUMBAR EPIDURAL;  Surgeon: Jaswant Pham MD;  Location: [facility]    Knee surgery  09/08/2015    right leg    Lumpectomy left  09/2023    [facility]    Other surgical history      arthroscopy knee    Radiation left  2023    Tonsillectomy        Family History   Problem Relation Age of Onset    Breast Cancer Self     Breast Cancer Mother 69    Cancer Father         leukemia    Cancer Brother         prostate ca    Heart Disorder Brother         [pacemaker    Cancer Son         prostate ca    Hypertension Son     Diabetes Maternal Grandmother     Diabetes Maternal Grandfather     Diabetes Paternal Grandmother     Diabetes Paternal Grandfather       Social History     Socioeconomic History    Marital status:     Number of children: 3   Occupational History    Occupation: RETIRED TEACHER   Tobacco Use    Smoking status: Never    Smokeless tobacco: Never    Tobacco comments:     Updated 5/15/24   Vaping Use    Vaping status: Never Used   Substance and Sexual Activity    Alcohol use: Yes     Comment: rare    Drug use: No    Sexual activity: Not Currently     Partners: Male   Social History Narrative    marital status:     occupation: retired teacher elementarycaffeine: amblood tranfusion: nohiv exposure: notravel: home in [place]; [facility] walking 3 x week; 2 miles    mammo: 2010, 2011, 2012,2013,2014    dexa: 2010    pap: doesn't get    colonoscopy: 2009    ab: 2010,2011, 2012,2013,2014    influenza: 2010, 2011,2012,2013    bdt: ?    pneumovax: yes    zoster: yes     Social Drivers of  Health     Financial Resource Strain: Low Risk  (4/5/2024)    Financial Resource Strain     Difficulty of Paying Living Expenses: Not hard at all     Med Affordability: No   Food Insecurity: Unknown (4/2/2024)    Food Insecurity     Food Insecurity: Patient declined   Transportation Needs: No Transportation Needs (4/5/2024)    Transportation Needs     Lack of Transportation: No    [facility]    Social Connections   Housing Stability: Unknown (4/2/2024)    Housing Stability     Housing Instability: Patient declined         REVIEW OF SYSTEMS:   GENERAL: feels well otherwise    EXAM:   /68 (BP Location: Right arm, Patient Position: Sitting, Cuff Size: large)   Pulse 76   Temp 97.7 °F (36.5 °C) (Temporal)   Wt 159 lb (72.1 kg)   LMP  (LMP Unknown)   SpO2 96%   BMI 27.29 kg/m²   GENERAL: Well developed, well nourished,in no apparent distress  MSK: Full evaluation patient's lumbar spine reveals no midline bony.  She has mild right-sided paraspinal tenderness.  No significant neural tension.  Evaluation of her left knee reveals generally full range of motion.  Her surgical incision is well-healed.  There is no effusion.  She has mild soft tissue swelling of the anterior knee.    ASSESSMENT AND PLAN:   Shanti Coy is a 88 year old female who presents for evaluation.    Chronic right-sided low back pain without sciatica  Reviewed her previous imaging and previous treatment.  At this time, I recommend she begin dedicated physical therapy.  We discussed additional analgesic options and activity goals.  Should her symptoms persist, I recommend she follow-up with her pain management physician for consideration of repeat CSI  - Physical Therapy Referral - [facility]    S/P TKR (total knee replacement), bilateral  Contusion of left knee, initial encounter  I suspect her persistent left knee discomfort is likely secondary to contusion.  She has  upcoming follow-up with her orthopedic surgeon.  We discussed icing and compression therapy in the interim.       All questions were answered and the patient agrees with the plan.     Thank you,  Festus Khanna MD         [1]   Allergies  Allergen Reactions    Mold

## 2024-10-23 ENCOUNTER — APPOINTMENT (OUTPATIENT)
Dept: PHYSICAL THERAPY | Facility: HOSPITAL | Age: 89
End: 2024-10-23
Attending: FAMILY MEDICINE
Payer: MEDICARE

## 2024-10-25 ENCOUNTER — TELEPHONE (OUTPATIENT)
Dept: PHYSICAL THERAPY | Facility: HOSPITAL | Age: 89
End: 2024-10-25

## 2024-10-28 ENCOUNTER — APPOINTMENT (OUTPATIENT)
Dept: PHYSICAL THERAPY | Facility: HOSPITAL | Age: 89
End: 2024-10-28
Attending: FAMILY MEDICINE
Payer: MEDICARE

## 2024-10-30 ENCOUNTER — OFFICE VISIT (OUTPATIENT)
Dept: PHYSICAL THERAPY | Facility: HOSPITAL | Age: 89
End: 2024-10-30
Attending: FAMILY MEDICINE
Payer: MEDICARE

## 2024-10-30 PROCEDURE — 97140 MANUAL THERAPY 1/> REGIONS: CPT

## 2024-10-30 PROCEDURE — 97161 PT EVAL LOW COMPLEX 20 MIN: CPT

## 2024-10-30 NOTE — PROGRESS NOTES
SPINE EVALUATION:     Diagnosis:   Chronic right-sided low back pain without sciatica (M54.50,G89.29)       Referring Provider: Festus Khanna  Date of Evaluation:    10/30/2024    Precautions:  Cancer,   Next MD visit:   none scheduled  Date of Surgery: n/a     PATIENT SUMMARY   Shanti Coy is a 88 year old female who presents to therapy today with complaints of had left knee replacement in April. Went well until a fall. Has been dealing with low back pain for multiple years. Reports right calf cramping with back flare up. Left leg has been more acute. Back symptoms will always come on first, and then the leg symptoms. Muscle tightness/cramps in the back as well. Has found relief with ice on the back.   Mid September the back symptoms and leg cramping began.   Previous PT for the back in the past. Also has had injections. Only found relief from the injections.   Sleep: roughly 4 hours on average a night.   Pt describes pain level current 0/10, at best 0/10, at worst 10/10. Pain described as cramping, achiness   Current functional limitations include walking, lifting (>8 lbs), bending, vacuuming .   Severity: moderate  Irritability: moderate (15 minutes of walking, 5 minutes of seated rest, allowed completion of activity)  Nature of symptoms: mechanical  Stage: acute flare up of chronic condition  Stability: moderate    24 hour pattern: some mornings there is stiffness, completes stretches as needed.   Aggravating factors: walking, lifting (>8 lbs), bending, vacuuming .  Relieving factors: ice, sitting, tylenol ( as needed)     Shanti describes prior level of function fairly active with walking.   Work/Home Responsibilities: would like tot volunteering if possible.  Pt goals include walking increased distances, increased energy, better sleep.  Past medical history was reviewed with Shanti. Significant findings include   Past Medical History:    Aortic valve insufficiency    last echo 7/26/23    Asthma (HCC)     Eczema    Exposure to medical diagnostic radiation    Female climacteric state    GERD    High blood pressure    High cholesterol    Hx of motion sickness    Left Breast cancer (HCC)    Onychocryptosis    Osteoarthritis    Other and unspecified hyperlipidemia    Pain due to onychomycosis of toenail    Prediabetes    Primary localized osteoarthrosis of right lower leg    Visual impairment    glasses   History of vertigo    Pt denies bowel/bladder changes, saddle anesthesia, and ZURI LE N/T.    ASSESSMENT  Shanti presents to physical therapy evaluation with primary c/o bilateral lumbar cramping and bilateral cramping in calves. The results of the objective tests and measures show decreased range of motion of the lumbar spine, hypomobility of the lumbar spine, decreased strength of bilateral legs.  Functional deficits include but are not limited to walking, lifting (>8 lbs), bending, vacuuming . .  Signs and symptoms are consistent with diagnosis of lumbar radiculopathy. Pt and PT discussed evaluation findings, pathology, POC and HEP.  Pt voiced understanding and performs HEP correctly without reported pain. Skilled Physical Therapy is medically necessary to address the above impairments and reach functional goals.     OBJECTIVE:   Observation/Posture: increase kyphosis  Neuro Screen: unremarkable     Lumbar AROM: (* denotes performed with pain)  Flexion: 80 deg  Extension: 5 deg  Sidebending: R NT; L NT  Rotation: R min impairment; L min impairment      Accessory motion: hypomobile with CPA at L1-S1 grade II-III, hypomobile with UPA on left at L4-5 grade II-III  Palpation: tenderness to palpation of bilateral lumbar paraspinals    Strength: (* denotes performed with pain)  LE   Hip flexion (L2): R 3+/5; L 3+/5  Hip abduction: R 3+/5; L 3+/5  Hip Extension: R 3+/5; L 3+/5   Hip ER: R 3+/5; L 3+/5  Hip IR: R 3+/5; L 3+/5  Knee Flexion: R 5/5; L 5/5   Knee extension (L3): R 5/5; L 5/5   DF (L4): R 5/5; L 5/5  Great Toe  Ext (L5): R 5/5, L 5/5  PF (S1): R 5/5; L 5/5     Flexibility:   LE   Hip Flexor: R mod impairment, L mod impairment  Hamstrings: R SLR 60; L SLR 45  Piriformis: R min impairment; L min impairment  Gastroc-soleus: R min impairment; L min impairment     Special tests:   Repeated Motions Testing: no change in symptoms  Shift Correction: NT  Lumbar Quadrant Testing: positive closing patterns  SLR: R 60, L 45  Long Axis Distraction: NT  Prone Instability Test: NT      Today’s Treatment and Response: improvement of lumbar symptoms following CPA and UPA  Pt education was provided on exam findings, treatment diagnosis, treatment plan, expectations, and prognosis. Pt was also provided recommendations for activity modifications, modalities as needed [ice/heat], importance of remaining active, and shoe wear  Patient was instructed in and issued a HEP for: Access Code: [email or web address]  URL: [email or web address]  Date: 10/30/2024  Prepared by: Mynor Lebron    Exercises  - Supine 90/90 Sciatic Nerve Glide with Knee Flexion/Extension  - 2-3 x daily - 7 x weekly - 1 sets - 10 reps    Charges: PT Eval Low Complexity, 1 MT      Total Timed Treatment: 8 min     Total Treatment Time: 45 min     Based on clinical rationale and outcome measures, this evaluation involved Low Complexity decision making due to 1-2 personal factors/comorbidities, 3 body structures involved/activity limitations, and evolving symptoms including changing pain levels.  PLAN OF CARE:    Goals: (to be met in 10 visits)   Pt will improve transversus abdominis recruitment to perform proper isometric contraction without requiring verbal or tactile cuing to promote advancement of therex   Pt will demonstrate good understanding of proper posture and body mechanics to decrease pain and improve spinal safety   Pt will improve lumbar spine AROM flexion to >85 deg to allow increase ease with bending forward to don shoes   Pt will report improved symptom  centralization and absence of radicular symptoms for 3 consecutive days to improve function with ADL   Pt will have decreased paraspinal mm tension to tolerate standing >15 minutes for work and home activities   Pt will be independent and compliant with comprehensive HEP to maintain progress achieved in PT       Frequency / Duration: Patient will be seen for 1-2 x/week or a total of 10 visits over a 90 day period. Treatment will include: Manual Therapy, Neuromuscular Re-education, Therapeutic Activities, Therapeutic Exercise, and Home Exercise Program instruction    Education or treatment limitation: None  Rehab Potential:good    Oswestry Disability Index Score  Score: (Patient-Rptd) 26 % (10/30/2024  9:17 AM)      Patient/Family/Caregiver was advised of these findings, precautions, and treatment options and has agreed to actively participate in planning and for this course of care.    Thank you for your referral. Please co-sign or sign and return this letter via fax as soon as possible to [phone]. If you have any questions, please contact me at Dept: [phone]    Sincerely,  Electronically signed by therapist: Mynor Lebron, PT    Physician's certification required: Yes  I certify the need for these services furnished under this plan of treatment and while under my care.    X___________________________________________________ Date____________________    Certification From: 10/30/2024  To:1/28/2025

## 2024-11-07 ENCOUNTER — OFFICE VISIT (OUTPATIENT)
Dept: PHYSICAL THERAPY | Facility: HOSPITAL | Age: 89
End: 2024-11-07
Attending: FAMILY MEDICINE
Payer: MEDICARE

## 2024-11-07 PROCEDURE — 97140 MANUAL THERAPY 1/> REGIONS: CPT

## 2024-11-07 PROCEDURE — 97110 THERAPEUTIC EXERCISES: CPT

## 2024-11-07 NOTE — PROGRESS NOTES
Diagnosis:   Chronic right-sided low back pain without sciatica (M54.50,G89.29)          Referring Provider: Festus Khanna  Date of Evaluation:    10/30/2024    Precautions:  Cancer Next MD visit:   none scheduled  Date of Surgery: n/a   Insurance Primary/Secondary: AET[employer] MCR / N/A     # Auth Visits: POC 10 visits            Subjective: I feel like I am doing a little bit better.  I don't feel like I am getting the cramping as often  The right knee seems to increase in symptoms with the lumbar pain. It goes from the knee down to the ankle. The left knee is just the burning sensation.  Pain: 2/10 bilateral lumbar region  3/10 bilateral knee (burning sensation)      Objective:   Lumbar AROM: (* denotes performed with pain)  Flexion: 80 deg  Extension: 5 deg  Sidebending: R NT; L NT  Rotation: R min impairment; L min impairment      Hamstrings: R SLR 70; L SLR 65    Assessment: pt tolerated session fairly well. Pt presented with mild complaints of lumbar pain and bilateral knee burning sensation. Following completion of nerve glide, pt reported decreased burning in the knees. Pt continues to demonstrate hypomobility throughout lumbar and thoracic region. Improvements following MT. Pt demonstrated improvement of bilateral SLR      Goals: (to be met in 10 visits)   Pt will improve transversus abdominis recruitment to perform proper isometric contraction without requiring verbal or tactile cuing to promote advancement of therex   Pt will demonstrate good understanding of proper posture and body mechanics to decrease pain and improve spinal safety   Pt will improve lumbar spine AROM flexion to >85 deg to allow increase ease with bending forward to don shoes   Pt will report improved symptom centralization and absence of radicular symptoms for 3 consecutive days to improve function with ADL   Pt will have decreased paraspinal mm tension to tolerate standing >15 minutes for work and home activities   Pt will be independent  and compliant with comprehensive HEP to maintain progress achieved in PT     Plan: continue to assess distal symptoms. Complete vascular vs neurologic claudication test if no progress is made  Date: 11/7/2024  TX#: 2/10 Date:                 TX#: 3/ Date:                 TX#: 4/ Date:                 TX#: 5/ Date:   Tx#: 6/   Manual Therapy 30 min  CPA L4-S1 grade III   UPA L4-S1 grade III R/L  CPA T12-T8 grade III  STM to R/L lumbar paraspinals        TherEx - 15 min  Seated thoracic and lumbar extension over chair 2 x 10 (5 sec holds)  Seated LAQ RTB x 10 ea leg   Seated hamstrings RTB x 10 ea leg                     HEP: Access Code: [email or web address]  URL: [email or web address]  Date: 11/07/2024  Prepared by: Mynor Lebron    Exercises  - Supine 90/90 Sciatic Nerve Glide with Knee Flexion/Extension  - 2-3 x daily - 7 x weekly - 1 sets - 10 reps  - Seated Thoracic Lumbar Extension with Pectoralis Stretch  - 2-3 x daily - 7 x weekly - 2 sets - 10 reps  - Sitting Knee Extension with Resistance  - 2-3 x daily - 7 x weekly - 2 sets - 10 reps  - Seated Hamstring Curl with Anchored Resistance  - 2-3 x daily - 7 x weekly - 2 sets - 10 reps    Charges: 2 MT 1 TherEx       Total Timed Treatment: 45 min  Total Treatment Time: 45 min

## 2024-11-09 RX ORDER — LOSARTAN POTASSIUM AND HYDROCHLOROTHIAZIDE 12.5; 1 MG/1; MG/1
1 TABLET ORAL DAILY
Qty: 90 TABLET | Refills: 3 | Status: SHIPPED | OUTPATIENT
Start: 2024-11-09

## 2024-11-09 NOTE — TELEPHONE ENCOUNTER
REFILL PASSED PER [facility] PROTOCOLS    Requested Prescriptions   Pending Prescriptions Disp Refills    LOSARTAN POTASSIUM-HCTZ 100-12.5 MG Oral Tab [Pharmacy Med Name: Losartan Potassium/Hydrochlorothiazide 100-12.5 Mg Tab Auro] 90 tablet 0     Sig: TAKE ONE TABLET BY MOUTH ONE TIME DAILY       Hypertension Medications Protocol Passed - 11/9/2024 12:00 PM        Passed - CMP or BMP in past 12 months        Passed - Last BP reading less than 140/90     BP Readings from Last 1 Encounters:   10/22/24 138/68               Passed - In person appointment or virtual visit in the past 12 mos or appointment in next 3 mos     Recent Outpatient Visits              2 days ago     [facility] Mynor Lebron, PT    Office Visit    1 week ago     [facility] Mynor Lebron, PT    Office Visit    2 weeks ago Chronic right-sided low back pain without sciatica    [facility], [address] [place] [address] Festus Khanna MD    Office Visit    1 month ago Infiltrating lobular carcinoma of breast, stage 1, left (HCC)    [facility], [facility] Carrie Ordonez APRN    Office Visit    1 month ago Invasive lobular carcinoma of left breast in female ([facility])    [facility] in [place] Rusty Ma MD    Office Visit          Future Appointments         Provider Department Appt Notes    In 1 week Mynor Lebron, PT [facility] [employer] MCR  no c/p no visit limit no auth    In 1 week Mynor Lebron, PT [facility] [employer] MCR  no c/p no visit limit no auth    In 2 weeks Sanjeev Cage MD [address] Left knee  Why does my knee swell & warm. I had knee replacement 6month but fell 6 weeks after.    In 4 months [address] Mammography     In 4 months Rusty Ma MD [facility] in [place] 6 month f/u     In 4 months Ninoska Galeano MD [facility] 6 month follow up    In 6 months Sanjeev Cage MD [address] 1 year f/up                    Passed - EGFRCR or GFRNAA > 50     GFR Evaluation  EGFRCR: 70 , resulted on 7/9/2024               Future Appointments         Provider Department Appt Notes    In 1 week Mynor Lebron, PT [facility] Therapy [employer] MCR  no c/p no visit limit no auth    In 1 week Mynor Lebron, PT [facility] [employer] MCR  no c/p no visit limit no auth    In 2 weeks Sanjeev Cage MD [address] Left knee  Why does my knee swell & warm. I had knee replacement 6month but fell 6 weeks after.    In 4 months [facility] Mammography     In 4 months Rusty Ma MD [facility] in [place] 6 month f/u    In 4 months Ninoska Galeano MD [facility] 6 month follow up    In 6 months Sanjeev Cage MD [address] 1 year f/up          Recent Outpatient Visits              2 days ago     [facility] Mynor Lebron, PT    Office Visit    1 week ago     [facility] Mynor Lebron, PT    Office Visit    2 weeks ago Chronic right-sided low back pain without sciatica    [address] Festus Khanna MD    Office Visit    1 month ago Infiltrating lobular carcinoma of breast, stage 1, left (HCC)    [facility] Carrie Ordonez APRN    Office Visit    1 month ago Invasive lobular carcinoma of left breast in female ([facility])    [facility] in [place] Rusty Ma MD    Office Visit

## 2024-11-14 ENCOUNTER — APPOINTMENT (OUTPATIENT)
Dept: PHYSICAL THERAPY | Facility: HOSPITAL | Age: 89
End: 2024-11-14
Attending: FAMILY MEDICINE
Payer: MEDICARE

## 2024-11-18 ENCOUNTER — TELEPHONE (OUTPATIENT)
Dept: PHYSICAL THERAPY | Facility: HOSPITAL | Age: 89
End: 2024-11-18

## 2024-11-18 ENCOUNTER — OFFICE VISIT (OUTPATIENT)
Dept: PHYSICAL THERAPY | Facility: HOSPITAL | Age: 89
End: 2024-11-18
Attending: FAMILY MEDICINE
Payer: MEDICARE

## 2024-11-18 PROCEDURE — 97140 MANUAL THERAPY 1/> REGIONS: CPT

## 2024-11-18 PROCEDURE — 97110 THERAPEUTIC EXERCISES: CPT

## 2024-11-18 NOTE — PROGRESS NOTES
Diagnosis:   Chronic right-sided low back pain without sciatica (M54.50,G89.29)          Referring Provider: Festus Kahnna  Date of Evaluation:    10/30/2024    Precautions:  Cancer Next MD visit:   none scheduled  Date of Surgery: n/a   Insurance Primary/Secondary: [employer]ET[employer] MCR / N/A     # Auth Visits: POC 10 visits            Subjective: I feel like the pain is getting a little bit better. I was able to vacuum this morning, which is something that I haven't had to do in awhile. I had to take a few rest breaks throughout   Pain: 1/10 bilateral lumbar region  4/10 bilateral knee (burning sensation), stiffness in the left knee . 2/10 at end of session      Objective:   Lumbar AROM: (* denotes performed with pain)  Flexion: 80 deg  Extension: 5 deg  Sidebending: R NT; L NT  Rotation: R min impairment; L min impairment      Hamstrings: R SLR 70; L SLR 70    Assessment: pt tolerated session fairly well. Pt presented with mild complaints of lumbar pain and bilateral knee burning sensation. Pt also had reports of left knee stiffness. Reports that this has been increasing since the weather has been changing. Progressed nerve glide to supine with knee flexion/extension and ankle DF/PF. Pt reported improvement of distal symptoms. Tender to palpation of right piriformis. Following session, pt reported improvement of symptoms. Pt instructed to call and schedule more appointments    Goals: (to be met in 10 visits)   Pt will improve transversus abdominis recruitment to perform proper isometric contraction without requiring verbal or tactile cuing to promote advancement of therex   Pt will demonstrate good understanding of proper posture and body mechanics to decrease pain and improve spinal safety   Pt will improve lumbar spine AROM flexion to >85 deg to allow increase ease with bending forward to don shoes   Pt will report improved symptom centralization and absence of radicular symptoms for 3 consecutive days to improve function  with ADL   Pt will have decreased paraspinal mm tension to tolerate standing >15 minutes for work and home activities   Pt will be independent and compliant with comprehensive HEP to maintain progress achieved in PT     Plan: continue to assess distal symptoms. Complete vascular vs neurologic claudication test if no progress is made  Date: 11/7/2024  TX#: 2/10 Date:11/18/2024                 TX#: 3/10 Date:                 TX#: 4/ Date:                 TX#: 5/ Date:   Tx#: 6/   Manual Therapy 30 min  CPA L4-S1 grade III   UPA L4-S1 grade III R/L  CPA T12-T8 grade III  STM to R/L lumbar paraspinals  Manual Therapy 30 min  CPA L4-S1 grade III   UPA L4-S1 grade III R/L  STM to R/L lumbar paraspinals  STM to R piriformis      TherEx - 15 min  Seated thoracic and lumbar extension over chair 2 x 10 (5 sec holds)  Seated LAQ RTB x 10 ea leg   Seated hamstrings RTB x 10 ea leg TherEx  Supine sciatic nerve glide x 10 ea leg  Seated thoracic and lumbar extension over chair 2 x 10 (5 sec holds)  Seated piriformis stretch 3 x 20 sec holds                      HEP: Access Code: [email or web address]  URL: [email or web address]  Date: 11/07/2024  Prepared by: Mynor Lebron    Exercises  - Supine 90/90 Sciatic Nerve Glide with Knee Flexion/Extension  - 2-3 x daily - 7 x weekly - 1 sets - 10 reps  - Seated Thoracic Lumbar Extension with Pectoralis Stretch  - 2-3 x daily - 7 x weekly - 2 sets - 10 reps  - Sitting Knee Extension with Resistance  - 2-3 x daily - 7 x weekly - 2 sets - 10 reps  - Seated Hamstring Curl with Anchored Resistance  - 2-3 x daily - 7 x weekly - 2 sets - 10 reps    Charges: 2 MT 1 TherEx       Total Timed Treatment: 45 min  Total Treatment Time: 45 min

## 2024-11-21 ENCOUNTER — OFFICE VISIT (OUTPATIENT)
Dept: PHYSICAL THERAPY | Facility: HOSPITAL | Age: 89
End: 2024-11-21
Attending: FAMILY MEDICINE
Payer: MEDICARE

## 2024-11-21 PROCEDURE — 97110 THERAPEUTIC EXERCISES: CPT

## 2024-11-21 PROCEDURE — 97140 MANUAL THERAPY 1/> REGIONS: CPT

## 2024-11-21 NOTE — PROGRESS NOTES
Diagnosis:   Chronic right-sided low back pain without sciatica (M54.50,G89.29)          Referring Provider: Festus Khanna  Date of Evaluation:    10/30/2024    Precautions:  Cancer Next MD visit:   none scheduled  Date of Surgery: n/a   Insurance Primary/Secondary: AETNA MCR / N/A     # Auth Visits: POC 10 visits            Subjective: the back is feeling is okay today. There was a lot of pain yesterday while I was vacuuming. I had to take a break residential through.    Pain: 1/10 bilateral lumbar region; 0/10 at end of session   1/10 bilateral knee (burning sensation), stiffness in the left knee . 2/10 at end of session      Objective:   Lumbar AROM: (* denotes performed with pain)  Flexion: 80 deg  Extension: 5 deg  Sidebending: R NT; L NT  Rotation: R min impairment; L min impairment      Hamstrings: R SLR 70; L SLR 70    Assessment: pt tolerated session fairly well. Pt presented with mild complaints of lumbar pain and bilateral knee burning sensation. Pt also had reports of left knee stiffness. Pain improved compared to previous session. Pt reported improvement of symptoms following MT, and additional mobility exercise. Pt's HEP updated.     Goals: (to be met in 10 visits)   Pt will improve transversus abdominis recruitment to perform proper isometric contraction without requiring verbal or tactile cuing to promote advancement of therex   Pt will demonstrate good understanding of proper posture and body mechanics to decrease pain and improve spinal safety   Pt will improve lumbar spine AROM flexion to >85 deg to allow increase ease with bending forward to don shoes   Pt will report improved symptom centralization and absence of radicular symptoms for 3 consecutive days to improve function with ADL   Pt will have decreased paraspinal mm tension to tolerate standing >15 minutes for work and home activities   Pt will be independent and compliant with comprehensive HEP to maintain progress achieved in PT     Plan:  continue to assess distal symptoms.  Date: 11/7/2024  TX#: 2/10 Date:11/18/2024                 TX#: 3/10 Date:11/21/2024                 TX#: 4/10 Date:                 TX#: 5/ Date:   Tx#: 6/   Manual Therapy 30 min  CPA L4-S1 grade III   UPA L4-S1 grade III R/L  CPA T12-T8 grade III  STM to R/L lumbar paraspinals  Manual Therapy 30 min  CPA L4-S1 grade III   UPA L4-S1 grade III R/L  STM to R/L lumbar paraspinals  STM to R piriformis Manual therapy 20  CPA L4-S1 grade III   UPA L4-S1 grade III R/L  STM to R/L lumbar paraspinals     TherEx - 15 min  Seated thoracic and lumbar extension over chair 2 x 10 (5 sec holds)  Seated LAQ RTB x 10 ea leg   Seated hamstrings RTB x 10 ea leg TherEx  Supine sciatic nerve glide x 10 ea leg  Seated thoracic and lumbar extension over chair 2 x 10 (5 sec holds)  Seated piriformis stretch 3 x 20 sec holds   TherEx 25 min  Seated thoracic and lumbar extension over chair 2 x 10 (5 sec holds)  Seated piriformis stretch 3 x 20 sec holds  Standing hip abduction GTB 2 x 15 ea leg  Standing hip extension GTB 2 x 15 ea leg   Standing hip flexion GTB 2 x 15 ea leg  SL thoracic open book arm at side x 15                   HEP: Access Code: [email or web address]  URL: [email or web address]  Date: 11/21/2024  Prepared by: Mynor Lebron    Exercises  - Supine 90/90 Sciatic Nerve Glide with Knee Flexion/Extension  - 2-3 x daily - 7 x weekly - 1 sets - 10 reps  - Seated Thoracic Lumbar Extension with Pectoralis Stretch  - 2-3 x daily - 7 x weekly - 2 sets - 10 reps  - Sitting Knee Extension with Resistance  - 2-3 x daily - 7 x weekly - 2 sets - 10 reps  - Seated Hamstring Curl with Anchored Resistance  - 2-3 x daily - 7 x weekly - 2 sets - 10 reps  - Standing 3-Way Leg Reach with Resistance at Ankles and Counter Support  - 2-3 x daily - 7 x weekly - 2 sets - 15 reps  - Sidelying Open Book Thoracic Lumbar Rotation and Extension  - 2-3 x daily - 7 x weekly - 2 sets - 10 reps    Charges: 1  MT 2TherEx       Total Timed Treatment: 45 min  Total Treatment Time: 45 min

## 2024-11-26 ENCOUNTER — TELEPHONE (OUTPATIENT)
Facility: CLINIC | Age: 89
End: 2024-11-26

## 2024-11-26 DIAGNOSIS — M25.562 LEFT KNEE PAIN, UNSPECIFIED CHRONICITY: Primary | ICD-10-CM

## 2024-11-27 ENCOUNTER — HOSPITAL ENCOUNTER (OUTPATIENT)
Dept: GENERAL RADIOLOGY | Age: 89
Discharge: HOME | End: 2024-11-27
Attending: ORTHOPAEDIC SURGERY
Payer: MEDICARE

## 2024-11-27 ENCOUNTER — OFFICE VISIT (OUTPATIENT)
Dept: ORTHOPEDICS CLINIC | Facility: CLINIC | Age: 89
End: 2024-11-27
Payer: MEDICARE

## 2024-11-27 ENCOUNTER — OFFICE VISIT (OUTPATIENT)
Dept: PHYSICAL THERAPY | Facility: HOSPITAL | Age: 89
End: 2024-11-27
Attending: FAMILY MEDICINE
Payer: MEDICARE

## 2024-11-27 VITALS — WEIGHT: 159 LBS | BODY MASS INDEX: 27.14 KG/M2 | HEIGHT: 64 IN

## 2024-11-27 DIAGNOSIS — Z96.652 STATUS POST LEFT KNEE REPLACEMENT: ICD-10-CM

## 2024-11-27 DIAGNOSIS — M70.42 PREPATELLAR BURSITIS OF LEFT KNEE: Primary | ICD-10-CM

## 2024-11-27 DIAGNOSIS — T84.84XA PAIN DUE TO TOTAL RIGHT KNEE REPLACEMENT, INITIAL ENCOUNTER (HCC): ICD-10-CM

## 2024-11-27 DIAGNOSIS — M25.562 LEFT KNEE PAIN, UNSPECIFIED CHRONICITY: ICD-10-CM

## 2024-11-27 DIAGNOSIS — Z96.651 PAIN DUE TO TOTAL RIGHT KNEE REPLACEMENT, INITIAL ENCOUNTER (HCC): ICD-10-CM

## 2024-11-27 PROCEDURE — 1159F MED LIST DOCD IN RCRD: CPT | Performed by: ORTHOPAEDIC SURGERY

## 2024-11-27 PROCEDURE — 99214 OFFICE O/P EST MOD 30 MIN: CPT | Performed by: ORTHOPAEDIC SURGERY

## 2024-11-27 PROCEDURE — 97110 THERAPEUTIC EXERCISES: CPT | Performed by: PHYSICAL THERAPY ASSISTANT

## 2024-11-27 PROCEDURE — 1160F RVW MEDS BY RX/DR IN RCRD: CPT | Performed by: ORTHOPAEDIC SURGERY

## 2024-11-27 PROCEDURE — 73562 X-RAY EXAM OF KNEE 3: CPT | Performed by: ORTHOPAEDIC SURGERY

## 2024-11-27 PROCEDURE — 97140 MANUAL THERAPY 1/> REGIONS: CPT | Performed by: PHYSICAL THERAPY ASSISTANT

## 2024-11-27 PROCEDURE — 1125F AMNT PAIN NOTED PAIN PRSNT: CPT | Performed by: ORTHOPAEDIC SURGERY

## 2024-11-27 RX ORDER — CELECOXIB 200 MG/1
200 CAPSULE ORAL DAILY
Qty: 60 CAPSULE | Refills: 0 | Status: SHIPPED | OUTPATIENT
Start: 2024-11-27

## 2024-11-27 RX ORDER — METOPROLOL SUCCINATE 25 MG/1
25 TABLET, EXTENDED RELEASE ORAL DAILY
COMMUNITY

## 2024-11-27 NOTE — PROGRESS NOTES
[facility] Progress Note    Subjective: Very pleasant 89-year-old female returns to clinic today for evaluation of her left knee, also discussion of right knee.  She has history of left knee replacement by myself earlier this year, states that she did very well with the knee and it had become her good knee (right knee replaced 5 or 6 years ago, but has never done as well per her report).  She states about 5 months ago she had a fall while going up stairs [place] cabin[place]beach house, notes that she fell directly forward onto the front of both knees onto a stone step.  She states that she had significant swelling and bruising about both knees.  Over the past few months, symptoms improved to a degree, but notes that a degree of pain has persisted.  She notes that she has been some burning pain, swelling and stiffness like a band, seems to set in after about 30 minutes of standing.  She also notes stiffness if she sits for too long.  She states she was using Voltaren intermittently.  She has also been doing therapy.  She does not take any regular NSAIDs for the pain.  She states prior to this the knee was doing excellent, she was very happy with her left knee outcome, but she wanted to make sure that she did not damage the knee replacement.  She did have x-rays by her primary care doctor in July because of this issue.    Objective: Patient is super pleasant, appears comfortable, ambulating without assist device.  Left knee with full range of motion, full extension and flexion around 120.  Knee is appropriate stable to varus and valgus stress throughout range of motion without gapping or instability.  Anterior drawer at 90 degrees less than 5 mm.  Right knee with similar stability exam.  Both knees with healed anterior incision, no erythema, no ecchymosis.  There is mild bogginess and fullness about the prepatellar bursa on the left knee.  There is some tenderness about the proximal tibia, mild.      Imaging: X-rays of  the left knee personally viewed, independently interpreted and radiology report read.  Films from today compared to films from July ordered by patient's primary care doctor, as well as our last films in clinic from May.  Demonstrates unchanged position of cemented components, no lucencies about bone cement implant interface.  Lateral view confirms appropriate positioning of implants without signs of loosening or change in position.  Tibial component about 3 to 4 degrees posterior slope.  Patella tracking centrally on skyline view.  There does appear to be extra-articular soft tissue swelling on images from July, which does appear decreased on today's images.      Labs: Metabolic panel from July 2024 with GFR 70, creatinine 0.82      Assessment/Plan: 89-year-old female with left knee contusion status post knee replacement, prepatellar bursitis, also with chronic pain following right knee replacement.  Reviewed the imaging and exam findings with the patient and reassured that the implant and stabilizing ligaments appear to be intact without damage.  Signs and symptoms would suggest prepatellar bursitis from trauma, possible proximal tibia contusion as a source of her symptoms.  She has gotten better but it has plateaued.  We did discuss treating with anti-inflammatory modalities, she has previously tolerated Celebrex and I did send a prescription for this for her.  I will touch base with her after 1 month to assess her response, and if she is improved no further treatment need be necessary.  We did discuss possibility of radiofrequency ablation should she continue to have discomfort and could provide referral to Dr. Hogan.  From the right knee standpoint, we also discussed possibility of radiofrequency ablation.  I will message her through [employer] in 1 month and direct accordingly.  All questions were answered.    Sanjeev Cage MD, FAAOS  [facility]  Phone [phone]  Fax [phone]

## 2024-11-27 NOTE — PROGRESS NOTES
Diagnosis:   Chronic right-sided low back pain without sciatica (M54.50,G89.29)          Referring Provider: Festus Khanna  Date of Evaluation:    10/30/2024    Precautions:  Cancer Next MD visit:   none scheduled  Date of Surgery: n/a   Insurance Primary/Secondary: AETNA MCR / N/A     # Auth Visits: POC 10 visits            Subjective: Back feeling pretty good today although I haven't done anything today. I have been doing the exercises that both Mynor and Tyler gave me. No pain down my leg today. I am experiencing some burning in knees ( see notes for history) At rest no pain then if I do activities then it makes things worse.    Pain:  2/10 bilateral lumbar region; 0/10 at end of session     Objective:   Lumbar AROM: (* denotes performed with pain)  Flexion: 80 deg  Extension: 5 deg  Sidebending: R NT; L NT  Rotation: R min impairment; L min impairment      Hamstrings: R SLR 70; L SLR 70    Assessment: Pt presents with improved sciatic symptoms reporting centralization. Pain was 0/10 at end of session. Incorporated new activities as pt reporting increased discomfort in R QL area. Pt has reviewed knee issue with Dr Cage who has diagnosed bursitis - prostheses in situ. Will commence Celebrex. Pt benefits from MT and continued POC. Pt reported no adverse reaction in response to interventions today. PT remains necessary to address ongoing deficits as identified at initial assessment.      Goals: (to be met in 10 visits)   Pt will improve transversus abdominis recruitment to perform proper isometric contraction without requiring verbal or tactile cuing to promote advancement of therex   Pt will demonstrate good understanding of proper posture and body mechanics to decrease pain and improve spinal safety   Pt will improve lumbar spine AROM flexion to >85 deg to allow increase ease with bending forward to don shoes   Pt will report improved symptom centralization and absence of radicular symptoms for 3 consecutive  days to improve function with ADL   Pt will have decreased paraspinal mm tension to tolerate standing >15 minutes for work and home activities   Pt will be independent and compliant with comprehensive HEP to maintain progress achieved in PT     Plan: continue to assess distal symptoms.  Date: 11/7/2024  TX#: 2/10 Date:11/18/2024                 TX#: 3/10 Date:11/21/2024                 TX#: 4/10 Date:                 TX#: 5/ Date:   Tx#: 6/   Manual Therapy 30 min  CPA L4-S1 grade III   UPA L4-S1 grade III R/L  CPA T12-T8 grade III  STM to R/L lumbar paraspinals  Manual Therapy 30 min  CPA L4-S1 grade III   UPA L4-S1 grade III R/L  STM to R/L lumbar paraspinals  STM to R piriformis Manual therapy 20  CPA L4-S1 grade III   UPA L4-S1 grade III R/L  STM to R/L lumbar paraspinals Manual therapy 15 mins  CPA L4-S1 grade III   UPA L4-S1 grade III R/L  STM to R/L lumbar paraspinals      TherEx - 15 min  Seated thoracic and lumbar extension over chair 2 x 10 (5 sec holds)  Seated LAQ RTB x 10 ea leg   Seated hamstrings RTB x 10 ea leg TherEx  Supine sciatic nerve glide x 10 ea leg  Seated thoracic and lumbar extension over chair 2 x 10 (5 sec holds)  Seated piriformis stretch 3 x 20 sec holds   TherEx 25 min  Seated thoracic and lumbar extension over chair 2 x 10 (5 sec holds)  Seated piriformis stretch 3 x 20 sec holds  Standing hip abduction GTB 2 x 15 ea leg  Standing hip extension GTB 2 x 15 ea leg   Standing hip flexion GTB 2 x 15 ea leg  SL thoracic open book arm at side x 15 Ther ex  30 Mins   NuStep 5 mins  NEW  Seated piriformis stretch 3 x 20 sec holds  Standing hip abduction GTB 2 x 15 ea leg  QL stretch 3 x 15 R side NEW  Thread the needle at rail x 10 B NEW  Standing resisted push down in to RSB x 20 NEW  Seated BSB roll out x 10 FW NEW                  HEP: Access Code: [email or web address]  URL: [email or web address]  Date: 11/21/2024  Prepared by: Mynor Lebron    Exercises  - Supine 90/90 Sciatic  Nerve Glide with Knee Flexion/Extension  - 2-3 x daily - 7 x weekly - 1 sets - 10 reps  - Seated Thoracic Lumbar Extension with Pectoralis Stretch  - 2-3 x daily - 7 x weekly - 2 sets - 10 reps  - Sitting Knee Extension with Resistance  - 2-3 x daily - 7 x weekly - 2 sets - 10 reps  - Seated Hamstring Curl with Anchored Resistance  - 2-3 x daily - 7 x weekly - 2 sets - 10 reps  - Standing 3-Way Leg Reach with Resistance at Ankles and Counter Support  - 2-3 x daily - 7 x weekly - 2 sets - 15 reps  - Sidelying Open Book Thoracic Lumbar Rotation and Extension  - 2-3 x daily - 7 x weekly - 2 sets - 10 reps    Charges: 1 MT 2TherEx       Total Timed Treatment: 45 min  Total Treatment Time: 45 min

## 2024-12-02 ENCOUNTER — TELEPHONE (OUTPATIENT)
Dept: INTERNAL MEDICINE CLINIC | Facility: CLINIC | Age: 89
End: 2024-12-02

## 2024-12-02 NOTE — TELEPHONE ENCOUNTER
Spoke to patient. She states she has been taking Letrozole for breast cancer for about a year. She noticed symptoms of gas pain and diarrhea that started after a few months and gradually worsened. She stopped the medication about a week ago and symptoms resolved.   Advised patient to reach out to Dr Ma's office to discuss side effects of Letrozole. She will reach out to them today.  Follow up appointment with CB moved up to next week (per patient, she is not available this week).     CB any further recommendations, or ok to follow up next week and discuss med side effects with Dr Ma/Onc.

## 2024-12-02 NOTE — TELEPHONE ENCOUNTER
Patient made the below appt through [employer], not sure if needs to be triaged.  High TE      Appointment for: Shanti Coy ([id number])   Visit type: MYCHART EXAM (2964)   12/19/2024 10:30 AM (30 minutes) with Madiha Joseph in [facility] [address] [place] [address]      Patient comments:   Pain in stomach.

## 2024-12-02 NOTE — TELEPHONE ENCOUNTER
If all sxs (gas and diarrhea) have resolved then I have no further recommendations in that regard.  Agree with discussing her discontinuing her breast cancer treatment (Letrozole) with her Oncologist (Anil) as they will need to discuss if alternate medication is appropriate.     22

## 2024-12-05 ENCOUNTER — OFFICE VISIT (OUTPATIENT)
Dept: PHYSICAL THERAPY | Facility: HOSPITAL | Age: 89
End: 2024-12-05
Attending: FAMILY MEDICINE
Payer: MEDICARE

## 2024-12-05 PROCEDURE — 97110 THERAPEUTIC EXERCISES: CPT

## 2024-12-05 PROCEDURE — 97140 MANUAL THERAPY 1/> REGIONS: CPT

## 2024-12-05 NOTE — PROGRESS NOTES
Diagnosis:   Chronic right-sided low back pain without sciatica (M54.50,G89.29)          Referring Provider: Festus Khanna  Date of Evaluation:    10/30/2024    Precautions:  Cancer Next MD visit:   none scheduled  Date of Surgery: n/a   Insurance Primary/Secondary: AETDANII MCR / N/A     # Auth Visits: POC 10 visits            Subjective: After the last session, I was a little flared up. I went to session [place] and he gave me a prescription on Celebrex. He is going to check back in at the end of the month to see about an injection.   I am babysitting right now, and I am more active right now. Lots of stairs.   Pain:  4/10 bilateral lumbar region; 0/10 at end of session     Objective:   Lumbar AROM: (* denotes performed with pain)  Flexion: 80 deg  Extension: 5 deg  Sidebending: R NT; L NT  Rotation: R min impairment; L min impairment      Hamstrings: R SLR 70; L SLR 70    Assessment: Pt presents with improved sciatic symptoms reporting centralization. Pain was 0/10 at end of session. Added additional mobility exercises and were well tolerated. Pt instructed to trial them first thing in the morning. Pt verbalized understanding. Pt reported that she is going to reach out to doctor about possibly scheduling a cortisone injection for the beginning of the year.     Goals: (to be met in 10 visits)   Pt will improve transversus abdominis recruitment to perform proper isometric contraction without requiring verbal or tactile cuing to promote advancement of therex   Pt will demonstrate good understanding of proper posture and body mechanics to decrease pain and improve spinal safety   Pt will improve lumbar spine AROM flexion to >85 deg to allow increase ease with bending forward to don shoes   Pt will report improved symptom centralization and absence of radicular symptoms for 3 consecutive days to improve function with ADL   Pt will have decreased paraspinal mm tension to tolerate standing >15 minutes for work and home  activities   Pt will be independent and compliant with comprehensive HEP to maintain progress achieved in PT     Plan: continue with strengthening exercises and stretches for the back  Date: 11/7/2024  TX#: 2/10 Date:11/18/2024                 TX#: 3/10 Date:11/21/2024                 TX#: 4/10 Date:                 TX#: 5/ Date:12/5/2024   Tx#: 6/10   Manual Therapy 30 min  CPA L4-S1 grade III   UPA L4-S1 grade III R/L  CPA T12-T8 grade III  STM to R/L lumbar paraspinals  Manual Therapy 30 min  CPA L4-S1 grade III   UPA L4-S1 grade III R/L  STM to R/L lumbar paraspinals  STM to R piriformis Manual therapy 20  CPA L4-S1 grade III   UPA L4-S1 grade III R/L  STM to R/L lumbar paraspinals Manual therapy 15 mins  CPA L4-S1 grade III   UPA L4-S1 grade III R/L  STM to R/L lumbar paraspinals   Manual therapy 15 mins  CPA L4-S1 grade III   UPA L4-S1 grade III R/L  Transverse mobs L2-S1 grade III R/L     TherEx - 15 min  Seated thoracic and lumbar extension over chair 2 x 10 (5 sec holds)  Seated LAQ RTB x 10 ea leg   Seated hamstrings RTB x 10 ea leg TherEx  Supine sciatic nerve glide x 10 ea leg  Seated thoracic and lumbar extension over chair 2 x 10 (5 sec holds)  Seated piriformis stretch 3 x 20 sec holds   TherEx 25 min  Seated thoracic and lumbar extension over chair 2 x 10 (5 sec holds)  Seated piriformis stretch 3 x 20 sec holds  Standing hip abduction GTB 2 x 15 ea leg  Standing hip extension GTB 2 x 15 ea leg   Standing hip flexion GTB 2 x 15 ea leg  SL thoracic open book arm at side x 15 Ther ex  30 Mins   NuStep 5 mins  NEW  Seated piriformis stretch 3 x 20 sec holds  Standing hip abduction GTB 2 x 15 ea leg  QL stretch 3 x 15 R side NEW  Thread the needle at rail x 10 B NEW  Standing resisted push down in to RSB x 20 NEW  Seated BSB roll out x 10 FW NEW TherEx  NuStep level 5, 5 min  Supine LTR x 20   Supine figure four stretch 3 x 30 sec hold ea leg  Supine piriformis stretch 3 x 30 sec holds ea leg  DKTC 3 x  30 sec holds  Standing 3-way kicks GTB 2 x 10 ea leg                   HEP: Access Code: [email or web address]  URL: [email or web address]  Date: 12/05/2024  Prepared by: Mynor Lebron    Exercises  - Supine 90/90 Sciatic Nerve Glide with Knee Flexion/Extension  - 2-3 x daily - 7 x weekly - 1 sets - 10 reps  - Seated Thoracic Lumbar Extension with Pectoralis Stretch  - 2-3 x daily - 7 x weekly - 2 sets - 10 reps  - Sitting Knee Extension with Resistance  - 2-3 x daily - 7 x weekly - 2 sets - 10 reps  - Seated Hamstring Curl with Anchored Resistance  - 2-3 x daily - 7 x weekly - 2 sets - 10 reps  - Standing 3-Way Leg Reach with Resistance at Ankles and Counter Support  - 2-3 x daily - 7 x weekly - 2 sets - 15 reps  - Sidelying Open Book Thoracic Lumbar Rotation and Extension  - 2-3 x daily - 7 x weekly - 2 sets - 10 reps  - Supine Lower Trunk Rotation  - 2 x daily - 7 x weekly - 2 sets - 10 reps  - Supine Figure 4 Piriformis Stretch  - 2 x daily - 7 x weekly - 1 sets - 3 reps - 30 hold  - Supine Double Knee to Chest  - 2 x daily - 7 x weekly - 1 sets - 3 reps - 30 hold    Charges: 1 MT 2TherEx       Total Timed Treatment: 45 min  Total Treatment Time: 45 min

## 2024-12-10 ENCOUNTER — OFFICE VISIT (OUTPATIENT)
Dept: PHYSICAL THERAPY | Facility: HOSPITAL | Age: 89
End: 2024-12-10
Attending: FAMILY MEDICINE
Payer: MEDICARE

## 2024-12-10 PROCEDURE — 97140 MANUAL THERAPY 1/> REGIONS: CPT | Performed by: PHYSICAL THERAPY ASSISTANT

## 2024-12-10 PROCEDURE — 97110 THERAPEUTIC EXERCISES: CPT | Performed by: PHYSICAL THERAPY ASSISTANT

## 2024-12-10 NOTE — PROGRESS NOTES
Diagnosis:   Chronic right-sided low back pain without sciatica (M54.50,G89.29)          Referring Provider: Festus Khanna  Date of Evaluation:    10/30/2024    Precautions:  Cancer Next MD visit:   none scheduled  Date of Surgery: n/a   Insurance Primary/Secondary: AETNA MCR / N/A     # Auth Visits: POC 10 visits            Subjective: I am feeling much better - easier after last session. The medications are causing me some issues with my GI system.     Pain:  1/10 bilateral lumbar region; 0/10 at end of session     Objective:   Lumbar AROM: (* denotes performed with pain)  Flexion: 80 deg  Extension: 5 deg  Sidebending: R NT; L NT  Rotation: R min impairment; L min impairment      Hamstrings: R SLR 70; L SLR 70    Assessment: Pt presents with improved symptoms with reports of no pain today. No TTP on STM. Pt given cues to slow down lumbar rotations to facilitate stretch. Included new activity for dynamic stability as indicated. Pt felt that SB roll outs may have caused issues after previous session therefore avoided this time. Pt reports 1/10 pain at end of session. Pt reported no adverse reaction in response to interventions today. PT remains necessary to address ongoing deficits as identified at initial assessment.      Goals: (to be met in 10 visits)   Pt will improve transversus abdominis recruitment to perform proper isometric contraction without requiring verbal or tactile cuing to promote advancement of therex   Pt will demonstrate good understanding of proper posture and body mechanics to decrease pain and improve spinal safety   Pt will improve lumbar spine AROM flexion to >85 deg to allow increase ease with bending forward to don shoes   Pt will report improved symptom centralization and absence of radicular symptoms for 3 consecutive days to improve function with ADL   Pt will have decreased paraspinal mm tension to tolerate standing >15 minutes for work and home activities   Pt will be independent and  compliant with comprehensive HEP to maintain progress achieved in PT     Plan: continue with strengthening exercises and stretches for the back  Date: 11/7/2024  TX#: 2/10 Date:11/18/2024                 TX#: 3/10 Date:11/21/2024                 TX#: 4/10 Date:                 TX#: 5/ Date:12/5/2024   Tx#: 6/10 12/10/2024   Tx 7 / 10    Manual Therapy 30 min  CPA L4-S1 grade III   UPA L4-S1 grade III R/L  CPA T12-T8 grade III  STM to R/L lumbar paraspinals  Manual Therapy 30 min  CPA L4-S1 grade III   UPA L4-S1 grade III R/L  STM to R/L lumbar paraspinals  STM to R piriformis Manual therapy 20  CPA L4-S1 grade III   UPA L4-S1 grade III R/L  STM to R/L lumbar paraspinals Manual therapy 15 mins  CPA L4-S1 grade III   UPA L4-S1 grade III R/L  STM to R/L lumbar paraspinals   Manual therapy 15 mins  CPA L4-S1 grade III   UPA L4-S1 grade III R/L  Transverse mobs L2-S1 grade III R/L   Manual therapy 15 mins  CPA L4-S1 grade III   UPA L4-S1 grade III R/L  STM to R/L lumbar paraspinals   TherEx - 15 min  Seated thoracic and lumbar extension over chair 2 x 10 (5 sec holds)  Seated LAQ RTB x 10 ea leg   Seated hamstrings RTB x 10 ea leg TherEx  Supine sciatic nerve glide x 10 ea leg  Seated thoracic and lumbar extension over chair 2 x 10 (5 sec holds)  Seated piriformis stretch 3 x 20 sec holds   TherEx 25 min  Seated thoracic and lumbar extension over chair 2 x 10 (5 sec holds)  Seated piriformis stretch 3 x 20 sec holds  Standing hip abduction GTB 2 x 15 ea leg  Standing hip extension GTB 2 x 15 ea leg   Standing hip flexion GTB 2 x 15 ea leg  SL thoracic open book arm at side x 15 Ther ex  30 Mins   NuStep 5 mins  NEW  Seated piriformis stretch 3 x 20 sec holds  Standing hip abduction GTB 2 x 15 ea leg  QL stretch 3 x 15 R side NEW  Thread the needle at rail x 10 B NEW  Standing resisted push down in to RSB x 20 NEW  Seated BSB roll out x 10 FW NEW TherEx  NuStep level 5, 5 min  Supine LTR x 20   Supine figure four stretch  3 x 30 sec hold ea leg  Supine piriformis stretch 3 x 30 sec holds ea leg  DKTC 3 x 30 sec holds  Standing 3-way kicks GTB 2 x 10 ea leg   Ther ex  30 mins   NuStep level 5, 5 min  Supine LTR x 20   Supine figure four stretch 3 x 30 sec hold ea leg  Standing 3-way kicks GTB 2 x 10 ea leg  Palloff press w Red sports cord x 10 B NEW  Lat pull downs w RTB x 15 NEW  Discussed swiss ball roll outs - may have been provocative therefore avoided this session.                    HEP: Access Code: [email or web address]  URL: [email or web address]  Date: 12/05/2024  Prepared by: Mynor Lebron    Exercises  - Supine 90/90 Sciatic Nerve Glide with Knee Flexion/Extension  - 2-3 x daily - 7 x weekly - 1 sets - 10 reps  - Seated Thoracic Lumbar Extension with Pectoralis Stretch  - 2-3 x daily - 7 x weekly - 2 sets - 10 reps  - Sitting Knee Extension with Resistance  - 2-3 x daily - 7 x weekly - 2 sets - 10 reps  - Seated Hamstring Curl with Anchored Resistance  - 2-3 x daily - 7 x weekly - 2 sets - 10 reps  - Standing 3-Way Leg Reach with Resistance at Ankles and Counter Support  - 2-3 x daily - 7 x weekly - 2 sets - 15 reps  - Sidelying Open Book Thoracic Lumbar Rotation and Extension  - 2-3 x daily - 7 x weekly - 2 sets - 10 reps  - Supine Lower Trunk Rotation  - 2 x daily - 7 x weekly - 2 sets - 10 reps  - Supine Figure 4 Piriformis Stretch  - 2 x daily - 7 x weekly - 1 sets - 3 reps - 30 hold  - Supine Double Knee to Chest  - 2 x daily - 7 x weekly - 1 sets - 3 reps - 30 hold    Charges: 1 MT 2TherEx       Total Timed Treatment: 45 min  Total Treatment Time: 45 min

## 2024-12-12 ENCOUNTER — OFFICE VISIT (OUTPATIENT)
Dept: INTERNAL MEDICINE CLINIC | Facility: CLINIC | Age: 89
End: 2024-12-12
Payer: MEDICARE

## 2024-12-12 ENCOUNTER — HOSPITAL ENCOUNTER (OUTPATIENT)
Dept: GENERAL RADIOLOGY | Age: 89
Discharge: HOME | End: 2024-12-12
Attending: PHYSICIAN ASSISTANT
Payer: MEDICARE

## 2024-12-12 VITALS
HEART RATE: 64 BPM | DIASTOLIC BLOOD PRESSURE: 60 MMHG | HEIGHT: 64.02 IN | TEMPERATURE: 97 F | BODY MASS INDEX: 26.46 KG/M2 | WEIGHT: 155 LBS | SYSTOLIC BLOOD PRESSURE: 100 MMHG

## 2024-12-12 DIAGNOSIS — K59.09 CHRONIC CONSTIPATION: Primary | ICD-10-CM

## 2024-12-12 DIAGNOSIS — K59.09 CHRONIC CONSTIPATION: ICD-10-CM

## 2024-12-12 PROCEDURE — 99214 OFFICE O/P EST MOD 30 MIN: CPT | Performed by: PHYSICIAN ASSISTANT

## 2024-12-12 PROCEDURE — 1159F MED LIST DOCD IN RCRD: CPT | Performed by: PHYSICIAN ASSISTANT

## 2024-12-12 PROCEDURE — G2211 COMPLEX E/M VISIT ADD ON: HCPCS | Performed by: PHYSICIAN ASSISTANT

## 2024-12-12 PROCEDURE — 74018 RADEX ABDOMEN 1 VIEW: CPT | Performed by: PHYSICIAN ASSISTANT

## 2024-12-12 NOTE — PROGRESS NOTES
Chief Complaint   Patient presents with    Medication Problem     Per patient she was on letrazol and has been havitng these problems since being on this medication    Change of Bowel Habits    Stomach Pain    Diarrhea    Constipation       HPI:  Pt presents with c/o constipation alternating with diarrhea for months.  Pt has chronic constipation for years.  Feels it has been worsening for many months to over a year but cannot state exactly when began.  Wondering if related to a medication change.  Started Letrozole last year..  Has not had a BM daily for many years.  Uses Docusate OTC when constipated and then it seems to cause diarrhea the following day, states has solid stools and then liquid stools for about an hour.  Then she won't have a BM for a day or so and will feel constipated again.  States she tried Metamucil in the past (10 years ago) and it was not helpful.  Admits that she does not drink much fluid throughout the day as it will cause urinary frequency.  No blood in stool.  Upper abd cramping at times.  Has never seen GI for chronic constipation.     Review of Systems   See HPI.  No other complaints today.    Past Medical History:    Aortic valve insufficiency    last echo 7/26/23    Asthma (HCC)    Eczema    Exposure to medical diagnostic radiation    Female climacteric state    GERD    High blood pressure    High cholesterol    Hx of motion sickness    Left Breast cancer (HCC)    Onychocryptosis    Osteoarthritis    Other and unspecified hyperlipidemia    Pain due to onychomycosis of toenail    Prediabetes    Primary localized osteoarthrosis of right lower leg    Visual impairment    glasses       Patient Active Problem List   Diagnosis    Pure hypercholesterolemia    Primary hypertension    Insomnia    Spinal stenosis of lumbar region    Lumbar radiculitis    History of total knee replacement    Pre-diabetes    Asthma with COPD (chronic obstructive pulmonary disease) (HCC)    Aortic root dilation  (HCC)    Spondylolisthesis at L4-L5 level    Inflammatory spondylopathy of lumbar region (HCC)    Primary osteoarthritis of left knee    Invasive lobular carcinoma of left breast in female (HCC)    Infiltrating lobular carcinoma of breast, stage 1, left (HCC)    Coronary artery disease involving native coronary artery of native heart without angina pectoris    Aortic valve regurgitation    Encounter for annual health examination    Moderate persistent asthma with exacerbation (HCC)       Current Outpatient Medications   Medication Sig Dispense Refill    Aspirin 81 MG Oral Cap aspirin 81 MG Oral Tab, [RxNorm: 955114]      metoprolol succinate ER 25 MG Oral Tablet 24 Hr Take 1 tablet (25 mg total) by mouth daily.      Losartan Potassium-HCTZ 100-12.5 MG Oral Tab Take 1 tablet by mouth daily. 90 tablet 3    atorvastatin 40 MG Oral Tab Take 1 tablet (40 mg total) by mouth daily.      TRELEGY ELLIPTA 200-62.5-25 MCG/ACT Inhalation Aerosol Powder, Breath Activated Inhale 1 puff into the lungs daily.      AMLODIPINE 5 MG Oral Tab TAKE 1 TABLET BY MOUTH EVERY DAY 90 tablet 0    Blood Pressure Monitoring (BLOOD PRESSURE CUFF) Does not apply Misc For use to measure BP daily 1 each 0    azelastine 0.1 % Nasal Solution 2 sprays by Nasal route 2 (two) times daily as needed.      ipratropium 0.06 % Nasal Solution 2 sprays by Nasal route 3 (three) times daily as needed.      Albuterol Sulfate HFA (PROAIR HFA) 108 (90 Base) MCG/ACT Inhalation Aero Soln Inhale 2 puffs into the lungs every 4 (four) hours as needed. 1 each 5    mometasone 0.1 % External Ointment Apply 1 Application topically daily. 15 g 1    CRANBERRY EXTRACT OR Take 1 tablet by mouth daily.        Vitamin D3 (VITAMIN D3) 2000 UNITS Oral Cap Take 1 capsule (2,000 Units total) by mouth daily.      PRILOSEC 20 MG OR CPDR Take 1 capsule (20 mg total) by mouth every morning.      VITAMIN E Take 1 capsule by mouth daily.      celecoxib 200 MG Oral Cap Take 1 capsule (200  mg total) by mouth daily. (Patient not taking: Reported on 12/12/2024) 60 capsule 0    letrozole 2.5 MG Oral Tab Take 1 tablet (2.5 mg total) by mouth daily. (Patient not taking: Reported on 12/12/2024) 90 tablet 3       Physical Exam  /60   Pulse 64   Temp 97.2 °F (36.2 °C)   Ht 5' 4.02\" (1.626 m)   Wt 155 lb (70.3 kg)   LMP  (LMP Unknown)   BMI 26.59 kg/m²   Constitutional:  No distress.   HEENT:  Normocephalic and atraumatic.  Eyes: Conjunctivae are normal.  Neck: Neck supple.   Cardiovascular: Normal rate, regular rhythm.  No murmur, rubs or gallops.   Pulmonary/Chest: Effort normal and breath sounds normal. No respiratory distress. No wheezes, rhonchi or rales  Abdominal: Soft. Bowel sounds are hypoactive. Non tender, no masses, no organomegaly or hernias. No guarding or rebound.  Skin: Skin is warm and dry. No rash noted. No erythema. No pallor.       A/P:    Encounter Diagnosis   Name Primary?    Chronic constipation - alternative with diarrhea (after taking Docusate to treat constipation).  Check KUB today.  ? Unresolved constipation with overflow diarrhea?  Encouraged regular fiber intake, adequate hydration daily (min of 64 oz daily) and physical activity as able.  Further recs once results of KUB known. Yes     Code selection for this visit was based on time spent (32 min) on date of service in preparing to see the patient, obtaining and/or reviewing separately obtained history, performing a medically appropriate examination, counseling and educating the patient/family/caregiver, ordering medications or testing, referring and communicating with other healthcare providers, documenting clinical information in the EHR, independently interpreting results and communicating results to the patient/family/caregiver and care coordination with the patient's other providers.      No orders of the defined types were placed in this encounter.      Meds & Refills for this Visit:  Requested Prescriptions       No prescriptions requested or ordered in this encounter       Imaging & Consults:  None    Return in about 1 week (around 12/19/2024) for constipation.  There are no Patient Instructions on file for this visit.    All questions were answered and the patient understands the plan.

## 2024-12-17 ENCOUNTER — OFFICE VISIT (OUTPATIENT)
Dept: PHYSICAL THERAPY | Facility: HOSPITAL | Age: 89
End: 2024-12-17
Attending: FAMILY MEDICINE
Payer: MEDICARE

## 2024-12-17 PROCEDURE — 97110 THERAPEUTIC EXERCISES: CPT

## 2024-12-17 PROCEDURE — 97140 MANUAL THERAPY 1/> REGIONS: CPT

## 2024-12-17 NOTE — PROGRESS NOTES
Diagnosis:   Chronic right-sided low back pain without sciatica (M54.50,G89.29)          Referring Provider: Festus Khanna  Date of Evaluation:    10/30/2024    Precautions:  Cancer Next MD visit:   none scheduled  Date of Surgery: n/a   Insurance Primary/Secondary: [employer] MCR / N/A     # Auth Visits: POC 10 visits            Subjective: I still am moving forward with getting an injection. The doctor that I have gotten my previous injection is taking a little time off, but I am looking at the middle of January    Pain:  3/10 bilateral lumbar region; 0/10 at end of session     Objective:   Lumbar AROM: (* denotes performed with pain)  Flexion: 80 deg  Extension: 15 deg  Sidebending: R NT; L NT  Rotation: R min impairment; L min impairment      Hamstrings: R SLR 70; L SLR 70    Assessment: Pt presents with improved symptoms with reports of minor pain today. Pt reported that she did a lot of standing yesterday while baking. Pt demonstrated improvement of lumbar range of motion compared to previous session. Progressed strengthening exercises in a standing position and well tolerated. Added to HEP. Plans to discharge at next session, as the patient plans to move forward with scheduling injection.     Goals: (to be met in 10 visits)   Pt will improve transversus abdominis recruitment to perform proper isometric contraction without requiring verbal or tactile cuing to promote advancement of therex   Pt will demonstrate good understanding of proper posture and body mechanics to decrease pain and improve spinal safety   Pt will improve lumbar spine AROM flexion to >85 deg to allow increase ease with bending forward to don shoes   Pt will report improved symptom centralization and absence of radicular symptoms for 3 consecutive days to improve function with ADL   Pt will have decreased paraspinal mm tension to tolerate standing >15 minutes for work and home activities   Pt will be independent and compliant with comprehensive  HEP to maintain progress achieved in PT     Plan: continue with strengthening exercises and stretches for the back. Discharge at next session.   Date: 11/7/2024  TX#: 2/10 Date:11/18/2024                 TX#: 3/10 Date:11/21/2024                 TX#: 4/10 Date:                 TX#: 5/ Date:12/5/2024   Tx#: 6/10 12/10/2024   Tx 7 / 10  Date:12/17/2024  Tx#:8/10   Manual Therapy 30 min  CPA L4-S1 grade III   UPA L4-S1 grade III R/L  CPA T12-T8 grade III  STM to R/L lumbar paraspinals  Manual Therapy 30 min  CPA L4-S1 grade III   UPA L4-S1 grade III R/L  STM to R/L lumbar paraspinals  STM to R piriformis Manual therapy 20  CPA L4-S1 grade III   UPA L4-S1 grade III R/L  STM to R/L lumbar paraspinals Manual therapy 15 mins  CPA L4-S1 grade III   UPA L4-S1 grade III R/L  STM to R/L lumbar paraspinals   Manual therapy 15 mins  CPA L4-S1 grade III   UPA L4-S1 grade III R/L  Transverse mobs L2-S1 grade III R/L   Manual therapy 15 mins  CPA L4-S1 grade III   UPA L4-S1 grade III R/L  STM to R/L lumbar paraspinals Manual therapy 15 mins  CPA L4-S1 grade III   UPA L4-S1 grade III R/L   TherEx - 15 min  Seated thoracic and lumbar extension over chair 2 x 10 (5 sec holds)  Seated LAQ RTB x 10 ea leg   Seated hamstrings RTB x 10 ea leg TherEx  Supine sciatic nerve glide x 10 ea leg  Seated thoracic and lumbar extension over chair 2 x 10 (5 sec holds)  Seated piriformis stretch 3 x 20 sec holds   TherEx 25 min  Seated thoracic and lumbar extension over chair 2 x 10 (5 sec holds)  Seated piriformis stretch 3 x 20 sec holds  Standing hip abduction GTB 2 x 15 ea leg  Standing hip extension GTB 2 x 15 ea leg   Standing hip flexion GTB 2 x 15 ea leg  SL thoracic open book arm at side x 15 Ther ex  30 Mins   NuStep 5 mins  NEW  Seated piriformis stretch 3 x 20 sec holds  Standing hip abduction GTB 2 x 15 ea leg  QL stretch 3 x 15 R side NEW  Thread the needle at rail x 10 B NEW  Standing resisted push down in to RSB x 20 NEW  Seated BSB  roll out x 10 FW NEW TherEx  NuStep level 5, 5 min  Supine LTR x 20   Supine figure four stretch 3 x 30 sec hold ea leg  Supine piriformis stretch 3 x 30 sec holds ea leg  DKTC 3 x 30 sec holds  Standing 3-way kicks GTB 2 x 10 ea leg   Ther ex  30 mins   NuStep level 5, 5 min  Supine LTR x 20   Supine figure four stretch 3 x 30 sec hold ea leg  Standing 3-way kicks GTB 2 x 10 ea leg  Palloff press w Red sports cord x 10 B NEW  Lat pull downs w RTB x 15 NEW  Discussed swiss ball roll outs - may have been provocative therefore avoided this session.  TherEx  Supine figure four stretch 3 x 30 sec hold ea leg  Child's Pose 3 x 30 sec holds  Side stepping 2 laps in // bars                      HEP: Access Code: [email or web address]  URL: [email or web address]  Date: 12/17/2024  Prepared by: Mynor Lebron    Exercises  - Supine 90/90 Sciatic Nerve Glide with Knee Flexion/Extension  - 2-3 x daily - 7 x weekly - 1 sets - 10 reps  - Seated Thoracic Lumbar Extension with Pectoralis Stretch  - 2-3 x daily - 7 x weekly - 2 sets - 10 reps  - Sitting Knee Extension with Resistance  - 2-3 x daily - 7 x weekly - 2 sets - 10 reps  - Seated Hamstring Curl with Anchored Resistance  - 2-3 x daily - 7 x weekly - 2 sets - 10 reps  - Standing 3-Way Leg Reach with Resistance at Ankles and Counter Support  - 2-3 x daily - 7 x weekly - 2 sets - 15 reps  - Sidelying Open Book Thoracic Lumbar Rotation and Extension  - 2-3 x daily - 7 x weekly - 2 sets - 10 reps  - Supine Lower Trunk Rotation  - 2 x daily - 7 x weekly - 2 sets - 10 reps  - Supine Figure 4 Piriformis Stretch  - 2 x daily - 7 x weekly - 1 sets - 3 reps - 30 hold  - Supine Double Knee to Chest  - 2 x daily - 7 x weekly - 1 sets - 3 reps - 30 hold  - Side Stepping with Resistance at Ankles and Counter Support  - 2 x daily - 7 x weekly - 3 sets  - Forward Monster Walk with Resistance at Ankles and Counter Support  - 2 x daily - 7 x weekly - 3 sets    Charges: 1 MT 2TherEx        Total Timed Treatment: 45 min  Total Treatment Time: 45 min

## 2024-12-19 ENCOUNTER — OFFICE VISIT (OUTPATIENT)
Dept: INTERNAL MEDICINE CLINIC | Facility: CLINIC | Age: 89
End: 2024-12-19
Payer: MEDICARE

## 2024-12-19 VITALS
OXYGEN SATURATION: 98 % | DIASTOLIC BLOOD PRESSURE: 72 MMHG | HEART RATE: 79 BPM | SYSTOLIC BLOOD PRESSURE: 122 MMHG | HEIGHT: 64.02 IN | WEIGHT: 155.63 LBS | BODY MASS INDEX: 26.57 KG/M2

## 2024-12-19 DIAGNOSIS — K59.09 CHRONIC CONSTIPATION: Primary | ICD-10-CM

## 2024-12-19 PROCEDURE — G2211 COMPLEX E/M VISIT ADD ON: HCPCS | Performed by: PHYSICIAN ASSISTANT

## 2024-12-19 PROCEDURE — 1159F MED LIST DOCD IN RCRD: CPT | Performed by: PHYSICIAN ASSISTANT

## 2024-12-19 PROCEDURE — 99214 OFFICE O/P EST MOD 30 MIN: CPT | Performed by: PHYSICIAN ASSISTANT

## 2024-12-19 RX ORDER — POLYETHYLENE GLYCOL 3350 17 G/17G
17 POWDER, FOR SOLUTION ORAL NIGHTLY PRN
Qty: 116 G | Refills: 0 | Status: SHIPPED | OUTPATIENT
Start: 2024-12-19

## 2024-12-19 NOTE — PATIENT INSTRUCTIONS
Take 1 dose of Miralax on Friday night.  If not significant BM on Saturday then repeat dose of Miralax Saturday night.

## 2024-12-19 NOTE — PROGRESS NOTES
Chief Complaint   Patient presents with    Follow - Up     Room 2, DG, follow up after 1 week visit.       HPI:  Pt presents for follow up of chronic constipation.  See last note for details.  Diarrhea has resolved.  Has added Metamucil daily and is tolerating thus far.  Thinks that has helped some.  Has been having some small BMs about every other day since OV last week.  Has taken MOM X 3 doses with some resulting BM, no adverse SEs.  Still feels bloated and distended.  Some belching last night.  No n/v but appetite is decreased.      Review of Systems   See above.  No other complaints today.    Past Medical History:    Aortic valve insufficiency    last echo 7/26/23    Asthma (HCC)    Eczema    Exposure to medical diagnostic radiation    Female climacteric state    GERD    High blood pressure    High cholesterol    Hx of motion sickness    Left Breast cancer (HCC)    Onychocryptosis    Osteoarthritis    Other and unspecified hyperlipidemia    Pain due to onychomycosis of toenail    Prediabetes    Primary localized osteoarthrosis of right lower leg    Visual impairment    glasses       Patient Active Problem List   Diagnosis    Pure hypercholesterolemia    Primary hypertension    Insomnia    Spinal stenosis of lumbar region    Lumbar radiculitis    History of total knee replacement    Pre-diabetes    Asthma with COPD (chronic obstructive pulmonary disease) (HCC)    Aortic root dilation (HCC)    Spondylolisthesis at L4-L5 level    Inflammatory spondylopathy of lumbar region (HCC)    Primary osteoarthritis of left knee    Invasive lobular carcinoma of left breast in female (HCC)    Infiltrating lobular carcinoma of breast, stage 1, left (HCC)    Coronary artery disease involving native coronary artery of native heart without angina pectoris    Aortic valve regurgitation    Encounter for annual health examination    Moderate persistent asthma with exacerbation (HCC)       Current Outpatient Medications   Medication  Sig Dispense Refill    polyethylene glycol, PEG 3350, 17 GM/SCOOP Oral Powder Take 17 g by mouth nightly as needed (constipation). 116 g 0    Aspirin 81 MG Oral Cap aspirin 81 MG Oral Tab, [RxNorm: 636520]      metoprolol succinate ER 25 MG Oral Tablet 24 Hr Take 1 tablet (25 mg total) by mouth daily.      Losartan Potassium-HCTZ 100-12.5 MG Oral Tab Take 1 tablet by mouth daily. 90 tablet 3    atorvastatin 40 MG Oral Tab Take 1 tablet (40 mg total) by mouth daily.      TRELEGY ELLIPTA 200-62.5-25 MCG/ACT Inhalation Aerosol Powder, Breath Activated Inhale 1 puff into the lungs daily.      AMLODIPINE 5 MG Oral Tab TAKE 1 TABLET BY MOUTH EVERY DAY 90 tablet 0    Blood Pressure Monitoring (BLOOD PRESSURE CUFF) Does not apply Misc For use to measure BP daily 1 each 0    azelastine 0.1 % Nasal Solution 2 sprays by Nasal route 2 (two) times daily as needed.      ipratropium 0.06 % Nasal Solution 2 sprays by Nasal route 3 (three) times daily as needed.      Albuterol Sulfate HFA (PROAIR HFA) 108 (90 Base) MCG/ACT Inhalation Aero Soln Inhale 2 puffs into the lungs every 4 (four) hours as needed. 1 each 5    mometasone 0.1 % External Ointment Apply 1 Application topically daily. 15 g 1    CRANBERRY EXTRACT OR Take 1 tablet by mouth daily.        Vitamin D3 (VITAMIN D3) 2000 UNITS Oral Cap Take 1 capsule (2,000 Units total) by mouth daily.      PRILOSEC 20 MG OR CPDR Take 1 capsule (20 mg total) by mouth every morning.      VITAMIN E Take 1 capsule by mouth daily.      celecoxib 200 MG Oral Cap Take 1 capsule (200 mg total) by mouth daily. (Patient not taking: Reported on 12/19/2024) 60 capsule 0    letrozole 2.5 MG Oral Tab Take 1 tablet (2.5 mg total) by mouth daily. (Patient not taking: Reported on 12/19/2024) 90 tablet 3       Physical Exam  /72 (BP Location: Right arm, Patient Position: Sitting, Cuff Size: adult)   Pulse 79   Ht 5' 4.02\" (1.626 m)   Wt 155 lb 9.6 oz (70.6 kg)   LMP  (LMP Unknown)   SpO2 98%    BMI 26.69 kg/m²   Constitutional:  No distress.   HEENT:  Normocephalic and atraumatic.  Abdominal: Soft. Bowel sounds are normal. Non tender, no masses, no organomegaly or hernias.  No guarding or rebound  Skin: Skin is warm and dry. No rash noted. No erythema. No pallor.       A/P:    Encounter Diagnosis   Name Primary?    Chronic constipation - Small improvement from last week.  Will try a couple of doses of Miralax over the next week.  F/U in 1 week.  If not improving then consider CT abd/pelvis and/or a dose of Mag citrate.  Has referral to GI and will call to schedule eval there too if not resolving.   Yes     Code selection for this visit was based on time spent (34 min) on date of service in preparing to see the patient, obtaining and/or reviewing separately obtained history, performing a medically appropriate examination, counseling and educating the patient/family/caregiver, ordering medications or testing, referring and communicating with other healthcare providers, documenting clinical information in the EHR, independently interpreting results and communicating results to the patient/family/caregiver and care coordination with the patient's other providers.      No orders of the defined types were placed in this encounter.      Meds & Refills for this Visit:  Requested Prescriptions     Signed Prescriptions Disp Refills    polyethylene glycol, PEG 3350, 17 GM/SCOOP Oral Powder 116 g 0     Sig: Take 17 g by mouth nightly as needed (constipation).       Imaging & Consults:  GASTRO - INTERNAL    Return in about 1 week (around 12/26/2024) for constipation.  Patient Instructions   Take 1 dose of Miralax on Friday night.  If not significant BM on Saturday then repeat dose of Miralax Saturday night.      All questions were answered and the patient understands the plan.

## 2024-12-23 ENCOUNTER — APPOINTMENT (OUTPATIENT)
Dept: PHYSICAL THERAPY | Facility: HOSPITAL | Age: 89
End: 2024-12-23
Attending: FAMILY MEDICINE
Payer: MEDICARE

## 2024-12-25 NOTE — PROGRESS NOTES
Shanti Coy  11/26/1935    Chief Complaint   Patient presents with    Follow - Up     Rm 12 SS. Follow up on constipation, laxatives did not work.      SUBJECTIVE   Shanti Coy is a 89 year old female who presents for follow up on constipation. Was seen by CB on 12/12 and 12/19. She has tried Docusate, MiraLAX, Magnesium citrate without significant improvement, only used 1-2 times though. KUB unremarkable stool seen in colon, no obstruction. Patient already referred to GI and has appointment scheduled in Feb. There was also plan for  CT A/P.    The patient states that she has been struggling with constipation for the last 4 months, an acute on chronic issue.     She has been experiencing intermittent lower abdominal cramping that was daily until yesterday. No pain today. She is passing gas that has a particular foul odor.  She also feels bloated after meals. Stool is brown and tan never red or black.    Of note the patient self-discontinued Letrozole about 3 weeks ago?    She is only drinking about 3 glasses of water per day. She lives alone and does cook meals but does not typically cook vegetables since it would be too much food just for her.      Review of Systems   Review of Systems   No f/c/chest pain or sob. No cough. No ha or dizziness. No numbness, tingling, or weakness.     OBJECTIVE:   /55   Pulse 73   Temp 97 °F (36.1 °C) (Temporal)   Resp 16   Ht 5' 4.02\" (1.626 m)   Wt 155 lb (70.3 kg)   LMP  (LMP Unknown)   SpO2 98%   BMI 26.59 kg/m²   Physical Exam   Constitutional: Oriented to person, place, and time. No distress.   Cardiovascular: Normal rate, regular rhythm and intact distal pulses.  No murmur, rubs or gallops.   Pulmonary/Chest: Effort normal and breath sounds normal. No respiratory distress.  Abdominal: Soft. Bowel sounds are present. Non tender, no masses, no organomegaly or hernias.  Lab Results   Component Value Date     (H) 07/09/2024    BUN 17 07/09/2024     CREATSERUM 0.82 07/09/2024    BUNCREA 21.7 (H) 06/11/2021    ANIONGAP 7 07/09/2024    GFRAA 76 03/17/2022    GFRNAA 66 03/17/2022    CA 9.3 07/09/2024     07/09/2024    K 3.8 07/09/2024     07/09/2024    CO2 25.0 07/09/2024    OSMOCALC 285 07/09/2024      Lab Results   Component Value Date    WBC 10.8 04/03/2024    RBC 3.88 04/03/2024    HGB 11.7 (L) 04/03/2024    HCT 33.9 (L) 04/03/2024    MCV 87.4 04/03/2024    MCH 30.2 04/03/2024    MCHC 34.5 04/03/2024    RDW 12.5 04/03/2024    .0 04/03/2024      Lab Results   Component Value Date    TSH 1.330 07/09/2024        ASSESSMENT AND PLAN:       ICD-10-CM    1. Lower abdominal pain  R10.30 CT ABDOMEN+PELVIS(CONTRAST ONLY)(CPT=74177)  No pain today but has been intermittent over the past several months. If pain recurs schedule CT A/P. Normal renal function. She already has appointment with GI scheduled in Feb. For now can try Docusate-Senna as needed. Discussed that is is not a long term solution. She is going to increase consumption of water and fiber-containing foods along with daily Metamucil.       2. Chronic constipation  K59.09 senna-docusate 8.6-50 MG Oral Tab     CT ABDOMEN+PELVIS(CONTRAST ONLY)(CPT=74177)      3. Primary hypertension  I10 Well controlled in office today. Continue current regimen.      4. Prior aromatase inhibitor therapy  Z92.21 Follow up with Oncologist regarding discontinuation of aromatase inhibitor.                  TODAY'S ORDERS     No orders of the defined types were placed in this encounter.      Meds & Refills:  Requested Prescriptions      No prescriptions requested or ordered in this encounter       Imaging & Consults:  None    No follow-ups on file.  There are no Patient Instructions on file for this visit.    All questions were answered and the patient agrees with the plan. Code selection for this visit was based on time spent (>30min) on date of service in preparing to see the patient, obtaining and/or reviewing  separately obtained history, performing a medically appropriate examination, counseling and educating the patient/family/caregiver, ordering medications or testing, referring and communicating with other healthcare providers, documenting clinical information in the EHR, independently interpreting results and communicating results to the patient/family/caregiver and care coordination with the patient's other providers.      Thank you,  Get Camacho, DO

## 2024-12-26 ENCOUNTER — OFFICE VISIT (OUTPATIENT)
Dept: INTERNAL MEDICINE CLINIC | Facility: CLINIC | Age: 89
End: 2024-12-26
Payer: MEDICARE

## 2024-12-26 VITALS
RESPIRATION RATE: 16 BRPM | BODY MASS INDEX: 26.46 KG/M2 | OXYGEN SATURATION: 98 % | DIASTOLIC BLOOD PRESSURE: 55 MMHG | HEIGHT: 64.02 IN | SYSTOLIC BLOOD PRESSURE: 120 MMHG | WEIGHT: 155 LBS | HEART RATE: 73 BPM | TEMPERATURE: 97 F

## 2024-12-26 DIAGNOSIS — I10 PRIMARY HYPERTENSION: ICD-10-CM

## 2024-12-26 DIAGNOSIS — Z92.21 PRIOR AROMATASE INHIBITOR THERAPY: ICD-10-CM

## 2024-12-26 DIAGNOSIS — K59.09 CHRONIC CONSTIPATION: ICD-10-CM

## 2024-12-26 DIAGNOSIS — R10.30 LOWER ABDOMINAL PAIN: Primary | ICD-10-CM

## 2024-12-26 PROCEDURE — 99214 OFFICE O/P EST MOD 30 MIN: CPT | Performed by: INTERNAL MEDICINE

## 2024-12-26 PROCEDURE — 1160F RVW MEDS BY RX/DR IN RCRD: CPT | Performed by: INTERNAL MEDICINE

## 2024-12-26 PROCEDURE — 1159F MED LIST DOCD IN RCRD: CPT | Performed by: INTERNAL MEDICINE

## 2024-12-26 RX ORDER — SENNA AND DOCUSATE SODIUM 50; 8.6 MG/1; MG/1
1 TABLET, FILM COATED ORAL DAILY PRN
Qty: 30 TABLET | Refills: 0 | Status: SHIPPED | OUTPATIENT
Start: 2024-12-26

## 2024-12-31 ENCOUNTER — APPOINTMENT (OUTPATIENT)
Dept: PHYSICAL THERAPY | Facility: HOSPITAL | Age: 89
End: 2024-12-31
Attending: FAMILY MEDICINE
Payer: MEDICARE

## 2025-01-02 ENCOUNTER — HOSPITAL ENCOUNTER (OUTPATIENT)
Dept: CT IMAGING | Facility: HOSPITAL | Age: 89
Discharge: HOME | End: 2025-01-02
Attending: INTERNAL MEDICINE
Payer: MEDICARE

## 2025-01-02 DIAGNOSIS — K59.09 CHRONIC CONSTIPATION: ICD-10-CM

## 2025-01-02 DIAGNOSIS — R10.30 LOWER ABDOMINAL PAIN: ICD-10-CM

## 2025-01-02 LAB
CREAT BLD-MCNC: 1 MG/DL
EGFRCR SERPLBLD CKD-EPI 2021: 55 ML/MIN/1.73M2 (ref 60–?)

## 2025-01-02 PROCEDURE — 82565 ASSAY OF CREATININE: CPT

## 2025-01-02 PROCEDURE — 74177 CT ABD & PELVIS W/CONTRAST: CPT | Performed by: INTERNAL MEDICINE

## 2025-01-03 ENCOUNTER — TELEPHONE (OUTPATIENT)
Dept: INTERNAL MEDICINE CLINIC | Facility: CLINIC | Age: 89
End: 2025-01-03

## 2025-01-03 ENCOUNTER — PATIENT MESSAGE (OUTPATIENT)
Dept: INTERNAL MEDICINE CLINIC | Facility: CLINIC | Age: 89
End: 2025-01-03

## 2025-01-03 PROBLEM — K80.20 CALCULUS OF GALLBLADDER WITHOUT CHOLECYSTITIS WITHOUT OBSTRUCTION: Status: ACTIVE | Noted: 2025-01-03

## 2025-01-03 NOTE — TELEPHONE ENCOUNTER
Yes contrast can cause diarrhea. CT resulted yesterday afternoon and I was out of office. I plan to call her this afternoon when done seeing patients. Thanks!

## 2025-01-03 NOTE — TELEPHONE ENCOUNTER
Patient called with condition update. She had CT abdomen/pelvis done yesterday. About 2 1/2 hours after she drank the contrast she developed diarrhea. She is wondering if the contrast may have caused diarrhea.   She reports some improvement in diarrhea today, but still 4 episodes today.   She is drinking water and cranberry juice. Advised patient to stick with bland BRAT diet, drink some electrolyte beverages.   Patient wondering if she should take anything for diarrhea, typically when she does then she has opposite problem of constipation.   She is also looking for results of CT and any further recommendations.

## 2025-01-08 ENCOUNTER — OFFICE VISIT (OUTPATIENT)
Dept: INTERNAL MEDICINE CLINIC | Facility: CLINIC | Age: 89
End: 2025-01-08
Payer: MEDICARE

## 2025-01-08 VITALS
RESPIRATION RATE: 16 BRPM | HEIGHT: 64 IN | SYSTOLIC BLOOD PRESSURE: 112 MMHG | DIASTOLIC BLOOD PRESSURE: 68 MMHG | BODY MASS INDEX: 26.29 KG/M2 | WEIGHT: 154 LBS | HEART RATE: 72 BPM | TEMPERATURE: 98 F | OXYGEN SATURATION: 100 %

## 2025-01-08 DIAGNOSIS — K59.09 CHRONIC CONSTIPATION: Primary | ICD-10-CM

## 2025-01-08 DIAGNOSIS — K80.20 CALCULUS OF GALLBLADDER WITHOUT CHOLECYSTITIS WITHOUT OBSTRUCTION: ICD-10-CM

## 2025-01-08 DIAGNOSIS — K44.9 LARGE HIATAL HERNIA: ICD-10-CM

## 2025-01-08 DIAGNOSIS — I10 PRIMARY HYPERTENSION: ICD-10-CM

## 2025-01-08 PROCEDURE — 99214 OFFICE O/P EST MOD 30 MIN: CPT | Performed by: INTERNAL MEDICINE

## 2025-01-08 PROCEDURE — G2211 COMPLEX E/M VISIT ADD ON: HCPCS | Performed by: INTERNAL MEDICINE

## 2025-01-08 PROCEDURE — 1160F RVW MEDS BY RX/DR IN RCRD: CPT | Performed by: INTERNAL MEDICINE

## 2025-01-08 PROCEDURE — 1159F MED LIST DOCD IN RCRD: CPT | Performed by: INTERNAL MEDICINE

## 2025-01-08 NOTE — PROGRESS NOTES
Shanti Coy  11/26/1935    Chief Complaint   Patient presents with    Constipation     B/M last night. Good      SUBJECTIVE   Shanti Coy is a 89 year old female who presents for follow up on constipation. 1/2 CT abdomen/pelvis showed cholelithiasis without cholecystitis and a large hiatal hernia. Afterwards she had a likely contrast-induced loose stool on 1/3. She did not have another bowel movement until 1/7 and again this morning. She took Milk of Magnesia on 1/7. She tried Docusate-Senna last week but it did not help. Sometimes feels cramping and pain.    Review of Systems   Review of Systems   No f/c/chest pain or sob. No cough.  No ha or dizziness. No numbness, tingling, or weakness.  OBJECTIVE:   /68 (BP Location: Left arm, Patient Position: Sitting, Cuff Size: adult)   Pulse 72   Temp 97.9 °F (36.6 °C) (Temporal)   Resp 16   Ht 5' 4\" (1.626 m)   Wt 154 lb (69.9 kg)   LMP  (LMP Unknown)   SpO2 100%   BMI 26.43 kg/m²   Physical Exam   Constitutional: Oriented to person, place, and time. No distress.   Cardiovascular: Normal rate, regular rhythm and intact distal pulses.  No murmur, rubs or gallops.   Pulmonary/Chest: Effort normal and breath sounds normal. No respiratory distress.  Abdominal: Soft. Bowel sounds are present. Non tender, no masses, no organomegaly or hernias.  Musculoskeletal: No edema    Lab Results   Component Value Date     (H) 07/09/2024    BUN 17 07/09/2024    CREATSERUM 0.82 07/09/2024    BUNCREA 21.7 (H) 06/11/2021    ANIONGAP 7 07/09/2024    GFRAA 76 03/17/2022    GFRNAA 66 03/17/2022    CA 9.3 07/09/2024     07/09/2024    K 3.8 07/09/2024     07/09/2024    CO2 25.0 07/09/2024    OSMOCALC 285 07/09/2024      Lab Results   Component Value Date    WBC 10.8 04/03/2024    RBC 3.88 04/03/2024    HGB 11.7 (L) 04/03/2024    HCT 33.9 (L) 04/03/2024    MCV 87.4 04/03/2024    MCH 30.2 04/03/2024    MCHC 34.5 04/03/2024    RDW 12.5 04/03/2024    PLT  241.0 04/03/2024      Lab Results   Component Value Date    TSH 1.330 07/09/2024      ASSESSMENT AND PLAN:       ICD-10-CM    1. Chronic constipation  K59.09 Patient is going to continue to hydrate and increase fiber in her diet. She is going to take a fiber supplement daily and eat a prune. Can take Milk of Magnesia PRN. Has appointment with [facility] on 2/11.      2. Calculus of gallbladder without cholecystitis without obstruction  K80.20 Asymptomatic. Continue to monitor. Consider designated US of gallbladder.      3. Large hiatal hernia  K44.9 Asymptomatic. Taking Prilosec.      4. Primary hypertension  I10 BP is well controlled today. Continue Amlodipine 5 mg and Losartan-hydrochlorothiazide. 100-12.5 mg.            TODAY'S ORDERS     No orders of the defined types were placed in this encounter.      Meds & Refills:  Requested Prescriptions      No prescriptions requested or ordered in this encounter       Imaging & Consults:  None    No follow-ups on file.  There are no Patient Instructions on file for this visit.    All questions were answered and the patient agrees with the plan. Code selection for this visit was based on time spent (>30min) on date of service in preparing to see the patient, obtaining and/or reviewing separately obtained history, performing a medically appropriate examination, counseling and educating the patient/family/caregiver, ordering medications or testing, referring and communicating with other healthcare providers, documenting clinical information in the EHR, independently interpreting results and communicating results to the patient/family/caregiver and care coordination with the patient's other providers.      Thank you,  Get Camacho, DO

## 2025-01-10 ENCOUNTER — TELEPHONE (OUTPATIENT)
Dept: INTERNAL MEDICINE CLINIC | Facility: CLINIC | Age: 89
End: 2025-01-10

## 2025-01-10 NOTE — TELEPHONE ENCOUNTER
Per MP- recommendations good.     Called and spoke with pt. Notified pt MP ok with recommendations. Pt will monitor and go to ER with worsening symptoms. Appreciative of follow up.

## 2025-01-10 NOTE — TELEPHONE ENCOUNTER
Spoke to patient to advised of Dr. Sanchez's recommendation to look in stool if crown passed, she states she will do her best.

## 2025-01-10 NOTE — TELEPHONE ENCOUNTER
LOV 1/8/25     Pt called. Stated about 30 minutes ago she was eating a granola bar with nuts and accidentally swallowed the crown of her tooth. Denies coughing/choking. Pt stated she called dentist and he left for the day. Has appointment Monday with dentist. Pt stated she has been having ongoing GI issues (saw MP 1/8/25). I notified pt should be okay and crown should pass. I advised pt to monitor and if she develops severe abd pain, vomiting, should go right to ER. Pt verbalizes understanding.     MP - Are you okay with recommendations? Anything further to relay?    0

## 2025-01-24 ENCOUNTER — LAB ENCOUNTER (OUTPATIENT)
Dept: LAB | Age: 89
End: 2025-01-24
Attending: INTERNAL MEDICINE
Payer: MEDICARE

## 2025-01-24 DIAGNOSIS — E78.00 PURE HYPERCHOLESTEROLEMIA: Primary | ICD-10-CM

## 2025-01-24 LAB
ALBUMIN SERPL-MCNC: 4.5 G/DL (ref 3.2–4.8)
ALBUMIN/GLOB SERPL: 2.1 {RATIO} (ref 1–2)
ALP LIVER SERPL-CCNC: 81 U/L
ALT SERPL-CCNC: 13 U/L
ANION GAP SERPL CALC-SCNC: 9 MMOL/L (ref 0–18)
AST SERPL-CCNC: 17 U/L (ref ?–34)
BILIRUB SERPL-MCNC: 0.9 MG/DL (ref 0.2–1.1)
BUN BLD-MCNC: 24 MG/DL (ref 9–23)
CALCIUM BLD-MCNC: 9.9 MG/DL (ref 8.7–10.6)
CHLORIDE SERPL-SCNC: 106 MMOL/L (ref 98–112)
CHOLEST SERPL-MCNC: 153 MG/DL (ref ?–200)
CO2 SERPL-SCNC: 26 MMOL/L (ref 21–32)
CREAT BLD-MCNC: 0.91 MG/DL
EGFRCR SERPLBLD CKD-EPI 2021: 61 ML/MIN/1.73M2 (ref 60–?)
FASTING PATIENT LIPID ANSWER: YES
FASTING STATUS PATIENT QL REPORTED: YES
GLOBULIN PLAS-MCNC: 2.1 G/DL (ref 2–3.5)
GLUCOSE BLD-MCNC: 103 MG/DL (ref 70–99)
HDLC SERPL-MCNC: 56 MG/DL (ref 40–59)
LDLC SERPL CALC-MCNC: 78 MG/DL (ref ?–100)
NONHDLC SERPL-MCNC: 97 MG/DL (ref ?–130)
OSMOLALITY SERPL CALC.SUM OF ELEC: 296 MOSM/KG (ref 275–295)
POTASSIUM SERPL-SCNC: 4.4 MMOL/L (ref 3.5–5.1)
PROT SERPL-MCNC: 6.6 G/DL (ref 5.7–8.2)
SODIUM SERPL-SCNC: 141 MMOL/L (ref 136–145)
TRIGL SERPL-MCNC: 105 MG/DL (ref 30–149)
VLDLC SERPL CALC-MCNC: 16 MG/DL (ref 0–30)

## 2025-01-24 PROCEDURE — 36415 COLL VENOUS BLD VENIPUNCTURE: CPT

## 2025-01-24 PROCEDURE — 80061 LIPID PANEL: CPT

## 2025-01-24 PROCEDURE — 80053 COMPREHEN METABOLIC PANEL: CPT

## 2025-02-14 ENCOUNTER — MED REC SCAN ONLY (OUTPATIENT)
Dept: INTERNAL MEDICINE CLINIC | Facility: CLINIC | Age: 89
End: 2025-02-14

## 2025-03-13 ENCOUNTER — LAB ENCOUNTER (OUTPATIENT)
Dept: LAB | Age: 89
End: 2025-03-13
Attending: NURSE PRACTITIONER
Payer: MEDICARE

## 2025-03-13 DIAGNOSIS — K86.89 PANCREATIC ATROPHY (HCC): ICD-10-CM

## 2025-03-13 DIAGNOSIS — D64.9 ANEMIA, UNSPECIFIED TYPE: ICD-10-CM

## 2025-03-13 LAB
BASOPHILS # BLD AUTO: 0.09 X10(3) UL (ref 0–0.2)
BASOPHILS NFR BLD AUTO: 1.3 %
DEPRECATED HBV CORE AB SER IA-ACNC: 16 NG/ML
EOSINOPHIL # BLD AUTO: 0.33 X10(3) UL (ref 0–0.7)
EOSINOPHIL NFR BLD AUTO: 4.9 %
ERYTHROCYTE [DISTWIDTH] IN BLOOD BY AUTOMATED COUNT: 13.2 %
FOLATE SERPL-MCNC: 15.4 NG/ML (ref 5.4–?)
HCT VFR BLD AUTO: 42.8 %
HGB BLD-MCNC: 13.7 G/DL
IMM GRANULOCYTES # BLD AUTO: 0.02 X10(3) UL (ref 0–1)
IMM GRANULOCYTES NFR BLD: 0.3 %
IRON SATN MFR SERPL: 17 %
IRON SERPL-MCNC: 59 UG/DL
LYMPHOCYTES # BLD AUTO: 0.92 X10(3) UL (ref 1–4)
LYMPHOCYTES NFR BLD AUTO: 13.8 %
MCH RBC QN AUTO: 28.2 PG (ref 26–34)
MCHC RBC AUTO-ENTMCNC: 32 G/DL (ref 31–37)
MCV RBC AUTO: 88.2 FL
MONOCYTES # BLD AUTO: 0.86 X10(3) UL (ref 0.1–1)
MONOCYTES NFR BLD AUTO: 12.9 %
NEUTROPHILS # BLD AUTO: 4.47 X10 (3) UL (ref 1.5–7.7)
NEUTROPHILS # BLD AUTO: 4.47 X10(3) UL (ref 1.5–7.7)
NEUTROPHILS NFR BLD AUTO: 66.8 %
PLATELET # BLD AUTO: 321 10(3)UL (ref 150–450)
RBC # BLD AUTO: 4.85 X10(6)UL
TOTAL IRON BINDING CAPACITY: 355 UG/DL (ref 250–425)
TRANSFERRIN SERPL-MCNC: 284 MG/DL (ref 250–380)
VIT B12 SERPL-MCNC: 226 PG/ML (ref 211–911)
WBC # BLD AUTO: 6.7 X10(3) UL (ref 4–11)

## 2025-03-13 PROCEDURE — 85025 COMPLETE CBC W/AUTO DIFF WBC: CPT

## 2025-03-13 PROCEDURE — 83550 IRON BINDING TEST: CPT

## 2025-03-13 PROCEDURE — 82746 ASSAY OF FOLIC ACID SERUM: CPT

## 2025-03-13 PROCEDURE — 82607 VITAMIN B-12: CPT

## 2025-03-13 PROCEDURE — 82728 ASSAY OF FERRITIN: CPT

## 2025-03-13 PROCEDURE — 36415 COLL VENOUS BLD VENIPUNCTURE: CPT

## 2025-03-13 PROCEDURE — 83540 ASSAY OF IRON: CPT

## 2025-03-14 ENCOUNTER — LAB ENCOUNTER (OUTPATIENT)
Dept: LAB | Facility: HOSPITAL | Age: 89
End: 2025-03-14
Attending: NURSE PRACTITIONER
Payer: MEDICARE

## 2025-03-14 DIAGNOSIS — K86.89: ICD-10-CM

## 2025-03-14 PROCEDURE — 82656 EL-1 FECAL QUAL/SEMIQ: CPT

## 2025-03-17 ENCOUNTER — TELEPHONE (OUTPATIENT)
Dept: INTERNAL MEDICINE CLINIC | Facility: CLINIC | Age: 89
End: 2025-03-17

## 2025-03-17 DIAGNOSIS — Z13.29 ENCOUNTER FOR SCREENING FOR ENDOCRINE DISORDER: ICD-10-CM

## 2025-03-17 DIAGNOSIS — Z00.00 ENCOUNTER FOR ANNUAL HEALTH EXAMINATION: Primary | ICD-10-CM

## 2025-03-17 DIAGNOSIS — R73.03 PREDIABETES: ICD-10-CM

## 2025-03-17 NOTE — TELEPHONE ENCOUNTER
Lipid and CMP are completed on 01/24/25, CBC W/diff, B12, Ferritin and Iron was completed on   03/13/25.  TSH and H1C placed today per [facility] protocol.

## 2025-03-17 NOTE — TELEPHONE ENCOUNTER
Patient called request labs prior to their annual physical.  Annual physical scheduled for 6/23/25   Please order labs. Patient preferred lab is [facility]  Patient informed request was sent to clinical team.  Patient informed to fast for labs.  No callback required.

## 2025-03-18 LAB — PANCREATIC ELAST FECAL: 572 UG ELAST./G

## 2025-03-25 ENCOUNTER — HOSPITAL ENCOUNTER (OUTPATIENT)
Dept: MAMMOGRAPHY | Facility: HOSPITAL | Age: 89
Discharge: HOME | End: 2025-03-25
Payer: MEDICARE

## 2025-03-25 DIAGNOSIS — C50.912: ICD-10-CM

## 2025-03-25 PROCEDURE — 77061 BREAST TOMOSYNTHESIS UNI: CPT

## 2025-03-25 PROCEDURE — 77065 DX MAMMO INCL CAD UNI: CPT

## 2025-03-31 ENCOUNTER — APPOINTMENT (OUTPATIENT)
Age: 89
End: 2025-03-31
Attending: INTERNAL MEDICINE
Payer: MEDICARE

## 2025-04-14 ENCOUNTER — OFFICE VISIT (OUTPATIENT)
Age: 89
End: 2025-04-14
Attending: INTERNAL MEDICINE
Payer: MEDICARE

## 2025-04-14 VITALS
HEIGHT: 62.91 IN | HEART RATE: 76 BPM | BODY MASS INDEX: 27.32 KG/M2 | OXYGEN SATURATION: 98 % | TEMPERATURE: 98 F | RESPIRATION RATE: 16 BRPM | DIASTOLIC BLOOD PRESSURE: 73 MMHG | WEIGHT: 154.19 LBS | SYSTOLIC BLOOD PRESSURE: 146 MMHG

## 2025-04-14 DIAGNOSIS — C50.912 INVASIVE LOBULAR CARCINOMA OF LEFT BREAST IN FEMALE (HCC): Primary | ICD-10-CM

## 2025-04-14 DIAGNOSIS — D50.0 IRON DEFICIENCY ANEMIA SECONDARY TO BLOOD LOSS (CHRONIC): ICD-10-CM

## 2025-04-14 NOTE — PROGRESS NOTES
Patient here for 6 month f/u for breast cancer Patient started taking letrozole 11/2023. Patient started to hae loose stools in December 2023. Patient stopped taking letrozole in July 2024. Patient has been following up with GI regarding her loose stools. Patient has not had a colonoscopy.     Education Record    Learner:  Patient    Disease / Diagnosis: breast cancer     Barriers / Limitations:  None   Comments:    Method:  Discussion   Comments:    General Topics:  Medication, Side effects and symptom management, and Plan of care reviewed   Comments:    Outcome:  Shows understanding   Comments:

## 2025-04-14 NOTE — PROGRESS NOTES
Cancer Center Progress Note    Problem List:      Problem List[1]      History of Present Illness  Shanti Coy is an 89 year old female with invasive lobular carcinoma of the left breast who presents with gastrointestinal symptoms and iron deficiency.    She has a history of invasive lobular carcinoma of the left breast, diagnosed in July 2023. A lumpectomy was performed in September 2023, revealing a 1.4 cm grade 2 invasive lobular carcinoma that was estrogen receptor positive, progesterone receptor negative, and HER2 negative. She was on letrozole but discontinued it in January 2025 due to gastrointestinal symptoms, which she suspects may be related to the medication.    Gastrointestinal symptoms began in December 2024, characterized by alternating constipation and loose stools. She has a history of chronic constipation and attempted to manage these symptoms with Metamucil and a stool softener. A CT scan of the abdomen and pelvis at the end of December 2024 showed a large hiatal hernia and suspected cholelithiasis with mild gallbladder wall thickening. She experiences upper right abdominal pain, which she associates with her gallbladder issues. Since starting iron supplements, her bowel movements have become looser. No blood in her bowel movements, which are not dark in color.    A CBC on March 13, 2025, showed a hemoglobin level of 13.7, a ferritin level of 16, and an iron saturation of 17%. Her B12 level was 226. She is currently taking iron and B12 supplements. She has lost some weight and reports a decreased appetite.    A mammogram of the left breast on March 25, 2025, showed benign findings. A six-month bilateral mammogram was recommended.      Review of Systems:   Constitutional: Negative for fevers, chills, night sweats and weight loss.  Eyes: Negative for visual disturbance, irritation and redness.  Respiratory: Negative for cough, hemoptysis, chest pain, or dyspnea.  Cardiovascular: Negative for  angina, orthopnea or palpitations.  Integument/breast: Negative for rash, skin lesions, and pruritus.  Hematologic/lymphatic: Negative for easy bruising, bleeding, and lymphadenopathy.  Musculoskeletal: Negative for myalgias, arthralgias, muscle weakness.  Genitourinary: Negative for dysuria or hematuria  Psychiatric: The patient's mood was calm and appropriate for this visit.  The pertinent positives and negatives were described. All other systems were negative.    PMH/PSH:  Past Medical History[2]    Past Surgical History[3]    Family History Reviewed:  Family History[4]    Allergies:     Allergies[5]    Medications:  Current Medications[6]       Vital Signs:      Height: 159.8 cm (5' 2.91\") (04/14 1117)  Weight: 69.9 kg (154 lb 3.2 oz) (04/14 1117)  BSA (Calculated - sq m): 1.73 sq meters (04/14 1117)  Pulse: 76 (04/14 1117)  BP: 146/73 (04/14 1117)  Temp: 97.7 °F (36.5 °C) (04/14 1117)  Do Not Use - Resp Rate: --  SpO2: 98 % (04/14 1117)      Performance Status:  ECOG 1: Restricted in physically strenuous activity but ambulatory and able to carry out work of a light or sedentary nature.     Physical Examination:      Constitutional: Patient is alert and oriented x 3, not in acute distress.  Eyes: Anicteric sclera, pink conjunctiva.  HEENT:  Oropharynx is clear. Neck is supple.  Respiratory: Clear to auscultation and percussion. No rales.  No wheezes.  Cardiovascular: Regular rate and rhythm. No murmurs.  Gastrointestinal: Soft, non tender with good bowel sounds.  Musculoskeletal: No edema. No calf tenderness.  Skin: No suspicious skin lesion, no rash, no ulceration.  Lymphatics: There is no palpable lymphadenopathy throughout in the cervical, supraclavicular, or axillary regions.  Psychiatric: The patient's mood is calm and appropriate for this visit.         Results reviewed at this visit:     Lab Results   Component Value Date    WBC 6.7 03/13/2025    RBC 4.85 03/13/2025    HGB 13.7 03/13/2025    HCT 42.8  03/13/2025    MCV 88.2 03/13/2025    MCH 28.2 03/13/2025    MCHC 32.0 03/13/2025    RDW 13.2 03/13/2025    .0 03/13/2025     Lab Results   Component Value Date     01/24/2025    K 4.4 01/24/2025     01/24/2025    CO2 26.0 01/24/2025    BUN 24 (H) 01/24/2025    CREATSERUM 0.91 01/24/2025     (H) 01/24/2025    CA 9.9 01/24/2025    ALKPHO 81 01/24/2025    ALT 13 01/24/2025    AST 17 01/24/2025    BILT 0.9 01/24/2025    ALB 4.5 01/24/2025    TP 6.6 01/24/2025       Results  LABS  CBC: Hb 13.7 (03/13/2025)  Ferritin: 16 (03/13/2025)  Iron saturation: 17% (03/13/2025)  B12: 226 (03/13/2025)    RADIOLOGY  Mammogram: Benign findings in the left breast (03/25/2025)  CT scan of the abdomen and pelvis: No acute process; large hiatal hernia; suspected cholelithiasis with mild wall thickening of the gallbladder fundus (01/02/2025)    PATHOLOGY  1.4 cm grade 2 invasive lobular carcinoma; ER positive, VA negative, HER2 negative (09/2023)         Assessment & Plan  Invasive lobular carcinoma of the left breast diagnosed on 7/27/2023:  Lumpectomy on 9/15/2023  1.4 cm ILC  Grade II  ER 90%  VA < 1%  Ki-67 10 to 20%  Her2 IHC 0    Invasive lobular carcinoma of the left breast, diagnosed in July 2023. Underwent lumpectomy in September 2023, revealing a 1.4 cm grade 2 tumor, estrogen receptor positive, progesterone receptor negative, and HER2 negative. Recent mammogram in March 2025 showed benign findings. She discontinued letrozole in January 2025 due to gastrointestinal symptoms, which are not attributed to letrozole. Letrozole is essential for her treatment.  - Restart letrozole 2.5 mg oral daily  - Schedule a six-month bilateral mammogram    Iron deficiency anemia  Iron deficiency anemia with ferritin level of 16 and iron saturation of 17%. Currently on oral iron supplementation. Suspected gastrointestinal bleeding as the cause. Further investigation required to identify the source. Iron supplementation may  alter bowel habits but does not address the underlying cause.  - Continue oral iron supplementation  - Follow up with gastroenterology for EGD and colonoscopy to investigate potential gastrointestinal bleeding    Gastrointestinal symptoms  Gastrointestinal symptoms since December, including alternating constipation and loose stools. History of large hiatal hernia and suspected cholelithiasis with mild gallbladder wall thickening. Advised to follow up with gastroenterology for EGD and colonoscopy to determine the cause of symptoms and potential bleeding. Risks of scopes discussed; she opted for colonoscopy to identify the bleeding source.  - Follow up with gastroenterology for EGD and colonoscopy    Mild osteopenia in the femoral neck  Mild osteopenia in the femoral neck. Bone density last assessed in March 2024. Next assessment due in one year.  - Schedule bone density assessment in March 2026    Follow-up  Follow-up in six months for ongoing management of her conditions.  - Schedule follow-up appointment in six months           The following individual(s) verbally consented to be recorded using ambient AI listening technology and understand that they can each withdraw their consent to this listening technology at any point by asking the clinician to turn off or pause the recording:    Patient name: Shanti Coy      Rusty Ma MD          [1]   Patient Active Problem List  Diagnosis    Pure hypercholesterolemia    Primary hypertension    Insomnia    Spinal stenosis of lumbar region    Lumbar radiculitis    History of total knee replacement    Pre-diabetes    Asthma with COPD (chronic obstructive pulmonary disease) (HCC)    Aortic root dilation    Spondylolisthesis at L4-L5 level    Inflammatory spondylopathy of lumbar region    Primary osteoarthritis of left knee    Invasive lobular carcinoma of left breast in female (HCC)    Infiltrating lobular carcinoma of breast, stage 1, left (HCC)    Coronary artery  disease involving native coronary artery of native heart without angina pectoris    Aortic valve regurgitation    Encounter for annual health examination    Moderate persistent asthma with exacerbation (HCC)    Calculus of gallbladder without cholecystitis without obstruction   [2]   Past Medical History:   Abdominal pain    6 months    Aortic valve insufficiency    last echo 7/26/23    Arthritis    Asthma (HCC)    Back pain    10 years    Constipation    Diarrhea, unspecified    Easy bruising    2 yrs    Eczema    Exposure to medical diagnostic radiation    Fatigue    6 months    Female climacteric state    Flatulence/gas pain/belching    Frequent use of laxatives    GERD    Heartburn    High blood pressure    High cholesterol    Hx of motion sickness    Irregular bowel habits    Left Breast cancer (HCC)    Onychocryptosis    Osteoarthritis    Other and unspecified hyperlipidemia    Pain due to onychomycosis of toenail    Prediabetes    Primary localized osteoarthrosis of right lower leg    Shortness of breath    Sleep disturbance    Several years    Visual impairment    glasses    Wears glasses    7 years old    Weight loss    3months    Wheezing    10 years   [3]   Past Surgical History:  Procedure Laterality Date    Appendectomy      Cataract      Colonoscopy  01/01/2009    Fluor gid & loclzj ndl/cath spi dx/ther njx  02/03/2014    Procedure: CERVICAL EPIDURAL;  Surgeon: Eugene Garcia MD;  Location: [facility]    Hysterectomy  01/01/1990    partial; one ovary in; heavy menses    Injection, w/wo contrast, dx/therapeutic substance, epidural/subarachnoid; lumbar/sacral  02/03/2014    Procedure: LUMBAR EPIDURAL;  Surgeon: Eugene Garcia MD;  Location: [facility]    Injection, w/wo contrast, dx/therapeutic substance, epidural/subarachnoid; lumbar/sacral N/A 07/06/2016    Procedure: LUMBAR EPIDURAL;  Surgeon: Jaswant Pham MD;  Location: [facility]     Injection, w/wo contrast, dx/therapeutic substance, epidural/subarachnoid; lumbar/sacral N/A 07/28/2016    Procedure: LUMBAR EPIDURAL;  Surgeon: Jaswant Pham MD;  Location: [facility]    Knee replacement surgery  6 yrs    Both knees    Knee surgery  09/08/2015    right leg    Lumpectomy left Left 09/2023    [facility]    Needle biopsy left Left 07/27/2023    US guided biopsy -[facility]    Other surgical history      arthroscopy knee    Radiation left  2023    Tonsillectomy      Total abdom hysterectomy      30 years   [4]   Family History  Problem Relation Age of Onset    Breast Cancer Self 87    Breast Cancer Mother 69    Cancer Father         leukemia    Cancer Brother         prostate ca    Heart Disorder Brother         [pacemaker    Cancer Son         prostate ca    Hypertension Son     Diabetes Maternal Grandmother     Diabetes Maternal Grandfather     Diabetes Paternal Grandmother     Diabetes Paternal Grandfather    [5]   Allergies  Allergen Reactions    Mold    [6]    Ferrous Sulfate 325 (65 Fe) MG Oral Tab Take 1 tablet (325 mg total) by mouth daily with breakfast. 30 tablet 3    Vitamin C 500 MG Oral Tab Take 1 tablet (500 mg total) by mouth daily. 30 tablet 3    Cyanocobalamin (VITAMIN B 12) 500 MCG Oral Tab Take 1,000 mcg by mouth daily. 30 tablet 3    hydrocortisone (PROCTOZONE-HC) 2.5 % External Cream Apply twice daily rectally for 14 days. Do not use for more than 14 days, 1 each 0    senna-docusate 8.6-50 MG Oral Tab Take 1 tablet by mouth daily as needed for constipation. 30 tablet 0    Aspirin 81 MG Oral Cap       metoprolol succinate ER 25 MG Oral Tablet 24 Hr Take 1 tablet (25 mg total) by mouth in the morning.      Losartan Potassium-HCTZ 100-12.5 MG Oral Tab Take 1 tablet by mouth daily. 90 tablet 3    atorvastatin 40 MG Oral Tab Take 1 tablet (40 mg total) by mouth in the morning.      TRELEGY ELLIPTA 200-62.5-25 MCG/ACT Inhalation Aerosol Powder, Breath Activated Inhale 1 puff  into the lungs in the morning.      AMLODIPINE 5 MG Oral Tab TAKE 1 TABLET BY MOUTH EVERY DAY 90 tablet 0    Blood Pressure Monitoring (BLOOD PRESSURE CUFF) Does not apply Misc For use to measure BP daily 1 each 0    azelastine 0.1 % Nasal Solution 2 sprays by Nasal route as needed in the morning and 2 sprays as needed in the evening.      ipratropium 0.06 % Nasal Solution 2 sprays by Nasal route as needed in the morning and 2 sprays as needed at noon and 2 sprays as needed in the evening.      Albuterol Sulfate HFA (PROAIR HFA) 108 (90 Base) MCG/ACT Inhalation Aero Soln Inhale 2 puffs into the lungs every 4 (four) hours as needed. 1 each 5    mometasone 0.1 % External Ointment Apply 1 Application topically daily. 15 g 1    CRANBERRY EXTRACT OR Take 1 tablet by mouth in the morning.      Vitamin D3 (VITAMIN D3) 2000 UNITS Oral Cap Take 1 capsule (2,000 Units total) by mouth in the morning.      PRILOSEC 20 MG OR CPDR Take 1 capsule (20 mg total) by mouth every morning.      VITAMIN E Take 1 capsule by mouth in the morning.

## 2025-04-28 ENCOUNTER — LAB ENCOUNTER (OUTPATIENT)
Dept: LAB | Age: 89
End: 2025-04-28
Attending: INTERNAL MEDICINE
Payer: MEDICARE

## 2025-04-28 DIAGNOSIS — R73.03 PREDIABETES: ICD-10-CM

## 2025-04-28 DIAGNOSIS — Z00.00 ENCOUNTER FOR ANNUAL HEALTH EXAMINATION: ICD-10-CM

## 2025-04-28 DIAGNOSIS — E78.00 PURE HYPERCHOLESTEROLEMIA: Primary | ICD-10-CM

## 2025-04-28 DIAGNOSIS — Z13.29 ENCOUNTER FOR SCREENING FOR ENDOCRINE DISORDER: ICD-10-CM

## 2025-04-28 LAB
ALBUMIN SERPL-MCNC: 4.5 G/DL (ref 3.2–4.8)
ALBUMIN/GLOB SERPL: 2.1 {RATIO} (ref 1–2)
ALP LIVER SERPL-CCNC: 87 U/L (ref 55–142)
ALT SERPL-CCNC: 16 U/L (ref 10–49)
ANION GAP SERPL CALC-SCNC: 11 MMOL/L (ref 0–18)
AST SERPL-CCNC: 20 U/L (ref ?–34)
BILIRUB SERPL-MCNC: 1 MG/DL (ref 0.2–1.1)
BUN BLD-MCNC: 18 MG/DL (ref 9–23)
CALCIUM BLD-MCNC: 10.3 MG/DL (ref 8.7–10.6)
CHLORIDE SERPL-SCNC: 106 MMOL/L (ref 98–112)
CHOLEST SERPL-MCNC: 140 MG/DL (ref ?–200)
CO2 SERPL-SCNC: 23 MMOL/L (ref 21–32)
CREAT BLD-MCNC: 0.98 MG/DL (ref 0.55–1.02)
EGFRCR SERPLBLD CKD-EPI 2021: 56 ML/MIN/1.73M2 (ref 60–?)
EST. AVERAGE GLUCOSE BLD GHB EST-MCNC: 128 MG/DL (ref 68–126)
FASTING PATIENT LIPID ANSWER: YES
FASTING STATUS PATIENT QL REPORTED: YES
GLOBULIN PLAS-MCNC: 2.1 G/DL (ref 2–3.5)
GLUCOSE BLD-MCNC: 105 MG/DL (ref 70–99)
HBA1C MFR BLD: 6.1 % (ref ?–5.7)
HDLC SERPL-MCNC: 52 MG/DL (ref 40–59)
LDLC SERPL CALC-MCNC: 61 MG/DL (ref ?–100)
NONHDLC SERPL-MCNC: 88 MG/DL (ref ?–130)
OSMOLALITY SERPL CALC.SUM OF ELEC: 292 MOSM/KG (ref 275–295)
POTASSIUM SERPL-SCNC: 4.2 MMOL/L (ref 3.5–5.1)
PROT SERPL-MCNC: 6.6 G/DL (ref 5.7–8.2)
SODIUM SERPL-SCNC: 140 MMOL/L (ref 136–145)
TRIGL SERPL-MCNC: 158 MG/DL (ref 30–149)
TSI SER-ACNC: 1.58 UIU/ML (ref 0.55–4.78)
VLDLC SERPL CALC-MCNC: 23 MG/DL (ref 0–30)

## 2025-04-28 PROCEDURE — 84443 ASSAY THYROID STIM HORMONE: CPT

## 2025-04-28 PROCEDURE — 83036 HEMOGLOBIN GLYCOSYLATED A1C: CPT

## 2025-04-28 PROCEDURE — 80053 COMPREHEN METABOLIC PANEL: CPT

## 2025-04-28 PROCEDURE — 80061 LIPID PANEL: CPT

## 2025-04-28 PROCEDURE — 36415 COLL VENOUS BLD VENIPUNCTURE: CPT

## 2025-05-06 ENCOUNTER — OFFICE VISIT (OUTPATIENT)
Dept: SURGERY | Facility: CLINIC | Age: 89
End: 2025-05-06
Payer: MEDICARE

## 2025-05-06 VITALS
HEART RATE: 74 BPM | OXYGEN SATURATION: 96 % | BODY MASS INDEX: 27 KG/M2 | WEIGHT: 156 LBS | SYSTOLIC BLOOD PRESSURE: 107 MMHG | RESPIRATION RATE: 18 BRPM | DIASTOLIC BLOOD PRESSURE: 59 MMHG

## 2025-05-06 DIAGNOSIS — Z17.0 CARCINOMA OF UPPER-OUTER QUADRANT OF LEFT BREAST IN FEMALE, ESTROGEN RECEPTOR POSITIVE (HCC): Primary | ICD-10-CM

## 2025-05-06 DIAGNOSIS — C50.412 CARCINOMA OF UPPER-OUTER QUADRANT OF LEFT BREAST IN FEMALE, ESTROGEN RECEPTOR POSITIVE (HCC): Primary | ICD-10-CM

## 2025-05-06 PROCEDURE — 99213 OFFICE O/P EST LOW 20 MIN: CPT | Performed by: SURGERY

## 2025-05-06 PROCEDURE — 1160F RVW MEDS BY RX/DR IN RCRD: CPT | Performed by: SURGERY

## 2025-05-06 PROCEDURE — 1159F MED LIST DOCD IN RCRD: CPT | Performed by: SURGERY

## 2025-05-06 PROCEDURE — 1126F AMNT PAIN NOTED NONE PRSNT: CPT | Performed by: SURGERY

## 2025-05-08 DIAGNOSIS — C50.912 INVASIVE LOBULAR CARCINOMA OF LEFT BREAST IN FEMALE (HCC): ICD-10-CM

## 2025-05-08 RX ORDER — LETROZOLE 2.5 MG/1
2.5 TABLET, FILM COATED ORAL DAILY
Qty: 90 TABLET | Refills: 0 | Status: SHIPPED | OUTPATIENT
Start: 2025-05-08

## 2025-05-13 ENCOUNTER — TELEPHONE (OUTPATIENT)
Facility: CLINIC | Age: 89
End: 2025-05-13

## 2025-05-13 DIAGNOSIS — Z96.652 STATUS POST LEFT KNEE REPLACEMENT: Primary | ICD-10-CM

## 2025-05-13 DIAGNOSIS — T84.84XA PAIN DUE TO TOTAL RIGHT KNEE REPLACEMENT, INITIAL ENCOUNTER: ICD-10-CM

## 2025-05-13 DIAGNOSIS — Z96.651 PAIN DUE TO TOTAL RIGHT KNEE REPLACEMENT, INITIAL ENCOUNTER: ICD-10-CM

## 2025-05-14 ENCOUNTER — HOSPITAL ENCOUNTER (OUTPATIENT)
Dept: GENERAL RADIOLOGY | Age: 89
Discharge: HOME | End: 2025-05-14
Attending: ORTHOPAEDIC SURGERY
Payer: MEDICARE

## 2025-05-14 ENCOUNTER — OFFICE VISIT (OUTPATIENT)
Dept: ORTHOPEDICS CLINIC | Facility: CLINIC | Age: 89
End: 2025-05-14
Payer: MEDICARE

## 2025-05-14 DIAGNOSIS — Z96.652 STATUS POST LEFT KNEE REPLACEMENT: Primary | ICD-10-CM

## 2025-05-14 DIAGNOSIS — Z96.652 STATUS POST LEFT KNEE REPLACEMENT: ICD-10-CM

## 2025-05-14 DIAGNOSIS — T84.84XA PAIN DUE TO TOTAL RIGHT KNEE REPLACEMENT, INITIAL ENCOUNTER: ICD-10-CM

## 2025-05-14 DIAGNOSIS — Z96.651 PAIN DUE TO TOTAL RIGHT KNEE REPLACEMENT, INITIAL ENCOUNTER: ICD-10-CM

## 2025-05-14 PROCEDURE — 1159F MED LIST DOCD IN RCRD: CPT | Performed by: ORTHOPAEDIC SURGERY

## 2025-05-14 PROCEDURE — 99214 OFFICE O/P EST MOD 30 MIN: CPT | Performed by: ORTHOPAEDIC SURGERY

## 2025-05-14 PROCEDURE — 73562 X-RAY EXAM OF KNEE 3: CPT | Performed by: ORTHOPAEDIC SURGERY

## 2025-05-14 PROCEDURE — 1160F RVW MEDS BY RX/DR IN RCRD: CPT | Performed by: ORTHOPAEDIC SURGERY

## 2025-05-14 RX ORDER — MULTIVITAMIN WITH IRON
TABLET ORAL
COMMUNITY
Start: 2025-05-07

## 2025-05-14 NOTE — PROGRESS NOTES
[facility] Progress Note    Subjective: Very pleasant 89-year-old female returns to clinic today for 1 year postop evaluation of her left knee, also for evaluation of her right knee replacement and pain in her right lower extremity.  From the left knee standpoint she states she is doing very well.  She notes no pain with the knee, only occasional tightness.  Notes that the knee has been moving and functioning well, she has no limitations due to the knee.  She is happy with her outcome.  From the right knee standpoint, she states that she had this replaced around 8 years ago by Dr. Scherer.  She notes that she has had pain in her right leg, goes from the knee down to the foot, that was present prior to knee replacement and did not change after knee replacement.  She does note that there is associated numbness/odd sensation in the leg, and that she does have pain in her low back.  She states that she has been managed for spinal stenosis in the past, notes that injections by Dr. Pham have provided significant relief.  Has not had an injection in 3+ years.    Objective: Patient appears comfortable, very pleasant.  She demonstrates full extension of both knees, flexion 130.  Knees are stable to varus and valgus stress.  No focal tenderness about the right knee.  No effusion about either knee.      Imaging: X-rays of both the right and left knees personally viewed, independently interpreted and radiology report read.  Demonstrates cemented bilateral total knee arthroplasty.  No change in position of components on the left knee compared to films from last year.  No eccentric narrowing of medial or lateral compartments.  Patella tracking centrally.  No prior comparison films for the right knee however no signs of implant failure, eccentric wear or loosening.      Assessment/Plan: 89-year-old female with bilateral knee replacement (left done 1 year ago, here for 1 year postop check, doing fine), also with right  lower extremity pain.  Counseled that both knees appear to be doing well.  Imaging and exam findings not concerning for implant failure, and history would suggest that she is doing well from a subjective and functional standpoint as well.  Did discuss that this radiating pain with numbness in the right lower extremity is more likely related to spine, particularly given that she has had relief in the past from injections.  Recommended follow-up with her pain doctor for injection discussion, did offer to help set her up with one of our doctors for this if she has any difficulty getting in.    Sanjeev Cage MD, FAAOS  [facility]  Phone [phone]  Fax [phone]

## 2025-05-16 PROBLEM — Z85.3 HISTORY OF BREAST CANCER: Status: ACTIVE | Noted: 2025-05-16

## 2025-05-16 PROBLEM — C50.912 INVASIVE LOBULAR CARCINOMA OF LEFT BREAST IN FEMALE (HCC): Status: RESOLVED | Noted: 2023-08-16 | Resolved: 2025-05-16

## 2025-05-16 PROBLEM — C50.912: Status: RESOLVED | Noted: 2023-09-26 | Resolved: 2025-05-16

## 2025-05-20 ENCOUNTER — OFFICE VISIT (OUTPATIENT)
Dept: INTERNAL MEDICINE CLINIC | Facility: CLINIC | Age: 89
End: 2025-05-20
Payer: MEDICARE

## 2025-05-20 VITALS
SYSTOLIC BLOOD PRESSURE: 122 MMHG | RESPIRATION RATE: 16 BRPM | DIASTOLIC BLOOD PRESSURE: 66 MMHG | TEMPERATURE: 98 F | HEIGHT: 64.5 IN | HEART RATE: 75 BPM | BODY MASS INDEX: 26.31 KG/M2 | WEIGHT: 156 LBS | OXYGEN SATURATION: 96 %

## 2025-05-20 DIAGNOSIS — Z96.653 S/P TKR (TOTAL KNEE REPLACEMENT), BILATERAL: ICD-10-CM

## 2025-05-20 DIAGNOSIS — G47.00 INSOMNIA, UNSPECIFIED TYPE: ICD-10-CM

## 2025-05-20 DIAGNOSIS — E78.00 PURE HYPERCHOLESTEROLEMIA: ICD-10-CM

## 2025-05-20 DIAGNOSIS — R73.03 PRE-DIABETES: ICD-10-CM

## 2025-05-20 DIAGNOSIS — M48.061 SPINAL STENOSIS OF LUMBAR REGION WITHOUT NEUROGENIC CLAUDICATION: ICD-10-CM

## 2025-05-20 DIAGNOSIS — C50.912 INVASIVE LOBULAR CARCINOMA OF LEFT BREAST IN FEMALE (HCC): ICD-10-CM

## 2025-05-20 DIAGNOSIS — R73.03 PREDIABETES: ICD-10-CM

## 2025-05-20 DIAGNOSIS — M25.562 CHRONIC KNEE PAIN AFTER TOTAL REPLACEMENT OF LEFT KNEE JOINT: ICD-10-CM

## 2025-05-20 DIAGNOSIS — M46.96 INFLAMMATORY SPONDYLOPATHY OF LUMBAR REGION: ICD-10-CM

## 2025-05-20 DIAGNOSIS — I10 PRIMARY HYPERTENSION: ICD-10-CM

## 2025-05-20 DIAGNOSIS — J45.41 MODERATE PERSISTENT ASTHMA WITH EXACERBATION (HCC): ICD-10-CM

## 2025-05-20 DIAGNOSIS — I10 ESSENTIAL HYPERTENSION: ICD-10-CM

## 2025-05-20 DIAGNOSIS — M17.12 PRIMARY OSTEOARTHRITIS OF LEFT KNEE: ICD-10-CM

## 2025-05-20 DIAGNOSIS — I35.1 NONRHEUMATIC AORTIC VALVE INSUFFICIENCY: ICD-10-CM

## 2025-05-20 DIAGNOSIS — J44.89 ASTHMA WITH COPD (CHRONIC OBSTRUCTIVE PULMONARY DISEASE) (HCC): Chronic | ICD-10-CM

## 2025-05-20 DIAGNOSIS — Z96.651 HISTORY OF TOTAL RIGHT KNEE REPLACEMENT: ICD-10-CM

## 2025-05-20 DIAGNOSIS — M54.16 LUMBAR RADICULITIS: ICD-10-CM

## 2025-05-20 DIAGNOSIS — I77.810 AORTIC ROOT DILATION: ICD-10-CM

## 2025-05-20 DIAGNOSIS — Z96.653 HISTORY OF TOTAL BILATERAL KNEE REPLACEMENT: ICD-10-CM

## 2025-05-20 DIAGNOSIS — Z00.00 ENCOUNTER FOR ANNUAL HEALTH EXAMINATION: Primary | ICD-10-CM

## 2025-05-20 DIAGNOSIS — M43.16 SPONDYLOLISTHESIS AT L4-L5 LEVEL: ICD-10-CM

## 2025-05-20 DIAGNOSIS — I25.10 CORONARY ARTERY DISEASE INVOLVING NATIVE CORONARY ARTERY OF NATIVE HEART WITHOUT ANGINA PECTORIS: ICD-10-CM

## 2025-05-20 DIAGNOSIS — D50.9 IRON DEFICIENCY ANEMIA, UNSPECIFIED IRON DEFICIENCY ANEMIA TYPE: ICD-10-CM

## 2025-05-20 DIAGNOSIS — M54.50 CHRONIC RIGHT-SIDED LOW BACK PAIN WITHOUT SCIATICA: ICD-10-CM

## 2025-05-20 DIAGNOSIS — I35.1 AORTIC VALVE INSUFFICIENCY, ETIOLOGY OF CARDIAC VALVE DISEASE UNSPECIFIED: ICD-10-CM

## 2025-05-20 DIAGNOSIS — Z96.652 CHRONIC KNEE PAIN AFTER TOTAL REPLACEMENT OF LEFT KNEE JOINT: ICD-10-CM

## 2025-05-20 DIAGNOSIS — K44.9 LARGE HIATAL HERNIA: ICD-10-CM

## 2025-05-20 DIAGNOSIS — G89.29 CHRONIC KNEE PAIN AFTER TOTAL REPLACEMENT OF LEFT KNEE JOINT: ICD-10-CM

## 2025-05-20 DIAGNOSIS — G89.29 CHRONIC RIGHT-SIDED LOW BACK PAIN WITHOUT SCIATICA: ICD-10-CM

## 2025-05-20 NOTE — PROGRESS NOTES
Subjective:   Shanti Coy is a 89 year old female who presents for a MA AHA (Medicare Advantage Annual Health Assessment) and Subsequent Annual Wellness visit (Pt already had Initial Annual Wellness) and scheduled follow up of multiple significant but stable problems.   History of Present Illness    The patient continues to follow regularly with the gastroenterology service for symptoms associated with her large hiatal hernia, and variable bowel movements.  She is now experiencing a more loose bowel movement, rather than her baseline constipation.  During her last evaluation by the gastroenterology service a ferritin level was evaluated, resulting at 16.  There was discussion regarding colonoscopy and EGD evaluation given the iron deficiency, however in the meantime she initiated a daily iron supplement.  She is tolerating this supplement without constipation or other GI symptoms.  She reports no history of narrow caliber of stools or dark stools, until after the iron supplementation was initiated.  She continues to experience an intermittent lower back pain for which she is considering undergoing an epidural steroid injection that offered improvement historically.  Hemoglobin A1c is 6.1%.  She reportedly does consume sweets frequently without any other significant dietary changes since last evaluation.    History/Other:   Fall Risk Assessment:   She has been screened for Falls and is High Risk. Fall Prevention information provided to patient in After Visit Summary.    Do you feel unsteady when standing or walking?: (Patient-Rptd) Yes  Do you worry about falling?: (Patient-Rptd) Yes     Cognitive Assessment:   Abnormal  What day of the week is this?: Incorrect (wednesday)  What month is it?: Correct  What year is it?: Correct  Recall \"Ball\": Correct  Recall \"Flag\": Correct  Recall \"Tree\": Correct  MMSE SCORE: 30    Functional Ability/Status:   Shanti Coy has a completely normal functional assessment.  See flowsheet for details.      Depression Screening (PHQ):  PHQ-2 SCORE: 0  , done 5/13/2025   If you checked off any problems, how difficult have these problems made it for you to do your work, take care of things at home, or get along with other people?: Not difficult at all    Last [place] Suicide Screening on 5/20/2025 was No Risk.     5 minutes spent screening and counseling for depression    Advanced Directives:   She does have a Living Will but we do NOT have it on file in Epic.    She does have a POA but we do NOT have it on file in Epic.    Discussed Advance Care Planning with patient (and family/surrogate if present). Standard forms made available to patient in After Visit Summary.      Patient Active Problem List   Diagnosis    Pure hypercholesterolemia    Primary hypertension    Insomnia    Spinal stenosis of lumbar region    Lumbar radiculitis    History of total knee replacement    Pre-diabetes    Asthma with COPD (chronic obstructive pulmonary disease) (HCC)    Aortic root dilation    Spondylolisthesis at L4-L5 level    Inflammatory spondylopathy of lumbar region    Primary osteoarthritis of left knee    Coronary artery disease involving native coronary artery of native heart without angina pectoris    Aortic valve regurgitation    Encounter for annual health examination    Moderate persistent asthma with exacerbation (HCC)    Calculus of gallbladder without cholecystitis without obstruction    History of breast cancer     Allergies:  She is allergic to mold.    Current Medications:  Outpatient Medications Marked as Taking for the 5/20/25 encounter (Office Visit) with Brenton Hinojosa MD   Medication Sig    B Complex-C Oral Tab B-complex with vitamin C tablet, [RxNorm: 0]    LETROZOLE 2.5 MG Oral Tab Take 1 tablet (2.5 mg total) by mouth daily.    Ferrous Sulfate 325 (65 Fe) MG Oral Tab Take 1 tablet (325 mg total) by mouth daily with breakfast.    Vitamin C 500 MG Oral Tab Take 1 tablet (500 mg total)  by mouth daily.    Cyanocobalamin (VITAMIN B 12) 500 MCG Oral Tab Take 1,000 mcg by mouth daily.    hydrocortisone (PROCTOZONE-HC) 2.5 % External Cream Apply twice daily rectally for 14 days. Do not use for more than 14 days,    Aspirin 81 MG Oral Cap     metoprolol succinate ER 25 MG Oral Tablet 24 Hr Take 1 tablet (25 mg total) by mouth in the morning.    Losartan Potassium-HCTZ 100-12.5 MG Oral Tab Take 1 tablet by mouth daily.    atorvastatin 40 MG Oral Tab Take 1 tablet (40 mg total) by mouth in the morning.    TRELEGY ELLIPTA 200-62.5-25 MCG/ACT Inhalation Aerosol Powder, Breath Activated Inhale 1 puff into the lungs in the morning.    AMLODIPINE 5 MG Oral Tab TAKE 1 TABLET BY MOUTH EVERY DAY    Blood Pressure Monitoring (BLOOD PRESSURE CUFF) Does not apply Misc For use to measure BP daily    azelastine 0.1 % Nasal Solution 2 sprays by Nasal route as needed in the morning and 2 sprays as needed in the evening.    ipratropium 0.06 % Nasal Solution 2 sprays by Nasal route as needed in the morning and 2 sprays as needed at noon and 2 sprays as needed in the evening.    Albuterol Sulfate HFA (PROAIR HFA) 108 (90 Base) MCG/ACT Inhalation Aero Soln Inhale 2 puffs into the lungs every 4 (four) hours as needed.    mometasone 0.1 % External Ointment Apply 1 Application topically daily.    CRANBERRY EXTRACT OR Take 1 tablet by mouth in the morning.    Vitamin D3 (VITAMIN D3) 2000 UNITS Oral Cap Take 1 capsule (2,000 Units total) by mouth in the morning.    PRILOSEC 20 MG OR CPDR Take 1 capsule (20 mg total) by mouth every morning.    VITAMIN E Take 1 capsule by mouth in the morning.       Medical History:  She  has a past medical history of Abdominal pain (?), Allergic rhinitis ([place]), Aortic valve insufficiency, Arthritis (2000), Asthma (HCC), Back pain, Constipation (6monthd), Diarrhea, unspecified (6monthd), Easy bruising, Eczema, Esophageal reflux (?), Essential hypertension (?), Exposure to medical diagnostic  radiation (2023), Fatigue, Female climacteric state (06/15/2000), Flatulence/gas pain/belching (6months), Frequent use of laxatives (1 month), GERD, Heartburn (), High blood pressure, High cholesterol, motion sickness, Irregular bowel habits (6 months), Left Breast cancer (HCC) (2023), Numbness, Onychocryptosis (2016), Osteoarthritis, Other and unspecified hyperlipidemia, Pain due to onychomycosis of toenail (2017), Prediabetes, Primary localized osteoarthrosis of right lower leg (06/10/2015), Shortness of breath (2011), Sleep disturbance, Visual impairment, Wears glasses, Weight loss, and Wheezing ().  Surgical History:  She  has a past surgical history that includes hysterectomy (1990); tonsillectomy; appendectomy; cataract; colonoscopy (2009); injection, w/wo contrast, dx/therapeutic substance, epidural/subarachnoid; lumbar/sacral (2014); fluor gid & loclzj ndl/cath spi dx/ther njx (2014); other surgical history; knee surgery (2015); injection, w/wo contrast, dx/therapeutic substance, epidural/subarachnoid; lumbar/sacral (N/A, 2016); injection, w/wo contrast, dx/therapeutic substance, epidural/subarachnoid; lumbar/sacral (N/A, 2016); lumpectomy left (Left, 2023); radiation left (); total abdom hysterectomy; knee replacement surgery (6 yrs); needle biopsy left (Left, 2023); ; adenoidectomy (); and appendectomy (50 years ago).   Family History:  Her family history includes Breast Cancer (age of onset: 69) in her mother; Breast Cancer (age of onset: 85) in her self; Cancer in her brother, father, mother, son, and son; Diabetes in her maternal grandfather, maternal grandmother, paternal grandfather, and paternal grandmother; Heart Disorder in her brother; Hypertension in her son.  Social History:  She  reports that she has never smoked. She has never been exposed to tobacco smoke. She has never used smokeless tobacco. She  reports that she does not currently use alcohol after a past usage of about 1.0 standard drink of alcohol per week. She reports that she does not use drugs.    Tobacco:  She has never smoked tobacco.    CAGE Alcohol Screen:   CAGE screening score of 0 on 5/13/2025, showing low risk of alcohol abuse.      Patient Care Team:  Brenton Hinojosa MD as PCP - General (Internal Medicine)  Madiha Joseph PA-C (Physician Assistant)  Davi Meade MD (SURGERY, ORTHOPEDIC)  Janki Hartmann, PT as Physical Therapist (Physical Therapy)  Sara Barragan, PT as Physical Therapist  Ninoska Galeano MD (Surgical Oncology)  Reggie Adams MD (CARDIOLOGY)  Daisy Love, RN as Registered Nurse (Registered Nurse)  Moisés Tam MD (Radiation Oncology)  Monica Merrill, PT as Physical Therapist  Mychart, Generic Provider  Alessandra Cavazos, PT (Physical Therapy)  Alessandra Cavazos, PT as Physical Therapist (Physical Therapy)  Tyler Elizalde, PT as Physical Therapist  Reggie Adams MD (CARDIOLOGY)  Mynor Lebron, PT as Physical Therapist (Physical Therapy)  Kari Pretty, PTA as Physical Therapy Assistant (Physical Therapy)  Andrew Carroll MD (GASTROENTEROLOGY)  Radha Paul APRN (Nurse Practitioner)    Review of Systems  GENERAL: feels well otherwise  SKIN: denies any unusual skin lesions  EYES: denies blurred vision or double vision  HEENT: denies nasal congestion, sinus pain or ST  LUNGS: denies shortness of breath with exertion  CARDIOVASCULAR: denies chest pain on exertion  GI: denies abdominal pain, denies heartburn  : denies dysuria, vaginal discharge or itching, no complaint of urinary incontinence   MUSCULOSKELETAL: denies back pain  NEURO: denies headaches  PSYCHE: denies depression or anxiety  HEMATOLOGIC: denies hx of anemia  ENDOCRINE: denies thyroid history  ALL/ASTHMA: denies hx of allergy or asthma    Objective:   Physical Exam  General Appearance:  Alert, cooperative, no distress, appears stated  age   Head:  Normocephalic, without obvious abnormality, atraumatic   Eyes:  Bilateral conjunctiva within normal limits   Ears:  Tympanic membrane within normal limits bilaterally   Nose: Deferred   Throat: Deferred   Neck: Supple, symmetrical, trachea midline, no adenopathy;  thyroid: not enlarged, symmetric, no tenderness/mass/nodules; no carotid bruit or JVD   Back:   Symmetric, no curvature, ROM normal, no CVA tenderness   Lungs:   Clear to auscultation bilaterally, respirations unlabored   Heart:  Regular rate and rhythm, S1 and S2 normal, no murmur, rub, or gallop   Abdomen:   Soft, non-tender, bowel sounds active all four quadrants,  no masses, no organomegaly   Pelvic: Deferred   Extremities: No edema   Pulses: 2+ and symmetric   Skin: Skin color, texture, turgor normal, no rashes or lesions   Lymph nodes: Cervical nodes normal   Neurologic: Grossly normal       /66 (BP Location: Left arm, Patient Position: Sitting, Cuff Size: adult)   Pulse 75   Temp 97.6 °F (36.4 °C) (Temporal)   Resp 16   Ht 5' 4.5\" (1.638 m)   Wt 156 lb (70.8 kg)   LMP  (LMP Unknown)   SpO2 96%   Breastfeeding No   BMI 26.36 kg/m²  Estimated body mass index is 26.36 kg/m² as calculated from the following:    Height as of this encounter: 5' 4.5\" (1.638 m).    Weight as of this encounter: 156 lb (70.8 kg).    Medicare Hearing Assessment:   Hearing Screening    Time taken: 5/20/2025  8:05 AM  Entry User: Tami Palacios CMA  Screening Method: Finger Rub  Finger Rub Result: Pass         Visual Acuity:   Right Eye Visual Acuity: Corrected Right Eye Chart Acuity: 20/25   Left Eye Visual Acuity: Corrected Left Eye Chart Acuity: 20/40   Both Eyes Visual Acuity: Corrected Both Eyes Chart Acuity: 20/40   Able To Tolerate Visual Acuity: Yes        Assessment & Plan:   Shanti Coy is a 89 year old female who presents for a Medicare Assessment.     1. Iron deficiency anemia, unspecified iron deficiency anemia type (Primary)  -      CBC With Differential With Platelet; Future; Expected date: 05/20/2025  -     Iron And Tibc; Future; Expected date: 05/20/2025  -     Ferritin; Future; Expected date: 05/20/2025  2. Encounter for annual health examination  .  Assessment & Plan    Outstanding screening and preventive measures:  None    Iron deficiency:  Unknown etiology.  Following a long discussion, we agreed that at this time she will continue with iron supplementation and repeat iron studies and CBC in another 4 weeks for a total of 3 months of supplementation.  At that time if her level is stable or less, then she will consider undergoing EGD and colonoscopy by the GI service.  If ferritin level is satisfactory, then we will hold further evaluation at this time.     CAD:  Stable and without angina  Following with cardiology service regularly     Invasive lobular carcinoma of left breast:  Dx: 7/27/2023  Lumpectomy 9/15/2023  Taking letrozole  Following with oncology service regularly     Asthma and COPD:  Generally stable and controlled with infrequent symptoms occurring with temperature changes, such as currently with the increased precipitation, and during times of upper respiratory infection.  At those times she uses nebulized albuterol.  Otherwise she uses inhaled albuterol up to once weekly.  Continue Trelegy for maintenance.  Following with pulmonary service regularly     Aortic root dilation:  Stable  Following with cardiology service  Continue metoprolol      Benign essential hypertension:  Stable and controlled  Continue current management      Prediabetes:  Stable control with hemoglobin A1c of 6.1%.  We both agreed to not have the patient initiate medical management at this time.  Be mindful of consumption of sweets, however without any other drastic recommendations provided.     Bilateral knee osteoarthritis:  Post bilateral replacement  Following with orthopedic surgery service for as needed management     Chronic lower back  pain:  Multifactorial etiology, including spinal stenosis of lumbar region and lumbar radiculitis and spondylolisthesis at L4-L5, and inflammatory spondylopathy of lumbar region:  Stable with intermittent symptoms of pain.  Patient would like to undergo follow-up for consideration of repeat epidural steroid injection.  No focal neurological deficits     Insomnia:  Stable  Continue current management    The patient indicates understanding of these issues and agrees to the plan.  Reinforced healthy diet, lifestyle, and exercise.      Return in 6 months (on 11/20/2025).     Brenton Hinojosa MD, 5/20/2025     Supplementary Documentation:   General Health:  In the past six months, have you lost more than 10 pounds without trying?: (Patient-Rptd) 2 - No  Has your appetite been poor?: (Patient-Rptd) Yes  Type of Diet: (Patient-Rptd) Balanced  How does the patient maintain a good energy level?: (Patient-Rptd) Appropriate Exercise  How would you describe your daily physical activity?: (Patient-Rptd) Moderate  How would you describe your current health state?: (Patient-Rptd) Fair  How do you maintain positive mental well-being?: (Patient-Rptd) Social Interaction, Visiting Friends, Visiting Family  On a scale of 0 to 10, with 0 being no pain and 10 being severe pain, what is your pain level?: (Patient-Rptd) 4 - (Moderate)  In the past six months, have you experienced urine leakage?: (Patient-Rptd) 1-Yes  At any time do you feel concerned for the safety/well-being of yourself and/or your children, in your home or elsewhere?: (Patient-Rptd) No  Have you had any immunizations at another office such as Influenza, Hepatitis B, Tetanus, or Pneumococcal?: (Patient-Rptd) Yes    Health Maintenance   Topic Date Due    Annual Well Visit  01/01/2025    COVID-19 Vaccine (8 - 2024-25 season) 03/23/2025    Mammogram  03/25/2026    Influenza Vaccine  Completed    Annual Depression Screening  Completed    Fall Risk Screening (Annual)  Completed     Pneumococcal Vaccine: 50+ Years  Completed    Zoster Vaccines  Completed    Meningococcal B Vaccine  Aged Out

## 2025-05-27 PROBLEM — Z85.3 HISTORY OF BREAST CANCER: Status: RESOLVED | Noted: 2025-05-16 | Resolved: 2025-05-27

## 2025-05-31 PROBLEM — C50.412 CARCINOMA OF UPPER-OUTER QUADRANT OF LEFT BREAST IN FEMALE, ESTROGEN RECEPTOR POSITIVE (HCC): Status: ACTIVE | Noted: 2025-05-31

## 2025-05-31 PROBLEM — Z17.0 CARCINOMA OF UPPER-OUTER QUADRANT OF LEFT BREAST IN FEMALE, ESTROGEN RECEPTOR POSITIVE (HCC): Status: ACTIVE | Noted: 2025-05-31

## 2025-06-01 NOTE — PROGRESS NOTES
Breast Surgery Surveillance Visit    Diagnosis: Invasive lobular carcinoma, left breast, s/p lumpectomy on 9/15/2023    Stage: T1 cNX MX    Disease Status:  Surgical treatment complete, letrozole onging per medical oncology, completed RT on 11/20/2023    History of Present Illness:   Ms. Shanti Coy is a 89 year old woman who presents with an imaging detected concern of her left breast.  The patient denies any palpable masses, nipple discharge, skin changes or axillary discomfort.  She does have a family history of breast cancer and has been consistent with annual mammograms.  She has no personal prior history of breast disease or biopsies.  She had a work-up performed at an outside facility.  Her screening mammogram on July 7, 2023 detected a new focal asymmetry with distortion in the left breast for which additional imaging was recommended.  A left diagnostic evaluation confirmed an associated 1 cm mass at 1230, 5 cm from the nipple correlating with the mammographic finding.  Her left axillary lymph nodes were noted to be sonographically normal.  She had ultrasound-guided biopsy on July 27, 2023 and was confirmed to have invasive lobular carcinoma, ER 90%, KY less than 1%, HER2/cecil negative and Ki-67 10 to 20%. She underwent lumpectomy, which occurred without complication.  She denies any new clinical concerns since her last visit and is tolerating her letrozole.  She had a left diagnostic evaluation on March 25, 2025 that showed no suspicious findings.  She is here today for evaluation and recommendations for further therapy.        Past Medical History:    Abdominal pain    6 months    Allergic rhinitis    Aortic valve insufficiency    last echo 7/26/23    Arthritis    Asthma (HCC)    Back pain    10 years    Constipation    Diarrhea, unspecified    Easy bruising    2 yrs    Eczema    Esophageal reflux    Essential hypertension    Exposure to medical diagnostic radiation    Fatigue    6 months    Female  climacteric state    Flatulence/gas pain/belching    Frequent use of laxatives    GERD    Heartburn    High blood pressure    High cholesterol    Hx of motion sickness    Irregular bowel habits    Left Breast cancer (HCC)    Numbness    Onychocryptosis    Osteoarthritis    Other and unspecified hyperlipidemia    Pain due to onychomycosis of toenail    Prediabetes    Primary localized osteoarthrosis of right lower leg    Shortness of breath    Sleep disturbance    Several years    Visual impairment    glasses    Wears glasses    7 years old    Weight loss    3months    Wheezing    10 years       Past Surgical History:   Procedure Laterality Date    Adenoidectomy  1960    Appendectomy      Appendectomy  50 years ago    Cataract      Colonoscopy  2009    Fluor gid & loclzj ndl/cath spi dx/ther njx  2014    Procedure: CERVICAL EPIDURAL;  Surgeon: Eugene Garcia MD;  Location: [facility]    Hysterectomy  1990    partial; one ovary in; heavy menses    Injection, w/wo contrast, dx/therapeutic substance, epidural/subarachnoid; lumbar/sacral  2014    Procedure: LUMBAR EPIDURAL;  Surgeon: Eugene Garcia MD;  Location: [facility]    Injection, w/wo contrast, dx/therapeutic substance, epidural/subarachnoid; lumbar/sacral N/A 2016    Procedure: LUMBAR EPIDURAL;  Surgeon: Jaswant Pham MD;  Location: [facility]    Injection, w/wo contrast, dx/therapeutic substance, epidural/subarachnoid; lumbar/sacral N/A 2016    Procedure: LUMBAR EPIDURAL;  Surgeon: Jaswant Pham MD;  Location: [facility]    Knee replacement surgery  6 yrs    Both knees    Knee surgery  2015    right leg    Lumpectomy left Left 2023    [facility]    Needle biopsy left Left 2023    US guided biopsy -[facility]          Other surgical history      arthroscopy knee    Radiation left      Tonsillectomy      Total abdom hysterectomy       30 years       Gynecological History:  Pt is a   Pt was 31 years old at time of first pregnancy.    She denies cumulative breastfeeding history   She achieved menarche at age 17 and LMP 52  Age of Menopause: 52  Type: hysterectomy with one ovary removed  She has history of hormone replacement therapy for 15 years, last used 35 years ago .  She has history of oral contraceptive use for 15 years, last in .  She denies infertility treatment to achieve pregnancy.    Medications:     Ferrous Sulfate 325 (65 Fe) MG Oral Tab Take 1 tablet (325 mg total) by mouth daily with breakfast. 30 tablet 3    Vitamin C 500 MG Oral Tab Take 1 tablet (500 mg total) by mouth daily. 30 tablet 3    Cyanocobalamin (VITAMIN B 12) 500 MCG Oral Tab Take 1,000 mcg by mouth daily. 30 tablet 3    hydrocortisone (PROCTOZONE-HC) 2.5 % External Cream Apply twice daily rectally for 14 days. Do not use for more than 14 days, 1 each 0    Aspirin 81 MG Oral Cap       metoprolol succinate ER 25 MG Oral Tablet 24 Hr Take 1 tablet (25 mg total) by mouth in the morning.      Losartan Potassium-HCTZ 100-12.5 MG Oral Tab Take 1 tablet by mouth daily. 90 tablet 3    atorvastatin 40 MG Oral Tab Take 1 tablet (40 mg total) by mouth in the morning.      TRELEGY ELLIPTA 200-62.5-25 MCG/ACT Inhalation Aerosol Powder, Breath Activated Inhale 1 puff into the lungs in the morning.      AMLODIPINE 5 MG Oral Tab TAKE 1 TABLET BY MOUTH EVERY DAY 90 tablet 0    Blood Pressure Monitoring (BLOOD PRESSURE CUFF) Does not apply Misc For use to measure BP daily 1 each 0    azelastine 0.1 % Nasal Solution 2 sprays by Nasal route as needed in the morning and 2 sprays as needed in the evening.      ipratropium 0.06 % Nasal Solution 2 sprays by Nasal route as needed in the morning and 2 sprays as needed at noon and 2 sprays as needed in the evening.      Albuterol Sulfate HFA (PROAIR HFA) 108 (90 Base) MCG/ACT Inhalation Aero Soln Inhale 2 puffs into the lungs every 4  (four) hours as needed. 1 each 5    mometasone 0.1 % External Ointment Apply 1 Application topically daily. 15 g 1    CRANBERRY EXTRACT OR Take 1 tablet by mouth in the morning.      Vitamin D3 (VITAMIN D3) 2000 UNITS Oral Cap Take 1 capsule (2,000 Units total) by mouth in the morning.      PRILOSEC 20 MG OR CPDR Take 1 capsule (20 mg total) by mouth every morning.      VITAMIN E Take 1 capsule by mouth in the morning.         Allergies:    Allergies   Allergen Reactions    Mold        Family History:   Family History   Problem Relation Age of Onset    Breast Cancer Self 87    Breast Cancer Mother 69    Cancer Mother     Cancer Father         leukemia    Cancer Brother         prostate ca    Heart Disorder Brother         Pacemaker    Cancer Son         prostate ca    Hypertension Son     Diabetes Maternal Grandmother     Diabetes Maternal Grandfather     Diabetes Paternal Grandmother     Diabetes Paternal Grandfather     Cancer Son        She is not of Ashkenazi Shinto ancestry.    Social History:  History   Alcohol Use Not Currently     Comment: rare cage 5/20/25 socially       History   Smoking Status    Never   Smokeless Tobacco    Never       Ms. Shanti Coy is  with 3 children. She has 2 siblings. She is currently Retired    Review of Systems:  General:   The patient denies, fever, chills, night sweats, fatigue, generalized weakness, change in appetite or weight loss.    HEENT:     The patient denies eye irritation, +cataracts, redness, glaucoma, yellowing of the eyes, change in vision, color blindness, or +wearing contacts/glasses. The patient denies hearing loss, ringing in the ears, ear drainage, earaches, nasal congestion, nose bleeds, snoring, pain in mouth/throat, hoarseness, change in voice, facial trauma.    Respiratory:  The patient denies chronic cough, phlegm, hemoptysis, pleurisy/chest pain, pneumonia, +asthma, +wheezing, difficulty in breathing with exertion, emphysema, chronic  bronchitis, +shortness of breath or abnormal sound when breathing.     Cardiovascular:  There is no history of chest pain, chest pressure/discomfort, palpitations, irregular heartbeat, fainting or near-fainting, difficulty breathing when lying flat, SOB/Coughing at night, swelling of the legs or chest pain while walking.    Breasts:  See history of present illness    Gastrointestinal:     There is no history of difficulty or pain with swallowing, +reflux symptoms, vomiting, dark or bloody stools, constipation, yellowing of the skin, indigestion, nausea, change in bowel habits, diarrhea, abdominal pain or vomiting blood.     Genitourinary:  The patient denies frequent urination, +needing to get up at night to urinate, urinary hesitancy or retaining urine, painful urination, urinary incontinence, decreased urine stream, blood in the urine or vaginal/penile discharge.    Skin:    The patient denies rash, itching, skin lesions, +dry skin, change in skin color or change in moles.     Hematologic/Lymphatic:  The patient denies easily bruising or bleeding or persistent swollen glands or lymph nodes.     Musculoskeletal:  The patient denies muscle aches/pain, joint pain, stiff joints, neck pain, +back pain or bone pain.    Neuropsychiatric:  There is no history of migraines or severe headaches, seizure/epilepsy, speech problems, coordination problems, trembling/tremors, fainting/black outs, dizziness, memory problems, loss of sensation/numbness, problems walking, weakness, tingling or burning in hands/feet. There is no history of abusive relationship, bipolar disorder, sleep disturbance, anxiety, depression or feeling of despair.    Endocrine:    There is no history of poor/slow wound healing, weight loss/gain, fertility or hormone problems, cold intolerance, thyroid disease.     Allergic/Immunologic:  There is no history of hives, hay fever, angioedema or anaphylaxis.    /59 (BP Location: Left arm, Patient Position:  Sitting, Cuff Size: adult)   Pulse 74   Resp 18   Wt 70.8 kg (156 lb)   LMP  (LMP Unknown)   SpO2 96%   BMI 26.78 kg/m²     Physical Exam:  The patient is an alert, oriented, well-nourished and  well-developed woman who appears her stated age. Her speech patterns and movements are normal. Her affect is appropriate.    HEENT: The head is normocephalic. The neck is supple. The thyroid is not enlarged and is without palpable masses/nodules. There are no palpable masses. The trachea is in the midline. Conjunctiva are clear, non-icteric.    Chest: The chest expands symmetrically. The lungs are clear to auscultation.    Heart: The rhythm is regular.  There are no murmurs, rubs, gallops or thrills.    Breasts:  Her breasts are symmetrical with a cup size 38D.  Right breast: The skin, nipple ,and areola appear normal. There is no skin dimpling with movement of the pectoralis. There is no nipple retraction. No nipple discharge can be elicited. The parenchyma is mildly nodular. There are no dominant masses in the breast. The axillary tail is normal.  Left breast:   The skin, nipple, and areola appear normal. There is no skin dimpling with movement of the pectoralis. There is no nipple retraction. No nipple discharge can be elicited. The parenchyma is mildly nodular. There are no dominant masses in the breast. The axillary tail is normal.  There is a well-healed incision with no signs of new or recurrent disease.    Abdomen:  The abdomen is soft, flat and non tender. The liver is not enlarged. There are no palpable masses.    Lymph Nodes:  The supraclavicular, axillary and cervical regions are free of significant lymphadenopathy.    Back: There is no vertebral column tenderness.    Skin: The skin appears normal. There are no suspicious appearing rashes or lesions.    Extremities: The extremities are without deformity, cyanosis or edema.    Impression:   Ms. Shanti Coy is a 89 year old woman presents with family  history of breast cancer left breast cancer, s/p lumpectomy.    Recommendations:   I had a discussion with the Patient regarding her breast exam.  She has healed well since surgery with no signs of local recurrence.  I reviewed the recent imaging which was concordant with her clinical exam.  I  reviewed her pathology which showed invasive lobular carcinoma measuring 1.4cm with negative margins.  No rehana interrogation was performed due to normal clinical and radiological exam and advanced age.  She completed radiation without sequelae and is tolerating her letrozole under the direction of medical oncology.  She will be due for a bilateral diagnostic evaluation in September 2025 with a clinical exam to follow.  I encouraged her to continue monitoring her ROM and strength and explained that a referral to physical therapy may be warranted in the future if she identifies any limitations or restrictions. She was given ample opportunity for questions and those questions were answered to her satisfaction. She was encouraged to contact the office with any questions or concerns prior to her next scheduled appointment.     This encounter lasted a total of 25 minutes, more than 50% of which was dedicated to the discussion of management options.

## 2025-06-12 ENCOUNTER — LAB ENCOUNTER (OUTPATIENT)
Dept: LAB | Age: 89
End: 2025-06-12
Attending: INTERNAL MEDICINE
Payer: MEDICARE

## 2025-06-12 DIAGNOSIS — D50.9 IRON DEFICIENCY ANEMIA, UNSPECIFIED IRON DEFICIENCY ANEMIA TYPE: ICD-10-CM

## 2025-06-12 LAB
BASOPHILS # BLD AUTO: 0.06 X10(3) UL (ref 0–0.2)
BASOPHILS NFR BLD AUTO: 1 %
DEPRECATED HBV CORE AB SER IA-ACNC: 43 NG/ML (ref 50–306)
EOSINOPHIL # BLD AUTO: 0.14 X10(3) UL (ref 0–0.7)
EOSINOPHIL NFR BLD AUTO: 2.3 %
ERYTHROCYTE [DISTWIDTH] IN BLOOD BY AUTOMATED COUNT: 13.8 %
HCT VFR BLD AUTO: 42.4 % (ref 35–48)
HGB BLD-MCNC: 14.3 G/DL (ref 12–16)
IMM GRANULOCYTES # BLD AUTO: 0.03 X10(3) UL (ref 0–1)
IMM GRANULOCYTES NFR BLD: 0.5 %
IRON SATN MFR SERPL: 23 % (ref 15–50)
IRON SERPL-MCNC: 72 UG/DL (ref 50–170)
LYMPHOCYTES # BLD AUTO: 0.92 X10(3) UL (ref 1–4)
LYMPHOCYTES NFR BLD AUTO: 15.4 %
MCH RBC QN AUTO: 29.5 PG (ref 26–34)
MCHC RBC AUTO-ENTMCNC: 33.7 G/DL (ref 31–37)
MCV RBC AUTO: 87.6 FL (ref 80–100)
MONOCYTES # BLD AUTO: 0.79 X10(3) UL (ref 0.1–1)
MONOCYTES NFR BLD AUTO: 13.2 %
NEUTROPHILS # BLD AUTO: 4.03 X10 (3) UL (ref 1.5–7.7)
NEUTROPHILS # BLD AUTO: 4.03 X10(3) UL (ref 1.5–7.7)
NEUTROPHILS NFR BLD AUTO: 67.6 %
PLATELET # BLD AUTO: 295 10(3)UL (ref 150–450)
RBC # BLD AUTO: 4.84 X10(6)UL (ref 3.8–5.3)
TOTAL IRON BINDING CAPACITY: 316 UG/DL (ref 250–425)
TRANSFERRIN SERPL-MCNC: 228 MG/DL (ref 250–380)
WBC # BLD AUTO: 6 X10(3) UL (ref 4–11)

## 2025-06-12 PROCEDURE — 36415 COLL VENOUS BLD VENIPUNCTURE: CPT

## 2025-06-12 PROCEDURE — 83550 IRON BINDING TEST: CPT

## 2025-06-12 PROCEDURE — 82728 ASSAY OF FERRITIN: CPT

## 2025-06-12 PROCEDURE — 85025 COMPLETE CBC W/AUTO DIFF WBC: CPT

## 2025-06-12 PROCEDURE — 83540 ASSAY OF IRON: CPT

## 2025-06-13 NOTE — PROGRESS NOTES
Agree.  This is commonly seen with iron deficiency, and does not change the recommendation for management; continue iron supplementation.

## 2025-06-26 ENCOUNTER — TELEPHONE (OUTPATIENT)
Dept: INTERNAL MEDICINE CLINIC | Facility: CLINIC | Age: 89
End: 2025-06-26

## 2025-08-22 ENCOUNTER — ANESTHESIA (OUTPATIENT)
Dept: ENDOSCOPY | Facility: HOSPITAL | Age: 89
End: 2025-08-22

## 2025-08-22 ENCOUNTER — ANESTHESIA EVENT (OUTPATIENT)
Dept: ENDOSCOPY | Facility: HOSPITAL | Age: 89
End: 2025-08-22

## 2025-08-22 ENCOUNTER — HOSPITAL ENCOUNTER (OUTPATIENT)
Facility: HOSPITAL | Age: 89
Setting detail: HOSPITAL OUTPATIENT SURGERY
Discharge: HOME | End: 2025-08-22
Attending: INTERNAL MEDICINE | Admitting: INTERNAL MEDICINE

## 2025-08-22 VITALS
OXYGEN SATURATION: 90 % | RESPIRATION RATE: 17 BRPM | DIASTOLIC BLOOD PRESSURE: 64 MMHG | HEART RATE: 68 BPM | BODY MASS INDEX: 25.8 KG/M2 | TEMPERATURE: 98 F | WEIGHT: 153 LBS | HEIGHT: 64.5 IN | SYSTOLIC BLOOD PRESSURE: 139 MMHG

## 2025-08-22 DIAGNOSIS — K59.00 CONSTIPATION, UNSPECIFIED CONSTIPATION TYPE: ICD-10-CM

## 2025-08-22 DIAGNOSIS — K44.9 LARGE HIATAL HERNIA: ICD-10-CM

## 2025-08-22 DIAGNOSIS — L29.0 RECTAL ITCHING: ICD-10-CM

## 2025-08-22 DIAGNOSIS — K21.9 GASTROESOPHAGEAL REFLUX DISEASE, UNSPECIFIED WHETHER ESOPHAGITIS PRESENT: ICD-10-CM

## 2025-08-22 DIAGNOSIS — D64.9 ANEMIA, UNSPECIFIED TYPE: ICD-10-CM

## 2025-08-22 DIAGNOSIS — K44.9 HIATAL HERNIA: ICD-10-CM

## 2025-08-22 DIAGNOSIS — R63.4 WEIGHT LOSS: ICD-10-CM

## 2025-08-22 DIAGNOSIS — R19.8 IRREGULAR BOWEL HABITS: ICD-10-CM

## 2025-08-22 PROCEDURE — 88342 IMHCHEM/IMCYTCHM 1ST ANTB: CPT | Performed by: INTERNAL MEDICINE

## 2025-08-22 PROCEDURE — 88305 TISSUE EXAM BY PATHOLOGIST: CPT | Performed by: INTERNAL MEDICINE

## 2025-08-22 DEVICE — CLIP HEMSTAS W16MMXL230CM WRK CHN 2.8MM: Type: IMPLANTABLE DEVICE

## 2025-08-22 RX ORDER — HYDROMORPHONE HYDROCHLORIDE 1 MG/ML
0.2 INJECTION, SOLUTION INTRAMUSCULAR; INTRAVENOUS; SUBCUTANEOUS EVERY 5 MIN PRN
Status: DISCONTINUED | OUTPATIENT
Start: 2025-08-22 | End: 2025-08-22

## 2025-08-22 RX ORDER — HYDROMORPHONE HYDROCHLORIDE 1 MG/ML
0.4 INJECTION, SOLUTION INTRAMUSCULAR; INTRAVENOUS; SUBCUTANEOUS EVERY 5 MIN PRN
Status: DISCONTINUED | OUTPATIENT
Start: 2025-08-22 | End: 2025-08-22

## 2025-08-22 RX ORDER — SODIUM CHLORIDE, SODIUM LACTATE, POTASSIUM CHLORIDE, CALCIUM CHLORIDE 600; 310; 30; 20 MG/100ML; MG/100ML; MG/100ML; MG/100ML
INJECTION, SOLUTION INTRAVENOUS CONTINUOUS
Status: DISCONTINUED | OUTPATIENT
Start: 2025-08-22 | End: 2025-08-22

## 2025-08-22 RX ORDER — METOCLOPRAMIDE HYDROCHLORIDE 5 MG/ML
10 INJECTION INTRAMUSCULAR; INTRAVENOUS EVERY 8 HOURS PRN
Status: DISCONTINUED | OUTPATIENT
Start: 2025-08-22 | End: 2025-08-22

## 2025-08-22 RX ORDER — NALOXONE HYDROCHLORIDE 0.4 MG/ML
0.08 INJECTION, SOLUTION INTRAMUSCULAR; INTRAVENOUS; SUBCUTANEOUS AS NEEDED
Status: DISCONTINUED | OUTPATIENT
Start: 2025-08-22 | End: 2025-08-22

## 2025-08-22 RX ORDER — LIDOCAINE HYDROCHLORIDE 10 MG/ML
INJECTION, SOLUTION EPIDURAL; INFILTRATION; INTRACAUDAL; PERINEURAL AS NEEDED
Status: DISCONTINUED | OUTPATIENT
Start: 2025-08-22 | End: 2025-08-22 | Stop reason: SURG

## 2025-08-22 RX ORDER — HYDROMORPHONE HYDROCHLORIDE 1 MG/ML
0.6 INJECTION, SOLUTION INTRAMUSCULAR; INTRAVENOUS; SUBCUTANEOUS EVERY 5 MIN PRN
Status: DISCONTINUED | OUTPATIENT
Start: 2025-08-22 | End: 2025-08-22

## 2025-08-22 RX ORDER — ONDANSETRON 2 MG/ML
4 INJECTION INTRAMUSCULAR; INTRAVENOUS EVERY 6 HOURS PRN
Status: DISCONTINUED | OUTPATIENT
Start: 2025-08-22 | End: 2025-08-22

## 2025-08-22 RX ADMIN — SODIUM CHLORIDE, SODIUM LACTATE, POTASSIUM CHLORIDE, CALCIUM CHLORIDE: 600; 310; 30; 20 INJECTION, SOLUTION INTRAVENOUS at 09:04:00

## 2025-08-22 RX ADMIN — LIDOCAINE HYDROCHLORIDE 25 MG: 10 INJECTION, SOLUTION EPIDURAL; INFILTRATION; INTRACAUDAL; PERINEURAL at 08:24:00

## (undated) DIAGNOSIS — Z00.00 ROUTINE GENERAL MEDICAL EXAMINATION AT A HEALTH CARE FACILITY: Primary | ICD-10-CM

## (undated) DIAGNOSIS — E78.00 PURE HYPERCHOLESTEROLEMIA: ICD-10-CM

## (undated) DIAGNOSIS — I10 HYPERTENSION, UNSPECIFIED TYPE: ICD-10-CM

## (undated) DIAGNOSIS — E78.00 HYPERCHOLESTEROLEMIA: ICD-10-CM

## (undated) DEVICE — THREADED PINS PACK: Brand: KNEE INSTRUMENTS

## (undated) DEVICE — SMOOTH PINS PACK: Brand: KNEE INSTRUMENTS

## (undated) DEVICE — TOWEL,OR,DSP,ST,BLUE,DLX,2/PK,40PK/CS: Brand: MEDLINE

## (undated) DEVICE — Device: Brand: STABLECUT®

## (undated) DEVICE — SUT VICRYL 3-0 SH J416H

## (undated) DEVICE — DRESSING ANTIMIC 3.5 X 12 IN AQCEL AG ADVNTG

## (undated) DEVICE — PADDING CAST W4INXL4YD 100% COT SFT SELF BOND

## (undated) DEVICE — DISPOSABLE TOURNIQUET CUFF SINGLE BLADDER, DUAL PORT AND QUICK CONNECT CONNECTOR: Brand: COLOR CUFF

## (undated) DEVICE — BITEBLOCK ENDOSCP 60FR MAXI STRP

## (undated) DEVICE — LAPAROTOMY SPONGE - RF AND X-RAY DETECTABLE PRE-WASHED: Brand: SITUATE

## (undated) DEVICE — GLOVE SUR 7.5 SENSICARE PI PIP GRN PWD F

## (undated) DEVICE — TIP CLEANER: Brand: VALLEYLAB

## (undated) DEVICE — SCREWS PACK: Brand: KNEE INSTRUMENTS

## (undated) DEVICE — SUT SILK 2-0 FS 685G

## (undated) DEVICE — SOLUTION SCRB 4OZ 4% CHG ANTISEPSIS HIBICLN

## (undated) DEVICE — 3M™ STERI-DRAPE™ INSTRUMENT POUCH 1018: Brand: STERI-DRAPE™

## (undated) DEVICE — SHORT THREADED PINS PACK: Brand: KNEE INSTRUMENTS

## (undated) DEVICE — ANTIBACTERIAL UNDYED BRAIDED (POLYGLACTIN 910), SYNTHETIC ABSORBABLE SUTURE: Brand: COATED VICRYL

## (undated) DEVICE — LIGHT HANDLE

## (undated) DEVICE — UNDERPAD INCONTINENCE W23XL36I

## (undated) DEVICE — BREAST-HERNIA-PORT CDS-LF: Brand: MEDLINE INDUSTRIES, INC.

## (undated) DEVICE — Device

## (undated) DEVICE — ELECTRODE EKG W3.5XL4CM ABRAD W1.25XL1.5IN

## (undated) DEVICE — SYRINGE MED 30ML STD CLR PLAS LL TIP N CTRL

## (undated) DEVICE — PREMIUM WET SKIN PREP TRAY: Brand: MEDLINE INDUSTRIES, INC.

## (undated) DEVICE — HOOD: Brand: FLYTE

## (undated) DEVICE — BOWL BNE CEM MIX DISP QUIK-VAC

## (undated) DEVICE — GOWN,SIRUS,FABRIC-REINFORCED,X-LARGE: Brand: MEDLINE

## (undated) DEVICE — STERILE POLYISOPRENE POWDER-FREE SURGICAL GLOVES: Brand: PROTEXIS

## (undated) DEVICE — NEEDLE SPNL 18GA L3.5IN PNK QNCKE STYL DISP

## (undated) DEVICE — KIT CUSTOM ENDOPROCEDURE STERIS

## (undated) DEVICE — DRAPE,TAPE STRIPS,STERILE: Brand: MEDLINE

## (undated) DEVICE — GLOVE SUR 8.5 SENSICARE PI PIP GRN PWD F

## (undated) DEVICE — ATTAHJA VAC WITH 22MM CONNECTR

## (undated) DEVICE — CONTAINER,SPECIMEN,PNEU TUBE,4OZ,OR STRL: Brand: MEDLINE

## (undated) DEVICE — SOLUTION PREP 4OZ 10% POVIDONE IOD SCR TOP

## (undated) DEVICE — KIT MFLD FOR SPEC COLL

## (undated) DEVICE — HOOD, PEEL-AWAY: Brand: FLYTE

## (undated) DEVICE — DRAPE,U/SHT,SPLIT,FILM,60X84,STERILE: Brand: MEDLINE

## (undated) DEVICE — BANDAGE COHESIVE W4INXL5YD TAN E POR SLF ADH

## (undated) DEVICE — SOL NACL IRRIG 0.9% 1000ML BTL

## (undated) DEVICE — GOWN,SIRUS,FABRIC-REINFORCED,LARGE: Brand: MEDLINE

## (undated) DEVICE — ELECTRODE MONIT RED DOT 3PK

## (undated) DEVICE — PAD,ABDOMINAL,8"X7.5",ST,LF,20/BX: Brand: MEDLINE INDUSTRIES, INC.

## (undated) DEVICE — CANISTER SUCT 1200CC LID BLU HRD ADPT AUTO

## (undated) DEVICE — MEGADYNE E-Z CLEAN BLADE 2.75"

## (undated) DEVICE — SUT MONOCRYL 4-0 PS-2 Y496G

## (undated) DEVICE — GLOVE SUR 8 SENSICARE PI PIP CRM PWD F

## (undated) DEVICE — TOTAL HIP CDS: Brand: MEDLINE INDUSTRIES, INC.

## (undated) DEVICE — CANNULA NSL AD W/ FLTR 14FT O2/CO2

## (undated) DEVICE — SLEEVE COMPR MD KNEE LEN SGL USE KENDALL SCD

## (undated) DEVICE — SWORD PIN PACK: Brand: KNEE INSTRUMENTS

## (undated) DEVICE — SNARE SURG L230CM MIN WRK CHN 2.8MM OVL 15MM

## (undated) DEVICE — KIT VLV 5 PC AIR H2O SUCT BX ENDOGATOR CONN

## (undated) DEVICE — DRAPE PACK CHEST & U BAR

## (undated) DEVICE — SOLUTION IRRIG 3000ML 0.9% NACL FLX CONT

## (undated) DEVICE — SLEEVE KENDALL SCD EXPRESS MED

## (undated) DEVICE — COVER LT HNDL RIG FOR SUR CAM DISP

## (undated) DEVICE — 3M™ IOBAN™ 2 ANTIMICROBIAL INCISE DRAPE 6651EZ: Brand: IOBAN™ 2

## (undated) DEVICE — DRAPE,TOWEL,LARGE,INVISISHIELD: Brand: MEDLINE

## (undated) DEVICE — HEMOCLIP HORIZON SM 001200

## (undated) DEVICE — GLOVE SUR 7.5 SENSICARE PI PIP CRM PWD F

## (undated) DEVICE — HEMOCLIP HORIZON MED 002200

## (undated) DEVICE — WRAP COMPR UNIV KNEE HOT CLD GEL MICWV AND

## (undated) DEVICE — SUT ETHBND XL 5 30IN V-37 NABSRB GRN 40MM 1/2

## (undated) DEVICE — PENCIL ES BTTN SWCH W/ TIP HOLSTER E-Z CLN

## (undated) DEVICE — CLEAR MONOFILAMENT (POLYDIOXANONE), ABSORBABLE SURGICAL SUTURE: Brand: PDS

## (undated) DEVICE — SOLUTION RUBBING 4OZ 70% ISO ALC CLR

## (undated) NOTE — MR AVS SNAPSHOT
[facility]  [address] [address] [phone]               Thank you for choosing us for your health care visit with Refugio Roberts MD.  We are glad to serve you and happy to provide you with this summa authorization numbers or be assured that none are required. You can then schedule your appointment. Failure to obtain required authorization numbers can create reimbursement difficulties for you.       Assoc Dx:  S/P TKR (total knee replacement) using cemen 10/01/2013 123*   ---------- Medicare covers annually or at 6-month intervals for prediabetic patients        Cardiovascular Disease Screening     Cholesterol, covered every 5 yrs including Total, LDL and Trigs LDL CHOLESTEROL CALC (mg/dL)   Date Value   0 Bone density screening   Covered every 2 yrs after age 72    Covered yearly for Long term Glucocorticoid medication (Steroids) Requires diagnosis related to osteoporosis or estrogen deficiency.    Biennial benefit unless patient has history of long-term gl -TETANUS, DIPHTHERIA TOXOIDS AND ACELLULAR PERTUSIS VACCINE (TDAP), >7 YEARS, IM USE    This may be covered with your prescription benefits, but Medicare does not cover unless Medically needed    Zoster (Not covered by Medicare Part B) No orders found for Commonly known as:  FLONASE           fluticasone-salmeterol 250-50 MCG/DOSE Aepb   Inhale 1 puff into the lungs every 12 (twelve) hours.    Commonly known as:  ADVAIR DISKUS           Ipratropium Bromide 0.03 % Soln   2 sprays by Nasal route every 12 (twel ? Use a non skid rubber mat in the tub/shower. ? If you are unsteady on your feet you may want to use a shower chair/bench and a hand held shower head while bathing/showering.   BEDROOM:  ? Place light switches within reach of your bed and a night light be Choose whole grain products Foods high in sodium   Water is best for hydration Fast food.    Eat at home when possible     Tips for increasing your physical activity – Adults who are physically active are less likely to develop some chronic diseases than ad

## (undated) NOTE — LETTER
OUTSIDE TESTING RESULT REQUEST     IMPORTANT: FOR YOUR IMMEDIATE ATTENTION  Please FAX all test results listed below to: [phone]      * * * * If testing is NOT complete, arrange with patient A.S.A.P. * * * *      Patient Name: Shanti Coy  Surgery Date: 2024  Medical Record: [id number]  CSN: [id number]  : 1935 - A: 88 y     Sex: female  Surgeon(s):  Sanjeev Cage MD  Procedure: LEFT TOTAL KNEE ARTHROPLASTY.  Anesthesia Type: Choice     Surgeon: Sanjeev Cage MD     The following Testing and Time Line are REQUIRED PER ANESTHESIA     EKG READ AND SIGNED WITHIN   90 days      Thank You,   Sent by: ALEX Peters

## (undated) NOTE — LETTER
Patient Name: Mia Garrett  YOB: 1935          MRN number:  [id number]  Date:  9/27/2021  Referring Physician:  Abhilash Jaramillo    Discharge Summary  Initial Functional Outcome Score 59/100  Final Functional Outcome Score 68/100  Number of Visi Elevation - 2 x daily - 7 x weekly - 3 sets - 25 hold  Seated Upper Trapezius Stretch - 2 x daily - 7 x weekly - 3 sets - 20 hold  Sidelying Thoracic Rotation with Open Book - 1 x daily - 7 x weekly - 1 sets - 10 reps - 5 hold  Standing Backward Shoulder R

## (undated) NOTE — LETTER
03/03/21        [address]  [address] [facility] [address]      Dear Evelyn Campos,    [address] records indicate that you have outstanding lab work and or testing that was ordered for you and has not yet been completed:  Orders Placed This Encounter

## (undated) NOTE — LETTER
24    Orthopedic Surgery   Pre-Operative Clearance Request    Patient Name:   Shanti Coy             :   1935    Surgeon: Dr. Cage             Date of Surgery: 24    Surgical Procedure: LEFT TOTAL KNEE ARTHROPLASTY.       Please complete all of the following 2-3 weeks PRIOR TO your scheduled surgery to avoid potential cancellation.         [x]  Cardiac Clearance    **Please fax test results, H&P, and clearance to [phone] and to P.A.T at [phone]**

## (undated) NOTE — LETTER
October 29, 2019    Aspenvadim Allie  [address]  [address] [facility] [address]    Dear Vinny Yee: It was a pleasure speaking with you over the phone recently.  To follow up, I wanted to send you some contact information to utilize when you have a question and or

## (undated) NOTE — LETTER
ASTHMA ACTION PLAN for Flaca Fung     : 1935     Date: 2021  Provider:  Arlette Curtis PA-C  Phone for doctor or clinic: [address] [place] [address], [address] [place] [address], [facility] [address]  [place] [id number] [phone]    [facility]

## (undated) NOTE — LETTER
OUTSIDE TESTING RESULT REQUEST     IMPORTANT: FOR YOUR IMMEDIATE ATTENTION  Please FAX all test results listed below to: [phone]         * * * * If testing is NOT complete, arrange with patient A.S.A.P. * * * *      Patient Name: Shanti Coy  Surgery Date: 2024  Medical Record: [id number]  CSN: [id number]  : 1935 - A: 88 y     Sex: female  Surgeon(s):  Sanjeev Cage MD  Procedure: LEFT TOTAL KNEE ARTHROPLASTY.  Anesthesia Type: Choice     Surgeon: Sanjeev Cage MD     The following Testing and Time Line are REQUIRED PER ANESTHESIA     EKG READ AND SIGNED WITHIN   90 days      Thank You,   Sent by:Jaimee JOHNSON

## (undated) NOTE — LETTER
ASTHMA ACTION PLAN for Bessy Fernandes     : 1935     Date: 2018  Provider:  Thom Jaramillo MD  Phone for doctor or clinic: [facility], [address] [facility], [address] [place] [address] [phone]

## (undated) NOTE — LETTER
24    Orthopedic Surgery   Pre-Operative Clearance Request    Patient Name:   Shanti Coy             :   1935    Surgeon: Dr. Cage             Date of Surgery: 24    Surgical Procedure: LEFT TOTAL KNEE ARTHROPLASTY.       Please complete all of the following 2-3 weeks PRIOR TO your scheduled surgery to avoid potential cancellation.     [x]  History and Physical        [x]  Medical  Clearance                     Required pre-op testing to be ordered:                        [x]  CBC W/Diff                                                                   [x]  CMP                                                                                      [x]  PT/INR    [x]  PTT     [x]  Type and Screen                     **Please fax test results, H&P, and clearance to [phone] and to P.A.T at [phone]**

## (undated) NOTE — LETTER
Patient Name: Mars Cuba  YOB: 1935          MRN number:  [id number]  Date:  6/11/2018  Referring Physician:  Edy Lema     Progress/Discharge Summary  Dx: Dizziness, Neck pain           Authorized # of Visits:  10 requested (Willie Pearson making this decision. In agreement with this. Patient to hold from PT per plan as listed below.      Objective:   Postural Control:  4-Stage Balance Test:    - Feet together: >30 sec   - Modified Tandem:  >30 sec ZURI with min sway   - Tandem Stance: R back for continued PT as needed within plan. If do not hear from patient and she continues to do well, will consider this date formal D/C from PT.       Patient/Family/Caregiver was advised of these findings, precautions, and treatment options and has agreed to

## (undated) NOTE — LETTER
ASTHMA ACTION PLAN for Nicolas Goldman     : 1935     Date: 2017  Provider:  MONA Lou  Phone for doctor or clinic: [facility], [address] [facility], [address] [place] [address] [phone]

## (undated) NOTE — Clinical Note
ASTHMA ACTION PLAN for Marika Kelley     : 1935     Date: 6/15/2017  Provider:  Dax Flores MD  Phone for doctor or clinic: [facility] [address] [place] [address] [phone]

## (undated) NOTE — LETTER
OUTSIDE TESTING RESULT REQUEST     IMPORTANT: FOR YOUR IMMEDIATE ATTENTION  Please FAX all test results listed below to: [phone]     Testing already done on or about: 23     * * * * If testing is NOT complete, arrange with patient A.S.A.P. * * * *      Patient Name: Christen Hernandez  Surgery Date: 9/15/2023  Medical Record: [id number]  CSN: [id number]  : 1935 - A: 82 y     Sex: female  Surgeon(s):  Clarke Donohue MD  Procedure: Left breast wire localized lumpectomy  Anesthesia Type: MAC     Surgeon: Clarke Donohue MD     The following Testing and Time Line are REQUIRED PER ANESTHESIA     EKG READ AND SIGNED WITHIN   90 days  BMP (requires 4 hour fast) within  60 days      Thank You,   Sent by: Nataliia JOHNSON

## (undated) NOTE — LETTER
11/09/18        [address]  [facility]      Dear Angelita Shell records indicate that you have outstanding lab work and or testing that was ordered for you and has not yet been completed:  Orders Placed This Encounter    XR VI

## (undated) NOTE — LETTER
07/19/21        [address]  [address] [facility] [address]      Dear Viv Wayne,    [address] [facility] records indicate that you have outstanding lab work and or testing that was ordered for you and has not yet been completed:  Orders Placed This Encounter